# Patient Record
Sex: FEMALE | Race: WHITE | Employment: OTHER | URBAN - METROPOLITAN AREA
[De-identification: names, ages, dates, MRNs, and addresses within clinical notes are randomized per-mention and may not be internally consistent; named-entity substitution may affect disease eponyms.]

---

## 2020-05-03 ENCOUNTER — HOSPITAL ENCOUNTER (INPATIENT)
Age: 75
LOS: 14 days | Discharge: HOME | DRG: 829 | End: 2020-05-18
Attending: EMERGENCY MEDICINE | Admitting: INTERNAL MEDICINE
Payer: MEDICARE

## 2020-05-03 LAB
A/G RATIO: 1.5 (ref 1.1–2.2)
ALBUMIN SERPL-MCNC: 4 G/DL (ref 3.4–5)
ALP BLD-CCNC: 88 U/L (ref 40–129)
ALT SERPL-CCNC: 25 U/L (ref 10–40)
ANION GAP SERPL CALCULATED.3IONS-SCNC: 13 MMOL/L (ref 3–16)
AST SERPL-CCNC: 28 U/L (ref 15–37)
ATYPICAL LYMPHOCYTE RELATIVE PERCENT: 53 % (ref 0–6)
BASOPHILS ABSOLUTE: 0 K/UL (ref 0–0.2)
BASOPHILS RELATIVE PERCENT: 0 %
BILIRUB SERPL-MCNC: 0.8 MG/DL (ref 0–1)
BUN BLDV-MCNC: 10 MG/DL (ref 7–20)
CALCIUM SERPL-MCNC: 8.7 MG/DL (ref 8.3–10.6)
CHLORIDE BLD-SCNC: 75 MMOL/L (ref 99–110)
CO2: 24 MMOL/L (ref 21–32)
CREAT SERPL-MCNC: <0.5 MG/DL (ref 0.6–1.2)
EOSINOPHILS ABSOLUTE: 0 K/UL (ref 0–0.6)
EOSINOPHILS RELATIVE PERCENT: 0 %
GFR AFRICAN AMERICAN: >60
GFR NON-AFRICAN AMERICAN: >60
GLOBULIN: 2.6 G/DL
GLUCOSE BLD-MCNC: 127 MG/DL (ref 70–99)
HCT VFR BLD CALC: 38.8 % (ref 36–48)
HEMATOLOGY PATH CONSULT: YES
HEMOGLOBIN: 13.4 G/DL (ref 12–16)
LIPASE: 26 U/L (ref 13–60)
LYMPHOCYTES ABSOLUTE: 43.4 K/UL (ref 1–5.1)
LYMPHOCYTES RELATIVE PERCENT: 31 %
MCH RBC QN AUTO: 29.8 PG (ref 26–34)
MCHC RBC AUTO-ENTMCNC: 34.4 G/DL (ref 31–36)
MCV RBC AUTO: 86.8 FL (ref 80–100)
MONOCYTES ABSOLUTE: 1.6 K/UL (ref 0–1.3)
MONOCYTES RELATIVE PERCENT: 3 %
NEUTROPHILS ABSOLUTE: 6.7 K/UL (ref 1.7–7.7)
NEUTROPHILS RELATIVE PERCENT: 13 %
PDW BLD-RTO: 13.9 % (ref 12.4–15.4)
PLATELET # BLD: 419 K/UL (ref 135–450)
PLATELET SLIDE REVIEW: ADEQUATE
PMV BLD AUTO: 7.5 FL (ref 5–10.5)
POTASSIUM REFLEX MAGNESIUM: 4 MMOL/L (ref 3.5–5.1)
RBC # BLD: 4.48 M/UL (ref 4–5.2)
RBC # BLD: NORMAL 10*6/UL
SMUDGE CELLS: PRESENT
SODIUM BLD-SCNC: 112 MMOL/L (ref 136–145)
TOTAL PROTEIN: 6.6 G/DL (ref 6.4–8.2)
WBC # BLD: 51.7 K/UL (ref 4–11)

## 2020-05-03 PROCEDURE — 82533 TOTAL CORTISOL: CPT

## 2020-05-03 PROCEDURE — 6360000002 HC RX W HCPCS: Performed by: EMERGENCY MEDICINE

## 2020-05-03 PROCEDURE — 93005 ELECTROCARDIOGRAM TRACING: CPT | Performed by: EMERGENCY MEDICINE

## 2020-05-03 PROCEDURE — 80053 COMPREHEN METABOLIC PANEL: CPT

## 2020-05-03 PROCEDURE — 84443 ASSAY THYROID STIM HORMONE: CPT

## 2020-05-03 PROCEDURE — 2500000003 HC RX 250 WO HCPCS: Performed by: EMERGENCY MEDICINE

## 2020-05-03 PROCEDURE — 83690 ASSAY OF LIPASE: CPT

## 2020-05-03 PROCEDURE — 96374 THER/PROPH/DIAG INJ IV PUSH: CPT

## 2020-05-03 PROCEDURE — 2580000003 HC RX 258: Performed by: EMERGENCY MEDICINE

## 2020-05-03 PROCEDURE — 36415 COLL VENOUS BLD VENIPUNCTURE: CPT

## 2020-05-03 PROCEDURE — 85025 COMPLETE CBC W/AUTO DIFF WBC: CPT

## 2020-05-03 PROCEDURE — 80061 LIPID PANEL: CPT

## 2020-05-03 PROCEDURE — 96375 TX/PRO/DX INJ NEW DRUG ADDON: CPT

## 2020-05-03 PROCEDURE — 99291 CRITICAL CARE FIRST HOUR: CPT

## 2020-05-03 PROCEDURE — 83930 ASSAY OF BLOOD OSMOLALITY: CPT

## 2020-05-03 RX ORDER — 0.9 % SODIUM CHLORIDE 0.9 %
1000 INTRAVENOUS SOLUTION INTRAVENOUS ONCE
Status: COMPLETED | OUTPATIENT
Start: 2020-05-03 | End: 2020-05-04

## 2020-05-03 RX ORDER — ONDANSETRON 2 MG/ML
4 INJECTION INTRAMUSCULAR; INTRAVENOUS EVERY 30 MIN PRN
Status: DISCONTINUED | OUTPATIENT
Start: 2020-05-03 | End: 2020-05-04

## 2020-05-03 RX ADMIN — FAMOTIDINE 20 MG: 10 INJECTION INTRAVENOUS at 22:32

## 2020-05-03 RX ADMIN — SODIUM CHLORIDE 1000 ML: 9 INJECTION, SOLUTION INTRAVENOUS at 22:32

## 2020-05-03 RX ADMIN — ONDANSETRON 4 MG: 2 INJECTION INTRAMUSCULAR; INTRAVENOUS at 22:32

## 2020-05-04 ENCOUNTER — APPOINTMENT (OUTPATIENT)
Dept: GENERAL RADIOLOGY | Age: 75
DRG: 829 | End: 2020-05-04
Payer: MEDICARE

## 2020-05-04 PROBLEM — R11.2 N&V (NAUSEA AND VOMITING): Status: ACTIVE | Noted: 2020-05-04

## 2020-05-04 PROBLEM — D72.829 LEUKOCYTOSIS: Status: ACTIVE | Noted: 2020-05-04

## 2020-05-04 PROBLEM — E87.1 HYPONATREMIA: Status: ACTIVE | Noted: 2020-05-04

## 2020-05-04 LAB
ALBUMIN SERPL-MCNC: 3.4 G/DL (ref 3.4–5)
ALBUMIN SERPL-MCNC: 4.1 G/DL (ref 3.4–5)
ANION GAP SERPL CALCULATED.3IONS-SCNC: 10 MMOL/L (ref 3–16)
ANION GAP SERPL CALCULATED.3IONS-SCNC: 12 MMOL/L (ref 3–16)
BACTERIA: ABNORMAL /HPF
BILIRUBIN URINE: NEGATIVE
BLOOD, URINE: ABNORMAL
BUN BLDV-MCNC: 10 MG/DL (ref 7–20)
BUN BLDV-MCNC: 7 MG/DL (ref 7–20)
CALCIUM SERPL-MCNC: 8.2 MG/DL (ref 8.3–10.6)
CALCIUM SERPL-MCNC: 9.1 MG/DL (ref 8.3–10.6)
CHLORIDE BLD-SCNC: 77 MMOL/L (ref 99–110)
CHLORIDE BLD-SCNC: 84 MMOL/L (ref 99–110)
CHOLESTEROL, TOTAL: 176 MG/DL (ref 0–199)
CLARITY: CLEAR
CO2: 24 MMOL/L (ref 21–32)
CO2: 25 MMOL/L (ref 21–32)
COLOR: YELLOW
CORTISOL TOTAL: 46.6 UG/DL
CREAT SERPL-MCNC: <0.5 MG/DL (ref 0.6–1.2)
CREAT SERPL-MCNC: <0.5 MG/DL (ref 0.6–1.2)
CREATININE URINE: 65.4 MG/DL (ref 28–259)
EKG ATRIAL RATE: 68 BPM
EKG DIAGNOSIS: NORMAL
EKG P AXIS: 81 DEGREES
EKG P-R INTERVAL: 194 MS
EKG Q-T INTERVAL: 410 MS
EKG QRS DURATION: 102 MS
EKG QTC CALCULATION (BAZETT): 435 MS
EKG R AXIS: 65 DEGREES
EKG T AXIS: 76 DEGREES
EKG VENTRICULAR RATE: 68 BPM
EPITHELIAL CELLS, UA: ABNORMAL /HPF (ref 0–5)
GFR AFRICAN AMERICAN: >60
GFR AFRICAN AMERICAN: >60
GFR NON-AFRICAN AMERICAN: >60
GFR NON-AFRICAN AMERICAN: >60
GLUCOSE BLD-MCNC: 121 MG/DL (ref 70–99)
GLUCOSE BLD-MCNC: 124 MG/DL (ref 70–99)
GLUCOSE URINE: NEGATIVE MG/DL
HDLC SERPL-MCNC: 70 MG/DL (ref 40–60)
HEMATOLOGY PATH CONSULT: NORMAL
HYALINE CASTS: ABNORMAL /LPF (ref 0–2)
KETONES, URINE: 15 MG/DL
LDL CHOLESTEROL CALCULATED: 92 MG/DL
LEUKOCYTE ESTERASE, URINE: NEGATIVE
MICROSCOPIC EXAMINATION: YES
NITRITE, URINE: NEGATIVE
OSMOLALITY URINE: 332 MOSM/KG (ref 390–1070)
OSMOLALITY: 233 MOSM/KG (ref 280–301)
PH UA: 6 (ref 5–8)
PHOSPHORUS: 2.5 MG/DL (ref 2.5–4.9)
PHOSPHORUS: 3.5 MG/DL (ref 2.5–4.9)
POTASSIUM SERPL-SCNC: 3.4 MMOL/L (ref 3.5–5.1)
POTASSIUM SERPL-SCNC: 3.9 MMOL/L (ref 3.5–5.1)
PROTEIN UA: 30 MG/DL
RBC UA: ABNORMAL /HPF (ref 0–4)
SODIUM BLD-SCNC: 113 MMOL/L (ref 136–145)
SODIUM BLD-SCNC: 115 MMOL/L (ref 136–145)
SODIUM BLD-SCNC: 117 MMOL/L (ref 136–145)
SODIUM BLD-SCNC: 117 MMOL/L (ref 136–145)
SODIUM BLD-SCNC: 119 MMOL/L (ref 136–145)
SODIUM BLD-SCNC: 119 MMOL/L (ref 136–145)
SODIUM URINE: 28 MMOL/L
SODIUM URINE: 30 MMOL/L
SPECIFIC GRAVITY UA: 1.01 (ref 1–1.03)
TRIGL SERPL-MCNC: 68 MG/DL (ref 0–150)
TSH REFLEX: 1.27 UIU/ML (ref 0.27–4.2)
URINE CULTURE, ROUTINE: NORMAL
URINE REFLEX TO CULTURE: YES
URINE TYPE: ABNORMAL
UROBILINOGEN, URINE: 0.2 E.U./DL
VLDLC SERPL CALC-MCNC: 14 MG/DL
WBC UA: ABNORMAL /HPF (ref 0–5)

## 2020-05-04 PROCEDURE — 2580000003 HC RX 258: Performed by: INTERNAL MEDICINE

## 2020-05-04 PROCEDURE — 84300 ASSAY OF URINE SODIUM: CPT

## 2020-05-04 PROCEDURE — 83935 ASSAY OF URINE OSMOLALITY: CPT

## 2020-05-04 PROCEDURE — 87086 URINE CULTURE/COLONY COUNT: CPT

## 2020-05-04 PROCEDURE — 80069 RENAL FUNCTION PANEL: CPT

## 2020-05-04 PROCEDURE — 6370000000 HC RX 637 (ALT 250 FOR IP): Performed by: INTERNAL MEDICINE

## 2020-05-04 PROCEDURE — 71045 X-RAY EXAM CHEST 1 VIEW: CPT

## 2020-05-04 PROCEDURE — 82570 ASSAY OF URINE CREATININE: CPT

## 2020-05-04 PROCEDURE — 1200000000 HC SEMI PRIVATE

## 2020-05-04 PROCEDURE — 36415 COLL VENOUS BLD VENIPUNCTURE: CPT

## 2020-05-04 PROCEDURE — 84295 ASSAY OF SERUM SODIUM: CPT

## 2020-05-04 PROCEDURE — 93010 ELECTROCARDIOGRAM REPORT: CPT | Performed by: INTERNAL MEDICINE

## 2020-05-04 PROCEDURE — 6360000002 HC RX W HCPCS: Performed by: INTERNAL MEDICINE

## 2020-05-04 PROCEDURE — 81001 URINALYSIS AUTO W/SCOPE: CPT

## 2020-05-04 RX ORDER — SODIUM CHLORIDE 0.9 % (FLUSH) 0.9 %
10 SYRINGE (ML) INJECTION PRN
Status: DISCONTINUED | OUTPATIENT
Start: 2020-05-04 | End: 2020-05-18 | Stop reason: HOSPADM

## 2020-05-04 RX ORDER — ACETAMINOPHEN 325 MG/1
650 TABLET ORAL EVERY 6 HOURS PRN
Status: DISCONTINUED | OUTPATIENT
Start: 2020-05-04 | End: 2020-05-18 | Stop reason: HOSPADM

## 2020-05-04 RX ORDER — SODIUM CHLORIDE 0.9 % (FLUSH) 0.9 %
10 SYRINGE (ML) INJECTION EVERY 12 HOURS SCHEDULED
Status: DISCONTINUED | OUTPATIENT
Start: 2020-05-04 | End: 2020-05-18 | Stop reason: HOSPADM

## 2020-05-04 RX ORDER — PROMETHAZINE HYDROCHLORIDE 25 MG/1
12.5 TABLET ORAL EVERY 6 HOURS PRN
Status: DISCONTINUED | OUTPATIENT
Start: 2020-05-04 | End: 2020-05-18 | Stop reason: HOSPADM

## 2020-05-04 RX ORDER — POTASSIUM CHLORIDE 20 MEQ/1
20 TABLET, EXTENDED RELEASE ORAL ONCE
Status: COMPLETED | OUTPATIENT
Start: 2020-05-04 | End: 2020-05-04

## 2020-05-04 RX ORDER — SODIUM CHLORIDE 9 MG/ML
INJECTION, SOLUTION INTRAVENOUS CONTINUOUS
Status: DISCONTINUED | OUTPATIENT
Start: 2020-05-04 | End: 2020-05-04

## 2020-05-04 RX ORDER — ACETAMINOPHEN 650 MG/1
650 SUPPOSITORY RECTAL EVERY 6 HOURS PRN
Status: DISCONTINUED | OUTPATIENT
Start: 2020-05-04 | End: 2020-05-18 | Stop reason: HOSPADM

## 2020-05-04 RX ORDER — ONDANSETRON 2 MG/ML
4 INJECTION INTRAMUSCULAR; INTRAVENOUS EVERY 6 HOURS PRN
Status: DISCONTINUED | OUTPATIENT
Start: 2020-05-04 | End: 2020-05-18 | Stop reason: HOSPADM

## 2020-05-04 RX ORDER — POLYETHYLENE GLYCOL 3350 17 G/17G
17 POWDER, FOR SOLUTION ORAL DAILY PRN
Status: DISCONTINUED | OUTPATIENT
Start: 2020-05-04 | End: 2020-05-18 | Stop reason: HOSPADM

## 2020-05-04 RX ORDER — ATORVASTATIN CALCIUM 40 MG/1
40 TABLET, FILM COATED ORAL DAILY
Status: DISCONTINUED | OUTPATIENT
Start: 2020-05-04 | End: 2020-05-18 | Stop reason: HOSPADM

## 2020-05-04 RX ORDER — SODIUM CHLORIDE 9 MG/ML
1000 INJECTION, SOLUTION INTRAVENOUS CONTINUOUS
Status: DISCONTINUED | OUTPATIENT
Start: 2020-05-04 | End: 2020-05-04

## 2020-05-04 RX ADMIN — Medication 10 ML: at 11:29

## 2020-05-04 RX ADMIN — SODIUM CHLORIDE: 9 INJECTION, SOLUTION INTRAVENOUS at 17:50

## 2020-05-04 RX ADMIN — ATORVASTATIN CALCIUM 40 MG: 40 TABLET, FILM COATED ORAL at 11:27

## 2020-05-04 RX ADMIN — SODIUM CHLORIDE: 9 INJECTION, SOLUTION INTRAVENOUS at 02:02

## 2020-05-04 RX ADMIN — ENOXAPARIN SODIUM 40 MG: 40 INJECTION SUBCUTANEOUS at 11:27

## 2020-05-04 RX ADMIN — POTASSIUM CHLORIDE 20 MEQ: 20 TABLET, EXTENDED RELEASE ORAL at 14:26

## 2020-05-04 ASSESSMENT — PAIN SCALES - GENERAL
PAINLEVEL_OUTOF10: 0

## 2020-05-04 NOTE — PROGRESS NOTES
Pt admitted to 211 from ED. Pt a/o, VSS. Tele monitor in place. Admission assessment completed and charted. Pt oriented to environment. Pt denies any pain or nausea at this time. Pt instructed to call for assistance if needed. Bed in lowest position with wheels locked. Bedside table and call light within reach. Pt denies any other needs at this time. Will continue to monitor.

## 2020-05-04 NOTE — CONSULTS
Thanks for consulting [facility] for the care of [facility]. Severe hyponatremia  Labs ordered  RN to call me with results      Full consult will follow  Please call with questions at-  24 Hrs Answering service [phone]  Perfect serve, or cell phone 7 am - 764 Timo Myers MD   [facility] nephrology  [facility]

## 2020-05-04 NOTE — CONSULTS
[facility]ROLOG[facility]    [facility] [employer]              [phone]                 Plan :     Sodium every 4 hrs for now  Multiple labs pending  Urine lytes will be helpful- pending       Assessment :     Hyponatremia  Sodium 112 on admission- now 80  Was on fluids  Now on hold    Urine lytes pending  No history of liver failure, alcoholism, CHF, hypothyroidism  Possible leukemia- bm pending    Likely dehydration, but sodium not going up fast, so could have SIADH        Hypokalemia  Due to N and V, D  Will replace          Leucocytosis- plan for bone marrow biopsy    [facility]rology would like to thank Grace Pagan MD   for opportunity to serve this patient      Please call with questions at-   24 Hrs Answering service [phone] or  7 am- 5 pm via Perfect serve or cell phone  Dr.Sudhir Pablito Blas          CC/reason for consult :     hyponatremia     HPI :     Lona Valdez is a 69 y.o. female presented to   the hospital on 5/3/2020 with nausea, vomiting and diarrhea  For 4 days. No pain abdomen. No other complaints. Denies liver problem, drinking, thyroid problem,  Heart problem    She was found to have low sodium because of which   We are consulted     Interval History:     Sodium coming up    ROS:     Seen with- no family    positives in bold   Constitutional:  fever, chills, weakness, weight change, fatigue  Skin:  rash, pruritus, hair loss, bruising, dry skin, petechiae  Head, Face, Neck   headaches, swelling,  cervical adenopathy  Respiratory: shortness of breath, cough, or wheezing  Cardiovascular: chest pain, palpitations, dizzy, edema  Gastrointestinal: nausea, vomiting, diarrhea, constipation,belly pain    Yellow skin, blood in stool  Musculoskeletal:  back pain, muscle weakness, gait problems,       joint pain or swelling.   Genitourinary:  dysuria, poor urine flow, flank pain, blood in urine  Neurologic:  vertigo, TIA'S, syncope, seizures, focal weakness  Psychosocial:  insomnia,

## 2020-05-04 NOTE — ED PROVIDER NOTES
Emergency Physician Note    Chief Complaint  Abdominal Pain (nausea vomiting diarrhea started friday.)       History of Present Illness  Lona Valdez is a 69 y.o. female who presents to the ED for nausea, vomiting, diarrhea. Symptoms started on Friday. Symptoms have been constant. Denies any blood in the vomitus or the diarrhea. Patient denies any abdominal pain at this time. She is however feeling very dehydrated    Denies fever, chills, malaise, chest pain, shortness of breath, cough, abdominal pain,  headache, sore throat, dysuria, back pain, rash. No palliative/provocative factors. Unless otherwise stated in this report or unable to obtain because of the patient's clinical or mental status as evidenced by the medical record, this patient's positive and negative responses for review of systems, constitutional, psych, eyes, ENT, cardiovascular, respiratory, gastrointestinal, neurological, genitourinary, musculoskeletal, integument systems and systems related to the presenting problem are either stated in the preceding paragraph or were not pertinent or were negative for the symptoms and/or complaints related to the medical problem. I have reviewed the following from the nursing documentation:      Prior to Admission medications    Medication Sig Start Date End Date Taking? Authorizing Provider   Atorvastatin Calcium (LIPITOR PO) Take by mouth   Yes Historical Provider, MD       Allergies as of 05/03/2020    (No Known Allergies)       Past Medical History:   Diagnosis Date    Hyperlipidemia         Surgical History: No past surgical history on file. Family History:  No family history on file. Social History     Socioeconomic History    Marital status:       Spouse name: Not on file    Number of children: Not on file    Years of education: Not on file    Highest education level: Not on file   Occupational History    Not on file   Social Needs    Financial resource to med/surg floor. Please note, critical care time was at least 35 minutes, obtaining history, conducting a physical exam, performing and monitoring interventions, ordering, collecting and interpreting tests, and establishing medical decision-making and discussion with the patient and/or family, specifically for management of the presenting complaint and symptoms initially, direct bedside care, reevaluation, review of records, and consultation. There was a high probability of clinically significant life-threatening deterioration in the patient's condition, which required my urgent intervention. This time does not include separately billable procedures. The note was completed using Dragon voice recognition transcription. Every effort was made to ensure accuracy; however, inadvertent transcription errors may be present despite my best efforts to edit errors.     Chris Mauricio MD  [address] [facility]       Chris Mauricio MD  05/04/20 5974

## 2020-05-04 NOTE — ED NOTES
[facility] @ 0017   RE: Lymphocytosis, hyponatremia  DR. Singleton @ [address]  05/04/20 0022

## 2020-05-04 NOTE — CONSULTS
 Financial resource strain: Not on file    Food insecurity     Worry: Not on file     Inability: Not on file   Cap That needs     Medical: Not on file     Non-medical: Not on file   Tobacco Use    Smoking status: Not on file   Substance and Sexual Activity    Alcohol use: Not on file    Drug use: Not on file    Sexual activity: Not on file   Lifestyle    Physical activity     Days per week: Not on file     Minutes per session: Not on file    Stress: Not on file   Relationships    Social connections     Talks on phone: Not on file     Gets together: Not on file     Attends Scientologist service: Not on file     Active member of club or organization: Not on file     Attends meetings of clubs or organizations: Not on file     Relationship status: Not on file    Intimate partner violence     Fear of current or ex partner: Not on file     Emotionally abused: Not on file     Physically abused: Not on file     Forced sexual activity: Not on file   Other Topics Concern    Not on file   Social History Narrative    Not on file          Family History:     No family history on file.   REVIEW OF SYSTEMS:    Review of Systems    PHYSICAL EXAM:      Vitals:  BP (!) 133/96   Pulse 79   Temp 97.5 °F (36.4 °C) (Oral)   Resp 18   Ht 5' 4\" (1.626 m)   Wt 140 lb (63.5 kg)   SpO2 95%   BMI 24.03 kg/m²     CONSTITUTIONAL:  awake, alert, cooperative, no apparent distress, and appears stated age NAD  EYES:  pupils equal, round and reactive to light, extra ocular muscles intact, sclera clear, conjunctiva normal  NECK:Supple, symmetrical, trachea midline, no adenopathy, thyroid symmetric, not enlarged and no tenderness, skin normal  HEMATOLOGIC/LYMPHATICS:  no cervical lymphadenopathy, no supraclavicular lymphadenopathy, no axillarylymphadenopathy and no inguinal lymphadenopathy  LUNGS:  No increased work of breathing, good air exchange, clear to auscultation bilaterally, no crackles or wheezing  CARDIOVASCULAR:  , regular rate and rhythm, normalS1 and S2, no S3 or S4, and no murmur noted  ABDOMEN:  No scars, normal bowel sounds, soft, non-distended, non-tender, no masses palpated, no hepatosplenomegally      MUSCULOSKELETAL:  There is no redness, warmth,or swelling of the joints. Full range of motion noted. Motor strength is 5 out of 5 all extremities bilaterally. NEUROLOGIC:  Awake, alert, oriented to name, place and time. Cranial nerves II-XII are grossly intact. Motor is 5 out of 5 bilaterally. SKIN:  no bruising or bleeding      DATA:    PT/INR:    Recent Labs     05/03/20  2235   PROT 6.6     PTT:  No results for input(s): APTT in the last 72 hours.   CMP:    Lab Results   Component Value Date     05/03/2020     05/03/2020    K 4.0 05/03/2020    K 3.9 05/03/2020    CL 75 05/03/2020    CL 77 05/03/2020    CO2 24 05/03/2020    CO2 24 05/03/2020    BUN 10 05/03/2020    BUN 10 05/03/2020    PROT 6.6 05/03/2020     Magnesium:  No results found for: MG  Phosphorus:  No components found for: PO4  Calcium:  No components found for: CA  CBC:    Lab Results   Component Value Date    WBC 51.7 05/03/2020    RBC 4.48 05/03/2020    HGB 13.4 05/03/2020    HCT 38.8 05/03/2020    MCV 86.8 05/03/2020    RDW 13.9 05/03/2020     05/03/2020     DIFF:  Lab Results   Component Value Date    MCV 86.8 05/03/2020    RDW 13.9 05/03/2020      [email or web address]  Uric Acid:  @labcrnt(URIC)@    Radiology Review:          Problem List  Patient Active Problem List   Diagnosis    Hyponatremia    N&V (nausea and vomiting)    Leukocytosis       IMPRESSION/RECOMMENDATIONS:    Leukocytosis:  -He has a high lymphocyte count  -This could be just CLL  -This is rather abrupt  -I did look and she had a white count of 16,000 about a year ago, but did not have a lymphocytosis  -We will proceed with a bone marrow biopsy    Hyponatremia:  -Nephrology is working up  -If this is SIADH, I would also be concerned about a lung cancer with a leukemoid reaction  -I would then order a CT of her chest        Thank you for asking me to see the patient.        Zhanna Ruggiero MD  Please Contact Through Perfect Serve

## 2020-05-04 NOTE — PLAN OF CARE
Problem: Falls - Risk of:  Goal: Will remain free from falls  Description: Will remain free from falls  Outcome: Ongoing  Goal: Absence of physical injury  Description: Absence of physical injury  Outcome: Ongoing   Pt free from falls and injury. Call light within reach.  Pt bed check on and calls for assistance to bathroom

## 2020-05-05 ENCOUNTER — APPOINTMENT (OUTPATIENT)
Dept: CT IMAGING | Age: 75
DRG: 829 | End: 2020-05-05
Payer: MEDICARE

## 2020-05-05 LAB
ALBUMIN SERPL-MCNC: 3.3 G/DL (ref 3.4–5)
ANION GAP SERPL CALCULATED.3IONS-SCNC: 5 MMOL/L (ref 3–16)
BUN BLDV-MCNC: 10 MG/DL (ref 7–20)
CALCIUM SERPL-MCNC: 8.1 MG/DL (ref 8.3–10.6)
CHLORIDE BLD-SCNC: 83 MMOL/L (ref 99–110)
CO2: 25 MMOL/L (ref 21–32)
CREAT SERPL-MCNC: <0.5 MG/DL (ref 0.6–1.2)
GFR AFRICAN AMERICAN: >60
GFR NON-AFRICAN AMERICAN: >60
GLUCOSE BLD-MCNC: 100 MG/DL (ref 70–99)
GLUCOSE BLD-MCNC: 97 MG/DL (ref 70–99)
HCT VFR BLD CALC: 32.8 % (ref 36–48)
HCT VFR BLD CALC: 35 % (ref 36–48)
HEMOGLOBIN: 11.4 G/DL (ref 12–16)
HEMOGLOBIN: 12 G/DL (ref 12–16)
MAGNESIUM: 1.7 MG/DL (ref 1.8–2.4)
MCH RBC QN AUTO: 30 PG (ref 26–34)
MCH RBC QN AUTO: 30.3 PG (ref 26–34)
MCHC RBC AUTO-ENTMCNC: 34.3 G/DL (ref 31–36)
MCHC RBC AUTO-ENTMCNC: 34.9 G/DL (ref 31–36)
MCV RBC AUTO: 87 FL (ref 80–100)
MCV RBC AUTO: 87.4 FL (ref 80–100)
PDW BLD-RTO: 13.7 % (ref 12.4–15.4)
PDW BLD-RTO: 13.7 % (ref 12.4–15.4)
PERFORMED ON: ABNORMAL
PHOSPHORUS: 2.3 MG/DL (ref 2.5–4.9)
PLATELET # BLD: 370 K/UL (ref 135–450)
PLATELET # BLD: 396 K/UL (ref 135–450)
PMV BLD AUTO: 7.3 FL (ref 5–10.5)
PMV BLD AUTO: 7.9 FL (ref 5–10.5)
POTASSIUM SERPL-SCNC: 3.5 MMOL/L (ref 3.5–5.1)
RBC # BLD: 3.76 M/UL (ref 4–5.2)
RBC # BLD: 4.01 M/UL (ref 4–5.2)
SODIUM BLD-SCNC: 113 MMOL/L (ref 136–145)
SODIUM BLD-SCNC: 118 MMOL/L (ref 136–145)
SODIUM BLD-SCNC: 119 MMOL/L (ref 136–145)
SODIUM BLD-SCNC: 121 MMOL/L (ref 136–145)
SODIUM URINE: 129 MMOL/L
WBC # BLD: 44 K/UL (ref 4–11)
WBC # BLD: 44.3 K/UL (ref 4–11)

## 2020-05-05 PROCEDURE — 88313 SPECIAL STAINS GROUP 2: CPT

## 2020-05-05 PROCEDURE — 2700000000 HC OXYGEN THERAPY PER DAY

## 2020-05-05 PROCEDURE — 88184 FLOWCYTOMETRY/ TC 1 MARKER: CPT

## 2020-05-05 PROCEDURE — 2580000003 HC RX 258: Performed by: INTERNAL MEDICINE

## 2020-05-05 PROCEDURE — 88305 TISSUE EXAM BY PATHOLOGIST: CPT

## 2020-05-05 PROCEDURE — 94761 N-INVAS EAR/PLS OXIMETRY MLT: CPT

## 2020-05-05 PROCEDURE — 6370000000 HC RX 637 (ALT 250 FOR IP): Performed by: INTERNAL MEDICINE

## 2020-05-05 PROCEDURE — 84295 ASSAY OF SERUM SODIUM: CPT

## 2020-05-05 PROCEDURE — 83735 ASSAY OF MAGNESIUM: CPT

## 2020-05-05 PROCEDURE — 07DR3ZX EXTRACTION OF ILIAC BONE MARROW, PERCUTANEOUS APPROACH, DIAGNOSTIC: ICD-10-PCS | Performed by: RADIOLOGY

## 2020-05-05 PROCEDURE — 88342 IMHCHEM/IMCYTCHM 1ST ANTB: CPT

## 2020-05-05 PROCEDURE — 80069 RENAL FUNCTION PANEL: CPT

## 2020-05-05 PROCEDURE — 85027 COMPLETE CBC AUTOMATED: CPT

## 2020-05-05 PROCEDURE — 2709999900 CT BIOPSY BONE MARROW

## 2020-05-05 PROCEDURE — 88311 DECALCIFY TISSUE: CPT

## 2020-05-05 PROCEDURE — 6360000002 HC RX W HCPCS: Performed by: RADIOLOGY

## 2020-05-05 PROCEDURE — 36415 COLL VENOUS BLD VENIPUNCTURE: CPT

## 2020-05-05 PROCEDURE — 88185 FLOWCYTOMETRY/TC ADD-ON: CPT

## 2020-05-05 PROCEDURE — 88341 IMHCHEM/IMCYTCHM EA ADD ANTB: CPT

## 2020-05-05 PROCEDURE — 84300 ASSAY OF URINE SODIUM: CPT

## 2020-05-05 PROCEDURE — 1200000000 HC SEMI PRIVATE

## 2020-05-05 PROCEDURE — 77012 CT SCAN FOR NEEDLE BIOPSY: CPT

## 2020-05-05 RX ORDER — SODIUM CHLORIDE 9 MG/ML
INJECTION, SOLUTION INTRAVENOUS CONTINUOUS
Status: DISCONTINUED | OUTPATIENT
Start: 2020-05-05 | End: 2020-05-05

## 2020-05-05 RX ORDER — FENTANYL CITRATE 50 UG/ML
INJECTION, SOLUTION INTRAMUSCULAR; INTRAVENOUS
Status: COMPLETED | OUTPATIENT
Start: 2020-05-05 | End: 2020-05-05

## 2020-05-05 RX ORDER — MIDAZOLAM HYDROCHLORIDE 5 MG/ML
INJECTION INTRAMUSCULAR; INTRAVENOUS
Status: COMPLETED | OUTPATIENT
Start: 2020-05-05 | End: 2020-05-05

## 2020-05-05 RX ADMIN — Medication 10 ML: at 08:32

## 2020-05-05 RX ADMIN — ATORVASTATIN CALCIUM 40 MG: 40 TABLET, FILM COATED ORAL at 12:17

## 2020-05-05 RX ADMIN — Medication 15 G: at 15:57

## 2020-05-05 RX ADMIN — Medication 15 G: at 20:27

## 2020-05-05 RX ADMIN — SODIUM CHLORIDE: 9 INJECTION, SOLUTION INTRAVENOUS at 06:47

## 2020-05-05 RX ADMIN — FENTANYL CITRATE 50 MCG: 50 INJECTION INTRAMUSCULAR; INTRAVENOUS at 10:41

## 2020-05-05 RX ADMIN — MIDAZOLAM HYDROCHLORIDE 1 MG: 5 INJECTION, SOLUTION INTRAMUSCULAR; INTRAVENOUS at 10:41

## 2020-05-05 RX ADMIN — Medication 10 ML: at 20:29

## 2020-05-05 ASSESSMENT — PAIN SCALES - GENERAL: PAINLEVEL_OUTOF10: 0

## 2020-05-05 NOTE — PROGRESS NOTES
ONCOLOGY HEMATOLOGY CARE PROGRESS NOTE      SUBJECTIVE:     The patient says she feels pretty well. ROS:   The remaining 10 point review of symptoms is unremarkable. OBJECTIVE        Physical    VITALS:  /62   Pulse 70   Temp 97.5 °F (36.4 °C) (Oral)   Resp 16   Ht 5' 4\" (1.626 m)   Wt 144 lb 4.8 oz (65.5 kg)   SpO2 93%   BMI 24.77 kg/m²   TEMPERATURE:  Current - Temp: 97.5 °F (36.4 °C); Max - Temp  Av.6 °F (36.4 °C)  Min: 97.5 °F (36.4 °C)  Max: 97.8 °F (36.6 °C)  PULSE OXIMETRY RANGE: SpO2  Av.2 %  Min: 92 %  Max: 96 %  24HR INTAKE/OUTPUT:      Intake/Output Summary (Last 24 hours) at 2020 0824  Last data filed at 2020 3295  Gross per 24 hour   Intake 1459 ml   Output 1100 ml   Net 359 ml       CONSTITUTIONAL:  awake, alert, cooperative, no apparent distress, HEENT oral pharynx , no scleral icterus  HEMATOLOGIC/LYMPHATICS:  no cervical lymphadenopathy, no supraclavicular lymphadenopathy, no axillary lymphadenopathy and no inguinal lymphadenopathy  LUNGS:  No increased work of breathing, good air exchange, clear to auscultation bilaterally, no crackles or wheezing  CARDIOVASCULAR:  , regular rate and rhythm, normal S1 and S2, no S3 or S4, and no murmur noted  ABDOMEN:  No scars, normal bowel sounds, soft, non-distended, non-tender, no masses palpated, no hepatosplenomegally  MUSCULOSKELETAL:  There is no redness, warmth, or swelling of the joints. EXTREMETIES: No clubbing cynosis or edema  NEUROLOGIC:  Awake, alert, oriented to name, place and time. Cranial nerves II-XII are grossly intact. Motor is 5 out of 5 bilaterally.    SKIN:  no bruising or bleeding      Data      Recent Labs     20  0152   WBC 51.7* 44.0*   HGB 13.4 11.4*   HCT 38.8 32.8*    370   MCV 86.8 87.0        Recent Labs     20  0910  20  2142 20  0152 20  0645   *  112* 119*  119*   < > 115* 113*

## 2020-05-05 NOTE — PROGRESS NOTES
Pt a/o. VSS. Shift assessment complete and charted. Pt denies pain, n/v, sob at this time. Pt denies needs. Pt verbalized understanding of NPO status after midnight. Updated pt on POC regarding labs and sodium level and pt verbalized understanding. Bed locked and in lowest position. Will continue to monitor.

## 2020-05-05 NOTE — PLAN OF CARE
Pt a/o. Verbalized understanding of calling for assistance. Bed check in place for safety. Will continue to monitor.

## 2020-05-05 NOTE — PROGRESS NOTES
Hospitalist Progress Note      PCP: Joanne Garcia    Date of Admission: 5/3/2020    Chief Complaint: N/V    Subjective: no new c/o. Medications:  Reviewed    Infusion Medications   Scheduled Medications    urea  15 g Oral TID    atorvastatin  40 mg Oral Daily    sodium chloride flush  10 mL Intravenous 2 times per day    enoxaparin  40 mg Subcutaneous Daily     PRN Meds: sodium chloride flush, acetaminophen **OR** acetaminophen, polyethylene glycol, promethazine **OR** ondansetron      Intake/Output Summary (Last 24 hours) at 5/5/2020 0858  Last data filed at 5/5/2020 7584  Gross per 24 hour   Intake 1459 ml   Output 1100 ml   Net 359 ml       Physical Exam Performed:    /78   Pulse 66   Temp 98.2 °F (36.8 °C) (Oral)   Resp 16   Ht 5' 4\" (1.626 m)   Wt 144 lb 4.8 oz (65.5 kg)   SpO2 93%   BMI 24.77 kg/m²     General appearance: No apparent distress, appears stated age and cooperative. HEENT: Pupils equal, round, and reactive to light. Conjunctivae/corneas clear. Neck: Supple, with full range of motion. No jugular venous distention. Trachea midline. Respiratory:  Normal respiratory effort. Clear to auscultation, bilaterally without Rales/Wheezes/Rhonchi. Cardiovascular: Regular rate and rhythm with normal S1/S2 without murmurs, rubs or gallops. Abdomen: Soft, non-tender, non-distended with normal bowel sounds. Musculoskeletal: No clubbing, cyanosis or edema bilaterally. Full range of motion without deformity. Skin: Skin color, texture, turgor normal.  No rashes or lesions. Neurologic:  Neurovascularly intact without any focal sensory/motor deficits.  Cranial nerves: II-XII intact, grossly non-focal.  Psychiatric: Alert and oriented, thought content appropriate, normal insight  Capillary Refill: Brisk,< 3 seconds   Peripheral Pulses: +2 palpable, equal bilaterally       Labs:   Recent Labs     05/03/20  2235 05/05/20  0152   WBC 51.7* 44.0*   HGB 13.4 11.4*   HCT 38.8 32.8*   PLT [address] [facility]     05/03/20  2235 05/04/20  0910  05/04/20  2142 05/05/20  0152 05/05/20  0645   *  112* 119*  119*   < > 115* 113* 119*   K 3.9  4.0 3.4*  --   --  3.5  --    CL 77*  75* 84*  --   --  83*  --    CO2 24  24 25  --   --  25  --    BUN 10  10 7  --   --  10  --    CREATININE <0.5*  <0.5* <0.5*  --   --  <0.5*  --    CALCIUM 9.1  8.7 8.2*  --   --  8.1*  --    PHOS 3.5 2.5  --   --  2.3*  --     < > = values in this interval not displayed. Recent Labs     05/03/20 2235   AST 28   ALT 25   BILITOT 0.8   ALKPHOS 88     No results for input(s): INR in the last 72 hours. No results for input(s): Marcha Mary in the last 72 hours. Urinalysis:      Lab Results   Component Value Date    NITRU Negative 05/04/2020    WBCUA 6-10 05/04/2020    BACTERIA 2+ 05/04/2020    RBCUA 11-20 05/04/2020    BLOODU MODERATE 05/04/2020    SPECGRAV 1.015 05/04/2020    GLUCOSEU Negative 05/04/2020       Consults:    IP CONSULT TO HOSPITALIST  IP CONSULT TO ONCOLOGY  IP CONSULT TO NEPHROLOGY      Assessment/Plan:    Active Hospital Problems    Diagnosis    Hyponatremia [E87.1]    N&V (nausea and vomiting) [R11.2]    Leukocytosis [D72.829]       N/V - likely 2nd to viral gastroenteritis. Supportive care w/ IVF/Antiemetics. HypoNatremia - critically low, likely 2nd to above, likely hypovolemic. SIADH possible. Follow serial labs on IVF. Reviewed and documented as above. Nephrology consulted and appreciated. Leukocytosis - possible CLL. Heme/Onc consulted and appreciated w/ plans for BM Bx. DVT Prophylaxis: LMWH  Diet: Diet NPO, After Midnight  Code Status: Full Code      PT/OT Eval Status: not yet ordered. Dispo - Possibly Wed/Thurs 6/7 May pending Na trend and subspecialty recs.      Sakina Rodriguez MD

## 2020-05-05 NOTE — PROGRESS NOTES
weakness, gait problems,       joint pain or swelling. Genitourinary:  dysuria, poor urine flow, flank pain, blood in urine  Neurologic:  vertigo, TIA'S, syncope, seizures, focal weakness  Psychosocial:  insomnia, anxiety, or depression. Additional positive findings: nausea, and vomiting               All other remaining systems are negative or unable to obtain       Medication:     Current Facility-Administered Medications: urea (URE-NA) packet 15 g, 15 g, Oral, TID  atorvastatin (LIPITOR) tablet 40 mg, 40 mg, Oral, Daily  sodium chloride flush 0.9 % injection 10 mL, 10 mL, Intravenous, 2 times per day  sodium chloride flush 0.9 % injection 10 mL, 10 mL, Intravenous, PRN  acetaminophen (TYLENOL) tablet 650 mg, 650 mg, Oral, Q6H PRN **OR** acetaminophen (TYLENOL) suppository 650 mg, 650 mg, Rectal, Q6H PRN  polyethylene glycol (GLYCOLAX) packet 17 g, 17 g, Oral, Daily PRN  promethazine (PHENERGAN) tablet 12.5 mg, 12.5 mg, Oral, Q6H PRN **OR** ondansetron (ZOFRAN) injection 4 mg, 4 mg, Intravenous, Q6H PRN  enoxaparin (LOVENOX) injection 40 mg, 40 mg, Subcutaneous, Daily       Vitals :     Vitals:    05/05/20 1105   BP: 139/76   Pulse: 73   Resp: 16   Temp:    SpO2: 100%       I & O :       Intake/Output Summary (Last 24 hours) at 5/5/2020 1126  Last data filed at 5/5/2020 0858  Gross per 24 hour   Intake 1459 ml   Output 1800 ml   Net -341 ml        Physical Examination :     General appearance: Anxious- no, distressed- no, in good spirits- yes  HEENT: Lips- normal, teeth- ok , oral mucosa- moist  Neck : Mass- no, appears symmetrical, JVD- not visible  Respiratory: Respiratory effort-  normal, wheeze- no, crackles - no  Cardiovascular:  Ausculation- No M/R/G, Edema no  Abdomen: visible mass- no, distention- no, scar- no, tenderness- no                            hepatosplenomegaly-  no  Musculoskeletal:  clubbing no,cyanosis- no , digital ischemia- no                           muscle strength- grossly normal , tone

## 2020-05-06 ENCOUNTER — APPOINTMENT (OUTPATIENT)
Dept: CT IMAGING | Age: 75
DRG: 829 | End: 2020-05-06
Payer: MEDICARE

## 2020-05-06 LAB
GLUCOSE BLD-MCNC: 107 MG/DL (ref 70–99)
GLUCOSE BLD-MCNC: 91 MG/DL (ref 70–99)
GLUCOSE BLD-MCNC: 97 MG/DL (ref 70–99)
GLUCOSE BLD-MCNC: 99 MG/DL (ref 70–99)
PERFORMED ON: ABNORMAL
PERFORMED ON: NORMAL
SODIUM BLD-SCNC: 119 MMOL/L (ref 136–145)
SODIUM BLD-SCNC: 119 MMOL/L (ref 136–145)
SODIUM BLD-SCNC: 122 MMOL/L (ref 136–145)
SODIUM BLD-SCNC: 124 MMOL/L (ref 136–145)
SODIUM BLD-SCNC: 128 MMOL/L (ref 136–145)
SODIUM URINE: 66 MMOL/L

## 2020-05-06 PROCEDURE — 6370000000 HC RX 637 (ALT 250 FOR IP): Performed by: INTERNAL MEDICINE

## 2020-05-06 PROCEDURE — 36415 COLL VENOUS BLD VENIPUNCTURE: CPT

## 2020-05-06 PROCEDURE — 2580000003 HC RX 258: Performed by: INTERNAL MEDICINE

## 2020-05-06 PROCEDURE — 6360000002 HC RX W HCPCS: Performed by: INTERNAL MEDICINE

## 2020-05-06 PROCEDURE — 71260 CT THORAX DX C+: CPT

## 2020-05-06 PROCEDURE — 84300 ASSAY OF URINE SODIUM: CPT

## 2020-05-06 PROCEDURE — 84295 ASSAY OF SERUM SODIUM: CPT

## 2020-05-06 PROCEDURE — 6360000004 HC RX CONTRAST MEDICATION: Performed by: INTERNAL MEDICINE

## 2020-05-06 PROCEDURE — 1200000000 HC SEMI PRIVATE

## 2020-05-06 RX ORDER — TOLVAPTAN 15 MG/1
15 TABLET ORAL DAILY
Status: DISCONTINUED | OUTPATIENT
Start: 2020-05-06 | End: 2020-05-06

## 2020-05-06 RX ORDER — TOLVAPTAN 15 MG/1
7.5 TABLET ORAL ONCE
Status: COMPLETED | OUTPATIENT
Start: 2020-05-06 | End: 2020-05-06

## 2020-05-06 RX ADMIN — TOLVAPTAN 7.5 MG: 15 TABLET ORAL at 10:07

## 2020-05-06 RX ADMIN — Medication 10 ML: at 21:43

## 2020-05-06 RX ADMIN — IOPAMIDOL 75 ML: 755 INJECTION, SOLUTION INTRAVENOUS at 11:01

## 2020-05-06 RX ADMIN — ENOXAPARIN SODIUM 40 MG: 40 INJECTION SUBCUTANEOUS at 10:01

## 2020-05-06 RX ADMIN — ATORVASTATIN CALCIUM 40 MG: 40 TABLET, FILM COATED ORAL at 10:00

## 2020-05-06 RX ADMIN — Medication 10 ML: at 10:01

## 2020-05-06 NOTE — PLAN OF CARE
Pt a/o. Verbalized understanding of calling for assistance. Observed steady gait. Room well lit and free of clutter. Bed check in place for safety. Will continue to monitor.

## 2020-05-06 NOTE — PROGRESS NOTES
[facility]    [facility] [place]              [phone]                 Plan :     No help with fluids restriction and also urena for a day  Will start samsca with - liberalization of fluid intake  Sodium every 4 hrs  Rn to call me with results    Repeat urinelytes suggestive of SIADH- agree with plan for CT lung    Assessment :     Hyponatremia  Sodium 112 on admission- now 119- in 3days  Was on fluids  Now on hold  Also urena for a day- no improvement      Urine lytes < 40 twice, repeat was 129,  No history of liver failure, alcoholism, CHF, hypothyroidism  Possible leukemia- bm pending    TSH, lipids, cortisol, LFT normal  Xray chest -ve for mass  CT lung- pending      Hypokalemia  Due to N and V, D  better  Will give potassium again        Leucocytosis- bone marrow biopsy done 5/5/20  [facility] would like to thank Grace Pagan MD   for opportunity to serve this patient      Please call with questions at-   24 Hrs Answering service [phone] or  7 am- 5 pm via Perfect serve or cell phone  Dr.Sudhir Pablito Blas          CC/reason for consult :     hyponatremia     HPI :     From consult note-   Lona Valdez is a 69 y.o. female presented to   the hospital on 5/3/2020 with nausea, vomiting and diarrhea  For 4 days. No pain abdomen. No other complaints.   Denies liver problem, drinking, thyroid problem,  Heart problem    She was found to have low sodium because of which   We are consulted     Interval History:   Sodium came down last night  Now coming up    ROS:     Seen with- no family    positives in bold   Constitutional:  fever, chills, weakness, weight change, fatigue  Skin:  rash, pruritus, hair loss, bruising, dry skin, petechiae  Head, Face, Neck   headaches, swelling,  cervical adenopathy  Respiratory: shortness of breath, cough, or wheezing  Cardiovascular: chest pain, palpitations, dizzy, edema  Gastrointestinal: nausea, vomiting, diarrhea, constipation,belly pain muscle strength- grossly normal , tone - grossly normal  Skin: rashes- no , ulcers- no, induration- no, tightening - no  Psychiatric:  Judgement and insight- normal           AAO X 3  Additional finding: -      LABS:     Recent Labs     05/03/20 2235 05/05/20 0152 05/05/20  0645   WBC 51.7* 44.0* 44.3*   HGB 13.4 11.4* 12.0   HCT 38.8 32.8* 35.0*    370 396     Recent Labs     05/03/20 2235 05/04/20  0910  05/05/20 0152 05/05/20  1315 05/05/20  2108 05/06/20  0531   *  112* 119*  119*   < > 113*   < > 118* 121* 119*   K 3.9  4.0 3.4*  --  3.5  --   --   --   --    CL 77*  75* 84*  --  83*  --   --   --   --    CO2 24  24 25  --  25  --   --   --   --    BUN 10  10 7  --  10  --   --   --   --    CREATININE <0.5*  <0.5* <0.5*  --  <0.5*  --   --   --   --    GLUCOSE 121*  127* 124*  --  97  --   --   --   --    MG  --   --   --  1.70*  --   --   --   --    PHOS 3.5 2.5  --  2.3*  --   --   --   --     < > = values in this interval not displayed.

## 2020-05-06 NOTE — PROGRESS NOTES
51.7* 44.0* 44.3*   HGB 13.4 11.4* 12.0   HCT 38.8 32.8* 35.0*    370 396     Recent Labs     05/03/20  2235 05/04/20  0910  05/05/20  0152  05/05/20  1315 05/05/20  2108 05/06/20  0531   *  112* 119*  119*   < > 113*   < > 118* 121* 119*   K 3.9  4.0 3.4*  --  3.5  --   --   --   --    CL 77*  75* 84*  --  83*  --   --   --   --    CO2 24  24 25  --  25  --   --   --   --    BUN 10  10 7  --  10  --   --   --   --    CREATININE <0.5*  <0.5* <0.5*  --  <0.5*  --   --   --   --    CALCIUM 9.1  8.7 8.2*  --  8.1*  --   --   --   --    PHOS 3.5 2.5  --  2.3*  --   --   --   --     < > = values in this interval not displayed. Recent Labs     05/03/20 2235   AST 28   ALT 25   BILITOT 0.8   ALKPHOS 88     No results for input(s): INR in the last 72 hours. No results for input(s): Thena Gourd in the last 72 hours. Urinalysis:      Lab Results   Component Value Date    NITRU Negative 05/04/2020    WBCUA 6-10 05/04/2020    BACTERIA 2+ 05/04/2020    RBCUA 11-20 05/04/2020    BLOODU MODERATE 05/04/2020    SPECGRAV 1.015 05/04/2020    GLUCOSEU Negative 05/04/2020       Consults:    IP CONSULT TO HOSPITALIST  IP CONSULT TO ONCOLOGY  IP CONSULT TO NEPHROLOGY      Assessment/Plan:    Active Hospital Problems    Diagnosis    Hyponatremia [E87.1]    N&V (nausea and vomiting) [R11.2]    Leukocytosis [D72.829]         N/V - likely 2nd to viral gastroenteritis. Supportive care w/ IVF/Antiemetics. HypoNatremia - critically low, likely 2nd to above, likely hypovolemic. SIADH possible. Follow serial labs on IVF. Reviewed and documented as above. Nephrology consulted and appreciated. Leukocytosis - possible CLL. Heme/Onc consulted and appreciated s/p BM Bx 5 May. DVT Prophylaxis: LMWH  Diet: DIET GENERAL; Daily Fluid Restriction: 1000 ml  Code Status: Full Code      PT/OT Eval Status: not yet ordered.      Dispo - Possibly Thurs/Friday 7/8 May pending Na trend and subspecialty

## 2020-05-06 NOTE — CARE COORDINATION
Chart reviewed. Followed by nephrology, oncology and IM. Bone marrow biopsy completed yesterday. CT chest pending. D/c pending clinical improvement. Per  assessment, lives with daughter and Kathy Arnold. Open to [place] [id number] IF needed. Therapy not ordered. Following.  Sammie Jo RN

## 2020-05-07 LAB
ALBUMIN SERPL-MCNC: 3.8 G/DL (ref 3.4–5)
ANION GAP SERPL CALCULATED.3IONS-SCNC: 13 MMOL/L (ref 3–16)
BUN BLDV-MCNC: 14 MG/DL (ref 7–20)
CALCIUM SERPL-MCNC: 9 MG/DL (ref 8.3–10.6)
CHLORIDE BLD-SCNC: 90 MMOL/L (ref 99–110)
CO2: 26 MMOL/L (ref 21–32)
CREAT SERPL-MCNC: <0.5 MG/DL (ref 0.6–1.2)
GFR AFRICAN AMERICAN: >60
GFR NON-AFRICAN AMERICAN: >60
GLUCOSE BLD-MCNC: 95 MG/DL (ref 70–99)
HCT VFR BLD CALC: 38.4 % (ref 36–48)
HEMATOLOGY PATH CONSULT: NO
HEMOGLOBIN: 13.2 G/DL (ref 12–16)
MCH RBC QN AUTO: 30 PG (ref 26–34)
MCHC RBC AUTO-ENTMCNC: 34.3 G/DL (ref 31–36)
MCV RBC AUTO: 87.7 FL (ref 80–100)
PDW BLD-RTO: 14 % (ref 12.4–15.4)
PHOSPHORUS: 4.8 MG/DL (ref 2.5–4.9)
PLATELET # BLD: 444 K/UL (ref 135–450)
PMV BLD AUTO: 7.5 FL (ref 5–10.5)
POTASSIUM SERPL-SCNC: 3.4 MMOL/L (ref 3.5–5.1)
RBC # BLD: 4.38 M/UL (ref 4–5.2)
SODIUM BLD-SCNC: 124 MMOL/L (ref 136–145)
SODIUM BLD-SCNC: 127 MMOL/L (ref 136–145)
SODIUM BLD-SCNC: 127 MMOL/L (ref 136–145)
SODIUM BLD-SCNC: 129 MMOL/L (ref 136–145)
SODIUM BLD-SCNC: 130 MMOL/L (ref 136–145)
WBC # BLD: 41.8 K/UL (ref 4–11)

## 2020-05-07 PROCEDURE — 1200000000 HC SEMI PRIVATE

## 2020-05-07 PROCEDURE — 36415 COLL VENOUS BLD VENIPUNCTURE: CPT

## 2020-05-07 PROCEDURE — 6370000000 HC RX 637 (ALT 250 FOR IP): Performed by: INTERNAL MEDICINE

## 2020-05-07 PROCEDURE — 85027 COMPLETE CBC AUTOMATED: CPT

## 2020-05-07 PROCEDURE — 80069 RENAL FUNCTION PANEL: CPT

## 2020-05-07 PROCEDURE — 2580000003 HC RX 258: Performed by: INTERNAL MEDICINE

## 2020-05-07 PROCEDURE — 6360000002 HC RX W HCPCS: Performed by: INTERNAL MEDICINE

## 2020-05-07 PROCEDURE — 84295 ASSAY OF SERUM SODIUM: CPT

## 2020-05-07 RX ORDER — POTASSIUM CHLORIDE 20 MEQ/1
40 TABLET, EXTENDED RELEASE ORAL ONCE
Status: COMPLETED | OUTPATIENT
Start: 2020-05-07 | End: 2020-05-07

## 2020-05-07 RX ORDER — DEXTROSE MONOHYDRATE 50 MG/ML
INJECTION, SOLUTION INTRAVENOUS CONTINUOUS
Status: DISCONTINUED | OUTPATIENT
Start: 2020-05-07 | End: 2020-05-07

## 2020-05-07 RX ADMIN — POTASSIUM CHLORIDE 40 MEQ: 20 TABLET, EXTENDED RELEASE ORAL at 14:43

## 2020-05-07 RX ADMIN — ENOXAPARIN SODIUM 40 MG: 40 INJECTION SUBCUTANEOUS at 10:29

## 2020-05-07 RX ADMIN — ATORVASTATIN CALCIUM 40 MG: 40 TABLET, FILM COATED ORAL at 10:29

## 2020-05-07 RX ADMIN — DEXTROSE MONOHYDRATE: 50 INJECTION, SOLUTION INTRAVENOUS at 10:29

## 2020-05-07 RX ADMIN — Medication 10 ML: at 21:20

## 2020-05-07 NOTE — PROGRESS NOTES
The following message was sent to Ginger Hoyos MD regarding her Q4 Na checks:  \"Pt is 69 y.o. F in for hyponatremia- Her Q4 Na check came back at 128- it has increased 4 units in 4 hours. Any interventions? \"  - He responded w/ no new orders. Resolved.  no need for Na cl.  - KCL supplementation ,

## 2020-05-07 NOTE — PROGRESS NOTES
Pt is a/o x4. VSS. Assessment as charted. - Pt has unlabored respirations w/ inspiratory and expiratory wheezes- SpO2 >90% on RA.   - Pt is on tele and has been running SR.   - Pt denies any pain/N/V/D at this time. Pt is currently resting in her bed that is locked and in its lowest position with her call light within reach, non-skid socks on, and bed alarm off d/t no staff assistance w/ ambulation. Pt denies any other needs at this time. Will continue to monitor.

## 2020-05-07 NOTE — PROGRESS NOTES
Hospitalist Progress Note      PCP: Doreen Mota    Date of Admission: 5/3/2020    Chief Complaint: N/V    Subjective: no new c/o. Medications:  Reviewed    Infusion Medications    dextrose       Scheduled Medications    atorvastatin  40 mg Oral Daily    sodium chloride flush  10 mL Intravenous 2 times per day    enoxaparin  40 mg Subcutaneous Daily     PRN Meds: sodium chloride flush, acetaminophen **OR** acetaminophen, polyethylene glycol, promethazine **OR** ondansetron      Intake/Output Summary (Last 24 hours) at 5/7/2020 0857  Last data filed at 5/7/2020 0827  Gross per 24 hour   Intake 1210 ml   Output 400 ml   Net 810 ml       Physical Exam Performed:    BP (!) 106/55   Pulse 74   Temp 97.9 °F (36.6 °C) (Oral)   Resp 18   Ht 5' 4\" (1.626 m)   Wt 144 lb 4.8 oz (65.5 kg)   SpO2 91%   BMI 24.77 kg/m²     General appearance: No apparent distress, appears stated age and cooperative. HEENT: Pupils equal, round, and reactive to light. Conjunctivae/corneas clear. Neck: Supple, with full range of motion. No jugular venous distention. Trachea midline. Respiratory:  Normal respiratory effort. Clear to auscultation, bilaterally without Rales/Wheezes/Rhonchi. Cardiovascular: Regular rate and rhythm with normal S1/S2 without murmurs, rubs or gallops. Abdomen: Soft, non-tender, non-distended with normal bowel sounds. Musculoskeletal: No clubbing, cyanosis or edema bilaterally. Full range of motion without deformity. Skin: Skin color, texture, turgor normal.  No rashes or lesions. Neurologic:  Neurovascularly intact without any focal sensory/motor deficits.  Cranial nerves: II-XII intact, grossly non-focal.  Psychiatric: Alert and oriented, thought content appropriate, normal insight  Capillary Refill: Brisk,< 3 seconds   Peripheral Pulses: +2 palpable, equal bilaterally       Labs:   Recent Labs     05/05/20  0152 05/05/20  0645 05/07/20  0241   WBC 44.0* 44.3* 41.8*   HGB 11.4* 12.0 13.2 HCT 32.8* 35.0* 38.4    396 444     Recent Labs     05/04/20  0910  05/05/20  0152  05/06/20  1853 05/06/20  2252 05/07/20  0241   *  119*   < > 113*   < > 124* 128* 129*   K 3.4*  --  3.5  --   --   --  3.4*   CL 84*  --  83*  --   --   --  90*   CO2 25  --  25  --   --   --  26   BUN 7  --  10  --   --   --  14   CREATININE <0.5*  --  <0.5*  --   --   --  <0.5*   CALCIUM 8.2*  --  8.1*  --   --   --  9.0   PHOS 2.5  --  2.3*  --   --   --  4.8    < > = values in this interval not displayed. No results for input(s): AST, ALT, BILIDIR, BILITOT, ALKPHOS in the last 72 hours. No results for input(s): INR in the last 72 hours. No results for input(s): Jarvis Broom in the last 72 hours. Urinalysis:      Lab Results   Component Value Date    NITRU Negative 05/04/2020    WBCUA 6-10 05/04/2020    BACTERIA 2+ 05/04/2020    RBCUA 11-20 05/04/2020    BLOODU MODERATE 05/04/2020    SPECGRAV 1.015 05/04/2020    GLUCOSEU Negative 05/04/2020       Consults:    IP CONSULT TO HOSPITALIST  IP CONSULT TO ONCOLOGY  IP CONSULT TO NEPHROLOGY      Assessment/Plan:    Active Hospital Problems    Diagnosis    Hyponatremia [E87.1]    N&V (nausea and vomiting) [R11.2]    Leukocytosis [D72.829]         N/V - likely 2nd to viral gastroenteritis. Supportive care w/ IVF/Antiemetics. 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 Follow serial labs on IVF. Reviewed and documented as above. Nephrology consulted and appreciated - improved w/ Samsca 6 May. Leukocytosis - possible CLL. Heme/Onc consulted and appreciated s/p BM Bx 5 May. COPD - severe, seen on CT scan. Continue current tx. Lung mass - L paramediastinal mass seen on CT scan 6 May suspicious for malignancy. DVT Prophylaxis: LMWH  Diet: DIET GENERAL;  Code Status: Full Code      PT/OT Eval Status: not yet ordered.      Dispo - Possibly Friday 8 May pending Na trend and

## 2020-05-07 NOTE — PROGRESS NOTES
[facility]    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  Oncology consulted    Due to mass and SIADH- she will at least need salt tablets on DC     DC 1-2 days     Assessment :     Hyponatremia- SIADH  Sodium 112 on admission- now 130- rise of 11 in  24 hrs with samsca  On dextrose, liberal fluid intake      Urine lytes < 40 twice, repeat was 129, 66  No history of liver failure, alcoholism, CHF, hypothyroidism  Possible leukemia- bm pending    TSH, lipids, cortisol, LFT normal  Xray chest -ve for mass  CT lung- mass, and also has adrenal mass      Hypokalemia  Due to N and V, D  better  Will give potassium again        Leucocytosis- bone marrow biopsy done 5/5/20  [facility] would like to thank Reina Shankar MD   for opportunity to serve this patient      Please call with questions at-   24 Hrs Answering service [phone] or  7 am- 5 pm via Perfect serve or cell phone  Dr.Sudhir [facility]          CC/reason for consult :     hyponatremia     HPI :     From consult note-   Inez Da Silva is a 69 y.o. female presented to   the hospital on 5/3/2020 with nausea, vomiting and diarrhea  For 4 days. No pain abdomen. No other complaints.   Denies liver problem, drinking, thyroid problem,  Heart problem    She was found to have low sodium because of which   We are consulted     Interval History:   Sodium came down last night  Now coming up    ROS:     Seen with- no family    positives in bold   Constitutional:  fever, chills, weakness, weight change, fatigue  Skin:  rash, pruritus, hair loss, bruising, dry skin, petechiae  Head, Face, Neck   headaches, swelling,  cervical adenopathy  Respiratory: shortness of breath, cough, or wheezing  Cardiovascular: chest pain, palpitations, dizzy, edema  Gastrointestinal: nausea, vomiting, diarrhea, constipation,belly pain    Yellow skin, blood in stool  Musculoskeletal:  back pain, muscle weakness, gait problems,       joint pain or swelling. Genitourinary:  dysuria, poor urine flow, flank pain, blood in urine  Neurologic:  vertigo, TIA'S, syncope, seizures, focal weakness  Psychosocial:  insomnia, anxiety, or depression. Additional positive findings: nausea, and vomiting               All other remaining systems are negative or unable to obtain       Medication:     Current Facility-Administered Medications: dextrose 5 % solution, , Intravenous, Continuous  atorvastatin (LIPITOR) tablet 40 mg, 40 mg, Oral, Daily  sodium chloride flush 0.9 % injection 10 mL, 10 mL, Intravenous, 2 times per day  sodium chloride flush 0.9 % injection 10 mL, 10 mL, Intravenous, PRN  acetaminophen (TYLENOL) tablet 650 mg, 650 mg, Oral, Q6H PRN **OR** acetaminophen (TYLENOL) suppository 650 mg, 650 mg, Rectal, Q6H PRN  polyethylene glycol (GLYCOLAX) packet 17 g, 17 g, Oral, Daily PRN  promethazine (PHENERGAN) tablet 12.5 mg, 12.5 mg, Oral, Q6H PRN **OR** ondansetron (ZOFRAN) injection 4 mg, 4 mg, Intravenous, Q6H PRN  enoxaparin (LOVENOX) injection 40 mg, 40 mg, Subcutaneous, Daily       Vitals :     Vitals:    05/07/20 1030   BP: (!) 96/59   Pulse: 75   Resp: 18   Temp: 97.9 °F (36.6 °C)   SpO2: 93%       I & O :       Intake/Output Summary (Last 24 hours) at 5/7/2020 1127  Last data filed at 5/7/2020 0827  Gross per 24 hour   Intake 1200 ml   Output 400 ml   Net 800 ml        Physical Examination :     General appearance: Anxious- no, distressed- no, in good spirits- yes  HEENT: Lips- normal, teeth- ok , oral mucosa- moist  Neck : Mass- no, appears symmetrical, JVD- not visible  Respiratory: Respiratory effort-  normal, wheeze- no, crackles - no  Cardiovascular:  Ausculation- No M/R/G, Edema no  Abdomen: visible mass- no, distention- no, scar- no, tenderness- no                            hepatosplenomegaly-  no  Musculoskeletal:  clubbing

## 2020-05-08 LAB
INR BLD: 0.97 (ref 0.86–1.14)
PROTHROMBIN TIME: 11.3 SEC (ref 10–13.2)
SODIUM BLD-SCNC: 124 MMOL/L (ref 136–145)
SODIUM BLD-SCNC: 126 MMOL/L (ref 136–145)
SODIUM BLD-SCNC: 127 MMOL/L (ref 136–145)

## 2020-05-08 PROCEDURE — 84295 ASSAY OF SERUM SODIUM: CPT

## 2020-05-08 PROCEDURE — 36415 COLL VENOUS BLD VENIPUNCTURE: CPT

## 2020-05-08 PROCEDURE — 6370000000 HC RX 637 (ALT 250 FOR IP): Performed by: INTERNAL MEDICINE

## 2020-05-08 PROCEDURE — 1200000000 HC SEMI PRIVATE

## 2020-05-08 PROCEDURE — 2580000003 HC RX 258: Performed by: INTERNAL MEDICINE

## 2020-05-08 PROCEDURE — 85610 PROTHROMBIN TIME: CPT

## 2020-05-08 RX ORDER — SODIUM CHLORIDE 1000 MG
2 TABLET, SOLUBLE MISCELLANEOUS
Status: DISCONTINUED | OUTPATIENT
Start: 2020-05-08 | End: 2020-05-12

## 2020-05-08 RX ADMIN — Medication 2 G: at 18:21

## 2020-05-08 RX ADMIN — Medication 10 ML: at 21:09

## 2020-05-08 RX ADMIN — Medication 2 G: at 14:03

## 2020-05-08 RX ADMIN — Medication 10 ML: at 09:30

## 2020-05-08 NOTE — PROGRESS NOTES
[facility]    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    Due to mass and SIADH- she will at least need salt tablets on DC   5/7/2020 -CT directed lung biopsy done on      Assessment :     Hyponatremia- SIADH  Sodium 112 on admission- now 130- rise of 11 in  24 hrs with samsca  On dextrose, liberal fluid intake      Urine lytes < 40 twice, repeat was 129, 66  No history of liver failure, alcoholism, CHF, hypothyroidism  Possible leukemia- bm pending    TSH, lipids, cortisol, LFT normal  Xray chest -ve for mass  CT lung- mass, and also has adrenal mass      Hypokalemia  Due to N and V, D  better  Will give potassium again        Leucocytosis- bone marrow biopsy done 5/5/20  [facility] would like to thank Loreto Rivera MD   for opportunity to serve this patient      Please call with questions at-   24 Hrs Answering service [phone] or  7 am- 5 pm via Perfect serve or cell phone  Dr.Sudhir Sanchez Glaser          CC/reason for consult :     hyponatremia     HPI :     From consult note-   Stephanie Garcia is a 69 y.o. female presented to   the hospital on 5/3/2020 with nausea, vomiting and diarrhea  For 4 days. No pain abdomen. No other complaints.   Denies liver problem, drinking, thyroid problem,  Heart problem    She was found to have low sodium because of which   We are consulted     Interval History:   Sodium came down last night  Now coming up    ROS:     Seen with- no family    positives in bold   Constitutional:  fever, chills, weakness, weight change, fatigue  Skin:  rash, pruritus, hair loss, bruising, dry skin, petechiae  Head, Face, Neck   headaches, swelling,  cervical adenopathy  Respiratory: shortness of breath, cough, or wheezing  Cardiovascular: chest pain, palpitations, dizzy, edema  Gastrointestinal: nausea, vomiting, diarrhea, constipation,belly pain    Yellow skin, blood in stool  Musculoskeletal:  back pain, muscle weakness, gait problems,       joint pain or swelling. Genitourinary:  dysuria, poor urine flow, flank pain, blood in urine  Neurologic:  vertigo, TIA'S, syncope, seizures, focal weakness  Psychosocial:  insomnia, anxiety, or depression. Additional positive findings: nausea, and vomiting               All other remaining systems are negative or unable to obtain       Medication:     Current Facility-Administered Medications: sodium chloride tablet 2 g, 2 g, Oral, TID WC  atorvastatin (LIPITOR) tablet 40 mg, 40 mg, Oral, Daily  sodium chloride flush 0.9 % injection 10 mL, 10 mL, Intravenous, 2 times per day  sodium chloride flush 0.9 % injection 10 mL, 10 mL, Intravenous, PRN  acetaminophen (TYLENOL) tablet 650 mg, 650 mg, Oral, Q6H PRN **OR** acetaminophen (TYLENOL) suppository 650 mg, 650 mg, Rectal, Q6H PRN  polyethylene glycol (GLYCOLAX) packet 17 g, 17 g, Oral, Daily PRN  promethazine (PHENERGAN) tablet 12.5 mg, 12.5 mg, Oral, Q6H PRN **OR** ondansetron (ZOFRAN) injection 4 mg, 4 mg, Intravenous, Q6H PRN  enoxaparin (LOVENOX) injection 40 mg, 40 mg, Subcutaneous, Daily       Vitals :     Vitals:    05/08/20 0923   BP: 114/76   Pulse: 76   Resp: 12   Temp: 98 °F (36.7 °C)   SpO2: 94%       I & O :       Intake/Output Summary (Last 24 hours) at 5/8/2020 1408  Last data filed at 5/8/2020 1347  Gross per 24 hour   Intake 1517 ml   Output 100 ml   Net 1417 ml        Physical Examination :     General appearance: Anxious- no, distressed- no, in good spirits- yes  HEENT: Lips- normal, teeth- ok , oral mucosa- moist  Neck : Mass- no, appears symmetrical, JVD- not visible  Respiratory: Respiratory effort-  normal, wheeze- no, crackles - no  Cardiovascular:  Ausculation- No M/R/G, Edema no  Abdomen: visible mass- no, distention- no, scar- no, tenderness- no hepatosplenomegaly-  no  Musculoskeletal:  clubbing no,cyanosis- no , digital ischemia- no                           muscle strength- grossly normal , tone - grossly normal  Skin: rashes- no , ulcers- no, induration- no, tightening - no  Psychiatric:  Judgement and insight- normal           AAO X 3  Additional finding: -      LABS:     Recent Labs     05/07/20 0241   WBC 41.8*   HGB 13.2   HCT 38.4        Recent Labs     05/07/20  0241  05/08/20  0230 05/08/20  0642 05/08/20  1035   *   < > 124* 126* 127*   K 3.4*  --   --   --   --    CL 90*  --   --   --   --    CO2 26  --   --   --   --    BUN 14  --   --   --   --    CREATININE <0.5*  --   --   --   --    GLUCOSE 95  --   --   --   --    PHOS 4.8  --   --   --   --     < > = values in this interval not displayed.

## 2020-05-08 NOTE — CONSULTS
Interventional Radiology  History and Physical  Consult Note        Reason for Consult:  Lung lesions. Request for percutaneous lung biopsy        The patient is a 69 y.o. female with CLL who has lung mass/lesion concerning for primary lung malignancy    Past Medical History:        Diagnosis Date    Hyperlipidemia        RELEVANT IMAGING FINDINGS:     CT chest 5/6/20:     Impression:  1. Left paramediastinal mass highly suspicious for malignancy. 2. Left hilar adenopathy, most compatible with metastatic disease. 3. Scattered pulmonary nodules bilaterally as described. Both benign and metastatic disease remain differential consideration. No remote studies are available for comparison. 4. Consolidative opacities in the bilateral lung apices, right greater than left. Findings are favored to represent pleuoparenchymal scarring over malignancy. Further evaluation with PET/CT is recommended. 5. Left adrenal nodularity concerning for metastatic disease given the patient's chest findings. ASSESSMENT/PLAN: I reviewed the relevant imaging. . In my opinion, the paramediastinal mass is to central for percutaneous biopsy. The largest nodule in the left apex is also unable to be biopsied due to location/lack of window. The other nodules more inferior in the lungs are 7mm or less, likely of lower diagnostic yield. Consider endobronchial biopsy if feasible, or hold off on biopsy until PET is done to see if there are any additional areas to be targeted.

## 2020-05-08 NOTE — PROGRESS NOTES
05/08/20  0230 05/08/20  0642   *   < > 124* 124* 126*   K 3.4*  --   --   --   --    CL 90*  --   --   --   --    CO2 26  --   --   --   --    BUN 14  --   --   --   --    CREATININE <0.5*  --   --   --   --    CALCIUM 9.0  --   --   --   --    PHOS 4.8  --   --   --   --     < > = values in this interval not displayed. No results for input(s): AST, ALT, BILIDIR, BILITOT, ALKPHOS in the last 72 hours. Recent Labs     05/08/20  0816   INR 0.97     No results for input(s): Lai Mayers in the last 72 hours. Urinalysis:      Lab Results   Component Value Date    NITRU Negative 05/04/2020    WBCUA 6-10 05/04/2020    BACTERIA 2+ 05/04/2020    RBCUA 11-20 05/04/2020    BLOODU MODERATE 05/04/2020    SPECGRAV 1.015 05/04/2020    GLUCOSEU Negative 05/04/2020       Consults:    IP CONSULT TO HOSPITALIST  IP CONSULT TO ONCOLOGY  IP CONSULT TO NEPHROLOGY      Assessment/Plan:    Active Hospital Problems    Diagnosis    Hyponatremia [E87.1]    N&V (nausea and vomiting) [R11.2]    Leukocytosis [D72.829]         N/V - likely 2nd to viral gastroenteritis. Supportive care w/ IVF/Antiemetics. 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 Follow serial labs on IVF. Reviewed and documented as above. Nephrology consulted and appreciated - improved w/ Samsca 6 May. Leukocytosis - possible CLL. Heme/Onc consulted and appreciated s/p BM Bx 5 May. COPD - severe, seen on CT scan. Continue current tx. Lung mass - likely small cell lung cancer. L paramediastinal mass seen on CT scan 6 May suspicious for malignancy. Oncology input appreciated, arranging definitive dx and tx plan. CT Bx ordered and pending. DVT Prophylaxis: LMWH  Diet: Diet NPO Time Specified  Code Status: Full Code      PT/OT Eval Status: not yet ordered.      Dispo - Possibly Friday/Sat 8/9 May pending Na trend and subspecialty recs, especially in light of CT findings

## 2020-05-09 LAB
ALBUMIN SERPL-MCNC: 3.8 G/DL (ref 3.4–5)
ANION GAP SERPL CALCULATED.3IONS-SCNC: 10 MMOL/L (ref 3–16)
ANION GAP SERPL CALCULATED.3IONS-SCNC: 9 MMOL/L (ref 3–16)
BUN BLDV-MCNC: 12 MG/DL (ref 7–20)
BUN BLDV-MCNC: 9 MG/DL (ref 7–20)
CALCIUM SERPL-MCNC: 8.7 MG/DL (ref 8.3–10.6)
CALCIUM SERPL-MCNC: 8.8 MG/DL (ref 8.3–10.6)
CHLORIDE BLD-SCNC: 85 MMOL/L (ref 99–110)
CHLORIDE BLD-SCNC: 87 MMOL/L (ref 99–110)
CO2: 25 MMOL/L (ref 21–32)
CO2: 26 MMOL/L (ref 21–32)
CREAT SERPL-MCNC: 0.6 MG/DL (ref 0.6–1.2)
CREAT SERPL-MCNC: <0.5 MG/DL (ref 0.6–1.2)
GFR AFRICAN AMERICAN: >60
GFR AFRICAN AMERICAN: >60
GFR NON-AFRICAN AMERICAN: >60
GFR NON-AFRICAN AMERICAN: >60
GLUCOSE BLD-MCNC: 125 MG/DL (ref 70–99)
GLUCOSE BLD-MCNC: 86 MG/DL (ref 70–99)
HCT VFR BLD CALC: 37.3 % (ref 36–48)
HEMOGLOBIN: 12.7 G/DL (ref 12–16)
MCH RBC QN AUTO: 30.5 PG (ref 26–34)
MCHC RBC AUTO-ENTMCNC: 34.2 G/DL (ref 31–36)
MCV RBC AUTO: 89.2 FL (ref 80–100)
PDW BLD-RTO: 14.1 % (ref 12.4–15.4)
PHOSPHORUS: 3.2 MG/DL (ref 2.5–4.9)
PLATELET # BLD: 437 K/UL (ref 135–450)
PMV BLD AUTO: 7.3 FL (ref 5–10.5)
POTASSIUM SERPL-SCNC: 4 MMOL/L (ref 3.5–5.1)
POTASSIUM SERPL-SCNC: 4 MMOL/L (ref 3.5–5.1)
RBC # BLD: 4.18 M/UL (ref 4–5.2)
SODIUM BLD-SCNC: 119 MMOL/L (ref 136–145)
SODIUM BLD-SCNC: 123 MMOL/L (ref 136–145)
SODIUM BLD-SCNC: 126 MMOL/L (ref 136–145)
WBC # BLD: 36.7 K/UL (ref 4–11)

## 2020-05-09 PROCEDURE — 1200000000 HC SEMI PRIVATE

## 2020-05-09 PROCEDURE — 85027 COMPLETE CBC AUTOMATED: CPT

## 2020-05-09 PROCEDURE — 6370000000 HC RX 637 (ALT 250 FOR IP): Performed by: INTERNAL MEDICINE

## 2020-05-09 PROCEDURE — 36415 COLL VENOUS BLD VENIPUNCTURE: CPT

## 2020-05-09 PROCEDURE — 80069 RENAL FUNCTION PANEL: CPT

## 2020-05-09 PROCEDURE — 2580000003 HC RX 258: Performed by: INTERNAL MEDICINE

## 2020-05-09 PROCEDURE — 6360000002 HC RX W HCPCS: Performed by: INTERNAL MEDICINE

## 2020-05-09 PROCEDURE — 84295 ASSAY OF SERUM SODIUM: CPT

## 2020-05-09 RX ORDER — TOLVAPTAN 15 MG/1
7.5 TABLET ORAL ONCE
Status: DISCONTINUED | OUTPATIENT
Start: 2020-05-09 | End: 2020-05-09

## 2020-05-09 RX ADMIN — Medication 2 G: at 12:22

## 2020-05-09 RX ADMIN — Medication 10 ML: at 08:54

## 2020-05-09 RX ADMIN — ATORVASTATIN CALCIUM 40 MG: 40 TABLET, FILM COATED ORAL at 08:52

## 2020-05-09 RX ADMIN — Medication 10 ML: at 21:14

## 2020-05-09 RX ADMIN — Medication 2 G: at 17:01

## 2020-05-09 RX ADMIN — Medication 2 G: at 08:52

## 2020-05-09 RX ADMIN — ENOXAPARIN SODIUM 40 MG: 40 INJECTION SUBCUTANEOUS at 08:52

## 2020-05-09 ASSESSMENT — PAIN SCALES - GENERAL: PAINLEVEL_OUTOF10: 0

## 2020-05-09 NOTE — PROGRESS NOTES
[facility]    [facility]              [phone]                Assessment plan for today  The patient was seen and examined in her room  The patient is very awake, alert and conversant she is able to eat and drink  She denies of any discomfort  Her documented urine output for yesterday was 300, according to her nurse, she is on 1000 mL's of fluid restriction  Her most recent set of vital signs showed temperature 97.3, heart rate 75 and blood pressure 118/72  She has no peripheral edema at all  Her lab work from this morning showed a sodium 119, potassium 4, bicarb 25, BUN 9, creatinine 0.5, glucose 86, calcium 8.7.,  Phosphorus 3.2    The patient's potassium level is 127. It is unclear how close her sodium level dropped from 1 . So I will repeat the patient sodium level stat. If her sodium is too difficult, then I will give a dose of tolvaptan. The case been discussed with patient's nurse.   5/7/2020 -CT directed lung biopsy done on      Assessment :     Hyponatremia- SIADH  Sodium 112 on admission- now 130- rise of 11 in  24 hrs with samsca  On dextrose, liberal fluid intake      Urine lytes < 40 twice, repeat was 129, 66  No history of liver failure, alcoholism, CHF, hypothyroidism  Possible leukemia- bm pending    TSH, lipids, cortisol, LFT normal  Xray chest -ve for mass  CT lung- mass, and also has adrenal mass      Hypokalemia  Due to N and V, D  better  Will give potassium again        Leucocytosis- bone marrow biopsy done 5/5/20  [facility] would like to thank Olena Birmingham MD   for opportunity to serve this patient      Please call with questions at-   24 Hrs Answering service [phone] or  7 am- 5 pm via Perfect serve or cell phone  Suresh Jose          CC/reason for consult :     hyponatremia     HPI :     From consult note-   Junaid Alvarado is a 69 y.o. female presented to   the hospital on 5/3/2020 with nausea, vomiting and diarrhea  For 4

## 2020-05-09 NOTE — PROGRESS NOTES
--  85*   CO2 26  --   --   --  25   PHOS 4.8  --   --   --  3.2   BUN 14  --   --   --  9   CREATININE <0.5*  --   --   --  <0.5*    < > = values in this interval not displayed. No results for input(s): AST, ALT, ALB, BILIDIR, BILITOT, ALKPHOS in the last 72 hours. Magnesium:    Lab Results   Component Value Date    MG 1.70 05/05/2020         Problem List  Patient Active Problem List   Diagnosis    Hyponatremia    N&V (nausea and vomiting)    Leukocytosis       ASSESSMENT AND PLAN:    Small cell lung cancer (presumptive with path pending)  -needs PET as outpatient  -if can't control her Na will need to treat as an inpatient, since this is likely mediated by her lung cancer  -can have port placed  -discussed with the patient  -CT-guided biopsy has been ordered. Likely Monday. Hyponatremia  -Siadh  -see above    CLL  -no indication for treatment, but this may complicate her lung cancer tx       ONCOLOGIC DISPOSITION:  -need to decide whether to tx now or as an outpatient.      Tal Gil MD  Please contact through [facility]

## 2020-05-09 NOTE — PROGRESS NOTES
37.3    437     Recent Labs     05/07/20  0241  05/08/20  0642 05/08/20  1035 05/09/20  0642   *   < > 126* 127* 119*   K 3.4*  --   --   --  4.0   CL 90*  --   --   --  85*   CO2 26  --   --   --  25   BUN 14  --   --   --  9   CREATININE <0.5*  --   --   --  <0.5*   CALCIUM 9.0  --   --   --  8.7   PHOS 4.8  --   --   --  3.2    < > = values in this interval not displayed. No results for input(s): AST, ALT, BILIDIR, BILITOT, ALKPHOS in the last 72 hours. Recent Labs     05/08/20  0816   INR 0.97     No results for input(s): Saúl Seip in the last 72 hours. Urinalysis:      Lab Results   Component Value Date    NITRU Negative 05/04/2020    WBCUA 6-10 05/04/2020    BACTERIA 2+ 05/04/2020    RBCUA 11-20 05/04/2020    BLOODU MODERATE 05/04/2020    SPECGRAV 1.015 05/04/2020    GLUCOSEU Negative 05/04/2020       Consults:    IP CONSULT TO HOSPITALIST  IP CONSULT TO ONCOLOGY  IP CONSULT TO NEPHROLOGY      Assessment/Plan:    Active Hospital Problems    Diagnosis    Hyponatremia [E87.1]    N&V (nausea and vomiting) [R11.2]    Leukocytosis [D72.829]         N/V - likely 2nd to viral gastroenteritis. Supportive care w/ IVF/Antiemetics. HypoNatremia - critically low, likely multifactorial 2nd to above, likely hypovolemic w/ SIADH likely 2nd to presumed small cell lung cancer. Follow serial labs on IVF. Reviewed and documented as above. Nephrology consulted and appreciated - improved w/ Samsca 6 May but will not remain improved. Leukocytosis - possible CLL. Heme/Onc consulted and appreciated s/p BM Bx 5 May. COPD - severe, seen on CT scan. Continue current tx. Lung mass - likely small cell lung cancer. L paramediastinal mass seen on CT scan 6 May suspicious for malignancy. Oncology input appreciated, arranging definitive dx and tx plan. CT Bx ordered and pending. DVT Prophylaxis: LMWH  Diet: DIET GENERAL; No Added Salt (3-4 GM);  Daily Fluid Restriction: 1000 ml  Code

## 2020-05-09 NOTE — PROGRESS NOTES
Pt a/o. Pt denies any pain. No nausea, no emesis. Pt ate over 50% of breakfast. Complains of no SOB. Call light within reach; will continue to monitor.

## 2020-05-10 LAB
ALBUMIN SERPL-MCNC: 3.9 G/DL (ref 3.4–5)
ANION GAP SERPL CALCULATED.3IONS-SCNC: 13 MMOL/L (ref 3–16)
BUN BLDV-MCNC: 8 MG/DL (ref 7–20)
CALCIUM SERPL-MCNC: 8.8 MG/DL (ref 8.3–10.6)
CHLORIDE BLD-SCNC: 88 MMOL/L (ref 99–110)
CO2: 25 MMOL/L (ref 21–32)
CREAT SERPL-MCNC: <0.5 MG/DL (ref 0.6–1.2)
GFR AFRICAN AMERICAN: >60
GFR NON-AFRICAN AMERICAN: >60
GLUCOSE BLD-MCNC: 98 MG/DL (ref 70–99)
HCT VFR BLD CALC: 38.1 % (ref 36–48)
HEMOGLOBIN: 13.2 G/DL (ref 12–16)
MCH RBC QN AUTO: 30.5 PG (ref 26–34)
MCHC RBC AUTO-ENTMCNC: 34.5 G/DL (ref 31–36)
MCV RBC AUTO: 88.4 FL (ref 80–100)
PDW BLD-RTO: 14 % (ref 12.4–15.4)
PHOSPHORUS: 2.9 MG/DL (ref 2.5–4.9)
PLATELET # BLD: 429 K/UL (ref 135–450)
PMV BLD AUTO: 7.1 FL (ref 5–10.5)
POTASSIUM SERPL-SCNC: 4 MMOL/L (ref 3.5–5.1)
RBC # BLD: 4.31 M/UL (ref 4–5.2)
SODIUM BLD-SCNC: 126 MMOL/L (ref 136–145)
WBC # BLD: 37.2 K/UL (ref 4–11)

## 2020-05-10 PROCEDURE — 36415 COLL VENOUS BLD VENIPUNCTURE: CPT

## 2020-05-10 PROCEDURE — 85027 COMPLETE CBC AUTOMATED: CPT

## 2020-05-10 PROCEDURE — 6370000000 HC RX 637 (ALT 250 FOR IP): Performed by: INTERNAL MEDICINE

## 2020-05-10 PROCEDURE — 1200000000 HC SEMI PRIVATE

## 2020-05-10 PROCEDURE — 6360000002 HC RX W HCPCS: Performed by: INTERNAL MEDICINE

## 2020-05-10 PROCEDURE — 2580000003 HC RX 258: Performed by: INTERNAL MEDICINE

## 2020-05-10 PROCEDURE — 80069 RENAL FUNCTION PANEL: CPT

## 2020-05-10 RX ADMIN — ATORVASTATIN CALCIUM 40 MG: 40 TABLET, FILM COATED ORAL at 09:04

## 2020-05-10 RX ADMIN — Medication 10 ML: at 09:07

## 2020-05-10 RX ADMIN — Medication 2 G: at 12:31

## 2020-05-10 RX ADMIN — Medication 2 G: at 17:03

## 2020-05-10 RX ADMIN — ENOXAPARIN SODIUM 40 MG: 40 INJECTION SUBCUTANEOUS at 09:05

## 2020-05-10 RX ADMIN — Medication 10 ML: at 21:31

## 2020-05-10 RX ADMIN — Medication 2 G: at 09:04

## 2020-05-10 ASSESSMENT — PAIN SCALES - GENERAL: PAINLEVEL_OUTOF10: 0

## 2020-05-10 NOTE — PROGRESS NOTES
Shift assessment completed per documentation. Pt A&Ox4. VSS. Pt awake, sitting up in chair. Pt denies pain and discomfort at this time. Bilateral lung sounds clear. Nonskid slippers on. Call light and personal belongings within reach. Will continue to monitor.

## 2020-05-10 NOTE — PLAN OF CARE
Problem: Safety:  Goal: Free from accidental physical injury  Description: Free from accidental physical injury  Note: Pt remains safe. Ambulates independently. Non skid footwear on.  Bed locked in lowest position; side rails 2/4; call light and bedside within reach

## 2020-05-10 NOTE — PROGRESS NOTES
Labs     05/09/20  0642 05/09/20  1319 05/09/20 2058   * 126* 123*   K 4.0  --  4.0   CL 85*  --  87*   CO2 25  --  26   BUN 9  --  12   CREATININE <0.5*  --  0.6   CALCIUM 8.7  --  8.8   PHOS 3.2  --   --      No results for input(s): AST, ALT, BILIDIR, BILITOT, ALKPHOS in the last 72 hours. Recent Labs     05/08/20  0816   INR 0.97     No results for input(s): Lai Mayers in the last 72 hours. Urinalysis:      Lab Results   Component Value Date    NITRU Negative 05/04/2020    WBCUA 6-10 05/04/2020    BACTERIA 2+ 05/04/2020    RBCUA 11-20 05/04/2020    BLOODU MODERATE 05/04/2020    SPECGRAV 1.015 05/04/2020    GLUCOSEU Negative 05/04/2020       Consults:    IP CONSULT TO HOSPITALIST  IP CONSULT TO ONCOLOGY  IP CONSULT TO NEPHROLOGY      Assessment/Plan:    Active Hospital Problems    Diagnosis    Hyponatremia [E87.1]    N&V (nausea and vomiting) [R11.2]    Leukocytosis [D72.829]         N/V - likely 2nd to viral gastroenteritis. Supportive care w/ IVF/Antiemetics. HypoNatremia - critically low, likely multifactorial 2nd to above, likely hypovolemic w/ SIADH likely 2nd to presumed small cell lung cancer. Follow serial labs on IVF. Reviewed and documented as above. Nephrology consulted and appreciated - improved w/ Samsca 6 May but will not remain improved. Leukocytosis - possible CLL. Heme/Onc consulted and appreciated s/p BM Bx 5 May. COPD - severe, seen on CT scan. Continue current tx. Lung mass - likely small cell lung cancer. L paramediastinal mass seen on CT scan 6 May suspicious for malignancy. Oncology input appreciated, arranging definitive dx and tx plan. CT Bx ordered and pending - likely Monday. DVT Prophylaxis: LMWH  Diet: DIET GENERAL; No Added Salt (3-4 GM); Daily Fluid Restriction: 1000 ml  Code Status: Full Code      PT/OT Eval Status: not yet ordered.      Dispo - Possibly Tues 12 May pending Na trend and subspecialty recs, especially in light of CT findings above.      Kailey Ibarra MD

## 2020-05-11 ENCOUNTER — ANESTHESIA EVENT (OUTPATIENT)
Dept: ENDOSCOPY | Age: 75
DRG: 829 | End: 2020-05-11
Payer: MEDICARE

## 2020-05-11 ENCOUNTER — ANESTHESIA (OUTPATIENT)
Dept: ENDOSCOPY | Age: 75
DRG: 829 | End: 2020-05-11
Payer: MEDICARE

## 2020-05-11 VITALS — TEMPERATURE: 97.7 F | SYSTOLIC BLOOD PRESSURE: 140 MMHG | OXYGEN SATURATION: 100 % | DIASTOLIC BLOOD PRESSURE: 107 MMHG

## 2020-05-11 PROCEDURE — 3609011900 HC BRONCHOSCOPY NEEDLE BX TRACHEA MAIN STEM&/BRON: Performed by: INTERNAL MEDICINE

## 2020-05-11 PROCEDURE — 3609011200 HC BRONCHOSCOPY W/TRANSBRONCL NDL ASPIR BX EA ADDL LOBE: Performed by: INTERNAL MEDICINE

## 2020-05-11 PROCEDURE — 88342 IMHCHEM/IMCYTCHM 1ST ANTB: CPT

## 2020-05-11 PROCEDURE — 31645 BRNCHSC W/THER ASPIR 1ST: CPT | Performed by: INTERNAL MEDICINE

## 2020-05-11 PROCEDURE — 88341 IMHCHEM/IMCYTCHM EA ADD ANTB: CPT

## 2020-05-11 PROCEDURE — 94761 N-INVAS EAR/PLS OXIMETRY MLT: CPT

## 2020-05-11 PROCEDURE — 2580000003 HC RX 258: Performed by: NURSE ANESTHETIST, CERTIFIED REGISTERED

## 2020-05-11 PROCEDURE — 88177 CYTP FNA EVAL EA ADDL: CPT

## 2020-05-11 PROCEDURE — 2580000003 HC RX 258: Performed by: INTERNAL MEDICINE

## 2020-05-11 PROCEDURE — 2700000000 HC OXYGEN THERAPY PER DAY

## 2020-05-11 PROCEDURE — 31629 BRONCHOSCOPY/NEEDLE BX EACH: CPT | Performed by: INTERNAL MEDICINE

## 2020-05-11 PROCEDURE — 1200000000 HC SEMI PRIVATE

## 2020-05-11 PROCEDURE — 88305 TISSUE EXAM BY PATHOLOGIST: CPT

## 2020-05-11 PROCEDURE — 2720000010 HC SURG SUPPLY STERILE: Performed by: INTERNAL MEDICINE

## 2020-05-11 PROCEDURE — 3700000001 HC ADD 15 MINUTES (ANESTHESIA): Performed by: INTERNAL MEDICINE

## 2020-05-11 PROCEDURE — 3700000000 HC ANESTHESIA ATTENDED CARE: Performed by: INTERNAL MEDICINE

## 2020-05-11 PROCEDURE — 7100000010 HC PHASE II RECOVERY - FIRST 15 MIN: Performed by: INTERNAL MEDICINE

## 2020-05-11 PROCEDURE — 3603165200 HC BRNCHSC EBUS GUIDED SAMPL 1/2 NODE STATION/STRUX: Performed by: INTERNAL MEDICINE

## 2020-05-11 PROCEDURE — 3609027000 HC BRONCHOSCOPY: Performed by: INTERNAL MEDICINE

## 2020-05-11 PROCEDURE — 88360 TUMOR IMMUNOHISTOCHEM/MANUAL: CPT

## 2020-05-11 PROCEDURE — 0B9L8ZX DRAINAGE OF LEFT LUNG, VIA NATURAL OR ARTIFICIAL OPENING ENDOSCOPIC, DIAGNOSTIC: ICD-10-PCS | Performed by: INTERNAL MEDICINE

## 2020-05-11 PROCEDURE — 2709999900 HC NON-CHARGEABLE SUPPLY: Performed by: INTERNAL MEDICINE

## 2020-05-11 PROCEDURE — 99223 1ST HOSP IP/OBS HIGH 75: CPT | Performed by: INTERNAL MEDICINE

## 2020-05-11 PROCEDURE — 88172 CYTP DX EVAL FNA 1ST EA SITE: CPT

## 2020-05-11 PROCEDURE — 0BD88ZX EXTRACTION OF LEFT UPPER LOBE BRONCHUS, VIA NATURAL OR ARTIFICIAL OPENING ENDOSCOPIC, DIAGNOSTIC: ICD-10-PCS | Performed by: INTERNAL MEDICINE

## 2020-05-11 PROCEDURE — 7100000011 HC PHASE II RECOVERY - ADDTL 15 MIN: Performed by: INTERNAL MEDICINE

## 2020-05-11 PROCEDURE — 2500000003 HC RX 250 WO HCPCS: Performed by: NURSE ANESTHETIST, CERTIFIED REGISTERED

## 2020-05-11 PROCEDURE — 6370000000 HC RX 637 (ALT 250 FOR IP): Performed by: INTERNAL MEDICINE

## 2020-05-11 PROCEDURE — 88112 CYTOPATH CELL ENHANCE TECH: CPT

## 2020-05-11 PROCEDURE — 88173 CYTOPATH EVAL FNA REPORT: CPT

## 2020-05-11 PROCEDURE — 07D78ZX EXTRACTION OF THORAX LYMPHATIC, VIA NATURAL OR ARTIFICIAL OPENING ENDOSCOPIC, DIAGNOSTIC: ICD-10-PCS | Performed by: INTERNAL MEDICINE

## 2020-05-11 PROCEDURE — 6360000002 HC RX W HCPCS: Performed by: NURSE ANESTHETIST, CERTIFIED REGISTERED

## 2020-05-11 RX ORDER — SUCCINYLCHOLINE CHLORIDE 20 MG/ML
INJECTION INTRAMUSCULAR; INTRAVENOUS PRN
Status: DISCONTINUED | OUTPATIENT
Start: 2020-05-11 | End: 2020-05-11 | Stop reason: SDUPTHER

## 2020-05-11 RX ORDER — PROPOFOL 10 MG/ML
INJECTION, EMULSION INTRAVENOUS PRN
Status: DISCONTINUED | OUTPATIENT
Start: 2020-05-11 | End: 2020-05-11 | Stop reason: SDUPTHER

## 2020-05-11 RX ORDER — ONDANSETRON 2 MG/ML
INJECTION INTRAMUSCULAR; INTRAVENOUS PRN
Status: DISCONTINUED | OUTPATIENT
Start: 2020-05-11 | End: 2020-05-11 | Stop reason: SDUPTHER

## 2020-05-11 RX ORDER — LIDOCAINE HYDROCHLORIDE 20 MG/ML
INJECTION, SOLUTION INFILTRATION; PERINEURAL PRN
Status: DISCONTINUED | OUTPATIENT
Start: 2020-05-11 | End: 2020-05-11 | Stop reason: SDUPTHER

## 2020-05-11 RX ORDER — ROCURONIUM BROMIDE 10 MG/ML
INJECTION, SOLUTION INTRAVENOUS PRN
Status: DISCONTINUED | OUTPATIENT
Start: 2020-05-11 | End: 2020-05-11 | Stop reason: SDUPTHER

## 2020-05-11 RX ORDER — FENTANYL CITRATE 50 UG/ML
INJECTION, SOLUTION INTRAMUSCULAR; INTRAVENOUS PRN
Status: DISCONTINUED | OUTPATIENT
Start: 2020-05-11 | End: 2020-05-11 | Stop reason: SDUPTHER

## 2020-05-11 RX ORDER — DEXAMETHASONE SODIUM PHOSPHATE 4 MG/ML
INJECTION, SOLUTION INTRA-ARTICULAR; INTRALESIONAL; INTRAMUSCULAR; INTRAVENOUS; SOFT TISSUE PRN
Status: DISCONTINUED | OUTPATIENT
Start: 2020-05-11 | End: 2020-05-11 | Stop reason: SDUPTHER

## 2020-05-11 RX ORDER — PHENYLEPHRINE HCL IN 0.9% NACL 1 MG/10 ML
SYRINGE (ML) INTRAVENOUS PRN
Status: DISCONTINUED | OUTPATIENT
Start: 2020-05-11 | End: 2020-05-11 | Stop reason: SDUPTHER

## 2020-05-11 RX ORDER — SODIUM CHLORIDE, SODIUM LACTATE, POTASSIUM CHLORIDE, CALCIUM CHLORIDE 600; 310; 30; 20 MG/100ML; MG/100ML; MG/100ML; MG/100ML
INJECTION, SOLUTION INTRAVENOUS CONTINUOUS PRN
Status: DISCONTINUED | OUTPATIENT
Start: 2020-05-11 | End: 2020-05-11 | Stop reason: SDUPTHER

## 2020-05-11 RX ADMIN — ONDANSETRON 4 MG: 2 INJECTION INTRAMUSCULAR; INTRAVENOUS at 13:21

## 2020-05-11 RX ADMIN — PROPOFOL 140 MG: 10 INJECTION, EMULSION INTRAVENOUS at 13:15

## 2020-05-11 RX ADMIN — Medication 100 MCG: at 13:23

## 2020-05-11 RX ADMIN — PROPOFOL 20 MG: 10 INJECTION, EMULSION INTRAVENOUS at 14:21

## 2020-05-11 RX ADMIN — SUCCINYLCHOLINE CHLORIDE 120 MG: 20 INJECTION, SOLUTION INTRAMUSCULAR; INTRAVENOUS at 13:15

## 2020-05-11 RX ADMIN — Medication 2 G: at 18:31

## 2020-05-11 RX ADMIN — SODIUM CHLORIDE, POTASSIUM CHLORIDE, SODIUM LACTATE AND CALCIUM CHLORIDE: 600; 310; 30; 20 INJECTION, SOLUTION INTRAVENOUS at 13:09

## 2020-05-11 RX ADMIN — Medication 10 ML: at 22:46

## 2020-05-11 RX ADMIN — Medication 10 ML: at 11:31

## 2020-05-11 RX ADMIN — SUGAMMADEX 100 MG: 100 INJECTION, SOLUTION INTRAVENOUS at 14:20

## 2020-05-11 RX ADMIN — LIDOCAINE HYDROCHLORIDE 60 MG: 20 INJECTION, SOLUTION INFILTRATION; PERINEURAL at 13:14

## 2020-05-11 RX ADMIN — Medication 100 MCG: at 13:33

## 2020-05-11 RX ADMIN — ROCURONIUM BROMIDE 50 MG: 10 SOLUTION INTRAVENOUS at 13:20

## 2020-05-11 RX ADMIN — Medication 50 MCG: at 13:26

## 2020-05-11 RX ADMIN — Medication 50 MCG: at 13:14

## 2020-05-11 RX ADMIN — DEXAMETHASONE SODIUM PHOSPHATE 8 MG: 4 INJECTION, SOLUTION INTRAMUSCULAR; INTRAVENOUS at 13:21

## 2020-05-11 RX ADMIN — SUGAMMADEX 100 MG: 100 INJECTION, SOLUTION INTRAVENOUS at 14:22

## 2020-05-11 RX ADMIN — Medication 100 MCG: at 14:08

## 2020-05-11 ASSESSMENT — PULMONARY FUNCTION TESTS
PIF_VALUE: 24
PIF_VALUE: 28
PIF_VALUE: 20
PIF_VALUE: 12
PIF_VALUE: 33
PIF_VALUE: 36
PIF_VALUE: 28
PIF_VALUE: 37
PIF_VALUE: 22
PIF_VALUE: 26
PIF_VALUE: 31
PIF_VALUE: 35
PIF_VALUE: 4
PIF_VALUE: 36
PIF_VALUE: 30
PIF_VALUE: 0
PIF_VALUE: 23
PIF_VALUE: 7
PIF_VALUE: 37
PIF_VALUE: 35
PIF_VALUE: 34
PIF_VALUE: 29
PIF_VALUE: 29
PIF_VALUE: 36
PIF_VALUE: 32
PIF_VALUE: 4
PIF_VALUE: 23
PIF_VALUE: 25
PIF_VALUE: 37
PIF_VALUE: 1
PIF_VALUE: 29
PIF_VALUE: 2
PIF_VALUE: 33
PIF_VALUE: 16
PIF_VALUE: 37
PIF_VALUE: 34
PIF_VALUE: 32
PIF_VALUE: 22
PIF_VALUE: 36
PIF_VALUE: 22
PIF_VALUE: 31
PIF_VALUE: 30
PIF_VALUE: 25
PIF_VALUE: 25
PIF_VALUE: 2
PIF_VALUE: 38
PIF_VALUE: 33
PIF_VALUE: 0
PIF_VALUE: 34
PIF_VALUE: 23
PIF_VALUE: 31
PIF_VALUE: 1
PIF_VALUE: 36
PIF_VALUE: 32
PIF_VALUE: 24
PIF_VALUE: 5
PIF_VALUE: 29
PIF_VALUE: 23
PIF_VALUE: 39
PIF_VALUE: 1
PIF_VALUE: 38
PIF_VALUE: 1
PIF_VALUE: 33
PIF_VALUE: 29
PIF_VALUE: 32
PIF_VALUE: 36
PIF_VALUE: 33
PIF_VALUE: 1
PIF_VALUE: 28
PIF_VALUE: 32
PIF_VALUE: 2
PIF_VALUE: 32
PIF_VALUE: 37
PIF_VALUE: 0
PIF_VALUE: 40
PIF_VALUE: 38
PIF_VALUE: 30
PIF_VALUE: 36
PIF_VALUE: 21
PIF_VALUE: 22
PIF_VALUE: 38
PIF_VALUE: 28

## 2020-05-11 ASSESSMENT — PAIN - FUNCTIONAL ASSESSMENT: PAIN_FUNCTIONAL_ASSESSMENT: 0-10

## 2020-05-11 NOTE — PROGRESS NOTES
Pt A&O, quiet and pleasant. VSS. Denies N/V, BS active x4, passing flatus. Denies pain/discomfort. Denies dyspnea; SpO2 92% on RA; RLL insp/exp wheezes with rhonchi; moist, non productive cough. Encouraging ambulation. Call light within reach. Bed side table within reach. Wheels locked. Bed in lowest position. Refused bed alarm; independent with ADLs. Pt instructed to call out for assistance. Pt expressed understanding & calls out appropriately. Shift assessment complete. All care cont per orders. Will cont to monitor.    Electronically signed by Pan Izquierdo RN on 5/11/2020 at 8:20 AM

## 2020-05-11 NOTE — PROGRESS NOTES
No rashes or lesions. Neurologic:  Neurovascularly intact without any focal sensory/motor deficits. Cranial nerves: II-XII intact, grossly non-focal.  Psychiatric: Alert and oriented, thought content appropriate, normal insight  Capillary Refill: Brisk,< 3 seconds   Peripheral Pulses: +2 palpable, equal bilaterally       Labs:   Recent Labs     05/09/20 0642 05/10/20  0814   WBC 36.7* 37.2*   HGB 12.7 13.2   HCT 37.3 38.1    429     Recent Labs     05/09/20  0642 05/09/20  1319 05/09/20 2058 05/10/20  0814   * 126* 123* 126*   K 4.0  --  4.0 4.0   CL 85*  --  87* 88*   CO2 25  --  26 25   BUN 9  --  12 8   CREATININE <0.5*  --  0.6 <0.5*   CALCIUM 8.7  --  8.8 8.8   PHOS 3.2  --   --  2.9     No results for input(s): AST, ALT, BILIDIR, BILITOT, ALKPHOS in the last 72 hours. No results for input(s): INR in the last 72 hours. No results for input(s): [place] in the last 72 hours. Urinalysis:      Lab Results   Component Value Date    NITRU Negative 05/04/2020    WBCUA 6-10 05/04/2020    BACTERIA 2+ 05/04/2020    RBCUA 11-20 05/04/2020    BLOODU MODERATE 05/04/2020    SPECGRAV 1.015 05/04/2020    GLUCOSEU Negative 05/04/2020       Consults:    IP CONSULT TO HOSPITALIST  IP CONSULT TO ONCOLOGY  IP CONSULT TO NEPHROLOGY  IP CONSULT TO PULMONOLOGY      Assessment/Plan:    Active Hospital Problems    Diagnosis    Hyponatremia [E87.1]    N&V (nausea and vomiting) [R11.2]    Leukocytosis [D72.829]         N/V - likely 2nd to viral gastroenteritis: Resolved  Supportive care w/ IVF/Antiemetics. Continue to monitor and support patient. HypoNatremia - critically low, likely multifactorial 2nd to above, in addition to hypovolemia w/ SIADH likely 2nd to presumed small cell lung cancer. Follow serial labs on IVF. Nephrology consulted and appreciated - improved w/ Samsca 6 May. Trialing salt tablets 2 g p.o. 3 times daily with meals.   Continue to monitor close for retained fluid, and potential edema    Leukocytosis - CLL. Heme/Onc consulted s/p BM Bx 5 May. Consistent and confirmed with CLL on bone marrow biopsy    Chronic COPD without acute exacerbation-stable pulmonary function, seen on CT scan. -Medications: No current treatment required  -Pulmonary consultation completed. Lung mass - likely small cell lung cancer. L paramediastinal mass seen on CT scan 6 May suspicious for malignancy. Oncology input appreciated, arranging definitive dx and tx plan. Pulmonary consulted for EBUS: Biopsy completed on 5/11. Awaiting pathology      DVT Prophylaxis: LMWH  Diet: DIET GENERAL; No Added Salt (3-4 GM); Daily Fluid Restriction: 1000 ml  Code Status: Full Code      PT/OT Eval Status: No need identified    Dispo - Possibly Tues 12 May pending Na trend and subspecialty recs, especially in light of CT findings above.      Gray Sánchez MD

## 2020-05-11 NOTE — PROGRESS NOTES
· Evaluation: Goals set   · Goals: Pt will consume greater than 50% of meals and ONS this admission    · Monitoring: Meal Intake, Supplement Intake, Weight, Pertinent Labs      Electronically signed by Sandie Valdovinos, MS, RD, LD on 5/11/20 at 11:12 AM EDT    Contact Number: Office: [phone]

## 2020-05-11 NOTE — CONSULTS
Substance Use Topics    Alcohol use: Not on file        No Known Allergies      Physical Exam:  Blood pressure (!) 165/90, pulse 76, temperature 98 °F (36.7 °C), temperature source Oral, resp. rate 13, height 5' 4\" (1.626 m), weight 144 lb 4.8 oz (65.5 kg), SpO2 92 %.'   Constitutional: Pleasant, averagely built, in no distress. Awake, oriented and communicative. HENT: Upper dental plate, missing teeth in lower jaw, class II airway, no oral lesions. Eyes:  Conjunctivae are normal. Pupils equal, round, and reactive to light. No scleral icterus. Wearing glasses   Neck: No JVD. No tracheal deviation present. No obvious thyroid mass. Cardiovascular: Normal rate, regular rhythm, normal heart sounds. No right ventricular heave. No lower extremity edema. Pulmonary/Chest: No wheezes. No rales. No accessory muscle usage or stridor. Abdominal: Soft. Bowel sounds present. No distension or tenderness. Musculoskeletal: No cyanosis. No clubbing. No obvious joint deformity. Lymphadenopathy: No cervical or supraclavicular adenopathy. Skin: Skin is warm and dry. No rash or nodules on the exposed extremities. Psychiatric: Normal mood and affect. Behavior is normal.  No anxiety. Neurologic: Alert, awake and oriented. PERRL. Speech fluent. No obvious focal deficit, detailed exam not performed        Results:  CBC:   Recent Labs     05/09/20  0642 05/10/20  0814   WBC 36.7* 37.2*   HGB 12.7 13.2   HCT 37.3 38.1   MCV 89.2 88.4    429     BMP:   Recent Labs     05/09/20  0642 05/09/20  1319 05/09/20 2058 05/10/20  0814   * 126* 123* 126*   K 4.0  --  4.0 4.0   CL 85*  --  87* 88*   CO2 25  --  26 25   PHOS 3.2  --   --  2.9   BUN 9  --  12 8   CREATININE <0.5*  --  0.6 <0.5*       Imaging:  I have reviewed radiology images personally. CT CHEST W CONTRAST   Final Result   1. Left paramediastinal mass highly suspicious for malignancy.    2. Left hilar adenopathy, most compatible with metastatic

## 2020-05-11 NOTE — PRE SEDATION
Sedation Pre-Procedure Note    Patient Name: Collette Paschal   YOB: 1945  Room/Bed: Endo Pool/NONE  Medical Record Number: [id number]  Date: 5/11/2020   Time: 1:14 PM       Indication: Lung mass, hilar adenopathy, smoker    Consent: I have discussed with the patient and/or the patient representative the indication, alternatives, and the possible risks and/or complications of the planned procedure and the anesthesia methods. The patient and/or patient representative appear to understand and agree to proceed. Vital Signs:   Vitals:    05/11/20 1233   BP: (!) 158/64   Pulse: 77   Resp: 16   Temp: 97.3 °F (36.3 °C)   SpO2: 92%       Past Medical History:   has a past medical history of Hyperlipidemia. Past Surgical History:   has a past surgical history that includes Breast surgery and Skin cancer excision. Medications:   Scheduled Meds:    sodium chloride  2 g Oral TID WC    atorvastatin  40 mg Oral Daily    sodium chloride flush  10 mL Intravenous 2 times per day    enoxaparin  40 mg Subcutaneous Daily     Continuous Infusions:   PRN Meds: sodium chloride flush, acetaminophen **OR** acetaminophen, polyethylene glycol, promethazine **OR** ondansetron  Home Meds:   Prior to Admission medications    Medication Sig Start Date End Date Taking? Authorizing Provider   Atorvastatin Calcium (LIPITOR PO) Take by mouth   Yes Historical Provider, MD     Coumadin Use Last 7 Days:  no  Antiplatelet drug therapy use last 7 days: no  Other anticoagulant use last 7 days: yes - Lovenox  Additional Medication Information:  Medications reviewed      Pre-Sedation Documentation and Exam:   I have personally completed a history, physical exam & review of systems for this patient (see notes).     Mallampati Airway Assessment:  Mallampati Class II - (soft palate, fauces & uvula are visible)    Prior History of Anesthesia Complications:   none    ASA Classification:  Class 3 - A patient with severe systemic disease that limits activity but is not incapacitating    Sedation/ Anesthesia Plan:   general    Medications Planned:   Per anesthesiology    Patient is an appropriate candidate for plan of sedation: yes    Electronically signed by Юлия Pierce MD on 5/11/2020 at 1:14 PM

## 2020-05-11 NOTE — ANESTHESIA PRE PROCEDURE
Department of Anesthesiology  Preprocedure Note       Name:  Aneesh Fried   Age:  69 y.o.  :  1945                                          MRN:  [id number]         Date:  2020      Surgeon: Radha Watt):  Caitlyn Zamarripa MD    Procedure: BRONCHOSCOPY ENDOBRONCHIAL ULTRASOUND with ANESTHESIA (N/A )    Medications prior to admission:   Prior to Admission medications    Medication Sig Start Date End Date Taking?  Authorizing Provider   Atorvastatin Calcium (LIPITOR PO) Take by mouth   Yes Historical Provider, MD       Current medications:    Current Facility-Administered Medications   Medication Dose Route Frequency Provider Last Rate Last Dose    sodium chloride tablet 2 g  2 g Oral TID  Duane Young MD   2 g at 05/10/20 1703    atorvastatin (LIPITOR) tablet 40 mg  40 mg Oral Daily Savanah Bliss MD   40 mg at 05/10/20 4055    sodium chloride flush 0.9 % injection 10 mL  10 mL Intravenous 2 times per day Savanah Bliss MD   10 mL at 20 1131    sodium chloride flush 0.9 % injection 10 mL  10 mL Intravenous PRN Savanah Bliss MD        acetaminophen (TYLENOL) tablet 650 mg  650 mg Oral Q6H PRN Savanah Bliss MD        Or   [facility] acetaminophen (TYLENOL) suppository 650 mg  650 mg Rectal Q6H PRN Dania Gilmore MD        polyethylene glycol (GLYCOLAX) packet 17 g  17 g Oral Daily PRN Savanah Bliss MD        promethazine (PHENERGAN) tablet 12.5 mg  12.5 mg Oral Q6H PRN Savanah Bliss MD        Or    ondansetron (ZOFRAN) injection 4 mg  4 mg Intravenous Q6H PRN Dania Gilmore MD        enoxaparin (LOVENOX) injection 40 mg  40 mg Subcutaneous Daily Savanah Bliss MD   40 mg at 05/10/20 0905       Allergies:  No Known Allergies    Problem List:    Patient Active Problem List   Diagnosis Code    Acute hyponatremia E87.1    N&V (nausea and vomiting) R11.2    Leukocytosis D72.829    Lung mass R91.8    Multiple lung nodules R91.8    Hilar adenopathy R59.0    Centrilobular emphysema (HCC) J43.2    Smoker F17.200       Past Medical History:        Diagnosis Date    Hyperlipidemia        Past Surgical History:        Procedure Laterality Date    BREAST SURGERY      fibroid tumors removed bilateral breast    SKIN CANCER EXCISION      mid chest       Social History:    Social History     Tobacco Use    Smoking status: Former Smoker     Last attempt to quit: 2020     Years since quittin.0    Smokeless tobacco: Never Used   Substance Use Topics    Alcohol use: Not on file                                Counseling given: Not Answered      Vital Signs (Current):   Vitals:    05/10/20 1437 05/10/20 2259 20 0810 20 1233   BP: 113/72 (!) 145/83 (!) 165/90 (!) 158/64   Pulse: 78 78 76 77   Resp: 18 16 13 16   Temp: 98.5 °F (36.9 °C) 98 °F (36.7 °C) 98 °F (36.7 °C) 97.3 °F (36.3 °C)   TempSrc: Oral Oral Oral Temporal   SpO2: 93% 91% 92% 92%   Weight:       Height:                                                  BP Readings from Last 3 Encounters:   20 (!) 158/64       NPO Status: Time of last liquid consumption:                         Time of last solid consumption:                         Date of last liquid consumption: 05/10/20                        Date of last solid food consumption: 05/10/20    BMI:   Wt Readings from Last 3 Encounters:   20 144 lb 4.8 oz (65.5 kg)     Body mass index is 24.77 kg/m².     CBC:   Lab Results   Component Value Date    WBC 37.2 05/10/2020    RBC 4.31 05/10/2020    HGB 13.2 05/10/2020    HCT 38.1 05/10/2020    MCV 88.4 05/10/2020    RDW 14.0 05/10/2020     05/10/2020       CMP:   Lab Results   Component Value Date     05/10/2020    K 4.0 05/10/2020    K 4.0 2020    CL 88 05/10/2020    CO2 25 05/10/2020    BUN 8 05/10/2020    CREATININE <0.5 05/10/2020    GFRAA >60 05/10/2020    AGRATIO 1.5 2020    LABGLOM >60 05/10/2020    GLUCOSE 98 05/10/2020    PROT

## 2020-05-11 NOTE — PLAN OF CARE
Problem: Falls - Risk of:  Goal: Will remain free from falls  Description: Will remain free from falls  Outcome: Ongoing     Problem: Falls - Risk of:  Goal: Absence of physical injury  Description: Absence of physical injury  Outcome: Ongoing     Problem: Falls - Risk of: Intervention: Assess risk factors for falls  Note: Pt is medium fall risk  Intervention: Postfall assessment  Note: No falls sustained thus far this shift. Intervention: Manage a safe environment  Note: Call light within reach. Bed side table within reach. Wheels locked. Bed in lowest position. Independent with ADLS; refused bed alarm. Pt instructed to call out for assistance. Pt expressed understanding & calls out appropriately. Intervention: Toileting assistance  Note: Independent with toileting needs.

## 2020-05-11 NOTE — ANESTHESIA POSTPROCEDURE EVALUATION
Department of Anesthesiology  Postprocedure Note    Patient: Joe Gaston  MRN: [id number]  YOB: 1945  Date of evaluation: 5/11/2020  Time:  3:46 PM     Procedure Summary     Date:  05/11/20 Room / Location:  [facility]    Anesthesia Start:  1309 Anesthesia Stop:  9370    Procedures:       [facility] with ANESTHESIA (N/A )      BRONCHOSCOPY/TRANSBRONCHIAL NEEDLE BIOPSY      BRONCHOSCOPY/TRANSBRONCHIAL NEEDLE BIOPSY ADDL LOBE      BRONCHOSCOPY DIAGNOSTIC OR CELL [address] ONLY Diagnosis:  (paramediastinal mass)    Surgeon:  Gigi Barone MD Responsible Provider:  Brian Bernabe MD    Anesthesia Type:  general ASA Status:  2          Anesthesia Type: general    Arsalan Phase I: Arsalan Score: 10    Arsalan Phase II:      Last vitals: Reviewed and per EMR flowsheets.        Anesthesia Post Evaluation    Patient location during evaluation: PACU  Patient participation: complete - patient participated  Level of consciousness: awake and alert  Pain score: 0  Airway patency: patent  Nausea & Vomiting: no nausea and no vomiting  Complications: no  Cardiovascular status: blood pressure returned to baseline  Respiratory status: acceptable  Hydration status: stable

## 2020-05-12 ENCOUNTER — APPOINTMENT (OUTPATIENT)
Dept: CT IMAGING | Age: 75
DRG: 829 | End: 2020-05-12
Payer: MEDICARE

## 2020-05-12 ENCOUNTER — APPOINTMENT (OUTPATIENT)
Dept: MRI IMAGING | Age: 75
DRG: 829 | End: 2020-05-12
Payer: MEDICARE

## 2020-05-12 LAB
ANION GAP SERPL CALCULATED.3IONS-SCNC: 12 MMOL/L (ref 3–16)
BANDED NEUTROPHILS RELATIVE PERCENT: 1 % (ref 0–7)
BASOPHILS ABSOLUTE: 0 K/UL (ref 0–0.2)
BASOPHILS RELATIVE PERCENT: 0 %
BUN BLDV-MCNC: 11 MG/DL (ref 7–20)
CALCIUM SERPL-MCNC: 9 MG/DL (ref 8.3–10.6)
CHLORIDE BLD-SCNC: 85 MMOL/L (ref 99–110)
CO2: 26 MMOL/L (ref 21–32)
CREAT SERPL-MCNC: <0.5 MG/DL (ref 0.6–1.2)
EOSINOPHILS ABSOLUTE: 0 K/UL (ref 0–0.6)
EOSINOPHILS RELATIVE PERCENT: 0 %
GFR AFRICAN AMERICAN: >60
GFR NON-AFRICAN AMERICAN: >60
GLUCOSE BLD-MCNC: 95 MG/DL (ref 70–99)
HCT VFR BLD CALC: 36.2 % (ref 36–48)
HEMATOLOGY PATH CONSULT: NO
HEMOGLOBIN: 12.2 G/DL (ref 12–16)
LACTATE DEHYDROGENASE: 229 U/L (ref 100–190)
LYMPHOCYTES ABSOLUTE: 36.4 K/UL (ref 1–5.1)
LYMPHOCYTES RELATIVE PERCENT: 76 %
MCH RBC QN AUTO: 29.8 PG (ref 26–34)
MCHC RBC AUTO-ENTMCNC: 33.7 G/DL (ref 31–36)
MCV RBC AUTO: 88.4 FL (ref 80–100)
MONOCYTES ABSOLUTE: 1.9 K/UL (ref 0–1.3)
MONOCYTES RELATIVE PERCENT: 4 %
NEUTROPHILS ABSOLUTE: 9.6 K/UL (ref 1.7–7.7)
NEUTROPHILS RELATIVE PERCENT: 19 %
PDW BLD-RTO: 14.1 % (ref 12.4–15.4)
PLATELET # BLD: 422 K/UL (ref 135–450)
PMV BLD AUTO: 7.2 FL (ref 5–10.5)
POTASSIUM SERPL-SCNC: 3.4 MMOL/L (ref 3.5–5.1)
RBC # BLD: 4.1 M/UL (ref 4–5.2)
RBC # BLD: NORMAL 10*6/UL
SMUDGE CELLS: PRESENT
SODIUM BLD-SCNC: 123 MMOL/L (ref 136–145)
URIC ACID, SERUM: 2.6 MG/DL (ref 2.6–6)
WBC # BLD: 47.9 K/UL (ref 4–11)

## 2020-05-12 PROCEDURE — 74177 CT ABD & PELVIS W/CONTRAST: CPT

## 2020-05-12 PROCEDURE — 2580000003 HC RX 258: Performed by: NURSE PRACTITIONER

## 2020-05-12 PROCEDURE — 6370000000 HC RX 637 (ALT 250 FOR IP): Performed by: INTERNAL MEDICINE

## 2020-05-12 PROCEDURE — 6370000000 HC RX 637 (ALT 250 FOR IP): Performed by: NURSE PRACTITIONER

## 2020-05-12 PROCEDURE — 84550 ASSAY OF BLOOD/URIC ACID: CPT

## 2020-05-12 PROCEDURE — 6360000004 HC RX CONTRAST MEDICATION: Performed by: INTERNAL MEDICINE

## 2020-05-12 PROCEDURE — 6360000002 HC RX W HCPCS: Performed by: INTERNAL MEDICINE

## 2020-05-12 PROCEDURE — 99232 SBSQ HOSP IP/OBS MODERATE 35: CPT | Performed by: INTERNAL MEDICINE

## 2020-05-12 PROCEDURE — 83615 LACTATE (LD) (LDH) ENZYME: CPT

## 2020-05-12 PROCEDURE — 6360000004 HC RX CONTRAST MEDICATION: Performed by: NURSE PRACTITIONER

## 2020-05-12 PROCEDURE — 70553 MRI BRAIN STEM W/O & W/DYE: CPT

## 2020-05-12 PROCEDURE — 85025 COMPLETE CBC W/AUTO DIFF WBC: CPT

## 2020-05-12 PROCEDURE — 80048 BASIC METABOLIC PNL TOTAL CA: CPT

## 2020-05-12 PROCEDURE — 2580000003 HC RX 258: Performed by: INTERNAL MEDICINE

## 2020-05-12 PROCEDURE — 36415 COLL VENOUS BLD VENIPUNCTURE: CPT

## 2020-05-12 PROCEDURE — A9579 GAD-BASE MR CONTRAST NOS,1ML: HCPCS | Performed by: NURSE PRACTITIONER

## 2020-05-12 PROCEDURE — 1200000000 HC SEMI PRIVATE

## 2020-05-12 RX ORDER — POTASSIUM CHLORIDE 20 MEQ/1
40 TABLET, EXTENDED RELEASE ORAL ONCE
Status: COMPLETED | OUTPATIENT
Start: 2020-05-12 | End: 2020-05-12

## 2020-05-12 RX ORDER — SODIUM CHLORIDE 1000 MG
2 TABLET, SOLUBLE MISCELLANEOUS
Status: DISCONTINUED | OUTPATIENT
Start: 2020-05-12 | End: 2020-05-12

## 2020-05-12 RX ORDER — SODIUM CHLORIDE 9 MG/ML
INJECTION, SOLUTION INTRAVENOUS CONTINUOUS
Status: DISCONTINUED | OUTPATIENT
Start: 2020-05-12 | End: 2020-05-15

## 2020-05-12 RX ORDER — ALLOPURINOL 300 MG/1
300 TABLET ORAL DAILY
Status: DISCONTINUED | OUTPATIENT
Start: 2020-05-12 | End: 2020-05-18 | Stop reason: HOSPADM

## 2020-05-12 RX ADMIN — IOHEXOL 50 ML: 240 INJECTION, SOLUTION INTRATHECAL; INTRAVASCULAR; INTRAVENOUS; ORAL at 12:02

## 2020-05-12 RX ADMIN — SODIUM CHLORIDE: 9 INJECTION, SOLUTION INTRAVENOUS at 12:00

## 2020-05-12 RX ADMIN — SODIUM CHLORIDE: 9 INJECTION, SOLUTION INTRAVENOUS at 17:06

## 2020-05-12 RX ADMIN — ALLOPURINOL 300 MG: 300 TABLET ORAL at 11:59

## 2020-05-12 RX ADMIN — Medication 10 ML: at 21:47

## 2020-05-12 RX ADMIN — Medication 2 G: at 11:59

## 2020-05-12 RX ADMIN — POTASSIUM CHLORIDE 40 MEQ: 20 TABLET, EXTENDED RELEASE ORAL at 17:06

## 2020-05-12 RX ADMIN — Medication 10 ML: at 09:08

## 2020-05-12 RX ADMIN — GADOTERIDOL 12 ML: 279.3 INJECTION, SOLUTION INTRAVENOUS at 18:04

## 2020-05-12 RX ADMIN — SODIUM CHLORIDE: 9 INJECTION, SOLUTION INTRAVENOUS at 21:39

## 2020-05-12 RX ADMIN — ATORVASTATIN CALCIUM 40 MG: 40 TABLET, FILM COATED ORAL at 09:07

## 2020-05-12 RX ADMIN — IOPAMIDOL 75 ML: 755 INJECTION, SOLUTION INTRAVENOUS at 15:01

## 2020-05-12 RX ADMIN — POLYETHYLENE GLYCOL 3350 17 G: 17 POWDER, FOR SOLUTION ORAL at 09:13

## 2020-05-12 RX ADMIN — SODIUM CHLORIDE: 9 INJECTION, SOLUTION INTRAVENOUS at 21:30

## 2020-05-12 RX ADMIN — Medication 15 G: at 16:53

## 2020-05-12 RX ADMIN — Medication 2 G: at 09:07

## 2020-05-12 RX ADMIN — ENOXAPARIN SODIUM 40 MG: 40 INJECTION SUBCUTANEOUS at 09:07

## 2020-05-12 NOTE — PROGRESS NOTES
We are asked to place portacath during this admission to allow pt to begin chemotherapy. Have scheduled surgery for tomorrow. I will evaluate and discuss procedure in detail w/ patient and complete formal consult at that time.     DTW

## 2020-05-12 NOTE — PLAN OF CARE
Problem: Safety:  Goal: Free from accidental physical injury  Description: Free from accidental physical injury  Outcome: Ongoing  Note: Bed locked, in lowest position. Non skid socks in place while OOB. Room kept free of clutter with call light and personal belongings kept within reach.

## 2020-05-12 NOTE — PROGRESS NOTES
INPATIENT PULMONARY CRITICAL CARE PROGRESS NOTE      SUBJECTIVE: Afebrile and hemodynamically stable. Had mild hemoptysis yesterday and this morning. Denies other complaints except sore throat from the intubation yesterday    Physical Exam:  Blood pressure 131/76, pulse 83, temperature 97.8 °F (36.6 °C), temperature source Oral, resp. rate 14, height 5' 4\" (1.626 m), weight 144 lb 4.8 oz (65.5 kg), SpO2 94 %.'   Constitutional: Pleasant, averagely built, in no distress. Awake, oriented and communicative. HENT: Upper dental plate, missing teeth in lower jaw, class II airway, no oral lesions. Eyes:  Conjunctivae are normal. Pupils equal, round, and reactive to light. No scleral icterus. Wearing glasses   Neck: No JVD. Cardiovascular: Normal rate, regular rhythm, normal heart sounds. Pulmonary/Chest: Bilateral scattered wheezes. No rales. No accessory muscle usage or stridor. Abdominal: Deferred. Musculoskeletal: No cyanosis. No clubbing. No obvious joint deformity. Lymphadenopathy: Deferred. Skin: Skin is warm and dry. No rash or nodules on the exposed extremities. Psychiatric: Normal mood and affect. Behavior is normal.  No anxiety. Neurologic: Alert, awake and oriented. PERRL. Speech fluent. No obvious focal deficit, detailed exam not performed      Results:  CBC:   Recent Labs     05/10/20  0814 05/12/20  0939   WBC 37.2* 47.9*   HGB 13.2 12.2   HCT 38.1 36.2   MCV 88.4 88.4    422     BMP:   Recent Labs     05/09/20  2058 05/10/20  0814 05/12/20  0939   * 126* 123*   K 4.0 4.0 3.4*   CL 87* 88* 85*   CO2 26 25 26   PHOS  --  2.9  --    BUN 12 8 11   CREATININE 0.6 <0.5* <0.5*       Imaging:  I have reviewed radiology images personally. CT CHEST W CONTRAST   Final Result   1. Left paramediastinal mass highly suspicious for malignancy. 2. Left hilar adenopathy, most compatible with metastatic disease. 3. Scattered pulmonary nodules bilaterally as described.   Both benign and   metastatic disease remain differential considerations. No remote studies are   available for comparison. 4. Consolidative opacities in the bilateral lung apices, right greater than   left. Findings are favored to represent pleuroparenchymal scarring over   malignancy. Further evaluation with PET/CT is recommended. 5. Left adrenal nodularity concerning for metastatic disease given the   patient's chest findings. However, prior CT report dated November 2017   indicates the presence of a left adrenal nodule favored to represent an   adenoma. PET/CT can be obtained. 6. Left para-aortic lymph nodes versus additional left adrenal nodules,   reactive lymphadenopathy versus metastatic disease. 7. Severe emphysema. CT BIOPSY BONE MARROW   Final Result   Successful CT guided bone marrow aspiration and core biopsy of the left iliac   bone. CT GUIDED NEEDLE PLACEMENT   Final Result   Successful CT guided bone marrow aspiration and core biopsy of the left iliac   bone. XR CHEST PORTABLE   Final Result   Hyperinflated lungs suggests obstructive small airways disease such as asthma   or COPD. No focal airspace disease. CT NEEDLE BIOPSY LUNG PERCUTANEOUS    (Results Pending)     Ct Chest W Contrast    Result Date: 5/6/2020  EXAMINATION: CT OF THE CHEST WITH CONTRAST, 5/6/2020 10:54 am TECHNIQUE: CT of the chest was performed with the administration of intravenous contrast. Multiplanar reformatted images are provided for review. Dose modulation, iterative reconstruction, and/or weight based adjustment of the mA/kV was utilized to reduce the radiation dose to as low as reasonably achievable. COMPARISON: May 4, 2020. CT report from November 2017.  HISTORY: ORDERING SYSTEM PROVIDED HISTORY: Probable lung cancer TECHNOLOGIST PROVIDED HISTORY: Reason for exam:->Probable lung cancer Reason for Exam: Evaluate for Lung Cancer Acuity: Acute Type of Exam: Initial Additional signs and symptoms:  Smoker x several years Relevant Medical/Surgical History: No surg FINDINGS: Mediastinum: The heart is normal in size without a pericardial effusion. The aorta and arch branches are normal in course and caliber. Main pulmonary artery is normal in course and caliber. No saddle embolus. A left paramediastinal mass measuring 5.1 x 2.8 cm is identified. Another prevascular lymph node measures 1.6 x 1.4 cm. Prominent left perihilar lymph nodes are also seen, measuring 2.0 x 1.7 cm. No right hilar adenopathy. A left cardiophrenic lymph node measures 10 x 10 mm (series 3, image 102). Lungs/pleura: Severe emphysematous changes are present. In the bilateral apices, pleuroparenchymal scarring is identified, left greater than right. A discrete nodular opacities also seen in the left upper lobe measuring 12 x 10 mm. Two discrete left lower lobe nodules are seen measuring 8 mm (series 3, image 53) and 7 mm (series 3, image 79). Right lower lobe pulmonary nodule measures 6 mm (series 3, image 80). Small left pleural effusion is present. The central airways are patent. Small amount of debris is identified in the left mainstem bronchus and bilateral lower lobe airways. Upper Abdomen: Limited evaluation of the upper abdomen demonstrates a left adrenal nodule measuring 2.1 x 1.7 cm. The right adrenal gland is not well seen. Additional left adrenal nodule versus para-aortic lymph nodes measuring up to 11 x 10 mm (series 2, image 122). Soft Tissues/Bones: Right thyroid nodule measures 10 mm and requires no dedicated follow-up. No acute or aggressive osseous lesion. No pathologically enlarged axillary or lower neck lymph nodes. 1. Left paramediastinal mass highly suspicious for malignancy. 2. Left hilar adenopathy, most compatible with metastatic disease. 3. Scattered pulmonary nodules bilaterally as described. Both benign and metastatic disease remain differential considerations.   No remote studies are available for comparison. 4. Consolidative opacities in the bilateral lung apices, right greater than left. Findings are favored to represent pleuroparenchymal scarring over malignancy. Further evaluation with PET/CT is recommended. 5. Left adrenal nodularity concerning for metastatic disease given the patient's chest findings. However, prior CT report dated November 2017 indicates the presence of a left adrenal nodule favored to represent an adenoma. PET/CT can be obtained. 6. Left para-aortic lymph nodes versus additional left adrenal nodules, reactive lymphadenopathy versus metastatic disease. 7. Severe emphysema. Ct Guided Needle Placement    Result Date: 5/6/2020  PROCEDURE: CT GUIDED BONE MARROW ASPIRATION AND CORE NEEDLE BONE BIOPSY OF THE  ILIAC BONE. MODERATE CONSCIOUS SEDATION 5/5/2020 HISTORY: ORDERING SYSTEM PROVIDED HISTORY: ct directed bone marrow bx for leukocytosis TECHNOLOGIST PROVIDED HISTORY: Reason for exam:->ct directed bone marrow bx for leukocytosis Reason for Exam: left iliac bone marrow bx; ORDERING SYSTEM PROVIDED HISTORY: leukocytosis-bone marrow bx TECHNOLOGIST PROVIDED HISTORY: Reason for exam:->leukocytosis-bone marrow bx SEDATION: Versed 1 mg IV and Fentanyl 50 mcg IV were administered. Following administration of the medications, patient was continuously monitored throughout the procedure by the Radiology Specials nurse with radiologist in attendance. Total monitoring time was approximately 25 minutes. TECHNIQUE: Informed consent was obtained following a detailed explanation of the procedure including risks, benefits, and alternatives. Universal protocol was followed. Axial images were obtained through the iliac bones using CT guidance and a suitable skin site was prepped and draped in sterile fashion. Local anesthesia was achieved with lidocaine. An 11 gauge T-Justino bone marrow biopsy needle was advanced into the left iliac bone.   Approximately 3-4 mL of bone marrow aspirate attendance. Total monitoring time was approximately 25 minutes. TECHNIQUE: Informed consent was obtained following a detailed explanation of the procedure including risks, benefits, and alternatives. Universal protocol was followed. Axial images were obtained through the iliac bones using CT guidance and a suitable skin site was prepped and draped in sterile fashion. Local anesthesia was achieved with lidocaine. An 11 gauge T-Justino bone marrow biopsy needle was advanced into the left iliac bone. Approximately 3-4 mL of bone marrow aspirate was obtained. Following this, approximately 5-6 mL of bone marrow aspirate was obtained in a syringe containing 2 mL of heparin. A single core biopsy specimen was obtained and the patient tolerated the procedure well. Dose modulation, iterative reconstruction, and/or weight based adjustment of the mA/kV was utilized to reduce the radiation dose to as low as reasonably achievable. Successful CT guided bone marrow aspiration and core biopsy of the left iliac bone. Assessment and plan:  Lung mass, multiple lung nodules. Likely neoplastic process. The nodules were too small to biopsy, the lung mass is close to the aorta and pulmonary artery. The apical pleural abnormalities probably represent scarring. She denies prior TB exposure. Left hilar adenopathy. Completed bronchoscopy with EBUS and biopsy of left hilar, subcarinal and AP window nodes. Preliminary GURMEET suggested small cell lung cancer. Patient informed that we will probably get final results by tomorrow. .  COPD/emphysema. Not symptomatic at present, mild wheezing today, possibly from bronchoscopy yesterday. Severe hyponatremia. Likely SIADH. Leukocytosis. Elevated lymphocyte count, bone marrow compatible with CLL. Smoker. She says she has quit smoking. DVT prophylaxis. Lovenox    We will sign off, please call with questions. D/W Dr. Sue Vásquez.  Please call with questions, thank you for

## 2020-05-12 NOTE — PROGRESS NOTES
Patient Name: Onesimo George                                                    Primary Physician: Trell Palm MD  Admitting Dx: Hyponatremia [E87.1]  Hyponatremia [E87.1]    Dr. Khushboo Thomas      Nephrology Nancy  Note  [facility]  [email or web address]                                       Interval Plan:     · Hyponatremia is stable 123-126. Encouraged patient to add more protein to her diet because the urea produce on high protein diet can increase free water clearance if she could add 2 more ounces of protein per day to her diet, over time, her serum level can be improved drastically. · D/C NaCl tabs and added PO Urea 15 gm po qd. The patient can be discharged from renal aspect, but she will require nephrology for her hyponatremia. · The long-term consequences of  even mild hyponatremia can be very substantial   exacerbate osteoporosis, cause balance issues and falls. · 5/7/2020 -CT directed lung biopsy on 5/11/20 with Small Cell Lung Ca? Assessment:     Hyponatremia  · SIADH  · Sodium 112 on admission- now 130- rise of 11 in  24 hrs with samsca  · On dextrose, liberal fluid intake  · Urine lytes < 40 twice, repeat was 129, 66  · No history of liver failure, alcoholism, CHF, hypothyroidism  · Possible leukemia- bm pending   · TSH, lipids, cortisol, LFT normal  · Xray chest -ve for mass  · CT lung- mass, and also has adrenal mass  Hypokalemia  · Due to N and V, D  · Will give potassium again  Leucocytosis  · bone marrow biopsy done 5/5/20    Please call our office at [phone] or [employer] with any questions or contact me directly. Subjective:     CC / Reason for Consult: HYponatremia    HPI/PMH:  69 y.o. female presented to the hospital on 5/3/2020 with nausea, vomiting and diarrhea  For 4 days. No pain abdomen. No other complaints. Denies liver problem, drinking, thyroid problem,  Heart problem. She was found to have low sodium of 112 and came up to 119.

## 2020-05-13 ENCOUNTER — ANESTHESIA EVENT (OUTPATIENT)
Dept: OPERATING ROOM | Age: 75
DRG: 829 | End: 2020-05-13
Payer: MEDICARE

## 2020-05-13 ENCOUNTER — ANESTHESIA (OUTPATIENT)
Dept: OPERATING ROOM | Age: 75
DRG: 829 | End: 2020-05-13
Payer: MEDICARE

## 2020-05-13 ENCOUNTER — APPOINTMENT (OUTPATIENT)
Dept: NUCLEAR MEDICINE | Age: 75
DRG: 829 | End: 2020-05-13
Payer: MEDICARE

## 2020-05-13 ENCOUNTER — APPOINTMENT (OUTPATIENT)
Dept: GENERAL RADIOLOGY | Age: 75
DRG: 829 | End: 2020-05-13
Payer: MEDICARE

## 2020-05-13 VITALS
OXYGEN SATURATION: 98 % | SYSTOLIC BLOOD PRESSURE: 129 MMHG | DIASTOLIC BLOOD PRESSURE: 73 MMHG | RESPIRATION RATE: 15 BRPM | TEMPERATURE: 98.6 F

## 2020-05-13 LAB
ANION GAP SERPL CALCULATED.3IONS-SCNC: 12 MMOL/L (ref 3–16)
BUN BLDV-MCNC: 6 MG/DL (ref 7–20)
CALCIUM SERPL-MCNC: 8.3 MG/DL (ref 8.3–10.6)
CHLORIDE BLD-SCNC: 82 MMOL/L (ref 99–110)
CO2: 25 MMOL/L (ref 21–32)
CREAT SERPL-MCNC: <0.5 MG/DL (ref 0.6–1.2)
GFR AFRICAN AMERICAN: >60
GFR NON-AFRICAN AMERICAN: >60
GLUCOSE BLD-MCNC: 89 MG/DL (ref 70–99)
POTASSIUM SERPL-SCNC: 3.7 MMOL/L (ref 3.5–5.1)
SODIUM BLD-SCNC: 119 MMOL/L (ref 136–145)

## 2020-05-13 PROCEDURE — 7100000011 HC PHASE II RECOVERY - ADDTL 15 MIN: Performed by: SURGERY

## 2020-05-13 PROCEDURE — 2580000003 HC RX 258: Performed by: SURGERY

## 2020-05-13 PROCEDURE — 36561 INSERT TUNNELED CV CATH: CPT | Performed by: SURGERY

## 2020-05-13 PROCEDURE — 6360000002 HC RX W HCPCS: Performed by: SURGERY

## 2020-05-13 PROCEDURE — 36415 COLL VENOUS BLD VENIPUNCTURE: CPT

## 2020-05-13 PROCEDURE — 3700000000 HC ANESTHESIA ATTENDED CARE: Performed by: SURGERY

## 2020-05-13 PROCEDURE — 6360000002 HC RX W HCPCS

## 2020-05-13 PROCEDURE — 77001 FLUOROGUIDE FOR VEIN DEVICE: CPT | Performed by: SURGERY

## 2020-05-13 PROCEDURE — 77001 FLUOROGUIDE FOR VEIN DEVICE: CPT

## 2020-05-13 PROCEDURE — 6370000000 HC RX 637 (ALT 250 FOR IP): Performed by: INTERNAL MEDICINE

## 2020-05-13 PROCEDURE — 6370000000 HC RX 637 (ALT 250 FOR IP): Performed by: SURGERY

## 2020-05-13 PROCEDURE — 3430000000 HC RX DIAGNOSTIC RADIOPHARMACEUTICAL: Performed by: NURSE PRACTITIONER

## 2020-05-13 PROCEDURE — 76937 US GUIDE VASCULAR ACCESS: CPT | Performed by: SURGERY

## 2020-05-13 PROCEDURE — 3700000001 HC ADD 15 MINUTES (ANESTHESIA): Performed by: SURGERY

## 2020-05-13 PROCEDURE — C1788 PORT, INDWELLING, IMP: HCPCS | Performed by: SURGERY

## 2020-05-13 PROCEDURE — 7100000010 HC PHASE II RECOVERY - FIRST 15 MIN: Performed by: SURGERY

## 2020-05-13 PROCEDURE — 80048 BASIC METABOLIC PNL TOTAL CA: CPT

## 2020-05-13 PROCEDURE — 3600000002 HC SURGERY LEVEL 2 BASE: Performed by: SURGERY

## 2020-05-13 PROCEDURE — 2709999900 HC NON-CHARGEABLE SUPPLY: Performed by: SURGERY

## 2020-05-13 PROCEDURE — 3209999900 FLUORO FOR SURGICAL PROCEDURES

## 2020-05-13 PROCEDURE — 78306 BONE IMAGING WHOLE BODY: CPT

## 2020-05-13 PROCEDURE — A9503 TC99M MEDRONATE: HCPCS | Performed by: NURSE PRACTITIONER

## 2020-05-13 PROCEDURE — 1200000000 HC SEMI PRIVATE

## 2020-05-13 PROCEDURE — 99221 1ST HOSP IP/OBS SF/LOW 40: CPT | Performed by: SURGERY

## 2020-05-13 PROCEDURE — 0JH60WZ INSERTION OF TOTALLY IMPLANTABLE VASCULAR ACCESS DEVICE INTO CHEST SUBCUTANEOUS TISSUE AND FASCIA, OPEN APPROACH: ICD-10-PCS | Performed by: SURGERY

## 2020-05-13 PROCEDURE — 02HV33Z INSERTION OF INFUSION DEVICE INTO SUPERIOR VENA CAVA, PERCUTANEOUS APPROACH: ICD-10-PCS | Performed by: SURGERY

## 2020-05-13 PROCEDURE — 2500000003 HC RX 250 WO HCPCS

## 2020-05-13 PROCEDURE — 2500000003 HC RX 250 WO HCPCS: Performed by: SURGERY

## 2020-05-13 PROCEDURE — 2580000003 HC RX 258

## 2020-05-13 PROCEDURE — 3600000012 HC SURGERY LEVEL 2 ADDTL 15MIN: Performed by: SURGERY

## 2020-05-13 DEVICE — PORT INFUS OD2.7MM ID1.5MM INTRO 8FR TI POLYUR CATH DETACH CT80STPD] ANGIODYNAMICS INC]: Type: IMPLANTABLE DEVICE | Site: CHEST  WALL | Status: FUNCTIONAL

## 2020-05-13 RX ORDER — PROPOFOL 10 MG/ML
INJECTION, EMULSION INTRAVENOUS CONTINUOUS PRN
Status: DISCONTINUED | OUTPATIENT
Start: 2020-05-13 | End: 2020-05-13 | Stop reason: SDUPTHER

## 2020-05-13 RX ORDER — OXYCODONE HYDROCHLORIDE AND ACETAMINOPHEN 5; 325 MG/1; MG/1
1 TABLET ORAL PRN
Status: DISCONTINUED | OUTPATIENT
Start: 2020-05-13 | End: 2020-05-13 | Stop reason: HOSPADM

## 2020-05-13 RX ORDER — DIPHENHYDRAMINE HYDROCHLORIDE 50 MG/ML
6.25 INJECTION INTRAMUSCULAR; INTRAVENOUS
Status: DISCONTINUED | OUTPATIENT
Start: 2020-05-13 | End: 2020-05-13 | Stop reason: HOSPADM

## 2020-05-13 RX ORDER — OXYCODONE HYDROCHLORIDE AND ACETAMINOPHEN 5; 325 MG/1; MG/1
2 TABLET ORAL PRN
Status: DISCONTINUED | OUTPATIENT
Start: 2020-05-13 | End: 2020-05-13 | Stop reason: HOSPADM

## 2020-05-13 RX ORDER — OXYCODONE HYDROCHLORIDE AND ACETAMINOPHEN 5; 325 MG/1; MG/1
1 TABLET ORAL EVERY 4 HOURS PRN
Status: DISCONTINUED | OUTPATIENT
Start: 2020-05-13 | End: 2020-05-18 | Stop reason: HOSPADM

## 2020-05-13 RX ORDER — OXYCODONE HYDROCHLORIDE AND ACETAMINOPHEN 5; 325 MG/1; MG/1
2 TABLET ORAL EVERY 4 HOURS PRN
Status: DISCONTINUED | OUTPATIENT
Start: 2020-05-13 | End: 2020-05-18 | Stop reason: HOSPADM

## 2020-05-13 RX ORDER — LABETALOL HYDROCHLORIDE 5 MG/ML
5 INJECTION, SOLUTION INTRAVENOUS
Status: DISCONTINUED | OUTPATIENT
Start: 2020-05-13 | End: 2020-05-13 | Stop reason: HOSPADM

## 2020-05-13 RX ORDER — LIDOCAINE HYDROCHLORIDE 20 MG/ML
INJECTION, SOLUTION INFILTRATION; PERINEURAL PRN
Status: DISCONTINUED | OUTPATIENT
Start: 2020-05-13 | End: 2020-05-13 | Stop reason: SDUPTHER

## 2020-05-13 RX ORDER — ONDANSETRON 2 MG/ML
4 INJECTION INTRAMUSCULAR; INTRAVENOUS EVERY 30 MIN PRN
Status: DISCONTINUED | OUTPATIENT
Start: 2020-05-13 | End: 2020-05-13 | Stop reason: HOSPADM

## 2020-05-13 RX ORDER — SODIUM CHLORIDE, SODIUM LACTATE, POTASSIUM CHLORIDE, CALCIUM CHLORIDE 600; 310; 30; 20 MG/100ML; MG/100ML; MG/100ML; MG/100ML
INJECTION, SOLUTION INTRAVENOUS CONTINUOUS PRN
Status: DISCONTINUED | OUTPATIENT
Start: 2020-05-13 | End: 2020-05-13 | Stop reason: SDUPTHER

## 2020-05-13 RX ORDER — TC 99M MEDRONATE 20 MG/10ML
25 INJECTION, POWDER, LYOPHILIZED, FOR SOLUTION INTRAVENOUS
Status: COMPLETED | OUTPATIENT
Start: 2020-05-13 | End: 2020-05-13

## 2020-05-13 RX ORDER — HYDRALAZINE HYDROCHLORIDE 20 MG/ML
5 INJECTION INTRAMUSCULAR; INTRAVENOUS EVERY 30 MIN PRN
Status: DISCONTINUED | OUTPATIENT
Start: 2020-05-13 | End: 2020-05-13 | Stop reason: HOSPADM

## 2020-05-13 RX ADMIN — ONDANSETRON 4 MG: 2 INJECTION INTRAMUSCULAR; INTRAVENOUS at 15:40

## 2020-05-13 RX ADMIN — SODIUM CHLORIDE: 9 INJECTION, SOLUTION INTRAVENOUS at 21:46

## 2020-05-13 RX ADMIN — ACETAMINOPHEN 650 MG: 325 TABLET ORAL at 09:15

## 2020-05-13 RX ADMIN — OXYCODONE HYDROCHLORIDE AND ACETAMINOPHEN 1 TABLET: 5; 325 TABLET ORAL at 14:18

## 2020-05-13 RX ADMIN — TC 99M MEDRONATE 25 MILLICURIE: 20 INJECTION, POWDER, LYOPHILIZED, FOR SOLUTION INTRAVENOUS at 08:30

## 2020-05-13 RX ADMIN — Medication 15 G: at 14:18

## 2020-05-13 RX ADMIN — SODIUM CHLORIDE, POTASSIUM CHLORIDE, SODIUM LACTATE AND CALCIUM CHLORIDE: 600; 310; 30; 20 INJECTION, SOLUTION INTRAVENOUS at 10:37

## 2020-05-13 RX ADMIN — Medication 10 ML: at 21:46

## 2020-05-13 RX ADMIN — PROPOFOL 100 MCG/KG/MIN: 10 INJECTION, EMULSION INTRAVENOUS at 10:42

## 2020-05-13 RX ADMIN — LIDOCAINE HYDROCHLORIDE 60 MG: 20 INJECTION, SOLUTION INFILTRATION; PERINEURAL at 10:42

## 2020-05-13 RX ADMIN — CEFAZOLIN SODIUM 2 G: 10 INJECTION, POWDER, FOR SOLUTION INTRAVENOUS at 10:39

## 2020-05-13 RX ADMIN — SODIUM CHLORIDE: 9 INJECTION, SOLUTION INTRAVENOUS at 21:30

## 2020-05-13 ASSESSMENT — PULMONARY FUNCTION TESTS
PIF_VALUE: 0

## 2020-05-13 ASSESSMENT — PAIN SCALES - GENERAL
PAINLEVEL_OUTOF10: 5
PAINLEVEL_OUTOF10: 5

## 2020-05-13 NOTE — PROGRESS NOTES
Hospitalist Progress Note      PCP: Alison Castellanos    Date of Admission: 5/3/2020    Chief Complaint: N/V    Subjective:   Patient offers no new medical complaints. Pulmonary consultation and EBUS Bx completed 5/11 for evaluation of left lower lung mass. 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 Plan: Place port 5/13 and expect inpatient chemo treatment to begin in the next following days. No new medical complaints  Bedside rounding with nursing completed      Medications:  Reviewed    Infusion Medications    sodium chloride 125 mL/hr at 05/12/20 2139     Scheduled Medications    allopurinol  300 mg Oral Daily    ceFAZolin  2 g Intravenous Once    urea  15 g Oral Daily    atorvastatin  40 mg Oral Daily    sodium chloride flush  10 mL Intravenous 2 times per day    enoxaparin  40 mg Subcutaneous Daily     PRN Meds: sodium chloride flush, acetaminophen **OR** acetaminophen, polyethylene glycol, promethazine **OR** ondansetron      Intake/Output Summary (Last 24 hours) at 5/13/2020 1009  Last data filed at 5/13/2020 0450  Gross per 24 hour   Intake 3778.58 ml   Output 1750 ml   Net 2028.58 ml       Physical Exam Performed:    BP (!) 155/82   Pulse 77   Temp 97.5 °F (36.4 °C) (Oral)   Resp 16   Ht 5' 4\" (1.626 m)   Wt 144 lb 4.8 oz (65.5 kg)   SpO2 96%   BMI 24.77 kg/m²     General appearance: No apparent distress, appears stated age and cooperative. HEENT: Pupils equal, round, and reactive to light. Conjunctivae/corneas clear. Neck: Supple, with full range of motion. No jugular venous distention. Trachea midline. Respiratory:  Normal respiratory effort. Clear to auscultation, bilaterally without Rales/Wheezes/Rhonchi. Cardiovascular: Regular rate and rhythm with normal S1/S2 without murmurs, rubs or gallops. Port placed to upper chest wall. Dressing intact. Abdomen: Soft, non-tender, non-distended with normal bowel sounds.   Musculoskeletal: No clubbing, Nephrology recommendations, DC salt tablets 2 g p.o. 3 times daily with meals, and start 15 gm Urea. Continue to monitor close for retained fluid, and potential edema    Leukocytosis - CLL. Heme/Onc consulted s/p BM Bx 5 May. Consistent and confirmed with CLL on bone marrow biopsy    Chronic COPD without acute exacerbation-stable pulmonary function, seen on CT scan. -Medications: No current treatment required  -Pulmonary consultation completed. Lung mass - Final path: small cell lung cancer. L paramediastinal mass seen on CT scan 6 May    Oncology input appreciated, arranging definitive dx and tx plan. Pulmonary consulted for EBUS: Biopsy completed on 5/11. Plan for port placement and inpatient chemo. Hypokalemia: K 3.4  - Replete oral  - Labs: BMP monitoring  - Encourage good nutrition      DVT Prophylaxis: LMWH  Diet: Diet NPO Time Specified  Code Status: Full Code      PT/OT Eval Status: No need identified    Dispo -Expect 3 days of inpatient chemo to begin in the next day or so.      Haresh Finn MD

## 2020-05-13 NOTE — BRIEF OP NOTE
Brief Postoperative Note      Patient: Romy Riojas  YOB: 1945  MRN: [id number]    Date of Procedure: 5/13/2020    Pre-Op Diagnosis: Lung cancer    Post-Op Diagnosis: Same       Procedure(s):  RIGHT PORT A CATH INSERTION    Surgeon(s):  Shu Morocho MD    Assistant:  Surgical Assistant: Kelsy Vyas    Anesthesia: Monitor Anesthesia Care    Estimated Blood Loss (mL): Minimal    Complications: None    Specimens:   * No specimens in log *    Implants:  Implant Name Type Inv.  Item Serial No.  Lot No. LRB No. Used Action   PORT INFUSE 1LUM TI ATTACH POLYURETH 8.0FR Port PORT INFUSE 1LUM TI ATTACH POLYURETH 8.0FR  [id number] Right 1 Implanted         Drains: * No LDAs found *    Findings: R IJ portacath, tip in distal SVC        Electronically signed by Valentino Parker MD on 5/14/2020 at 11:06 AM

## 2020-05-13 NOTE — ANESTHESIA PRE PROCEDURE
Department of Anesthesiology  Preprocedure Note       Name:  Brittanie Pickett   Age:  69 y.o.  :  1945                                          MRN:  [id number]         Date:  2020      Surgeon: Dayanara Paiz):  Kassie Vásquez MD    Procedure: Procedure(s):  PORT INSERTION    Medications prior to admission:   Prior to Admission medications    Medication Sig Start Date End Date Taking?  Authorizing Provider   Atorvastatin Calcium (LIPITOR PO) Take by mouth   Yes Historical Provider, MD       Current medications:    Current Facility-Administered Medications   Medication Dose Route Frequency Provider Last Rate Last Dose    HYDROmorphone (DILAUDID) injection 0.5 mg  0.5 mg Intravenous Q10 Min PRN Terrilee Schilder, MD        HYDROmorphone (DILAUDID) injection 0.5 mg  0.5 mg Intravenous Q5 Min PRN Terrilee Schilder, MD        oxyCODONE-acetaminophen (PERCOCET) 5-325 MG per tablet 1 tablet  1 tablet Oral PRN Terrilee Schilder, MD        Or    oxyCODONE-acetaminophen (PERCOCET) 5-325 MG per tablet 2 tablet  2 tablet Oral PRN Terrilee Schilder, MD        diphenhydrAMINE (BENADRYL) injection 6.25 mg  6.25 mg Intravenous Once PRN Terrilee Schilder, MD        ondansetron [facility]) injection 4 mg  4 mg Intravenous Q30 Min PRN Terrilee Schilder, MD        labetalol (NORMODYNE;TRANDATE) injection 5 mg  5 mg Intravenous Q15 Min PRN Terrilee Schilder, MD        hydrALAZINE (APRESOLINE) injection 5 mg  5 mg Intravenous Q30 Min PRN Terrilee Schilder, MD        meperidine (DEMEROL) injection SOLN 12.5 mg  12.5 mg Intravenous Q5 Min PRN Terrilee Schilder, MD        allopurinol (ZYLOPRIM) tablet 300 mg  300 mg Oral Daily Lyndle Billet, APRN - CNP   300 mg at 20 1159    0.9 % sodium chloride infusion   Intravenous Continuous Lyndle Billet, APRN -  mL/hr at 20 0179      ceFAZolin (ANCEF) 2 g in dextrose 5 % 100 mL IVPB  2 g Intravenous Once MD Ti Funes

## 2020-05-13 NOTE — CONSULTS
Department of General Surgery Consult    PATIENT NAME: Aneesh Fried   YOB: 1945    ADMISSION DATE: 5/3/2020  9:41 PM      TODAY'S DATE: 5/13/2020    Reason for Consult:  Port insertion    Chief Complaint: none    Requesting Physician:  Jodi Cavanaugh NP    HISTORY OF PRESENT ILLNESS:              The patient is a 69 y.o. female who presents with recently diagnosed lung cancer, preparing for chemotherapy, a port has been requested. No prior upper chest trauma or surgery.     Past Medical History:        Diagnosis Date    Hyperlipidemia        Past Surgical History:        Procedure Laterality Date    BREAST SURGERY      fibroid tumors removed bilateral breast    BRONCHOSCOPY N/A 5/11/2020    BRONCHOSCOPY ENDOBRONCHIAL ULTRASOUND with ANESTHESIA performed by Caitlyn Zamarripa MD at [address] [place] [address]  5/11/2020    BRONCHOSCOPY/TRANSBRONCHIAL NEEDLE BIOPSY performed by Caitlyn Zamarripa MD at [address] [place] [address]  5/11/2020    BRONCHOSCOPY/TRANSBRONCHIAL NEEDLE BIOPSY ADDL LOBE performed by Caitlyn Zamarripa MD at [address] [place] [address]  5/11/2020    BRONCHOSCOPY DIAGNOSTIC OR CELL [address] ONLY performed by Caitlyn Zamarripa MD at [address] [place] [address]       Current Medications:   Current Facility-Administered Medications: allopurinol (ZYLOPRIM) tablet 300 mg, 300 mg, Oral, Daily  0.9 % sodium chloride infusion, , Intravenous, Continuous  ceFAZolin (ANCEF) 2 g in dextrose 5 % 100 mL IVPB, 2 g, Intravenous, Once  urea (URE-NA) packet 15 g, 15 g, Oral, Daily  atorvastatin (LIPITOR) tablet 40 mg, 40 mg, Oral, Daily  sodium chloride flush 0.9 % injection 10 mL, 10 mL, Intravenous, 2 times per day  sodium chloride flush 0.9 % injection 10 mL, 10 mL, Intravenous, PRN  acetaminophen (TYLENOL) tablet 650 mg, 650 mg, Oral, Q6H PRN **OR** acetaminophen (TYLENOL) suppository 650 mg, 650 mg, Rectal, Q6H PRN  polyethylene glycol (GLYCOLAX) packet 17 g, 17 g, Oral, Daily PRN  promethazine (PHENERGAN) tablet 12.5 mg, 12.5 mg, Oral, Q6H PRN **OR** ondansetron (ZOFRAN) injection 4 mg, 4 mg, Intravenous, Q6H PRN  enoxaparin (LOVENOX) injection 40 mg, 40 mg, Subcutaneous, Daily  Prior to Admission medications    Medication Sig Start Date End Date Taking? Authorizing Provider   Atorvastatin Calcium (LIPITOR PO) Take by mouth   Yes Historical Provider, MD        Allergies:  Patient has no known allergies. Social History:   TOBACCO:   reports that she quit smoking about 2 weeks ago. She has never used smokeless tobacco.  ETOH:   has no history on file for alcohol. DRUGS:   has no history on file for drug. OCCUPATION:  Retired  Patient currently lives with family      Family History:    History reviewed. No pertinent family history. REVIEW OF SYSTEMS:  CONSTITUTIONAL:  negative  HEENT:  negative  RESPIRATORY:  negative  CARDIOVASCULAR:  negative  GASTROINTESTINAL:  negative except for    GENITOURINARY:  negative  HEMATOLOGIC/LYMPHATIC:  negative  NEUROLOGICAL:  Negative  * All other ROS reviewed and negative. PHYSICAL EXAM:  VITALS:  BP (!) 155/82   Pulse 77   Temp 97.5 °F (36.4 °C) (Oral)   Resp 16   Ht 5' 4\" (1.626 m)   Wt 144 lb 4.8 oz (65.5 kg)   SpO2 96%   BMI 24.77 kg/m²   24HR INTAKE/OUTPUT:    I/O last 3 completed shifts: In: 4018.6 [P.O.:1720; I.V.:2298.6]  Out: 1750 [Urine:1750]  No intake/output data recorded.     CONSTITUTIONAL:  alert, no apparent distress and normal weight  EYES:  PERRL, sclera clear  ENT:  Normocephalic,atraumatic, without obvious abnormality  NECK:  supple, symmetrical, trachea midline  LUNGS: Resp effort easy and unlabored,    CARDIOVASCULAR:  NO JVD, regular rate and rhythm and    MUSCULOSKELETAL: No clubbing or cyanosis, 0+ pitting edema lower extremities  NEUROLOGIC:  Mental Status Exam:  Level of Alertness:   awake  PSYCHIATRIC:   person, place, time  SKIN:  normal skin color, texture,

## 2020-05-13 NOTE — PROGRESS NOTES
BILITOT, ALKPHOS in the last 72 hours. Magnesium:    Lab Results   Component Value Date    MG 1.70 05/05/2020         Problem List  Patient Active Problem List   Diagnosis    Acute hyponatremia    N&V (nausea and vomiting)    Leukocytosis    Lung mass    Multiple lung nodules    Hilar adenopathy    Centrilobular emphysema (HCC)    Smoker       ASSESSMENT AND PLAN:    Small cell lung cancer (presumptive with path pending)  -It appears that her sodium has been very difficult to control  -Pathology is still pending  -I discussed with the patient that she has extensive disease including disease in the liver  -MRI of brain appears unremarkable  -She will have a Port-A-Cath placed today  -I would like to go ahead and treat her in the hospital once her Port-A-Cath is placed in her pathology has returned  -Otherwise I do not think we will be able to control her sodium very well    Hyponatremia  -Siadh  -see above    CLL  -no indication for treatment, but this may complicate her lung cancer tx         ONCOLOGIC DISPOSITION:  -await final path, likely needs inpatient chemo with Carbo  16 (3 day regimen)- she is aware. However, if her resp status remains stable MAY be able to treat in the office next week as an outpatient. Will need a port.  Ask gen surgery to see for a port once path is final.     Sofie Weiner MD  May be reached through [address] [place] [address]

## 2020-05-13 NOTE — PLAN OF CARE
Problem: Safety:  Goal: Free from accidental physical injury  Description: Free from accidental physical injury  5/13/2020 0017 by Lacie Gilman RN  Outcome: Ongoing  Note: Pt remained free from any accidental physical injury or harm this shift. Will continue to monitor.

## 2020-05-14 LAB
ANION GAP SERPL CALCULATED.3IONS-SCNC: 10 MMOL/L (ref 3–16)
BASOPHILS ABSOLUTE: 0.2 K/UL (ref 0–0.2)
BASOPHILS RELATIVE PERCENT: 0.4 %
BUN BLDV-MCNC: 5 MG/DL (ref 7–20)
CALCIUM SERPL-MCNC: 8 MG/DL (ref 8.3–10.6)
CHLORIDE BLD-SCNC: 85 MMOL/L (ref 99–110)
CO2: 25 MMOL/L (ref 21–32)
CREAT SERPL-MCNC: <0.5 MG/DL (ref 0.6–1.2)
EOSINOPHILS ABSOLUTE: 0.1 K/UL (ref 0–0.6)
EOSINOPHILS RELATIVE PERCENT: 0.3 %
GFR AFRICAN AMERICAN: >60
GFR NON-AFRICAN AMERICAN: >60
GLUCOSE BLD-MCNC: 86 MG/DL (ref 70–99)
HCT VFR BLD CALC: 31.8 % (ref 36–48)
HEMATOLOGY PATH CONSULT: NO
HEMOGLOBIN: 11.1 G/DL (ref 12–16)
LYMPHOCYTES ABSOLUTE: 31.2 K/UL (ref 1–5.1)
LYMPHOCYTES RELATIVE PERCENT: 81.4 %
MCH RBC QN AUTO: 30.4 PG (ref 26–34)
MCHC RBC AUTO-ENTMCNC: 34.8 G/DL (ref 31–36)
MCV RBC AUTO: 87.5 FL (ref 80–100)
MONOCYTES ABSOLUTE: 0.9 K/UL (ref 0–1.3)
MONOCYTES RELATIVE PERCENT: 2.3 %
NEUTROPHILS ABSOLUTE: 6 K/UL (ref 1.7–7.7)
NEUTROPHILS RELATIVE PERCENT: 15.6 %
PDW BLD-RTO: 14 % (ref 12.4–15.4)
PLATELET # BLD: 324 K/UL (ref 135–450)
PMV BLD AUTO: 7.1 FL (ref 5–10.5)
POTASSIUM SERPL-SCNC: 3.8 MMOL/L (ref 3.5–5.1)
RBC # BLD: 3.64 M/UL (ref 4–5.2)
SODIUM BLD-SCNC: 120 MMOL/L (ref 136–145)
WBC # BLD: 38.4 K/UL (ref 4–11)

## 2020-05-14 PROCEDURE — 3E04305 INTRODUCTION OF OTHER ANTINEOPLASTIC INTO CENTRAL VEIN, PERCUTANEOUS APPROACH: ICD-10-PCS | Performed by: INTERNAL MEDICINE

## 2020-05-14 PROCEDURE — APPSS45 APP SPLIT SHARED TIME 31-45 MINUTES: Performed by: CLINICAL NURSE SPECIALIST

## 2020-05-14 PROCEDURE — 2580000003 HC RX 258: Performed by: SURGERY

## 2020-05-14 PROCEDURE — 36591 DRAW BLOOD OFF VENOUS DEVICE: CPT

## 2020-05-14 PROCEDURE — 80048 BASIC METABOLIC PNL TOTAL CA: CPT

## 2020-05-14 PROCEDURE — 2580000003 HC RX 258: Performed by: INTERNAL MEDICINE

## 2020-05-14 PROCEDURE — 1200000000 HC SEMI PRIVATE

## 2020-05-14 PROCEDURE — 6370000000 HC RX 637 (ALT 250 FOR IP): Performed by: SURGERY

## 2020-05-14 PROCEDURE — 6360000002 HC RX W HCPCS: Performed by: SURGERY

## 2020-05-14 PROCEDURE — 85025 COMPLETE CBC W/AUTO DIFF WBC: CPT

## 2020-05-14 PROCEDURE — 6360000002 HC RX W HCPCS: Performed by: INTERNAL MEDICINE

## 2020-05-14 RX ORDER — DEXAMETHASONE SODIUM PHOSPHATE 4 MG/ML
4 INJECTION, SOLUTION INTRA-ARTICULAR; INTRALESIONAL; INTRAMUSCULAR; INTRAVENOUS; SOFT TISSUE DAILY
Status: DISCONTINUED | OUTPATIENT
Start: 2020-05-14 | End: 2020-05-14

## 2020-05-14 RX ORDER — ONDANSETRON 2 MG/ML
16 INJECTION INTRAMUSCULAR; INTRAVENOUS DAILY
Status: DISCONTINUED | OUTPATIENT
Start: 2020-05-14 | End: 2020-05-14

## 2020-05-14 RX ADMIN — CARBOPLATIN 425 MG: 10 INJECTION, SOLUTION INTRAVENOUS at 14:26

## 2020-05-14 RX ADMIN — ONDANSETRON 4 MG: 2 INJECTION INTRAMUSCULAR; INTRAVENOUS at 07:46

## 2020-05-14 RX ADMIN — Medication 10 ML: at 07:54

## 2020-05-14 RX ADMIN — ETOPOSIDE 172 MG: 20 INJECTION, SOLUTION INTRAVENOUS at 15:38

## 2020-05-14 RX ADMIN — ONDANSETRON 4 MG: 2 INJECTION INTRAMUSCULAR; INTRAVENOUS at 01:39

## 2020-05-14 RX ADMIN — OXYCODONE HYDROCHLORIDE AND ACETAMINOPHEN 1 TABLET: 5; 325 TABLET ORAL at 01:39

## 2020-05-14 RX ADMIN — ATORVASTATIN CALCIUM 40 MG: 40 TABLET, FILM COATED ORAL at 07:53

## 2020-05-14 RX ADMIN — SODIUM CHLORIDE: 9 INJECTION, SOLUTION INTRAVENOUS at 21:50

## 2020-05-14 RX ADMIN — DEXAMETHASONE SODIUM PHOSPHATE: 4 INJECTION, SOLUTION INTRAMUSCULAR; INTRAVENOUS at 13:03

## 2020-05-14 RX ADMIN — ALLOPURINOL 300 MG: 300 TABLET ORAL at 07:53

## 2020-05-14 RX ADMIN — Medication 10 ML: at 22:07

## 2020-05-14 RX ADMIN — SODIUM CHLORIDE: 9 INJECTION, SOLUTION INTRAVENOUS at 15:40

## 2020-05-14 RX ADMIN — ENOXAPARIN SODIUM 40 MG: 40 INJECTION SUBCUTANEOUS at 07:53

## 2020-05-14 ASSESSMENT — PAIN SCALES - GENERAL
PAINLEVEL_OUTOF10: 2
PAINLEVEL_OUTOF10: 5

## 2020-05-14 NOTE — PROGRESS NOTES
324   MCV 88.4 87.5        Recent Labs     05/12/20  0939 05/13/20  1214 05/14/20  0545   * 119* 120*   K 3.4* 3.7 3.8   CL 85* 82* 85*   CO2 26 25 25   BUN 11 6* 5*   CREATININE <0.5* <0.5* <0.5*     No results for input(s): AST, ALT, ALB, BILIDIR, BILITOT, ALKPHOS in the last 72 hours. 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 Bullock Needs   - Cont IV hydration for TLS prevention   - Allopurinol ON dc as well   - Will add Tecentriq with Cycle 2 in the office     Hyponatremia  -Siadh  -see above    CLL  -no indication for treatment, but this may complicate her lung cancer tx     We will assume attending services as of 5-14. ONCOLOGIC DISPOSITION:  - DC Saturday post chemo   - Will need to come to the [facility] office on Monday for Neulasta shot.      Emelina Gao CNP   May be reached through [address] [place] [address]

## 2020-05-14 NOTE — PROGRESS NOTES
Patient returned to room from surgery. Patient alert and oriented vital signs stable. Patient denies needs at this time. Call light within reach. Will monitor.

## 2020-05-14 NOTE — PROGRESS NOTES
Patient resting in bed alert and oriented x4. Assessment completed as documented. Vital signs stable. Pt with c/o left shoulder pain. Administered prn tylenol and gave pt heat pack. Call light within reach. Will monitor.

## 2020-05-14 NOTE — PROGRESS NOTES
Hospitalist Progress Note      PCP: Perfecto Plunkett    Date of Admission: 5/3/2020    Chief Complaint: N/V    Subjective:   Pulmonary consultation and EBUS Bx completed 5/11 for evaluation of left lower lung mass. 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 Plan: Place port 5/13 and expect inpatient chemo treatment to begin 5/14    Patient with significant hyponatremia. Sodium level 120 this a.m. Has not had a positive response to oral sodium chloride tablets. Nephrology had recommended urea oral drink. Patient reports that this is very \"distasteful\". She is unable to take this drink and is having a hard time increasing the protein to her diet. She continues to have persistent nausea that is not well controlled. Using Phenergan/Zofran for symptomatic relief.     No new medical complaints  Bedside rounding with nursing completed      Medications:  Reviewed    Infusion Medications    sodium chloride 125 mL/hr at 05/13/20 7206     Scheduled Medications    etoposide (VEPESID) chemo IVPB  100 mg/m2 Intravenous Q24H    ondansetron  16 mg Intravenous Daily    dexamethasone  4 mg Intravenous Daily    CARBOplatin (PARAPLATIN) chemo IVPB (by AUC) BEACON  2 mg/m2 Intravenous Once    allopurinol  300 mg Oral Daily    urea  15 g Oral Daily    atorvastatin  40 mg Oral Daily    sodium chloride flush  10 mL Intravenous 2 times per day    enoxaparin  40 mg Subcutaneous Daily     PRN Meds: oxyCODONE-acetaminophen **OR** oxyCODONE-acetaminophen, sodium chloride flush, acetaminophen **OR** acetaminophen, polyethylene glycol, promethazine **OR** ondansetron      Intake/Output Summary (Last 24 hours) at 5/14/2020 0826  Last data filed at 5/14/2020 0813  Gross per 24 hour   Intake 3126 ml   Output 2500 ml   Net 626 ml       Physical Exam Performed:    BP (!) 158/85   Pulse 94   Temp 97.8 °F (36.6 °C) (Oral)   Resp 16   Ht 5' 4\" (1.626 m)   Wt 143 lb 4 oz (65 kg)   SpO2 92%   BMI

## 2020-05-14 NOTE — PLAN OF CARE
Problem: Pain:  Goal: Patient's pain/discomfort is manageable  Description: Patient's pain/discomfort is manageable  Outcome: Ongoing  Note: Pt denies any pain or prn meds at this time. Will continue to monitor and reassess as needed.

## 2020-05-14 NOTE — PROGRESS NOTES
[facility] - [address] [facility]    SURGERY   5/14/2020 10:02 AM     Pt s/p port placement. Pt denies much pain. Having some nausea today. Port site clean, accessed. Will sign off, please call with any questions or concerns. Mckenna Branham     Surgery Staff    I have examined this patient and read and agree with the note by Mckenna Branham CNP from today.     [employer] ANTHONY

## 2020-05-14 NOTE — PROGRESS NOTES
Patient Name: Onesimo George                                                    Primary Physician: Trell Palm MD  Admitting Dx: Hyponatremia [E87.1]  Hyponatremia [E87.1]    Dr. Khushboo Thomas      Nephrology Nancy  Note  [facility]  [email or web address] [place]                                       Interval Plan:     · Hyponatremia unchanged at 120. Nausea and Vomiting contributing to ADH release. ·  Encouraged patient to add more protein to her diet because the urea produce on high protein diet can increase free water clearance. Add 2 more ounces of protein per day to her diet. · D/C NaCl tabs and added PO Urea 15 gm po qd which she complains of taste and did NOT get yesterday due to vomiting. Remains on NS at 125 cc/hr and continue for now. No Hypertonic saline. · 5/7/2020 -CT directed lung biopsy on 5/11/20 with Small Cell Lung Ca which is likely contributing to SIADH and hyponatremia. Chemo to start today. Assessment:     Hyponatremia  · SIADH. The long-term consequences of  even mild hyponatremia can be very substantial   exacerbate osteoporosis, cause balance issues and falls. · Sodium 112 on admission- now 130- rise of 11 in  24 hrs with samsca  · On dextrose, liberal fluid intake  · Urine lytes < 40 twice, repeat was 129, 66  · No history of liver failure, alcoholism, CHF, hypothyroidism  · Possible leukemia- bm pending   · TSH, lipids, cortisol, LFT normal  · Xray chest -ve for mass  · CT lung- mass, and also has adrenal mass  Hypokalemia  · Due to N and V, D  · Will give potassium again  Leucocytosis  · bone marrow biopsy done 5/5/20    Please call our office at [phone] or Perfect Serve with any questions or contact me directly. Subjective:     CC / Reason for Consult: HYponatremia    HPI/PMH:  69 y.o. female presented to the hospital on 5/3/2020 with nausea, vomiting and diarrhea  For 4 days. No pain abdomen. No other complaints.  Denies liver problem, drinking, thyroid problem,  Heart problem. She was found to have low sodium of 112 and came up to 119. ROS / Interval Hx: Patient seen in room on med floor. Trying to eat more but gets nauseated. Vomiting all day per patient. NO other issues. Port was placed. Objective:        Gen: alert, well appearing, and in no distress and oriented to person, place, and time     Neck:  supple, no significant adenopathy     Cardio: normal rate, regular rhythm, normal S1, S2, no murmurs, rubs, clicks or gallops      Resp: clear to auscultation, no wheezes, rales or rhonchi, symmetric air entry. GI:  soft, nontender, nondistended, no masses or organomegaly. Ext:  peripheral pulses normal, no pedal edema, no clubbing or cyanosis      MS: no joint tenderness, deformity or swelling      DERM: normal coloration and turgor, no rashesor bruising    Vitals:     BP (!) 158/85   Pulse 94   Temp 97.8 °F (36.6 °C) (Oral)   Resp 16   Ht 5' 4\" (1.626 m)   Wt 143 lb 4 oz (65 kg)   SpO2 92%   BMI 24.59 kg/m²      I/Os: Reviewed    Meds: Reviewed. Please see plan for changes made.     Labs:    Lab Results   Component Value Date    WBC 38.4 05/14/2020    HGB 11.1 05/14/2020    HCT 31.8 05/14/2020    MCV 87.5 05/14/2020     05/14/2020      Lab Results   Component Value Date    CREATININE <0.5 05/14/2020    BUN 5 05/14/2020     05/14/2020    K 3.8 05/14/2020    K 4.0 05/03/2020    CL 85 05/14/2020    CO2 25 05/14/2020     No components found for: GLU   Lab Results   Component Value Date    CALCIUM 8.0 05/14/2020    PHOS 2.9 05/10/2020      No results found for: [id number]   No results found for: IRON, TIBC, FERRITIN   No results found for: Av Plata

## 2020-05-14 NOTE — PROGRESS NOTES
Original chemotherapy orders reviewed and acknowledged. Appropriateness of chemotherapy treatment regimen carboplatin and etoposide for diagnosis of metastatic small cell lung cancer was verified. Patient educated on chemotherapy regimen. Acknowledgement of informed consent for chemotherapy obtained. Pt height, weight, and BSA verified. Appropriate dosing calculations of chemotherapy based on height, weight, and BSA verified. Administration: Chemotherapy drug carboplatin and etoposide independently verified with JEFF Cross prior to administration. Original order, appropriateness of regimen, drug supplied, height, weight, BSA, dose calculations, expiration dates/times, drug appearance, and two patient identifiers were verified by both RNs. Drug checked for vesicant/irritant status and for risk of hypersensitivity. Most recent laboratory values and allergies, were reviewed. Appropriate IV access available: Positive, brisk blood return via CVC was confirmed prior to administration. Chest x-ray for correct line placement reviewed  Tj Osman and JEFF Cross verified correct rate of chemotherapy and maintenance IV fluids. Patient was educated on chemotherapy regimen prior to administration including indication for treatment related to disease & side effects of chemotherapy drug. Patient verbalizes understanding of all instructions. Completion of Chemotherapy: Monitoring during infusion done per policy, see Flowsheets. Blood return verified before, during, and after infusion per policy; no signs of extravasation. Pt tolerated chemotherapy well and without incident. Chemotherapy infusion end time on the [facility]. Will continue to monitor.

## 2020-05-14 NOTE — OP NOTE
catheter easily aspirated blood and was flushed with heparinized saline. The catheter was then cut at 24 cm and attached to the port. The port was placed into the pocket and secured to the chest wall w/ two 2-0 Prolene sutures. The port was accessed with a Rivers needle, and once again it aspirated easily and was flushed again w/ heparinized saline. Final fluoroscopic imaging confirmed good position of the port and catheter. Chest wall incision was closed with 3-0 Vicryl subcutaneous followed by 4-0 Monocryl subcuticular sutures. The patient tolerated the procedure well. Sponge, needle, and instrument counts were correct.      82 Kavya Zuniga MD  5/14/2020  11:07 AM

## 2020-05-14 NOTE — PROGRESS NOTES
Pt axo. Assessment completed as charted. Pt c/o nausea. Medicated per orders. Pt aware of and in agreement with plan of care including planned chemotherapy administration. Denies pain or additional needs. Will continue to monitor. Call light in reach.

## 2020-05-15 LAB
ANION GAP SERPL CALCULATED.3IONS-SCNC: 11 MMOL/L (ref 3–16)
BANDED NEUTROPHILS RELATIVE PERCENT: 1 % (ref 0–7)
BASOPHILS ABSOLUTE: 0 K/UL (ref 0–0.2)
BASOPHILS RELATIVE PERCENT: 0 %
BUN BLDV-MCNC: 5 MG/DL (ref 7–20)
CALCIUM SERPL-MCNC: 7.9 MG/DL (ref 8.3–10.6)
CHLORIDE BLD-SCNC: 83 MMOL/L (ref 99–110)
CO2: 24 MMOL/L (ref 21–32)
CREAT SERPL-MCNC: <0.5 MG/DL (ref 0.6–1.2)
EOSINOPHILS ABSOLUTE: 0 K/UL (ref 0–0.6)
EOSINOPHILS RELATIVE PERCENT: 0 %
GFR AFRICAN AMERICAN: >60
GFR NON-AFRICAN AMERICAN: >60
GLUCOSE BLD-MCNC: 121 MG/DL (ref 70–99)
HCT VFR BLD CALC: 33.2 % (ref 36–48)
HEMATOLOGY PATH CONSULT: NO
HEMOGLOBIN: 11.8 G/DL (ref 12–16)
LYMPHOCYTES ABSOLUTE: 42.7 K/UL (ref 1–5.1)
LYMPHOCYTES RELATIVE PERCENT: 83 %
MCH RBC QN AUTO: 30.6 PG (ref 26–34)
MCHC RBC AUTO-ENTMCNC: 35.4 G/DL (ref 31–36)
MCV RBC AUTO: 86.4 FL (ref 80–100)
MONOCYTES ABSOLUTE: 0 K/UL (ref 0–1.3)
MONOCYTES RELATIVE PERCENT: 0 %
NEUTROPHILS ABSOLUTE: 8.7 K/UL (ref 1.7–7.7)
NEUTROPHILS RELATIVE PERCENT: 16 %
PDW BLD-RTO: 13.8 % (ref 12.4–15.4)
PLATELET # BLD: 408 K/UL (ref 135–450)
PLATELET SLIDE REVIEW: ADEQUATE
PMV BLD AUTO: 7.3 FL (ref 5–10.5)
POTASSIUM SERPL-SCNC: 3.6 MMOL/L (ref 3.5–5.1)
RBC # BLD: 3.85 M/UL (ref 4–5.2)
RBC # BLD: NORMAL 10*6/UL
SLIDE REVIEW: ABNORMAL
SODIUM BLD-SCNC: 116 MMOL/L (ref 136–145)
SODIUM BLD-SCNC: 116 MMOL/L (ref 136–145)
SODIUM BLD-SCNC: 117 MMOL/L (ref 136–145)
SODIUM BLD-SCNC: 117 MMOL/L (ref 136–145)
SODIUM BLD-SCNC: 118 MMOL/L (ref 136–145)
SODIUM BLD-SCNC: 118 MMOL/L (ref 136–145)
WBC # BLD: 51.4 K/UL (ref 4–11)

## 2020-05-15 PROCEDURE — 6370000000 HC RX 637 (ALT 250 FOR IP): Performed by: SURGERY

## 2020-05-15 PROCEDURE — 6360000002 HC RX W HCPCS: Performed by: INTERNAL MEDICINE

## 2020-05-15 PROCEDURE — 6360000002 HC RX W HCPCS: Performed by: SURGERY

## 2020-05-15 PROCEDURE — 2580000003 HC RX 258: Performed by: SURGERY

## 2020-05-15 PROCEDURE — 2580000003 HC RX 258: Performed by: INTERNAL MEDICINE

## 2020-05-15 PROCEDURE — 80048 BASIC METABOLIC PNL TOTAL CA: CPT

## 2020-05-15 PROCEDURE — 2000000000 HC ICU R&B

## 2020-05-15 PROCEDURE — 36591 DRAW BLOOD OFF VENOUS DEVICE: CPT

## 2020-05-15 PROCEDURE — 85025 COMPLETE CBC W/AUTO DIFF WBC: CPT

## 2020-05-15 PROCEDURE — 84295 ASSAY OF SERUM SODIUM: CPT

## 2020-05-15 RX ORDER — PROMETHAZINE HYDROCHLORIDE 12.5 MG/1
12.5 TABLET ORAL EVERY 6 HOURS PRN
Qty: 25 TABLET | Refills: 5 | Status: SHIPPED | OUTPATIENT
Start: 2020-05-15 | End: 2020-05-18

## 2020-05-15 RX ORDER — 3% SODIUM CHLORIDE 3 G/100ML
50 INJECTION, SOLUTION INTRAVENOUS CONTINUOUS
Status: DISCONTINUED | OUTPATIENT
Start: 2020-05-16 | End: 2020-05-15

## 2020-05-15 RX ORDER — OXYCODONE HYDROCHLORIDE AND ACETAMINOPHEN 5; 325 MG/1; MG/1
1 TABLET ORAL EVERY 4 HOURS PRN
Qty: 24 TABLET | Refills: 0 | Status: SHIPPED | OUTPATIENT
Start: 2020-05-15 | End: 2020-05-18

## 2020-05-15 RX ORDER — ONDANSETRON 4 MG/1
8 TABLET, ORALLY DISINTEGRATING ORAL EVERY 8 HOURS PRN
Qty: 20 TABLET | Refills: 5 | Status: SHIPPED | OUTPATIENT
Start: 2020-05-15 | End: 2020-05-18 | Stop reason: SDUPTHER

## 2020-05-15 RX ORDER — ATORVASTATIN CALCIUM 40 MG/1
40 TABLET, FILM COATED ORAL DAILY
Qty: 30 TABLET | Refills: 3 | Status: SHIPPED | OUTPATIENT
Start: 2020-05-16 | End: 2020-05-18

## 2020-05-15 RX ORDER — ALLOPURINOL 300 MG/1
300 TABLET ORAL DAILY
Qty: 30 TABLET | Refills: 3 | Status: SHIPPED | OUTPATIENT
Start: 2020-05-16 | End: 2020-05-18

## 2020-05-15 RX ORDER — 3% SODIUM CHLORIDE 3 G/100ML
50 INJECTION, SOLUTION INTRAVENOUS CONTINUOUS
Status: DISCONTINUED | OUTPATIENT
Start: 2020-05-15 | End: 2020-05-16

## 2020-05-15 RX ADMIN — ALLOPURINOL 300 MG: 300 TABLET ORAL at 09:19

## 2020-05-15 RX ADMIN — DEXAMETHASONE SODIUM PHOSPHATE: 4 INJECTION, SOLUTION INTRAMUSCULAR; INTRAVENOUS at 11:13

## 2020-05-15 RX ADMIN — POLYETHYLENE GLYCOL 3350 17 G: 17 POWDER, FOR SOLUTION ORAL at 11:27

## 2020-05-15 RX ADMIN — SODIUM CHLORIDE 25 ML/HR: 3 INJECTION, SOLUTION INTRAVENOUS at 15:21

## 2020-05-15 RX ADMIN — SODIUM CHLORIDE: 9 INJECTION, SOLUTION INTRAVENOUS at 09:18

## 2020-05-15 RX ADMIN — Medication 10 ML: at 20:44

## 2020-05-15 RX ADMIN — Medication 15 G: at 09:25

## 2020-05-15 RX ADMIN — ATORVASTATIN CALCIUM 40 MG: 40 TABLET, FILM COATED ORAL at 09:18

## 2020-05-15 RX ADMIN — ETOPOSIDE 172 MG: 20 INJECTION, SOLUTION INTRAVENOUS at 12:11

## 2020-05-15 RX ADMIN — ENOXAPARIN SODIUM 40 MG: 40 INJECTION SUBCUTANEOUS at 09:18

## 2020-05-15 RX ADMIN — Medication 10 ML: at 11:15

## 2020-05-15 RX ADMIN — ONDANSETRON 4 MG: 2 INJECTION INTRAMUSCULAR; INTRAVENOUS at 00:43

## 2020-05-15 ASSESSMENT — PAIN SCALES - GENERAL
PAINLEVEL_OUTOF10: 0

## 2020-05-15 NOTE — PROGRESS NOTES
ONCOLOGY HEMATOLOGY CARE PROGRESS NOTE      SUBJECTIVE:     She did well with chemo, no side effects at all. ROS:   The remaining 10 point review of symptoms is unremarkable. OBJECTIVE        Physical    VITALS:  BP (!) 145/75   Pulse 77   Temp 98 °F (36.7 °C) (Oral)   Resp 20   Ht 5' 4\" (1.626 m)   Wt 143 lb 4 oz (65 kg)   SpO2 94%   BMI 24.59 kg/m²   TEMPERATURE:  Current - Temp: 98 °F (36.7 °C); Max - Temp  Av °F (36.7 °C)  Min: 97.7 °F (36.5 °C)  Max: 98.4 °F (36.9 °C)  PULSE OXIMETRY RANGE: SpO2  Av %  Min: 92 %  Max: 94 %  24HR INTAKE/OUTPUT:      Intake/Output Summary (Last 24 hours) at 5/15/2020 1207  Last data filed at 5/15/2020 1115  Gross per 24 hour   Intake 5069.42 ml   Output 3900 ml   Net 1169.42 ml       CONSTITUTIONAL:  awake, alert, cooperative, no apparent distress, HEENT oral pharynx , no scleral icterus  HEMATOLOGIC/LYMPHATICS:  no cervical lymphadenopathy, no supraclavicular lymphadenopathy, no axillary lymphadenopathy and no inguinal lymphadenopathy  LUNGS:  Scattered wheezing, rhonchi to bilateral anterior chest wall   CARDIOVASCULAR:  , regular rate and rhythm, normal S1 and S2, no S3 or S4, and no murmur noted  ABDOMEN:  No scars, normal bowel sounds, soft, non-distended, non-tender, no masses palpated, no hepatosplenomegally  MUSCULOSKELETAL:  There is no redness, warmth, or swelling of the joints. EXTREMETIES: No clubbing cynosis or edema  NEUROLOGIC:  Awake, alert, oriented to name, place and time. Cranial nerves II-XII are grossly intact. Motor is 5 out of 5 bilaterally.    SKIN:  no bruising or bleeding      Data      Recent Labs     20  0545 05/15/20  0635   WBC 38.4* 51.4*   HGB 11.1* 11.8*   HCT 31.8* 33.2*    408   MCV 87.5 86.4        Recent Labs     20  1214 20  0545 05/15/20  0635   * 120* 118*   K 3.7 3.8 3.6   CL 82* 85* 83*   CO2 25 25 24   BUN 6* 5* 5*   CREATININE <0.5* <0.5* <0.5*

## 2020-05-15 NOTE — PLAN OF CARE
Problem: Nutrition  Goal: Optimal nutrition therapy  Outcome: Ongoing  Note: Nutrition Problem: Increased nutrient needs  Intervention: Food and/or Nutrient Delivery: Continue current diet, Modify current ONS  Nutritional Goals: Pt will consume greater than 50% of meals and ONS this admission

## 2020-05-15 NOTE — PROGRESS NOTES
Nutrition Assessment    Type and Reason for Visit: Reassess    Nutrition Recommendations:   1. Continue general, no added salt diet   2. Modify ONS to Ensure TID   3. Encourage PO intakes   4. Monitor nutrition adequacy, pertinent labs, N/V, bowel habits, wt changes, and clinical progress     Nutrition Assessment: Follow up: Pt with metastatic small cell neuroendocrine carcinoma. Port placed 5/13 and chemo started 5/14. Hyponatremia continues. Pt nutritionally compromised AEB pt report of continued poor appetite. Fluid restriction removed, pt favorable to adding Ensure TID to increase nutrition. Pt has no c/o nausea at time of visit, taking anti-nausea meds. Pt had no further nutrition related questions at this time, will continue to monitor. Malnutrition Assessment:  · Malnutrition Status: At risk for malnutrition    Nutrition Risk Level:  Moderate    Nutrient Needs:  · Estimated Daily Total Kcal: 4436-0818 kcal  · Estimated Daily Protein (g): 66-79 g  · Estimated Daily Total Fluid (ml/day): per MD    Nutrition Diagnosis:   · Problem: Increased nutrient needs  · Etiology: related to Increased demand for energy/nutrients     Signs and symptoms:  as evidenced by Nausea, Vomiting, Diet history of poor intake    Objective Information:  · Nutrition-Focused Physical Findings: BM x1 on 5/12, emesis of 50 ml today   · Wound Type: Surgical Wound(surgical incision to chest)  · Current Nutrition Therapies:  · Oral Diet Orders: General, No Added Salt (3-4gm)   · Oral Diet intake: 0%, 1-25%, 26-50%, 51-75%, %  · Oral Nutrition Supplement (ONS) Orders: Low Volume Supplement  · ONS intake: %(per pt)  · Anthropometric Measures:  · Ht: 5' 4\" (162.6 cm)   · Current Body Wt: 143 lb (64.9 kg)  · % Weight Change:  ,  NATASHA d/t no follow up weights or weight hx  · Ideal Body Wt: 120 lb (54.4 kg),   · BMI Classification: BMI 18.5 - 24.9 Normal Weight    Nutrition Interventions:   Continue current diet, Modify current

## 2020-05-15 NOTE — PLAN OF CARE
Problem: Safety:  Goal: Free from accidental physical injury  Description: Free from accidental physical injury  Outcome: Ongoing  Goal: Free from intentional harm  Description: Free from intentional harm  Outcome: Ongoing

## 2020-05-15 NOTE — FLOWSHEET NOTE
05/15/20 1935   Assessment   Charting Type Shift assessment   Neurological   Neuro (WDL) WDL   Level of Consciousness 0   Orientation Level Oriented X4   Swallow Screening   Is the patient able to remain alert for testing? Yes   Faye Coma Scale   Eye Opening 4   Best Verbal Response 5   Best Motor Response 6   Faye Coma Scale Score 15   HEENT   Teeth Partial plate upper   Right Eye Impaired vision   Left Eye Impaired vision   Respiratory   Respiratory Pattern Regular   Respiratory Depth Normal   Respiratory Quality/Effort Unlabored   Chest Assessment Chest expansion symmetrical   Breath Sounds   Right Upper Lobe Clear   Right Middle Lobe Diminished   Right Lower Lobe Diminished   Left Upper Lobe Fine Crackles   Left Lower Lobe Diminished   Cardiac   Cardiac Rhythm NSR  (on tele monitor)   Cardiac Regularity Regular   Heart Sounds S1, S2   Cardiac Monitor   Telemetry Monitor On Yes   Telemetry Audible Yes   Telemetry Alarms Set Yes   Telemetry Box Number icu   Gastrointestinal   Abdomen Inspection Rounded; Soft   Last BM (including prior to admit) 05/12/20   Tenderness Soft   RUQ Bowel Sounds Active   LUQ Bowel Sounds Active   RLQ Bowel Sounds Active   LLQ Bowel Sounds Active   GI Symptoms Loss of appetite   Passing Flatus Y   Peripheral Vascular   Peripheral Vascular (WDL) WDL   Edema None   Skin Color/Condition   Skin Color/Condition (WDL) WDL   Skin Integrity   Skin Integrity   (Port incision)   Location Rt Chest   Musculoskeletal   RUE Full movement   LUE Full movement   RL Extremity Full movement   LL Extremity Full movement   Genitourinary   Genitourinary (WDL) WDL   Flank Tenderness No   Suprapubic Tenderness No   Dysuria No   Anus/Rectum   Anus/Rectum (WDL) WDL   Incision 05/13/20 Chest Right;Upper   Date First Assessed/Time First Assessed: 05/13/20 1053   Present on Hospital Admission: No  Primary Wound Type:  Incision  Location: Chest  Wound Location Orientation: Right;Upper   Wound Assessment

## 2020-05-15 NOTE — PROGRESS NOTES
apparent distress, appears stated age and cooperative. HEENT: Pupils equal, round, and reactive to light. Conjunctivae/corneas clear. Neck: Supple, with full range of motion. No jugular venous distention. Trachea midline. Respiratory:  Normal respiratory effort. Clear to auscultation, bilaterally without Rales/Wheezes/Rhonchi. Cardiovascular: Regular rate and rhythm with normal S1/S2 without murmurs, rubs or gallops. Port placed to upper chest wall. Dressing intact. Abdomen: Soft, non-tender, non-distended with normal bowel sounds. Musculoskeletal: No clubbing, cyanosis or edema bilaterally. Full range of motion without deformity. Skin: Skin color, texture, turgor normal.  No rashes or lesions. Neurologic:  Neurovascularly intact without any focal sensory/motor deficits. Cranial nerves: II-XII intact, grossly non-focal.  Psychiatric: Alert and oriented, thought content appropriate, normal insight  Capillary Refill: Brisk,< 3 seconds   Peripheral Pulses: +2 palpable, equal bilaterally       Labs:   Recent Labs     05/14/20  0545 05/15/20  0635   WBC 38.4* 51.4*   HGB 11.1* 11.8*   HCT 31.8* 33.2*    408     Recent Labs     05/13/20  1214 05/14/20  0545 05/15/20  0635 05/15/20  1452 05/15/20  1639   * 120* 118* 117* 116*   K 3.7 3.8 3.6  --   --    CL 82* 85* 83*  --   --    CO2 25 25 24  --   --    BUN 6* 5* 5*  --   --    CREATININE <0.5* <0.5* <0.5*  --   --    CALCIUM 8.3 8.0* 7.9*  --   --      No results for input(s): AST, ALT, BILIDIR, BILITOT, ALKPHOS in the last 72 hours. No results for input(s): INR in the last 72 hours. No results for input(s): [place] in the last 72 hours.     Urinalysis:      Lab Results   Component Value Date    NITRU Negative 05/04/2020    WBCUA 6-10 05/04/2020    BACTERIA 2+ 05/04/2020    RBCUA 11-20 05/04/2020    BLOODU MODERATE 05/04/2020    SPECGRAV 1.015 05/04/2020    GLUCOSEU Negative 05/04/2020       Consults:    IP CONSULT TO HOSPITALIST  IP CONSULT TO ONCOLOGY  IP CONSULT TO NEPHROLOGY  IP CONSULT TO PULMONOLOGY  IP CONSULT TO GENERAL SURGERY  PHARMACY TO DOSE MEDICATION      Assessment/Plan:    Active Hospital Problems    Diagnosis    Lung mass [R91.8]    Multiple lung nodules [R91.8]    Hilar adenopathy [R59.0]    Centrilobular emphysema (HCC) [J43.2]    Smoker [F17.200]    Acute hyponatremia [E87.1]    N&V (nausea and vomiting) [R11.2]    Leukocytosis [D72.829]         Persistent nausea: Phoebe Finders Hyponatremia, SIADH, electrolyte abnormality, cancer burden and current chemo treatment. Supportive care w/ IVF/Antiemetics. Continue to monitor and support patient. HypoNatremia - critically low, likely multifactorial: hypovolemia w/ SIADH likely 2nd to small cell lung cancer. Follow serial labs on IVF. Nephrology consulted and appreciated - improved w/ Samsca 6 May. Per Nephrology recommendations, DC salt tablets 2 g p.o. 3 times daily with meals, and start 15 gm Urea. Patient could not tolerate Urea drink and increase to protein in her meals. Na dropping to 117, tranfer to ICU for hypertonic saline 5/15. Continue to monitor close     Leukocytosis secondary to CLL. Heme/Onc consulted s/p BM Bx 5 May. Consistent and confirmed with CLL on bone marrow biopsy  -No planned treatment per Heme/Onc    Chronic COPD without acute exacerbation-stable pulmonary function, seen on CT scan. -Medications: No current treatment required  -Pulmonary consultation completed. Lung mass - Final path: small cell lung cancer. L paramediastinal mass seen on CT scan 6 May:  Metastatic disease to liver. Oncology input appreciated, arranging definitive dx and tx plan. Pulmonary consulted for EBUS: Biopsy completed on 5/11.    - port placement and inpatient chemo to begin on 5/14    Hypokalemia:   - Replete oral  - Labs: BMP monitoring  - Encourage good nutrition      DVT Prophylaxis: LMWH  Diet: DIET GENERAL; No Added Salt (3-4 GM)  Dietary Nutrition

## 2020-05-15 NOTE — PROGRESS NOTES
liver problem, drinking, thyroid problem,  Heart problem. She was found to have low sodium of 112 and came up to 119. ROS / Interval Hx: Patient seen in room on med floor. Trying to eat more with improved nausea and was able to drink Urea. NO other issues. Port was placed. Objective:        Gen: alert, well appearing, and in no distress and oriented to person, place, and time     Neck:  supple, no significant adenopathy     Cardio: normal rate, regular rhythm, normal S1, S2, no murmurs, rubs, clicks or gallops      Resp: clear to auscultation, no wheezes, rales or rhonchi, symmetric air entry. GI:  soft, nontender, nondistended, no masses or organomegaly. Ext:  peripheral pulses normal, no pedal edema, no clubbing or cyanosis      MS: no joint tenderness, deformity or swelling      DERM: normal coloration and turgor, no rashesor bruising    Vitals:     /65   Pulse 87   Temp 97.7 °F (36.5 °C) (Oral)   Resp 18   Ht 5' 4\" (1.626 m)   Wt 143 lb 4 oz (65 kg)   SpO2 93%   BMI 24.59 kg/m²      I/Os: Reviewed    Meds: Reviewed. Please see plan for changes made.     Labs:    Lab Results   Component Value Date    WBC 51.4 05/15/2020    HGB 11.8 05/15/2020    HCT 33.2 05/15/2020    MCV 86.4 05/15/2020     05/15/2020      Lab Results   Component Value Date    CREATININE <0.5 05/15/2020    BUN 5 05/15/2020     05/15/2020    K 3.6 05/15/2020    K 4.0 05/03/2020    CL 83 05/15/2020    CO2 24 05/15/2020     No components found for: GLU   Lab Results   Component Value Date    CALCIUM 7.9 05/15/2020    PHOS 2.9 05/10/2020      No results found for: [id number]   No results found for: IRON, TIBC, FERRITIN   No results found for: Shira Marissa

## 2020-05-16 LAB
ANION GAP SERPL CALCULATED.3IONS-SCNC: 10 MMOL/L (ref 3–16)
ATYPICAL LYMPHOCYTE RELATIVE PERCENT: 30 % (ref 0–6)
BANDED NEUTROPHILS RELATIVE PERCENT: 4 % (ref 0–7)
BASOPHILS ABSOLUTE: 0 K/UL (ref 0–0.2)
BASOPHILS RELATIVE PERCENT: 0 %
BUN BLDV-MCNC: 7 MG/DL (ref 7–20)
CALCIUM SERPL-MCNC: 8.4 MG/DL (ref 8.3–10.6)
CHLORIDE BLD-SCNC: 85 MMOL/L (ref 99–110)
CO2: 27 MMOL/L (ref 21–32)
CREAT SERPL-MCNC: <0.5 MG/DL (ref 0.6–1.2)
EOSINOPHILS ABSOLUTE: 0 K/UL (ref 0–0.6)
EOSINOPHILS RELATIVE PERCENT: 0 %
GFR AFRICAN AMERICAN: >60
GFR NON-AFRICAN AMERICAN: >60
GLUCOSE BLD-MCNC: 88 MG/DL (ref 70–99)
HCT VFR BLD CALC: 34.5 % (ref 36–48)
HEMATOLOGY PATH CONSULT: NO
HEMOGLOBIN: 12 G/DL (ref 12–16)
LYMPHOCYTES ABSOLUTE: 57.6 K/UL (ref 1–5.1)
LYMPHOCYTES RELATIVE PERCENT: 67 %
MCH RBC QN AUTO: 30.1 PG (ref 26–34)
MCHC RBC AUTO-ENTMCNC: 34.8 G/DL (ref 31–36)
MCV RBC AUTO: 86.5 FL (ref 80–100)
MONOCYTES ABSOLUTE: 1.2 K/UL (ref 0–1.3)
MONOCYTES RELATIVE PERCENT: 2 %
NEUTROPHILS ABSOLUTE: 13.1 K/UL (ref 1.7–7.7)
NEUTROPHILS RELATIVE PERCENT: 18 %
OSMOLALITY URINE: 412 MOSM/KG (ref 390–1070)
PDW BLD-RTO: 13.8 % (ref 12.4–15.4)
PLATELET # BLD: 425 K/UL (ref 135–450)
PMV BLD AUTO: 7.4 FL (ref 5–10.5)
POTASSIUM SERPL-SCNC: 3.3 MMOL/L (ref 3.5–5.1)
POTASSIUM, UR: 13.5 MMOL/L
POTASSIUM, UR: 15.7 MMOL/L
RBC # BLD: 3.98 M/UL (ref 4–5.2)
RBC # BLD: NORMAL 10*6/UL
SMUDGE CELLS: PRESENT
SODIUM BLD-SCNC: 117 MMOL/L (ref 136–145)
SODIUM BLD-SCNC: 119 MMOL/L (ref 136–145)
SODIUM BLD-SCNC: 120 MMOL/L (ref 136–145)
SODIUM BLD-SCNC: 122 MMOL/L (ref 136–145)
SODIUM BLD-SCNC: 122 MMOL/L (ref 136–145)
SODIUM BLD-SCNC: 123 MMOL/L (ref 136–145)
SODIUM BLD-SCNC: 126 MMOL/L (ref 136–145)
SODIUM BLD-SCNC: 126 MMOL/L (ref 136–145)
SODIUM URINE: 142 MMOL/L
SODIUM URINE: 154 MMOL/L
WBC # BLD: 59.4 K/UL (ref 4–11)

## 2020-05-16 PROCEDURE — 37799 UNLISTED PX VASCULAR SURGERY: CPT

## 2020-05-16 PROCEDURE — 6360000002 HC RX W HCPCS: Performed by: INTERNAL MEDICINE

## 2020-05-16 PROCEDURE — 6370000000 HC RX 637 (ALT 250 FOR IP): Performed by: SURGERY

## 2020-05-16 PROCEDURE — 2580000003 HC RX 258: Performed by: FAMILY MEDICINE

## 2020-05-16 PROCEDURE — 6370000000 HC RX 637 (ALT 250 FOR IP): Performed by: INTERNAL MEDICINE

## 2020-05-16 PROCEDURE — 83935 ASSAY OF URINE OSMOLALITY: CPT

## 2020-05-16 PROCEDURE — 80048 BASIC METABOLIC PNL TOTAL CA: CPT

## 2020-05-16 PROCEDURE — 84300 ASSAY OF URINE SODIUM: CPT

## 2020-05-16 PROCEDURE — 2580000003 HC RX 258: Performed by: INTERNAL MEDICINE

## 2020-05-16 PROCEDURE — 85025 COMPLETE CBC W/AUTO DIFF WBC: CPT

## 2020-05-16 PROCEDURE — 6360000002 HC RX W HCPCS: Performed by: SURGERY

## 2020-05-16 PROCEDURE — 84295 ASSAY OF SERUM SODIUM: CPT

## 2020-05-16 PROCEDURE — 84133 ASSAY OF URINE POTASSIUM: CPT

## 2020-05-16 PROCEDURE — 2000000000 HC ICU R&B

## 2020-05-16 PROCEDURE — 36591 DRAW BLOOD OFF VENOUS DEVICE: CPT

## 2020-05-16 PROCEDURE — 2580000003 HC RX 258: Performed by: SURGERY

## 2020-05-16 RX ORDER — SODIUM CHLORIDE 1000 MG
2 TABLET, SOLUBLE MISCELLANEOUS
Status: DISCONTINUED | OUTPATIENT
Start: 2020-05-16 | End: 2020-05-16

## 2020-05-16 RX ORDER — POTASSIUM CHLORIDE 20 MEQ/1
20 TABLET, EXTENDED RELEASE ORAL 3 TIMES DAILY
Status: DISCONTINUED | OUTPATIENT
Start: 2020-05-16 | End: 2020-05-18 | Stop reason: HOSPADM

## 2020-05-16 RX ORDER — FUROSEMIDE 20 MG/1
20 TABLET ORAL 2 TIMES DAILY
Status: DISCONTINUED | OUTPATIENT
Start: 2020-05-16 | End: 2020-05-16

## 2020-05-16 RX ADMIN — SODIUM CHLORIDE 50 ML/HR: 3 INJECTION, SOLUTION INTRAVENOUS at 05:42

## 2020-05-16 RX ADMIN — DEXAMETHASONE SODIUM PHOSPHATE: 4 INJECTION, SOLUTION INTRAMUSCULAR; INTRAVENOUS at 11:23

## 2020-05-16 RX ADMIN — POTASSIUM CHLORIDE 20 MEQ: 20 TABLET, EXTENDED RELEASE ORAL at 13:35

## 2020-05-16 RX ADMIN — ATORVASTATIN CALCIUM 40 MG: 40 TABLET, FILM COATED ORAL at 08:18

## 2020-05-16 RX ADMIN — FUROSEMIDE 20 MG: 20 TABLET ORAL at 13:35

## 2020-05-16 RX ADMIN — Medication 10 ML: at 04:39

## 2020-05-16 RX ADMIN — SODIUM CHLORIDE TAB 1 GM 2 G: 1 TAB at 13:35

## 2020-05-16 RX ADMIN — SODIUM CHLORIDE TAB 1 GM 2 G: 1 TAB at 16:58

## 2020-05-16 RX ADMIN — Medication 10 ML: at 02:29

## 2020-05-16 RX ADMIN — Medication 15 G: at 09:31

## 2020-05-16 RX ADMIN — Medication 10 ML: at 08:18

## 2020-05-16 RX ADMIN — Medication 10 ML: at 20:25

## 2020-05-16 RX ADMIN — ETOPOSIDE 172 MG: 20 INJECTION, SOLUTION INTRAVENOUS at 12:54

## 2020-05-16 RX ADMIN — POTASSIUM CHLORIDE 20 MEQ: 20 TABLET, EXTENDED RELEASE ORAL at 20:25

## 2020-05-16 RX ADMIN — ALLOPURINOL 300 MG: 300 TABLET ORAL at 08:18

## 2020-05-16 RX ADMIN — ENOXAPARIN SODIUM 40 MG: 40 INJECTION SUBCUTANEOUS at 08:17

## 2020-05-16 RX ADMIN — Medication 10 ML: at 00:44

## 2020-05-16 RX ADMIN — POLYETHYLENE GLYCOL 3350 17 G: 17 POWDER, FOR SOLUTION ORAL at 08:21

## 2020-05-16 ASSESSMENT — PAIN SCALES - GENERAL
PAINLEVEL_OUTOF10: 0

## 2020-05-16 NOTE — PROGRESS NOTES
PT Na+ 117, result called to Dr. Bishop Boxer, and  aware, Nephrologist would like to be called next Na + level result. IVF cont. As is per Nephro.  MKRN

## 2020-05-16 NOTE — PROGRESS NOTES
Call rec'd from Dr. Clary Hough MD order with read back- Increase rate of Sodium Chloride 3 % to 50 ml/hr. From 25 ml/hr. No need to call result next lab. if Na+ level is not above 122 mmol. Per Dr. Dylan Corey. Pt made aware of result and order, verbalized understanding. Will continue to monitor.  [place]

## 2020-05-16 NOTE — PROGRESS NOTES
Pt in bed, kept warm and comfortable. No c/o voiced. Personal belongings and call light within reach.  JESUSN

## 2020-05-16 NOTE — PROGRESS NOTES
ONCOLOGY HEMATOLOGY CARE PROGRESS NOTE      SUBJECTIVE:     Pt moved floors due to low Na. Day 3 chemo planned today. Denies N/V.      ROS:   The remaining 10 point review of symptoms is unremarkable. OBJECTIVE        Physical    VITALS:  /61   Pulse 85   Temp 98.4 °F (36.9 °C) (Oral)   Resp 18   Ht 5' 4\" (1.626 m)   Wt 140 lb 4.8 oz (63.6 kg)   SpO2 94%   BMI 24.08 kg/m²   TEMPERATURE:  Current - Temp: 98.4 °F (36.9 °C); Max - Temp  Av °F (36.7 °C)  Min: 97.5 °F (36.4 °C)  Max: 98.4 °F (36.9 °C)  PULSE OXIMETRY RANGE: SpO2  Av.9 %  Min: 93 %  Max: 97 %  24HR INTAKE/OUTPUT:      Intake/Output Summary (Last 24 hours) at 2020 1109  Last data filed at 2020 1015  Gross per 24 hour   Intake 1882.5 ml   Output 3650 ml   Net -1767.5 ml       CONSTITUTIONAL:  awake, alert, cooperative, no apparent distress, HEENT oral pharynx , no scleral icterus  HEMATOLOGIC/LYMPHATICS:  no cervical lymphadenopathy, no supraclavicular lymphadenopathy, no axillary lymphadenopathy and no inguinal lymphadenopathy  LUNGS:  Scattered wheezing, rhonchi to bilateral anterior chest wall   CARDIOVASCULAR:  , regular rate and rhythm, normal S1 and S2, no S3 or S4, and no murmur noted  ABDOMEN:  No scars, normal bowel sounds, soft, non-distended, non-tender, no masses palpated, no hepatosplenomegally  MUSCULOSKELETAL:  There is no redness, warmth, or swelling of the joints. EXTREMETIES: No clubbing cynosis or edema  NEUROLOGIC:  Awake, alert, oriented to name, place and time. Cranial nerves II-XII are grossly intact. Motor is 5 out of 5 bilaterally.    SKIN:  no bruising or bleeding      Data      Recent Labs     20  0545 05/15/20  0635 20  0615   WBC 38.4* 51.4* 59.4*   HGB 11.1* 11.8* 12.0   HCT 31.8* 33.2* 34.5*    408 425   MCV 87.5 86.4 86.5        Recent Labs     20  0545 05/15/20  0635  20  0436 20  0615 20  0825   * 118*   <

## 2020-05-16 NOTE — PROGRESS NOTES
Pt resting Na+ 118. Dr. Adrian Rangel paged-  Pt being admitted for hyponatremia, Na+ level @ 2245 hrs=118, from 117 ( @ 2049 hrs), pt is on 3% NS @ 25 ml/hr. pt has Na+ to be drawn Q 2 hrs. Please let me know for any orders.  Thank you- Matty Wheat [phone]

## 2020-05-17 LAB
ANION GAP SERPL CALCULATED.3IONS-SCNC: 9 MMOL/L (ref 3–16)
ANISOCYTOSIS: ABNORMAL
ATYPICAL LYMPHOCYTE RELATIVE PERCENT: 26 % (ref 0–6)
BANDED NEUTROPHILS RELATIVE PERCENT: 9 % (ref 0–7)
BASOPHILS ABSOLUTE: 0 K/UL (ref 0–0.2)
BASOPHILS RELATIVE PERCENT: 0 %
BUN BLDV-MCNC: 16 MG/DL (ref 7–20)
CALCIUM SERPL-MCNC: 8.6 MG/DL (ref 8.3–10.6)
CHLORIDE BLD-SCNC: 92 MMOL/L (ref 99–110)
CO2: 28 MMOL/L (ref 21–32)
CORTISOL TOTAL: 1.3 UG/DL
CREAT SERPL-MCNC: <0.5 MG/DL (ref 0.6–1.2)
EOSINOPHILS ABSOLUTE: 0 K/UL (ref 0–0.6)
EOSINOPHILS RELATIVE PERCENT: 0 %
GFR AFRICAN AMERICAN: >60
GFR NON-AFRICAN AMERICAN: >60
GLUCOSE BLD-MCNC: 86 MG/DL (ref 70–99)
HCT VFR BLD CALC: 31.1 % (ref 36–48)
HEMATOLOGY PATH CONSULT: NO
HEMOGLOBIN: 10.7 G/DL (ref 12–16)
LYMPHOCYTES ABSOLUTE: 25.3 K/UL (ref 1–5.1)
LYMPHOCYTES RELATIVE PERCENT: 34 %
MACROCYTES: ABNORMAL
MCH RBC QN AUTO: 30.3 PG (ref 26–34)
MCHC RBC AUTO-ENTMCNC: 34.5 G/DL (ref 31–36)
MCV RBC AUTO: 87.9 FL (ref 80–100)
MONOCYTES ABSOLUTE: 0.4 K/UL (ref 0–1.3)
MONOCYTES RELATIVE PERCENT: 1 %
NEUTROPHILS ABSOLUTE: 16.5 K/UL (ref 1.7–7.7)
NEUTROPHILS RELATIVE PERCENT: 30 %
OSMOLALITY URINE: 701 MOSM/KG (ref 390–1070)
OVALOCYTES: ABNORMAL
PDW BLD-RTO: 14.1 % (ref 12.4–15.4)
PLATELET # BLD: 395 K/UL (ref 135–450)
PMV BLD AUTO: 7 FL (ref 5–10.5)
POIKILOCYTES: ABNORMAL
POTASSIUM SERPL-SCNC: 3.8 MMOL/L (ref 3.5–5.1)
POTASSIUM, UR: 30.4 MMOL/L
RBC # BLD: 3.54 M/UL (ref 4–5.2)
SMUDGE CELLS: PRESENT
SODIUM BLD-SCNC: 127 MMOL/L (ref 136–145)
SODIUM BLD-SCNC: 128 MMOL/L (ref 136–145)
SODIUM BLD-SCNC: 129 MMOL/L (ref 136–145)
SODIUM URINE: 110 MMOL/L
WBC # BLD: 42.2 K/UL (ref 4–11)

## 2020-05-17 PROCEDURE — 2580000003 HC RX 258: Performed by: SURGERY

## 2020-05-17 PROCEDURE — 6370000000 HC RX 637 (ALT 250 FOR IP): Performed by: INTERNAL MEDICINE

## 2020-05-17 PROCEDURE — 1200000000 HC SEMI PRIVATE

## 2020-05-17 PROCEDURE — 80048 BASIC METABOLIC PNL TOTAL CA: CPT

## 2020-05-17 PROCEDURE — 84133 ASSAY OF URINE POTASSIUM: CPT

## 2020-05-17 PROCEDURE — 84300 ASSAY OF URINE SODIUM: CPT

## 2020-05-17 PROCEDURE — 36591 DRAW BLOOD OFF VENOUS DEVICE: CPT

## 2020-05-17 PROCEDURE — 6370000000 HC RX 637 (ALT 250 FOR IP): Performed by: SURGERY

## 2020-05-17 PROCEDURE — 82533 TOTAL CORTISOL: CPT

## 2020-05-17 PROCEDURE — 85025 COMPLETE CBC W/AUTO DIFF WBC: CPT

## 2020-05-17 PROCEDURE — 84295 ASSAY OF SERUM SODIUM: CPT

## 2020-05-17 PROCEDURE — 83935 ASSAY OF URINE OSMOLALITY: CPT

## 2020-05-17 PROCEDURE — 6360000002 HC RX W HCPCS: Performed by: SURGERY

## 2020-05-17 RX ORDER — SODIUM CHLORIDE 1000 MG
2 TABLET, SOLUBLE MISCELLANEOUS
Status: DISCONTINUED | OUTPATIENT
Start: 2020-05-17 | End: 2020-05-18 | Stop reason: HOSPADM

## 2020-05-17 RX ORDER — FUROSEMIDE 20 MG/1
20 TABLET ORAL 2 TIMES DAILY
Status: DISCONTINUED | OUTPATIENT
Start: 2020-05-17 | End: 2020-05-18 | Stop reason: HOSPADM

## 2020-05-17 RX ADMIN — ALLOPURINOL 300 MG: 300 TABLET ORAL at 09:03

## 2020-05-17 RX ADMIN — SODIUM CHLORIDE TAB 1 GM 2 G: 1 TAB at 16:38

## 2020-05-17 RX ADMIN — ENOXAPARIN SODIUM 40 MG: 40 INJECTION SUBCUTANEOUS at 09:03

## 2020-05-17 RX ADMIN — Medication 10 ML: at 19:59

## 2020-05-17 RX ADMIN — Medication 10 ML: at 09:03

## 2020-05-17 RX ADMIN — ATORVASTATIN CALCIUM 40 MG: 40 TABLET, FILM COATED ORAL at 09:03

## 2020-05-17 RX ADMIN — POTASSIUM CHLORIDE 20 MEQ: 20 TABLET, EXTENDED RELEASE ORAL at 16:38

## 2020-05-17 RX ADMIN — FUROSEMIDE 20 MG: 20 TABLET ORAL at 16:38

## 2020-05-17 RX ADMIN — POTASSIUM CHLORIDE 20 MEQ: 20 TABLET, EXTENDED RELEASE ORAL at 19:58

## 2020-05-17 RX ADMIN — POTASSIUM CHLORIDE 20 MEQ: 20 TABLET, EXTENDED RELEASE ORAL at 09:03

## 2020-05-17 ASSESSMENT — PAIN SCALES - GENERAL: PAINLEVEL_OUTOF10: 0

## 2020-05-17 NOTE — PROGRESS NOTES
Hospitalist Progress Note      PCP: Swapna Ghotra    Date of Admission: 5/3/2020    Chief Complaint: N/V    Brief Hospital Course : Pulmonary consultation and EBUS Bx completed 5/11 for evaluation of left lower lung mass. 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 Sodium level 117 on 5/15/20 ; Has not had a positive response to oral sodium chloride tablets. Nephrology had recommended UreNa (oral drink)  Patient reported  that this was very \"distasteful\". She is unable to take this drink and is having a hard time increasing the protein to her diet. She continued to have persistent nausea that is not well controlled. Using Phenergan/Zofran for symptomatic relief. 5/15 Transferred  to ICU for Hypertonic Saline gtt per Nephrology    Subjective : Patient reports feeling much better this morning. Sodium improved to 129 this morning. Off 3% MS since 5/16/2020. Nephrology resume salt tablets. D/w RN -no acute events reported overnight.   No new medical complaints    Medications:  Reviewed    Scheduled Medications    furosemide  20 mg Oral BID    sodium chloride  2 g Oral TID WC    potassium chloride  20 mEq Oral TID    allopurinol  300 mg Oral Daily    atorvastatin  40 mg Oral Daily    sodium chloride flush  10 mL Intravenous 2 times per day    enoxaparin  40 mg Subcutaneous Daily     PRN Meds: oxyCODONE-acetaminophen **OR** oxyCODONE-acetaminophen, sodium chloride flush, acetaminophen **OR** acetaminophen, polyethylene glycol, promethazine **OR** ondansetron      Intake/Output Summary (Last 24 hours) at 5/17/2020 1457  Last data filed at 5/17/2020 0800  Gross per 24 hour   Intake 1672 ml   Output 1550 ml   Net 122 ml       Physical Exam Performed:  /64   Pulse 85   Temp 97.9 °F (36.6 °C) (Oral)   Resp 18   Ht 5' 4\" (1.626 m)   Wt 143 lb 15.4 oz (65.3 kg) CONSULT TO ONCOLOGY  IP CONSULT TO NEPHROLOGY  IP CONSULT TO PULMONOLOGY  IP CONSULT TO GENERAL SURGERY  PHARMACY TO DOSE MEDICATION      Active Hospital Problems    Diagnosis    Lung mass [R91.8]    Multiple lung nodules [R91.8]    Hilar adenopathy [R59.0]    Centrilobular emphysema ([address] [J43.2]    Smoker [F17.200]    Acute hyponatremia [E87.1]    N&V (nausea and vomiting) [R11.2]    Leukocytosis [D72.829]     Assessment/Plan:  Persistent nausea: Romy Lint Hyponatremia, SIADH, electrolyte abnormality, cancer burden and current chemo treatment. Supportive care w/ IVF/Antiemetics. Continue to monitor and support patient. HypoNatremia - critically low, likely multifactorial: hypovolemia w/ SIADH likely 2nd to small cell lung cancer. Follow serial labs on IVF. Nephrology consulted and appreciated - improved w/ Samsca 6 May. Per Nephrology recommendations, DC salt tablets 2 g p.o. 3 times daily with meals, and start 15 gm UreNa. Patient could not tolerate Urea drink and increase to protein in her meals. Na dropped  to 117, transferred  to ICU for hypertonic saline 5/15. Nephrology assisting with management  Continue to monitor close     Leukocytosis secondary to CLL. Heme/Onc consulted s/p BM Bx 5 May. Consistent and confirmed with CLL on bone marrow biopsy  -No planned treatment per Heme/Onc    Chronic COPD without acute exacerbation-stable pulmonary function, seen on CT scan. -Medications: No current treatment required  -Pulmonary consultation completed. Lung mass - Final path: small cell lung cancer. L paramediastinal mass seen on CT scan 6 May:  Metastatic disease to liver. Oncology input appreciated, arranging definitive dx and tx plan. Pulmonary consulted for EBUS: Biopsy completed on 5/11.    - port placement and inpatient chemo to begin on 5/14    Hypokalemia:   - Replete oral  - Labs: BMP monitoring  - Encourage good nutrition    Called and updated daughter  Angel Sanchez) Lajuana Hashimoto [phone] on 5/16/20  and updated about patient's condition and answered all questions . DVT Prophylaxis: LMWH  Diet: Dietary Nutrition Supplements: Standard High Calorie Oral Supplement  DIET GENERAL; No Added Salt (3-4 GM)  Code Status: Full Code    PT/OT Eval Status: No need identified    Dispo -likely tomorrow.   Okay to move out of ICU to Aide Gonzalez MD

## 2020-05-17 NOTE — PROGRESS NOTES
05/16/20  1625 05/16/20  2225 05/17/20  0555   *   < > 122*   < > 126* 126* 129*   K 3.6  --  3.3*  --   --   --  3.8   CL 83*  --  85*  --   --   --  92*   CO2 24  --  27  --   --   --  28   BUN 5*  --  7  --   --   --  16   CREATININE <0.5*  --  <0.5*  --   --   --  <0.5*    < > = values in this interval not displayed. No results for input(s): AST, ALT, ALB, BILIDIR, BILITOT, ALKPHOS in the last 72 hours. Magnesium:    Lab Results   Component Value Date    MG 1.70 05/05/2020         Problem List  Patient Active Problem List   Diagnosis    Acute hyponatremia    N&V (nausea and vomiting)    Leukocytosis    Lung mass    Multiple lung nodules    Hilar adenopathy    Centrilobular emphysema (HCC)    Smoker       ASSESSMENT AND PLAN:    Small cell lung cancer Neuroendocrine type with mets, extensive stage   -It appears that her sodium has been very difficult to control  -I discussed with the patient that she has extensive disease including disease in the liver  -MRI of brain appears unremarkable  - s/p port   - Carbo Day 1 and  16 Day 1-3 (Carbo AUC 6 with creat dosed at 1)  - Day 3 of 3 completed 5/16/2020  - Cont allopurinol for TLS prevention   - Allopurinol on dc as well   - Will add Tecentriq with Cycle 2 in the office     Hyponatremia  -Siadh  -appreciate nephrology assistance  - now on restricted fluids   - Na 129 today  - hopefully will improve with chemotherapy    CLL  -no indication for treatment, but this may complicate her lung cancer tx     ONCOLOGIC DISPOSITION:  - scheduled on Monday for Neulasta shot in office. If remains inpatient, could give Granix.      Darwin López MD

## 2020-05-17 NOTE — PLAN OF CARE
Problem: Pain:  Goal: Control of acute pain  Description: Control of acute pain  5/16/2020 1602 by Hola Tena RN  Outcome: Met This Shift  Goal: Control of chronic pain  Description: Control of chronic pain  5/16/2020 1602 by Hola Tena RN  Outcome: Met This Shift     Problem: Skin Integrity:  Goal: Skin integrity will stabilize  Description: Skin integrity will stabilize  5/16/2020 1602 by Hola Tena RN  Outcome: Met This Shift     Problem: Discharge Planning:  Goal: Patients continuum of care needs are met  Description: Patients continuum of care needs are met  5/16/2020 1602 by Hola Tena RN  Outcome: Met This Shift     Problem: Falls - Risk of:  Goal: Will remain free from falls  Description: Will remain free from falls  5/16/2020 1602 by Hola Tena RN  Outcome: Met This Shift  Goal: Absence of physical injury  Description: Absence of physical injury  5/16/2020 1602 by Hola Tena RN  Outcome: Met This Shift     Problem: Nutrition  Goal: Optimal nutrition therapy  5/16/2020 1602 by Hola Tena RN  Outcome: Met This Shift     Problem:  Bowel/Gastric:  Goal: Occurrences of nausea will decrease  Description: Occurrences of nausea will decrease  5/16/2020 1602 by Hola Tena RN  Outcome: Met This Shift  Goal: Bowel function will improve  Description: Bowel function will improve  5/16/2020 1602 by Hola Tena RN  Outcome: Met This Shift

## 2020-05-17 NOTE — FLOWSHEET NOTE
05/16/20 2000   Assessment   Charting Type Shift assessment   Neurological   Neuro (WDL) WDL   Level of Consciousness 0   Orientation Level Oriented X4   Swallow Screening   Is the patient able to remain alert for testing? Yes   Faye Coma Scale   Eye Opening 4   Best Verbal Response 5   Best Motor Response 6   Faye Coma Scale Score 15   NIH/MNHISS Stroke Scale   NIH/MNIHSS Stroke Scale Assessed No   HEENT   HEENT (WDL) X   Teeth Partial plate upper   Right Eye Impaired vision   Left Eye Impaired vision   Respiratory   Respiratory (WDL) X   Respiratory Pattern Regular   Respiratory Depth Normal   Respiratory Quality/Effort Unlabored   Chest Assessment Chest expansion symmetrical   L Breath Sounds Diminished   R Breath Sounds Diminished   Breath Sounds   Right Upper Lobe Diminished   Right Middle Lobe Diminished   Right Lower Lobe Diminished   Left Upper Lobe Diminished   Left Lower Lobe Diminished   Cardiac   Cardiac (WDL) WDL   Cardiac Rhythm NSR   Cardiac Regularity Regular   Heart Sounds S1, S2   Rhythm Interpretation   Pulse 81   Cardiac Monitor   Telemetry Monitor On Yes   Telemetry Audible Yes   Telemetry Alarms Set Yes   Telemetry Box Number icu   Gastrointestinal   Abdominal (WDL) X   Abdomen Inspection Rounded; Soft   Tenderness Soft   RUQ Bowel Sounds Active   LUQ Bowel Sounds Active   RLQ Bowel Sounds Active   LLQ Bowel Sounds Active   GI Symptoms Loss of appetite   Passing Flatus Y   Peripheral Vascular   Peripheral Vascular (WDL) WDL   Edema None   Skin Color/Condition   Skin Color/Condition (WDL) WDL   Skin Integrity   Skin Integrity (WDL) WDL   Skin Integrity   (R chest port incision)   Preventative Dressing Yes   Skin Fold Management No   Multiple Skin Integrity Sites No   Dressing Site Sacrum   Assessed this shift?  Yes   Musculoskeletal   Musculoskeletal (WDL) WDL   RUE Full movement   LUE Full movement   RL Extremity Full movement   LL Extremity Full movement   Genitourinary   Genitourinary (WDL) WDL   Flank Tenderness No   Suprapubic Tenderness No   Dysuria No   Urine Assessment   Incontinence No   Urine Color Yellow/straw   Urine Appearance Clear   Urine Odor Malodorous   Anus/Rectum   Anus/Rectum (WDL) WDL   Incision 05/13/20 Chest Right;Upper   Date First Assessed/Time First Assessed: 05/13/20 1053   Present on Hospital Admission: No  Primary Wound Type: Incision  Location: Chest  Wound Location Orientation: Right;Upper   Wound Assessment Dry; Intact   Gladis-wound Assessment Clean;Dry   Closure Surgical glue   Dressing/Treatment Surgical glue   Psychosocial   Psychosocial (WDL) WDL

## 2020-05-18 VITALS
RESPIRATION RATE: 18 BRPM | HEIGHT: 64 IN | TEMPERATURE: 98.4 F | DIASTOLIC BLOOD PRESSURE: 79 MMHG | HEART RATE: 90 BPM | SYSTOLIC BLOOD PRESSURE: 165 MMHG | BODY MASS INDEX: 23.68 KG/M2 | OXYGEN SATURATION: 94 % | WEIGHT: 138.7 LBS

## 2020-05-18 LAB
ANION GAP SERPL CALCULATED.3IONS-SCNC: 8 MMOL/L (ref 3–16)
BASOPHILS ABSOLUTE: 0 K/UL (ref 0–0.2)
BASOPHILS RELATIVE PERCENT: 0 %
BUN BLDV-MCNC: 14 MG/DL (ref 7–20)
CALCIUM SERPL-MCNC: 9.2 MG/DL (ref 8.3–10.6)
CHLORIDE BLD-SCNC: 91 MMOL/L (ref 99–110)
CO2: 29 MMOL/L (ref 21–32)
CORTISOL TOTAL: 2.9 UG/DL
CORTISOL TOTAL: 4.1 UG/DL
CREAT SERPL-MCNC: <0.5 MG/DL (ref 0.6–1.2)
EOSINOPHILS ABSOLUTE: 0 K/UL (ref 0–0.6)
EOSINOPHILS RELATIVE PERCENT: 0 %
GFR AFRICAN AMERICAN: >60
GFR NON-AFRICAN AMERICAN: >60
GLUCOSE BLD-MCNC: 103 MG/DL (ref 70–99)
HCT VFR BLD CALC: 34.1 % (ref 36–48)
HEMATOLOGY PATH CONSULT: NO
HEMOGLOBIN: 11.6 G/DL (ref 12–16)
LYMPHOCYTES ABSOLUTE: 32.1 K/UL (ref 1–5.1)
LYMPHOCYTES RELATIVE PERCENT: 83 %
MCH RBC QN AUTO: 30.2 PG (ref 26–34)
MCHC RBC AUTO-ENTMCNC: 34.1 G/DL (ref 31–36)
MCV RBC AUTO: 88.5 FL (ref 80–100)
MONOCYTES ABSOLUTE: 0.8 K/UL (ref 0–1.3)
MONOCYTES RELATIVE PERCENT: 2 %
NEUTROPHILS ABSOLUTE: 5.8 K/UL (ref 1.7–7.7)
NEUTROPHILS RELATIVE PERCENT: 15 %
OSMOLALITY URINE: 444 MOSM/KG (ref 390–1070)
PDW BLD-RTO: 13.9 % (ref 12.4–15.4)
PLATELET # BLD: 301 K/UL (ref 135–450)
PLATELET SLIDE REVIEW: ADEQUATE
PMV BLD AUTO: 7.1 FL (ref 5–10.5)
POTASSIUM SERPL-SCNC: 4 MMOL/L (ref 3.5–5.1)
POTASSIUM, UR: 31.2 MMOL/L
RBC # BLD: 3.85 M/UL (ref 4–5.2)
RBC # BLD: NORMAL 10*6/UL
SODIUM BLD-SCNC: 127 MMOL/L (ref 136–145)
SODIUM BLD-SCNC: 128 MMOL/L (ref 136–145)
SODIUM URINE: 99 MMOL/L
WBC # BLD: 38.7 K/UL (ref 4–11)

## 2020-05-18 PROCEDURE — 84295 ASSAY OF SERUM SODIUM: CPT

## 2020-05-18 PROCEDURE — 6370000000 HC RX 637 (ALT 250 FOR IP): Performed by: SURGERY

## 2020-05-18 PROCEDURE — 36591 DRAW BLOOD OFF VENOUS DEVICE: CPT

## 2020-05-18 PROCEDURE — 6360000002 HC RX W HCPCS: Performed by: SURGERY

## 2020-05-18 PROCEDURE — 2580000003 HC RX 258: Performed by: SURGERY

## 2020-05-18 PROCEDURE — 6370000000 HC RX 637 (ALT 250 FOR IP): Performed by: INTERNAL MEDICINE

## 2020-05-18 PROCEDURE — 83935 ASSAY OF URINE OSMOLALITY: CPT

## 2020-05-18 PROCEDURE — 85025 COMPLETE CBC W/AUTO DIFF WBC: CPT

## 2020-05-18 PROCEDURE — 80048 BASIC METABOLIC PNL TOTAL CA: CPT

## 2020-05-18 PROCEDURE — 84133 ASSAY OF URINE POTASSIUM: CPT

## 2020-05-18 PROCEDURE — 84300 ASSAY OF URINE SODIUM: CPT

## 2020-05-18 PROCEDURE — 82533 TOTAL CORTISOL: CPT

## 2020-05-18 RX ORDER — ALBUTEROL SULFATE 90 UG/1
2 AEROSOL, METERED RESPIRATORY (INHALATION) EVERY 4 HOURS PRN
Qty: 1 INHALER | Refills: 3 | Status: SHIPPED | OUTPATIENT
Start: 2020-05-18

## 2020-05-18 RX ORDER — PROMETHAZINE HYDROCHLORIDE 12.5 MG/1
12.5 TABLET ORAL EVERY 6 HOURS PRN
Qty: 25 TABLET | Refills: 5 | Status: SHIPPED | OUTPATIENT
Start: 2020-05-18

## 2020-05-18 RX ORDER — POTASSIUM CHLORIDE 20 MEQ/1
20 TABLET, EXTENDED RELEASE ORAL 2 TIMES DAILY
Qty: 60 TABLET | Refills: 0 | Status: ON HOLD | OUTPATIENT
Start: 2020-05-18 | End: 2021-02-26 | Stop reason: HOSPADM

## 2020-05-18 RX ORDER — ATORVASTATIN CALCIUM 40 MG/1
40 TABLET, FILM COATED ORAL DAILY
Qty: 30 TABLET | Refills: 3 | Status: ON HOLD | OUTPATIENT
Start: 2020-05-18 | End: 2021-02-26 | Stop reason: HOSPADM

## 2020-05-18 RX ORDER — ONDANSETRON 4 MG/1
8 TABLET, ORALLY DISINTEGRATING ORAL EVERY 8 HOURS PRN
Qty: 20 TABLET | Refills: 5 | Status: SHIPPED | OUTPATIENT
Start: 2020-05-18

## 2020-05-18 RX ORDER — ALLOPURINOL 300 MG/1
300 TABLET ORAL DAILY
Qty: 30 TABLET | Refills: 3 | Status: SHIPPED | OUTPATIENT
Start: 2020-05-18 | End: 2020-10-10 | Stop reason: ALTCHOICE

## 2020-05-18 RX ORDER — FUROSEMIDE 20 MG/1
20 TABLET ORAL 2 TIMES DAILY
Qty: 60 TABLET | Refills: 0 | Status: SHIPPED | OUTPATIENT
Start: 2020-05-18 | End: 2020-05-18

## 2020-05-18 RX ORDER — SODIUM CHLORIDE 1000 MG
2 TABLET, SOLUBLE MISCELLANEOUS
Qty: 90 TABLET | Refills: 3 | Status: ON HOLD | OUTPATIENT
Start: 2020-05-18 | End: 2020-09-27 | Stop reason: ALTCHOICE

## 2020-05-18 RX ORDER — FUROSEMIDE 20 MG/1
20 TABLET ORAL 2 TIMES DAILY
Qty: 60 TABLET | Refills: 0 | Status: ON HOLD | OUTPATIENT
Start: 2020-05-18 | End: 2020-09-29 | Stop reason: SDUPTHER

## 2020-05-18 RX ADMIN — SODIUM CHLORIDE TAB 1 GM 2 G: 1 TAB at 08:55

## 2020-05-18 RX ADMIN — Medication 10 ML: at 08:56

## 2020-05-18 RX ADMIN — FUROSEMIDE 20 MG: 20 TABLET ORAL at 08:55

## 2020-05-18 RX ADMIN — ATORVASTATIN CALCIUM 40 MG: 40 TABLET, FILM COATED ORAL at 08:55

## 2020-05-18 RX ADMIN — POTASSIUM CHLORIDE 20 MEQ: 20 TABLET, EXTENDED RELEASE ORAL at 08:55

## 2020-05-18 RX ADMIN — ENOXAPARIN SODIUM 40 MG: 40 INJECTION SUBCUTANEOUS at 08:55

## 2020-05-18 RX ADMIN — ALLOPURINOL 300 MG: 300 TABLET ORAL at 08:55

## 2020-05-18 NOTE — CARE COORDINATION
CASE MANAGEMENT DISCHARGE SUMMARY      Discharge to: home      Transportation: private car     Confirmed discharge plan with:     Patient: yes     Family, name and contact number: daughter has already discussed plan of care with MD and patient.  Waiting for RN ok for d/c       RN, name:       Douglas Gonzalez RN

## 2020-05-18 NOTE — DISCHARGE SUMMARY
Hospital Discharge Summary     NAME: George Mathur    :  1945    MRN:  [id number]    Admission Details:     Admit date:  5/3/2020    Admitting Physician:  Shaye Blanco MD  Primary Care Physician:  Lucas Anguiano    Discharge Details:     Discharge date:  20   Discharge Diagnoses:    Patient Active Problem List   Diagnosis    Acute hyponatremia    N&V (nausea and vomiting)    Leukocytosis    Lung mass    Multiple lung nodules    Hilar adenopathy    Centrilobular emphysema ([address]    Smoker     Discharge Condition: stable, good     Hospital Course:   Admitted for hyponatremia and found to have a lung mass with liver mets. Bx confirmed small cell neuroendocrine malignancy. She was treated with cycle 1 Carbo and  16. She tolerated chemo well. Nephrology was consulted for hyponatremia as well. She is maintained on salt tablets. Na to improve further with chemo treatment as her low sodium levels are related to a paraneoplastic process from her CA. She had a port placed. MRI brain negative. She is stable for dc today with plans for outpatient Neulasta growth fx support. She is maintained on Allopurinol. No signs of tumor lysis syndrome. She feels well today and ready to go home. No headache. No seizure like activity this admit. CBC stable.      PE:   General: alert and oriented X3, NAD, appears stage age, well nourished  HEENT: sclera non icteric and neck supple   Lungs: scattered wheezing to bilateral anterior chest wall   Cardiac: RRR, normal S1 and S2   Abd: soft, non tender, non distended, no masses or organomegaly appreciated   Skin: no lesions or rashes   Neuro: grossly intact   Ext: no edema, cyanosis, or clubbing     Discharge Medications:       Shree Russell   Home Medication Instructions [id number]    Printed on:20 1037   Medication Information                      allopurinol (ZYLOPRIM) 300 MG tablet  Take 1 tablet by mouth daily             atorvastatin aorta up to 3.2 cm. No free fluid or free air. Bones/Soft Tissues: No acute or aggressive osseous lesion. Degenerative changes of the spine are mild. 1. Numerous (approximately 9) liver masses compatible with metastatic disease. 2. Abnormal lymph nodes in the upper abdomen and left cardiophrenic region most suspicious for metastatic disease. PET/CT can be considered. 3. Left adrenal nodule, suspicious for metastatic disease as no remote studies are available for comparison. PET/CT can be considered. 4. Partial visualization of a loculated left pleural effusion. Xr Chest Portable    Result Date: 5/4/2020  EXAMINATION: ONE XRAY VIEW OF THE CHEST 5/4/2020 12:30 am COMPARISON: None. HISTORY: ORDERING SYSTEM PROVIDED HISTORY: pre-admit TECHNOLOGIST PROVIDED HISTORY: Reason for exam:->pre-admit Reason for Exam: pre-admit Type of Exam: Unknown FINDINGS: Hyperinflated lungs. Coarsened interstitial markings in the lower lung zones. Biapical pleuroparenchymal scarring. No pneumothorax or pleural effusion. No focal airspace consolidation. Normal heart size and mediastinal contours allowing for patient rotation. No acute osseous abnormality. Hyperinflated lungs suggests obstructive small airways disease such as asthma or COPD. No focal airspace disease. Nm Bone Scan Whole Body    Result Date: 5/13/2020  EXAMINATION: WHOLE BODY BONE SCAN  5/13/2020 TECHNIQUE: The patient was injected intravenously with 25 mCi of 99 mTc MDP and scintigraphy of the entire skeleton was performed approximately three hours later. COMPARISON: CT abdomen pelvis dated 05/12/2020 HISTORY: ORDERING SYSTEM PROVIDED HISTORY: staging for new small cell lung cancer TECHNOLOGIST PROVIDED HISTORY: Reason for exam:->staging for new small cell lung cancer FINDINGS: Relatively diffuse activity is seen throughout the spine with mild focality in the cervical spine, typically degenerative. Injection site is seen at the left forearm.  Mild used.  4 fluoroscopic images were obtained. Fluoroscopy was provided to the clinical service for procedural guidance. Radiologist was not present for the procedure. FINDINGS: Port is seen in the right chest wall, extending to expected location of superior vena cava. Provision of fluoroscopy for procedural guidance. Please refer to procedure note for further details. Treatments:     Carbo  16 chemo Cycle 1 Day 1 5/14/20     Disposition:     [address] [place] [address] should be follow up with Dr. Lyly Beckham in one week. She will have her Neulasta shot in the [facility] office tomorrow 5-19 at 11:30 am. Shot cannot be given the same day as a hospitalization due to insurance issues.      Signed:     5/18/2020  10:37 AM    Jhon Irby CNP   Medical Oncology/Hematology    [facility] - [address],  [facility]   Phone: [phone]  Fax: [phone]

## 2020-05-18 NOTE — PROGRESS NOTES
nontender, nondistended, no masses or organomegaly. Ext:  peripheral pulses normal, no pedal edema, no clubbing or cyanosis      MS: no joint tenderness, deformity or swelling      DERM: normal coloration and turgor, no rashesor bruising      Subjective:   Patient has no complaint       Review of Systems  Seen in room     Objective:     Patient Vitals for the past 8 hrs:   BP Temp Temp src Pulse Resp SpO2 Weight   05/18/20 0853 (!) 165/79 98.4 °F (36.9 °C) Oral 90 18 94 % --   05/18/20 0433 -- -- -- -- -- -- 138 lb 11.2 oz (62.9 kg)       I/O last 3 completed shifts: In: 960 [P.O.:960]  Out: 1050 [Urine:1050]    . l  Lab Results   Component Value Date    CREATININE <0.5 (L) 05/18/2020    BUN 14 05/18/2020     (L) 05/18/2020    K 4.0 05/18/2020    CL 91 (L) 05/18/2020    CO2 29 05/18/2020     Lab Results   Component Value Date    WBC 38.7 (H) 05/18/2020    HGB 11.6 (L) 05/18/2020    HCT 34.1 (L) 05/18/2020    MCV 88.5 05/18/2020     05/18/2020            AGLUCOSE)Magnesium:    Lab Results   Component Value Date    MG 1.70 05/05/2020     Phosphorus:    Lab Results   Component Value Date    PHOS 2.9 05/10/2020       Uric Acid:  No components found for: URIC    Principal Problem:    Acute hyponatremia  Active Problems:    N&V (nausea and vomiting)    Leukocytosis    Lung mass    Multiple lung nodules    Hilar adenopathy    Centrilobular emphysema (HCC)    Smoker  Resolved Problems:    * No resolved hospital problems.  *

## 2020-05-18 NOTE — CARE COORDINATION
Chart reviewed day 14. Completed chemo on Saturday. Labs abnormal transferred to ICU for hypertonic saline. Continue to be abnormal. Patient IPTA no therapy noted. Open to [place] [id number] if needed.  Brittany Hdez RN

## 2020-05-19 LAB
Lab: NORMAL
REPORT: NORMAL
THIS TEST SENT TO: NORMAL

## 2020-06-23 ENCOUNTER — HOSPITAL ENCOUNTER (OUTPATIENT)
Dept: NURSING | Age: 75
Setting detail: INFUSION SERIES
Discharge: HOME | End: 2020-06-23
Payer: MEDICARE

## 2020-06-23 VITALS
BODY MASS INDEX: 23.56 KG/M2 | HEART RATE: 77 BPM | TEMPERATURE: 97.2 F | SYSTOLIC BLOOD PRESSURE: 116 MMHG | RESPIRATION RATE: 16 BRPM | DIASTOLIC BLOOD PRESSURE: 68 MMHG | HEIGHT: 64 IN | WEIGHT: 138 LBS

## 2020-06-23 LAB
ABO/RH: NORMAL
ABO/RH: NORMAL
ANTIBODY SCREEN: NORMAL
BLOOD BANK DISPENSE STATUS: NORMAL
BLOOD BANK PRODUCT CODE: NORMAL
BPU ID: NORMAL
DESCRIPTION BLOOD BANK: NORMAL

## 2020-06-23 PROCEDURE — 99211 OFF/OP EST MAY X REQ PHY/QHP: CPT

## 2020-06-23 PROCEDURE — 6370000000 HC RX 637 (ALT 250 FOR IP): Performed by: INTERNAL MEDICINE

## 2020-06-23 PROCEDURE — 86923 COMPATIBILITY TEST ELECTRIC: CPT

## 2020-06-23 PROCEDURE — P9016 RBC LEUKOCYTES REDUCED: HCPCS

## 2020-06-23 PROCEDURE — 36415 COLL VENOUS BLD VENIPUNCTURE: CPT

## 2020-06-23 PROCEDURE — 36430 TRANSFUSION BLD/BLD COMPNT: CPT

## 2020-06-23 PROCEDURE — 86850 RBC ANTIBODY SCREEN: CPT

## 2020-06-23 PROCEDURE — 86901 BLOOD TYPING SEROLOGIC RH(D): CPT

## 2020-06-23 PROCEDURE — 86900 BLOOD TYPING SEROLOGIC ABO: CPT

## 2020-06-23 RX ORDER — DIPHENHYDRAMINE HCL 25 MG
25 TABLET ORAL ONCE
Status: COMPLETED | OUTPATIENT
Start: 2020-06-23 | End: 2020-06-23

## 2020-06-23 RX ORDER — ACETAMINOPHEN 325 MG/1
650 TABLET ORAL ONCE
Status: COMPLETED | OUTPATIENT
Start: 2020-06-23 | End: 2020-06-23

## 2020-06-23 RX ADMIN — ACETAMINOPHEN 650 MG: 325 TABLET ORAL at 11:41

## 2020-06-23 RX ADMIN — DIPHENHYDRAMINE HCL 25 MG: 25 TABLET ORAL at 11:42

## 2020-06-23 ASSESSMENT — PAIN - FUNCTIONAL ASSESSMENT: PAIN_FUNCTIONAL_ASSESSMENT: 0-10

## 2020-06-23 ASSESSMENT — PAIN SCALES - GENERAL: PAINLEVEL_OUTOF10: 0

## 2020-06-23 NOTE — PROGRESS NOTES
Pt tolerates transfusion well discharge instructions given and verbalizes understanding discharged via wheelchair in stable condition

## 2020-08-07 ENCOUNTER — APPOINTMENT (OUTPATIENT)
Dept: GENERAL RADIOLOGY | Age: 75
DRG: 871 | End: 2020-08-07
Payer: MEDICARE

## 2020-08-07 ENCOUNTER — HOSPITAL ENCOUNTER (INPATIENT)
Age: 75
LOS: 7 days | Discharge: HOME | DRG: 871 | End: 2020-08-14
Attending: EMERGENCY MEDICINE | Admitting: INTERNAL MEDICINE
Payer: MEDICARE

## 2020-08-07 PROBLEM — U07.1 COVID-19: Status: ACTIVE | Noted: 2020-08-07

## 2020-08-07 PROBLEM — A41.9 SEPSIS (HCC): Status: ACTIVE | Noted: 2020-08-07

## 2020-08-07 PROBLEM — D64.9 ANEMIA: Status: ACTIVE | Noted: 2020-08-07

## 2020-08-07 PROBLEM — E87.6 HYPOKALEMIA: Status: ACTIVE | Noted: 2020-08-07

## 2020-08-07 PROBLEM — E78.5 HYPERLIPIDEMIA: Status: ACTIVE | Noted: 2020-08-07

## 2020-08-07 PROBLEM — C34.90 LUNG CANCER (HCC): Status: ACTIVE | Noted: 2020-08-07

## 2020-08-07 LAB
A/G RATIO: 1.1 (ref 1.1–2.2)
ABO/RH: NORMAL
ALBUMIN SERPL-MCNC: 3.1 G/DL (ref 3.4–5)
ALP BLD-CCNC: 95 U/L (ref 40–129)
ALT SERPL-CCNC: 12 U/L (ref 10–40)
ANION GAP SERPL CALCULATED.3IONS-SCNC: 11 MMOL/L (ref 3–16)
ANISOCYTOSIS: ABNORMAL
ANTIBODY SCREEN: NORMAL
AST SERPL-CCNC: 16 U/L (ref 15–37)
BANDED NEUTROPHILS RELATIVE PERCENT: 33 % (ref 0–7)
BASOPHILS ABSOLUTE: 0 K/UL (ref 0–0.2)
BASOPHILS RELATIVE PERCENT: 0 %
BILIRUB SERPL-MCNC: 0.4 MG/DL (ref 0–1)
BUN BLDV-MCNC: 9 MG/DL (ref 7–20)
CALCIUM SERPL-MCNC: 7.5 MG/DL (ref 8.3–10.6)
CHLORIDE BLD-SCNC: 92 MMOL/L (ref 99–110)
CO2: 25 MMOL/L (ref 21–32)
CREAT SERPL-MCNC: 0.9 MG/DL (ref 0.6–1.2)
EOSINOPHILS ABSOLUTE: 0.3 K/UL (ref 0–0.6)
EOSINOPHILS RELATIVE PERCENT: 1 %
GFR AFRICAN AMERICAN: >60
GFR NON-AFRICAN AMERICAN: >60
GLOBULIN: 2.7 G/DL
GLUCOSE BLD-MCNC: 109 MG/DL (ref 70–99)
HCT VFR BLD CALC: 14.8 % (ref 36–48)
HEMATOLOGY PATH CONSULT: NO
HEMOGLOBIN: 4.8 G/DL (ref 12–16)
LACTIC ACID, SEPSIS: 0.8 MMOL/L (ref 0.4–1.9)
LYMPHOCYTES ABSOLUTE: 7.5 K/UL (ref 1–5.1)
LYMPHOCYTES RELATIVE PERCENT: 30 %
MAGNESIUM: 1.1 MG/DL (ref 1.8–2.4)
MCH RBC QN AUTO: 32.1 PG (ref 26–34)
MCHC RBC AUTO-ENTMCNC: 32.8 G/DL (ref 31–36)
MCV RBC AUTO: 98 FL (ref 80–100)
MONOCYTES ABSOLUTE: 0.3 K/UL (ref 0–1.3)
MONOCYTES RELATIVE PERCENT: 1 %
NEUTROPHILS ABSOLUTE: 17 K/UL (ref 1.7–7.7)
NEUTROPHILS RELATIVE PERCENT: 35 %
PDW BLD-RTO: 20.8 % (ref 12.4–15.4)
PLATELET # BLD: 241 K/UL (ref 135–450)
PLATELET SLIDE REVIEW: ADEQUATE
PMV BLD AUTO: 7.9 FL (ref 5–10.5)
POIKILOCYTES: ABNORMAL
POLYCHROMASIA: ABNORMAL
POTASSIUM REFLEX MAGNESIUM: 2.9 MMOL/L (ref 3.5–5.1)
RBC # BLD: 1.51 M/UL (ref 4–5.2)
ROULEAUX: ABNORMAL
SLIDE REVIEW: ABNORMAL
SODIUM BLD-SCNC: 128 MMOL/L (ref 136–145)
TOTAL PROTEIN: 5.8 G/DL (ref 6.4–8.2)
TROPONIN: <0.01 NG/ML
WBC # BLD: 25 K/UL (ref 4–11)

## 2020-08-07 PROCEDURE — 94761 N-INVAS EAR/PLS OXIMETRY MLT: CPT

## 2020-08-07 PROCEDURE — 83605 ASSAY OF LACTIC ACID: CPT

## 2020-08-07 PROCEDURE — 85025 COMPLETE CBC W/AUTO DIFF WBC: CPT

## 2020-08-07 PROCEDURE — 93005 ELECTROCARDIOGRAM TRACING: CPT | Performed by: EMERGENCY MEDICINE

## 2020-08-07 PROCEDURE — 86901 BLOOD TYPING SEROLOGIC RH(D): CPT

## 2020-08-07 PROCEDURE — 6360000002 HC RX W HCPCS: Performed by: EMERGENCY MEDICINE

## 2020-08-07 PROCEDURE — 86850 RBC ANTIBODY SCREEN: CPT

## 2020-08-07 PROCEDURE — P9016 RBC LEUKOCYTES REDUCED: HCPCS

## 2020-08-07 PROCEDURE — 83735 ASSAY OF MAGNESIUM: CPT

## 2020-08-07 PROCEDURE — 2700000000 HC OXYGEN THERAPY PER DAY

## 2020-08-07 PROCEDURE — 96375 TX/PRO/DX INJ NEW DRUG ADDON: CPT

## 2020-08-07 PROCEDURE — 99291 CRITICAL CARE FIRST HOUR: CPT

## 2020-08-07 PROCEDURE — 71045 X-RAY EXAM CHEST 1 VIEW: CPT

## 2020-08-07 PROCEDURE — 1200000000 HC SEMI PRIVATE

## 2020-08-07 PROCEDURE — 6370000000 HC RX 637 (ALT 250 FOR IP): Performed by: INTERNAL MEDICINE

## 2020-08-07 PROCEDURE — 2580000003 HC RX 258: Performed by: EMERGENCY MEDICINE

## 2020-08-07 PROCEDURE — 84484 ASSAY OF TROPONIN QUANT: CPT

## 2020-08-07 PROCEDURE — 86923 COMPATIBILITY TEST ELECTRIC: CPT

## 2020-08-07 PROCEDURE — 80053 COMPREHEN METABOLIC PANEL: CPT

## 2020-08-07 PROCEDURE — 86900 BLOOD TYPING SEROLOGIC ABO: CPT

## 2020-08-07 PROCEDURE — 2580000003 HC RX 258: Performed by: INTERNAL MEDICINE

## 2020-08-07 PROCEDURE — 96365 THER/PROPH/DIAG IV INF INIT: CPT

## 2020-08-07 PROCEDURE — 6360000002 HC RX W HCPCS: Performed by: INTERNAL MEDICINE

## 2020-08-07 PROCEDURE — 6370000000 HC RX 637 (ALT 250 FOR IP): Performed by: EMERGENCY MEDICINE

## 2020-08-07 RX ORDER — MAGNESIUM SULFATE 1 G/100ML
1 INJECTION INTRAVENOUS ONCE
Status: COMPLETED | OUTPATIENT
Start: 2020-08-07 | End: 2020-08-07

## 2020-08-07 RX ORDER — POLYETHYLENE GLYCOL 3350 17 G/17G
17 POWDER, FOR SOLUTION ORAL DAILY PRN
Status: DISCONTINUED | OUTPATIENT
Start: 2020-08-07 | End: 2020-08-14 | Stop reason: HOSPADM

## 2020-08-07 RX ORDER — SODIUM CHLORIDE 1000 MG
2 TABLET, SOLUBLE MISCELLANEOUS
Status: DISCONTINUED | OUTPATIENT
Start: 2020-08-08 | End: 2020-08-14 | Stop reason: HOSPADM

## 2020-08-07 RX ORDER — ONDANSETRON 4 MG/1
8 TABLET, ORALLY DISINTEGRATING ORAL EVERY 8 HOURS PRN
Status: DISCONTINUED | OUTPATIENT
Start: 2020-08-07 | End: 2020-08-07 | Stop reason: SDUPTHER

## 2020-08-07 RX ORDER — PROMETHAZINE HYDROCHLORIDE 12.5 MG/1
12.5 TABLET ORAL EVERY 6 HOURS PRN
Status: DISCONTINUED | OUTPATIENT
Start: 2020-08-07 | End: 2020-08-07 | Stop reason: SDUPTHER

## 2020-08-07 RX ORDER — ONDANSETRON 2 MG/ML
4 INJECTION INTRAMUSCULAR; INTRAVENOUS ONCE
Status: COMPLETED | OUTPATIENT
Start: 2020-08-07 | End: 2020-08-07

## 2020-08-07 RX ORDER — ALBUTEROL SULFATE 2.5 MG/3ML
2.5 SOLUTION RESPIRATORY (INHALATION) EVERY 6 HOURS PRN
Status: DISCONTINUED | OUTPATIENT
Start: 2020-08-07 | End: 2020-08-14 | Stop reason: HOSPADM

## 2020-08-07 RX ORDER — POTASSIUM CHLORIDE 20 MEQ/1
40 TABLET, EXTENDED RELEASE ORAL ONCE
Status: COMPLETED | OUTPATIENT
Start: 2020-08-07 | End: 2020-08-07

## 2020-08-07 RX ORDER — SODIUM CHLORIDE 0.9 % (FLUSH) 0.9 %
10 SYRINGE (ML) INJECTION EVERY 12 HOURS SCHEDULED
Status: DISCONTINUED | OUTPATIENT
Start: 2020-08-07 | End: 2020-08-10

## 2020-08-07 RX ORDER — SODIUM CHLORIDE 0.9 % (FLUSH) 0.9 %
10 SYRINGE (ML) INJECTION PRN
Status: DISCONTINUED | OUTPATIENT
Start: 2020-08-07 | End: 2020-08-10

## 2020-08-07 RX ORDER — ACETAMINOPHEN 650 MG/1
650 SUPPOSITORY RECTAL EVERY 6 HOURS PRN
Status: DISCONTINUED | OUTPATIENT
Start: 2020-08-07 | End: 2020-08-08

## 2020-08-07 RX ORDER — 0.9 % SODIUM CHLORIDE 0.9 %
30 INTRAVENOUS SOLUTION INTRAVENOUS ONCE
Status: COMPLETED | OUTPATIENT
Start: 2020-08-07 | End: 2020-08-07

## 2020-08-07 RX ORDER — POTASSIUM CHLORIDE 7.45 MG/ML
10 INJECTION INTRAVENOUS ONCE
Status: COMPLETED | OUTPATIENT
Start: 2020-08-07 | End: 2020-08-07

## 2020-08-07 RX ORDER — POTASSIUM CHLORIDE 20 MEQ/1
20 TABLET, EXTENDED RELEASE ORAL 2 TIMES DAILY
Status: DISCONTINUED | OUTPATIENT
Start: 2020-08-07 | End: 2020-08-14 | Stop reason: HOSPADM

## 2020-08-07 RX ORDER — POTASSIUM CHLORIDE AND SODIUM CHLORIDE 900; 300 MG/100ML; MG/100ML
INJECTION, SOLUTION INTRAVENOUS CONTINUOUS
Status: DISPENSED | OUTPATIENT
Start: 2020-08-07 | End: 2020-08-08

## 2020-08-07 RX ORDER — ALLOPURINOL 300 MG/1
300 TABLET ORAL DAILY
Status: DISCONTINUED | OUTPATIENT
Start: 2020-08-08 | End: 2020-08-14 | Stop reason: HOSPADM

## 2020-08-07 RX ORDER — ATORVASTATIN CALCIUM 40 MG/1
40 TABLET, FILM COATED ORAL DAILY
Status: DISCONTINUED | OUTPATIENT
Start: 2020-08-08 | End: 2020-08-14 | Stop reason: HOSPADM

## 2020-08-07 RX ORDER — PROMETHAZINE HYDROCHLORIDE 25 MG/1
12.5 TABLET ORAL EVERY 6 HOURS PRN
Status: DISCONTINUED | OUTPATIENT
Start: 2020-08-07 | End: 2020-08-14 | Stop reason: HOSPADM

## 2020-08-07 RX ORDER — 0.9 % SODIUM CHLORIDE 0.9 %
20 INTRAVENOUS SOLUTION INTRAVENOUS ONCE
Status: DISCONTINUED | OUTPATIENT
Start: 2020-08-07 | End: 2020-08-14 | Stop reason: HOSPADM

## 2020-08-07 RX ORDER — ONDANSETRON 2 MG/ML
4 INJECTION INTRAMUSCULAR; INTRAVENOUS EVERY 6 HOURS PRN
Status: DISCONTINUED | OUTPATIENT
Start: 2020-08-07 | End: 2020-08-14 | Stop reason: HOSPADM

## 2020-08-07 RX ORDER — ACETAMINOPHEN 325 MG/1
650 TABLET ORAL EVERY 6 HOURS PRN
Status: DISCONTINUED | OUTPATIENT
Start: 2020-08-07 | End: 2020-08-08

## 2020-08-07 RX ORDER — ACETAMINOPHEN 325 MG/1
650 TABLET ORAL ONCE
Status: COMPLETED | OUTPATIENT
Start: 2020-08-07 | End: 2020-08-07

## 2020-08-07 RX ADMIN — POTASSIUM CHLORIDE AND SODIUM CHLORIDE: 900; 300 INJECTION, SOLUTION INTRAVENOUS at 21:00

## 2020-08-07 RX ADMIN — PROMETHAZINE HYDROCHLORIDE 12.5 MG: 25 TABLET ORAL at 23:13

## 2020-08-07 RX ADMIN — POTASSIUM CHLORIDE 20 MEQ: 20 TABLET, EXTENDED RELEASE ORAL at 21:00

## 2020-08-07 RX ADMIN — MAGNESIUM SULFATE HEPTAHYDRATE 1 G: 1 INJECTION, SOLUTION INTRAVENOUS at 17:44

## 2020-08-07 RX ADMIN — SODIUM CHLORIDE 1836 ML: 9 INJECTION, SOLUTION INTRAVENOUS at 16:20

## 2020-08-07 RX ADMIN — PIPERACILLIN AND TAZOBACTAM 3.38 G: 3; .375 INJECTION, POWDER, LYOPHILIZED, FOR SOLUTION INTRAVENOUS at 18:54

## 2020-08-07 RX ADMIN — POTASSIUM CHLORIDE 40 MEQ: 20 TABLET, EXTENDED RELEASE ORAL at 17:40

## 2020-08-07 RX ADMIN — ONDANSETRON 4 MG: 2 INJECTION INTRAMUSCULAR; INTRAVENOUS at 16:20

## 2020-08-07 RX ADMIN — VANCOMYCIN HYDROCHLORIDE 1250 MG: 10 INJECTION, POWDER, LYOPHILIZED, FOR SOLUTION INTRAVENOUS at 19:28

## 2020-08-07 RX ADMIN — POTASSIUM CHLORIDE 10 MEQ: 7.46 INJECTION, SOLUTION INTRAVENOUS at 17:44

## 2020-08-07 RX ADMIN — CEFEPIME 2 G: 2 INJECTION, POWDER, FOR SOLUTION INTRAMUSCULAR; INTRAVENOUS at 21:00

## 2020-08-07 RX ADMIN — ACETAMINOPHEN 650 MG: 325 TABLET ORAL at 16:20

## 2020-08-07 RX ADMIN — Medication 10 ML: at 21:00

## 2020-08-07 ASSESSMENT — PAIN SCALES - GENERAL
PAINLEVEL_OUTOF10: 0

## 2020-08-07 NOTE — ED TRIAGE NOTES
Patient identified as a positive fall risk on the ED triage fall screening. Patient placed in fall precautions which includes:  yellow fall risk bracelet on wrist, non skid shoes on feett, \"Be Safe\" sign placed on patient's door, and bed alarm placed under patient/alarm turned on. Patient instructed on importance of not getting out of bed or ambulating without assistance for safety.

## 2020-08-07 NOTE — ED NOTES
Bed: 21  Expected date:   Expected time:   Means of arrival:   Comments:  [facility] [address], [facility]  08/07/20 3103

## 2020-08-07 NOTE — ED PROVIDER NOTES
[address] [facility]  ED  EMERGENCY DEPARTMENT ENCOUNTER      Pt Name: Kirt Villalpando  MRN: [id number]  Armstrongfstanislaw 1945  Date of evaluation: 8/7/2020  Provider: Kim Scott MD    CHIEF COMPLAINT       Chief Complaint   Patient presents with    Nausea     patient being txt for cancer. last txt Monday, Tuesday, Wednesday last week. she reports she recently tested positive for COVID last Thursday. nausea and vomiting started yesterday     Emesis         HISTORY OF PRESENT ILLNESS   (Location/Symptom, Timing/Onset, Context/Setting, Quality, Duration, Modifying Factors, Severity)  Note limiting factors. Kirt Villalpando is a 77 y.o. female with past medical history of lung cancer currently on chemotherapy with last treatment proceeding Wednesday here today with COVID positive lab test, vomiting and emesis    The patient states that last Wednesday she received chemotherapy. She noted to her oncologist that she started having some cough, fevers at home and feeling weak and fatigued. Patient was sent to an outside lab for COVID-19 testing and was called yesterday and told that she did in fact have COVID-19 infection. She states she is continued to have cough, shortness of breath, emesis, fevers myalgias. She states earlier today she was vomiting and has had increased worsening symptoms and decided to come to the hospital.  Denies chest pain. No abdominal pain. No diarrhea. Denies dysuria or hematuria. [facility]    Nursing Notes were reviewed. REVIEW OF SYSTEMS    (2-9 systems for level 4, 10 or more for level 5)     Review of Systems    Please see HPI for pertinent positive and negative review of system findings. A full 10 system ROS was performed and otherwise negative.         PAST MEDICAL HISTORY     Past Medical History:   Diagnosis Date    Cancer ([address]     Hyperlipidemia     Lung cancer [facility])          SURGICAL HISTORY       Past Surgical History:   Procedure Laterality Date    BREAST SURGERY fibroid tumors removed bilateral breast    BRONCHOSCOPY N/A 5/11/2020    BRONCHOSCOPY ENDOBRONCHIAL ULTRASOUND with ANESTHESIA performed by Malina Healy MD at [address] [place] [address]  5/11/2020    BRONCHOSCOPY/TRANSBRONCHIAL NEEDLE BIOPSY performed by Malina Healy MD at [address] [place] [address]  5/11/2020    BRONCHOSCOPY/TRANSBRONCHIAL NEEDLE BIOPSY ADDL LOBE performed by Malina Healy MD at [address] [place] [address]  5/11/2020    BRONCHOSCOPY DIAGNOSTIC OR CELL [address] ONLY performed by Malina Healy MD at [address] N/A 5/13/2020    RIGHT SUBCLAVIAN VEIN PORT A CATH INSERTION performed by Monserrat Villatoro MD at [address]      mid chest         CURRENT MEDICATIONS       Previous Medications    ALBUTEROL SULFATE HFA (PROAIR HFA) 108 (90 BASE) MCG/ACT INHALER    Inhale 2 puffs into the lungs every 4 hours as needed for Wheezing or Shortness of Breath    ALLOPURINOL (ZYLOPRIM) 300 MG TABLET    Take 1 tablet by mouth daily    ATORVASTATIN (LIPITOR) 40 MG TABLET    Take 1 tablet by mouth daily    FUROSEMIDE (LASIX) 20 MG TABLET    Take 1 tablet by mouth 2 times daily    ONDANSETRON (ZOFRAN ODT) 4 MG DISINTEGRATING TABLET    Take 2 tablets by mouth every 8 hours as needed for Nausea or Vomiting    POTASSIUM CHLORIDE (KLOR-CON M) 20 MEQ EXTENDED RELEASE TABLET    Take 1 tablet by mouth 2 times daily    PROMETHAZINE (PHENERGAN) 12.5 MG TABLET    Take 1 tablet by mouth every 6 hours as needed for Nausea    SODIUM CHLORIDE 1 G TABLET    Take 2 tablets by mouth 3 times daily (with meals)       ALLERGIES     Patient has no known allergies. FAMILY HISTORY     No family history on file. SOCIAL HISTORY       Social History     Socioeconomic History    Marital status:       Spouse name: Not on file    Number of children: Not on file    Years of education: Not on file    Highest education level: Not on file extremities spontaneously  Musculoskeletal:   Normal ROM, no deformities          Psychiatric:  Normal mood      DIAGNOSTIC RESULTS       Labs Reviewed   CBC WITH AUTO DIFFERENTIAL - Abnormal; Notable for the following components:       Result Value    WBC 25.0 (*)     RBC 1.51 (*)     Hemoglobin 4.8 (*)     Hematocrit 14.8 (*)     RDW 20.8 (*)     Neutrophils Absolute 17.0 (*)     Lymphocytes Absolute 7.5 (*)     Bands Relative 33 (*)     Anisocytosis Occasional (*)     Polychromasia Occasional (*)     Poikilocytes Occasional (*)     Rouleaux 3+ (*)     All other components within normal limits    Narrative:     Aylin Cortez [phone],  Hematology results called to and read back by Whit LOPEZ, 08/07/2020  16:27, by Joy Aguilar  Performed at:  [place] [address] [place]   Phone [phone]   COMPREHENSIVE METABOLIC PANEL W/ REFLEX TO MG FOR LOW K - Abnormal; Notable for the following components:    Sodium 128 (*)     Potassium reflex Magnesium 2.9 (*)     Chloride 92 (*)     Glucose 109 (*)     Calcium 7.5 (*)     Total Protein 5.8 (*)     Alb 3.1 (*)     All other components within normal limits    Narrative:     Aylin Sofia tel. [phone],  Chemistry results called to and read back by JEFF Beck, 08/07/2020  16:56, by Jeermie Kimball  Performed at:  [place] [address] [place]   Phone [phone]   MAGNESIUM - Abnormal; Notable for the following components:    Magnesium 1.10 (*)     All other components within normal limits    Narrative:     Aylin Sofia tel. [phone],  Chemistry results called to and read back by JEFF Beck, 08/07/2020  16:56, by Jeremie Kimball  Performed at:  [place] [address] [place]   Phone [phone]   TROPONIN    Narrative:     Aylin Sofia tel. [phone],  Chemistry results called to and volume IV fluids and broad-spectrum antibiotics for sepsis. Found to have hypokalemia and was treated with oral and IV potassium. In addition to the above findings, the patient was found to be significantly anemic with a hemoglobin of 4.8. She has had no blood in her stool. She is not vomiting blood. No vaginal bleeding. States she has had problems with anemia in the past and has worked previously required transfusions. She was transfused 2 units of PRBCs here but is otherwise remained stable. No signs of active hemorrhage. Soft abdomen    MDM    CONSULTS     IP CONSULT TO HOSPITALIST  IP CONSULT TO ONCOLOGY  IP CONSULT TO PHARMACY  IP CONSULT TO INFECTIOUS DISEASES    Critical Care:     CRITICAL CARE TIME   Total Critical Care time was 30 minutes, excluding separately reportable procedures. There was a high probability of clinically significant/life threatening deterioration in the patient's condition which required my urgent intervention. This time was spent reviewing the patient chart, interpreting diagnostic/laboratory data, transfusion of PRBCs for acute anemia, transfusion large-volume IV fluids and broad-spectrum antibiotics for sepsis, and administration of supplemental potassium for hypokalemia      REASSESSMENT          PROCEDURE     Unless otherwise noted below, none     Procedures      FINAL IMPRESSION      1. COVID-19 virus infection    2. Septicemia ([facility]    3. Anemia, unspecified type    4. Hypokalemia            DISPOSITION/PLAN   DISPOSITION Admitted 08/07/2020 06:29:37 PM        PATIENT REFERRED TO:  Saloni Perry  [address] [place] [id number] [phone]            DISCHARGE MEDICATIONS:  New Prescriptions    No medications on file     Controlled Substances Monitoring:     No flowsheet data found.     (Please note that portions of this note were completed with a voice recognition program.  Efforts were made to edit the dictations but occasionally words are mis-transcribed.)    Radha Panchal MD (electronically signed)  Attending Emergency Physician            Boogie Boyce MD  08/07/20 [id number]

## 2020-08-07 NOTE — ED NOTES
[facility] @ [facility]  Re; COVID+, Anemia, Sepsis, HypoK  Dr. Fer Perry @ [address] [facility]  08/07/20 [phone]

## 2020-08-08 PROBLEM — C78.7 HEPATIC METASTASIS (HCC): Status: ACTIVE | Noted: 2020-08-08

## 2020-08-08 PROBLEM — Z87.891 FORMER SMOKER: Status: ACTIVE | Noted: 2020-08-08

## 2020-08-08 PROBLEM — D62 ACUTE BLOOD LOSS ANEMIA: Status: ACTIVE | Noted: 2020-08-08

## 2020-08-08 PROBLEM — R79.89 ELEVATED PROCALCITONIN: Status: ACTIVE | Noted: 2020-08-08

## 2020-08-08 PROBLEM — E87.1 HYPONATREMIA: Status: ACTIVE | Noted: 2020-08-08

## 2020-08-08 PROBLEM — R91.8 PULMONARY INFILTRATES: Status: ACTIVE | Noted: 2020-08-08

## 2020-08-08 PROBLEM — C34.90 SMALL CELL LUNG CANCER (HCC): Status: ACTIVE | Noted: 2020-08-08

## 2020-08-08 LAB
A/G RATIO: 1.2 (ref 1.1–2.2)
ALBUMIN SERPL-MCNC: 3 G/DL (ref 3.4–5)
ALBUMIN SERPL-MCNC: 3.2 G/DL (ref 3.4–5)
ALP BLD-CCNC: 89 U/L (ref 40–129)
ALT SERPL-CCNC: 11 U/L (ref 10–40)
ANION GAP SERPL CALCULATED.3IONS-SCNC: 9 MMOL/L (ref 3–16)
ANION GAP SERPL CALCULATED.3IONS-SCNC: 9 MMOL/L (ref 3–16)
APTT: 37.3 SEC (ref 24.2–36.2)
AST SERPL-CCNC: 23 U/L (ref 15–37)
BACTERIA: ABNORMAL /HPF
BANDED NEUTROPHILS RELATIVE PERCENT: 8 % (ref 0–7)
BASOPHILS ABSOLUTE: 0 K/UL (ref 0–0.2)
BASOPHILS RELATIVE PERCENT: 0 %
BILIRUB SERPL-MCNC: 0.5 MG/DL (ref 0–1)
BILIRUBIN URINE: NEGATIVE
BLOOD, URINE: ABNORMAL
BUN BLDV-MCNC: 7 MG/DL (ref 7–20)
BUN BLDV-MCNC: 8 MG/DL (ref 7–20)
CALCIUM SERPL-MCNC: 7.4 MG/DL (ref 8.3–10.6)
CALCIUM SERPL-MCNC: 7.4 MG/DL (ref 8.3–10.6)
CHLORIDE BLD-SCNC: 103 MMOL/L (ref 99–110)
CHLORIDE BLD-SCNC: 97 MMOL/L (ref 99–110)
CLARITY: CLEAR
CO2: 21 MMOL/L (ref 21–32)
CO2: 23 MMOL/L (ref 21–32)
COLOR: YELLOW
CREAT SERPL-MCNC: 0.8 MG/DL (ref 0.6–1.2)
CREAT SERPL-MCNC: 0.8 MG/DL (ref 0.6–1.2)
D DIMER: 1208 NG/ML DDU (ref 0–229)
EKG ATRIAL RATE: 94 BPM
EKG DIAGNOSIS: NORMAL
EKG P AXIS: 70 DEGREES
EKG P-R INTERVAL: 142 MS
EKG Q-T INTERVAL: 364 MS
EKG QRS DURATION: 92 MS
EKG QTC CALCULATION (BAZETT): 455 MS
EKG R AXIS: 51 DEGREES
EKG T AXIS: 71 DEGREES
EKG VENTRICULAR RATE: 94 BPM
EOSINOPHILS ABSOLUTE: 0 K/UL (ref 0–0.6)
EOSINOPHILS RELATIVE PERCENT: 0 %
EPITHELIAL CELLS, UA: ABNORMAL /HPF (ref 0–5)
FERRITIN: 979.6 NG/ML (ref 15–150)
FIBRINOGEN: 401 MG/DL (ref 200–397)
GFR AFRICAN AMERICAN: >60
GFR AFRICAN AMERICAN: >60
GFR NON-AFRICAN AMERICAN: >60
GFR NON-AFRICAN AMERICAN: >60
GLOBULIN: 2.6 G/DL
GLUCOSE BLD-MCNC: 107 MG/DL (ref 70–99)
GLUCOSE BLD-MCNC: 112 MG/DL (ref 70–99)
GLUCOSE URINE: NEGATIVE MG/DL
HCT VFR BLD CALC: 20.7 % (ref 36–48)
HCT VFR BLD CALC: 20.8 % (ref 36–48)
HCT VFR BLD CALC: 24.2 % (ref 36–48)
HCT VFR BLD CALC: 29.4 % (ref 36–48)
HEMATOLOGY PATH CONSULT: NO
HEMOGLOBIN: 6.8 G/DL (ref 12–16)
HEMOGLOBIN: 6.9 G/DL (ref 12–16)
HEMOGLOBIN: 7.9 G/DL (ref 12–16)
HEMOGLOBIN: 9.7 G/DL (ref 12–16)
HYPOCHROMIA: ABNORMAL
INR BLD: 1.25 (ref 0.86–1.14)
KETONES, URINE: NEGATIVE MG/DL
LACTATE DEHYDROGENASE: 285 U/L (ref 100–190)
LACTIC ACID, SEPSIS: 0.7 MMOL/L (ref 0.4–1.9)
LEUKOCYTE ESTERASE, URINE: NEGATIVE
LYMPHOCYTES ABSOLUTE: 7.8 K/UL (ref 1–5.1)
LYMPHOCYTES RELATIVE PERCENT: 39 %
MACROCYTES: ABNORMAL
MAGNESIUM: 1.5 MG/DL (ref 1.8–2.4)
MCH RBC QN AUTO: 29.4 PG (ref 26–34)
MCH RBC QN AUTO: 29.9 PG (ref 26–34)
MCHC RBC AUTO-ENTMCNC: 32.7 G/DL (ref 31–36)
MCHC RBC AUTO-ENTMCNC: 33.1 G/DL (ref 31–36)
MCV RBC AUTO: 89.9 FL (ref 80–100)
MCV RBC AUTO: 90.1 FL (ref 80–100)
MICROSCOPIC EXAMINATION: YES
MONOCYTES ABSOLUTE: 0 K/UL (ref 0–1.3)
MONOCYTES RELATIVE PERCENT: 0 %
NEUTROPHILS ABSOLUTE: 12.1 K/UL (ref 1.7–7.7)
NEUTROPHILS RELATIVE PERCENT: 53 %
NITRITE, URINE: NEGATIVE
PDW BLD-RTO: 22.9 % (ref 12.4–15.4)
PDW BLD-RTO: 23.3 % (ref 12.4–15.4)
PH UA: 6 (ref 5–8)
PHOSPHORUS: 1.2 MG/DL (ref 2.5–4.9)
PLATELET # BLD: 236 K/UL (ref 135–450)
PLATELET # BLD: 248 K/UL (ref 135–450)
PLATELET SLIDE REVIEW: ADEQUATE
PMV BLD AUTO: 7.5 FL (ref 5–10.5)
PMV BLD AUTO: 7.9 FL (ref 5–10.5)
POTASSIUM REFLEX MAGNESIUM: 4 MMOL/L (ref 3.5–5.1)
POTASSIUM SERPL-SCNC: 4.3 MMOL/L (ref 3.5–5.1)
PROCALCITONIN: 1.82 NG/ML (ref 0–0.15)
PROTEIN UA: 30 MG/DL
PROTHROMBIN TIME: 14.5 SEC (ref 10–13.2)
RBC # BLD: 2.3 M/UL (ref 4–5.2)
RBC # BLD: 2.31 M/UL (ref 4–5.2)
RBC UA: ABNORMAL /HPF (ref 0–4)
SLIDE REVIEW: ABNORMAL
SODIUM BLD-SCNC: 127 MMOL/L (ref 136–145)
SODIUM BLD-SCNC: 135 MMOL/L (ref 136–145)
SPECIFIC GRAVITY UA: 1.02 (ref 1–1.03)
TOTAL PROTEIN: 5.6 G/DL (ref 6.4–8.2)
TOXIC GRANULATION: PRESENT
URINE REFLEX TO CULTURE: ABNORMAL
URINE TYPE: ABNORMAL
UROBILINOGEN, URINE: 0.2 E.U./DL
WBC # BLD: 19.9 K/UL (ref 4–11)
WBC # BLD: 20.9 K/UL (ref 4–11)
WBC UA: ABNORMAL /HPF (ref 0–5)

## 2020-08-08 PROCEDURE — 36591 DRAW BLOOD OFF VENOUS DEVICE: CPT

## 2020-08-08 PROCEDURE — C9113 INJ PANTOPRAZOLE SODIUM, VIA: HCPCS | Performed by: INTERNAL MEDICINE

## 2020-08-08 PROCEDURE — 85610 PROTHROMBIN TIME: CPT

## 2020-08-08 PROCEDURE — 2580000003 HC RX 258: Performed by: NURSE PRACTITIONER

## 2020-08-08 PROCEDURE — 6360000002 HC RX W HCPCS: Performed by: NURSE PRACTITIONER

## 2020-08-08 PROCEDURE — 87040 BLOOD CULTURE FOR BACTERIA: CPT

## 2020-08-08 PROCEDURE — 2700000000 HC OXYGEN THERAPY PER DAY

## 2020-08-08 PROCEDURE — 6360000002 HC RX W HCPCS: Performed by: INTERNAL MEDICINE

## 2020-08-08 PROCEDURE — 6370000000 HC RX 637 (ALT 250 FOR IP): Performed by: INTERNAL MEDICINE

## 2020-08-08 PROCEDURE — 2580000003 HC RX 258

## 2020-08-08 PROCEDURE — 99223 1ST HOSP IP/OBS HIGH 75: CPT | Performed by: INTERNAL MEDICINE

## 2020-08-08 PROCEDURE — 85027 COMPLETE CBC AUTOMATED: CPT

## 2020-08-08 PROCEDURE — 87449 NOS EACH ORGANISM AG IA: CPT

## 2020-08-08 PROCEDURE — 85384 FIBRINOGEN ACTIVITY: CPT

## 2020-08-08 PROCEDURE — 83605 ASSAY OF LACTIC ACID: CPT

## 2020-08-08 PROCEDURE — 87641 MR-STAPH DNA AMP PROBE: CPT

## 2020-08-08 PROCEDURE — 85379 FIBRIN DEGRADATION QUANT: CPT

## 2020-08-08 PROCEDURE — 36415 COLL VENOUS BLD VENIPUNCTURE: CPT

## 2020-08-08 PROCEDURE — U0003 INFECTIOUS AGENT DETECTION BY NUCLEIC ACID (DNA OR RNA); SEVERE ACUTE RESPIRATORY SYNDROME CORONAVIRUS 2 (SARS-COV-2) (CORONAVIRUS DISEASE [COVID-19]), AMPLIFIED PROBE TECHNIQUE, MAKING USE OF HIGH THROUGHPUT TECHNOLOGIES AS DESCRIBED BY CMS-2020-01-R: HCPCS

## 2020-08-08 PROCEDURE — 36430 TRANSFUSION BLD/BLD COMPNT: CPT

## 2020-08-08 PROCEDURE — 93010 ELECTROCARDIOGRAM REPORT: CPT | Performed by: INTERNAL MEDICINE

## 2020-08-08 PROCEDURE — 84145 PROCALCITONIN (PCT): CPT

## 2020-08-08 PROCEDURE — 94761 N-INVAS EAR/PLS OXIMETRY MLT: CPT

## 2020-08-08 PROCEDURE — 82728 ASSAY OF FERRITIN: CPT

## 2020-08-08 PROCEDURE — 85018 HEMOGLOBIN: CPT

## 2020-08-08 PROCEDURE — 83735 ASSAY OF MAGNESIUM: CPT

## 2020-08-08 PROCEDURE — 83615 LACTATE (LD) (LDH) ENZYME: CPT

## 2020-08-08 PROCEDURE — 81001 URINALYSIS AUTO W/SCOPE: CPT

## 2020-08-08 PROCEDURE — 1200000000 HC SEMI PRIVATE

## 2020-08-08 PROCEDURE — 2580000003 HC RX 258: Performed by: INTERNAL MEDICINE

## 2020-08-08 PROCEDURE — 80053 COMPREHEN METABOLIC PANEL: CPT

## 2020-08-08 PROCEDURE — 85730 THROMBOPLASTIN TIME PARTIAL: CPT

## 2020-08-08 PROCEDURE — 85025 COMPLETE CBC W/AUTO DIFF WBC: CPT

## 2020-08-08 PROCEDURE — 6370000000 HC RX 637 (ALT 250 FOR IP): Performed by: NURSE PRACTITIONER

## 2020-08-08 PROCEDURE — 85014 HEMATOCRIT: CPT

## 2020-08-08 RX ORDER — MAGNESIUM SULFATE IN WATER 40 MG/ML
2 INJECTION, SOLUTION INTRAVENOUS ONCE
Status: COMPLETED | OUTPATIENT
Start: 2020-08-08 | End: 2020-08-08

## 2020-08-08 RX ORDER — ACETAMINOPHEN 650 MG/1
650 SUPPOSITORY RECTAL EVERY 6 HOURS PRN
Status: DISCONTINUED | OUTPATIENT
Start: 2020-08-08 | End: 2020-08-14 | Stop reason: HOSPADM

## 2020-08-08 RX ORDER — SODIUM CHLORIDE 9 MG/ML
INJECTION, SOLUTION INTRAVENOUS
Status: DISPENSED
Start: 2020-08-08 | End: 2020-08-09

## 2020-08-08 RX ORDER — SODIUM CHLORIDE 9 MG/ML
INJECTION, SOLUTION INTRAVENOUS
Status: COMPLETED
Start: 2020-08-08 | End: 2020-08-08

## 2020-08-08 RX ORDER — ACETAMINOPHEN 325 MG/1
650 TABLET ORAL EVERY 6 HOURS PRN
Status: DISCONTINUED | OUTPATIENT
Start: 2020-08-08 | End: 2020-08-14 | Stop reason: HOSPADM

## 2020-08-08 RX ORDER — SODIUM CHLORIDE 0.9 % (FLUSH) 0.9 %
10 SYRINGE (ML) INJECTION EVERY 12 HOURS SCHEDULED
Status: DISCONTINUED | OUTPATIENT
Start: 2020-08-08 | End: 2020-08-14 | Stop reason: HOSPADM

## 2020-08-08 RX ORDER — PANTOPRAZOLE SODIUM 40 MG/10ML
40 INJECTION, POWDER, LYOPHILIZED, FOR SOLUTION INTRAVENOUS DAILY
Status: DISCONTINUED | OUTPATIENT
Start: 2020-08-08 | End: 2020-08-14 | Stop reason: HOSPADM

## 2020-08-08 RX ORDER — 0.9 % SODIUM CHLORIDE 0.9 %
250 INTRAVENOUS SOLUTION INTRAVENOUS ONCE
Status: COMPLETED | OUTPATIENT
Start: 2020-08-08 | End: 2020-08-08

## 2020-08-08 RX ORDER — SODIUM CHLORIDE 0.9 % (FLUSH) 0.9 %
10 SYRINGE (ML) INJECTION PRN
Status: DISCONTINUED | OUTPATIENT
Start: 2020-08-08 | End: 2020-08-14 | Stop reason: HOSPADM

## 2020-08-08 RX ORDER — DEXAMETHASONE SODIUM PHOSPHATE 10 MG/ML
6 INJECTION INTRAMUSCULAR; INTRAVENOUS EVERY 24 HOURS
Status: DISCONTINUED | OUTPATIENT
Start: 2020-08-08 | End: 2020-08-13

## 2020-08-08 RX ADMIN — MAGNESIUM SULFATE HEPTAHYDRATE 2 G: 40 INJECTION, SOLUTION INTRAVENOUS at 11:23

## 2020-08-08 RX ADMIN — ATORVASTATIN CALCIUM 40 MG: 40 TABLET, FILM COATED ORAL at 08:17

## 2020-08-08 RX ADMIN — DEXAMETHASONE SODIUM PHOSPHATE 6 MG: 10 INJECTION INTRAMUSCULAR; INTRAVENOUS at 15:10

## 2020-08-08 RX ADMIN — SODIUM CHLORIDE 250 ML: 900 INJECTION, SOLUTION INTRAVENOUS at 15:37

## 2020-08-08 RX ADMIN — SODIUM CHLORIDE 250 ML: 9 INJECTION, SOLUTION INTRAVENOUS at 21:09

## 2020-08-08 RX ADMIN — Medication 2 G: at 08:17

## 2020-08-08 RX ADMIN — ALLOPURINOL 300 MG: 300 TABLET ORAL at 08:17

## 2020-08-08 RX ADMIN — CEFEPIME 2 G: 2 INJECTION, POWDER, FOR SOLUTION INTRAMUSCULAR; INTRAVENOUS at 08:17

## 2020-08-08 RX ADMIN — POTASSIUM CHLORIDE 20 MEQ: 20 TABLET, EXTENDED RELEASE ORAL at 21:16

## 2020-08-08 RX ADMIN — VANCOMYCIN HYDROCHLORIDE 1000 MG: 1 INJECTION, POWDER, LYOPHILIZED, FOR SOLUTION INTRAVENOUS at 22:57

## 2020-08-08 RX ADMIN — ACETAMINOPHEN 650 MG: 325 TABLET ORAL at 15:11

## 2020-08-08 RX ADMIN — PANTOPRAZOLE SODIUM 40 MG: 40 INJECTION, POWDER, FOR SOLUTION INTRAVENOUS at 18:48

## 2020-08-08 RX ADMIN — Medication 10 ML: at 21:42

## 2020-08-08 RX ADMIN — Medication 2 G: at 11:23

## 2020-08-08 RX ADMIN — CEFEPIME 2 G: 2 INJECTION, POWDER, FOR SOLUTION INTRAMUSCULAR; INTRAVENOUS at 21:06

## 2020-08-08 RX ADMIN — POTASSIUM CHLORIDE 20 MEQ: 20 TABLET, EXTENDED RELEASE ORAL at 08:17

## 2020-08-08 RX ADMIN — Medication 2 G: at 18:10

## 2020-08-08 RX ADMIN — Medication 10 ML: at 08:18

## 2020-08-08 ASSESSMENT — PAIN SCALES - GENERAL
PAINLEVEL_OUTOF10: 0
PAINLEVEL_OUTOF10: 3
PAINLEVEL_OUTOF10: 0

## 2020-08-08 NOTE — CONSULTS
Laterality Date    BREAST SURGERY      fibroid tumors removed bilateral breast    BRONCHOSCOPY N/A 2020    BRONCHOSCOPY ENDOBRONCHIAL ULTRASOUND with ANESTHESIA performed by Malina Healy MD at [address] [place] [address]  2020    BRONCHOSCOPY/TRANSBRONCHIAL NEEDLE BIOPSY performed by Malina Healy MD at [address] [place] [address]  2020    BRONCHOSCOPY/TRANSBRONCHIAL NEEDLE BIOPSY ADDL LOBE performed by Malina Healy MD at [address] [place] [address]  2020    BRONCHOSCOPY DIAGNOSTIC OR CELL [address] ONLY performed by Malina Healy MD at [address] N/A 2020    RIGHT SUBCLAVIAN VEIN PORT A CATH INSERTION performed by Monserrat Villatoro MD at [address]      mid chest        No family history on file. Social History     Tobacco Use    Smoking status: Former Smoker     Last attempt to quit: 2020     Years since quittin.2    Smokeless tobacco: Never Used   Substance Use Topics    Alcohol use: Not on file        No Known Allergies            Physical Exam:  Blood pressure 103/60, pulse 85, temperature 99.1 °F (37.3 °C), temperature source Oral, resp. rate 18, height 5' 3.5\" (1.613 m), weight 145 lb 11.6 oz (66.1 kg), SpO2 (!) 89 %.'     In-person bedside physical examination deferred.   Pursuant to the emergency declaration under the [facility] waiver authority and the [employer] Act, this clinical encounter was conducted to provide necessary medical care.   (Also consistent with new provisions and guidance offered by [facility] on 2020 in setting of COVID 19 outbreak and in order to preserve personal protective equipment in accordance with the flexibilities announced by CMS on 2020)   References: https://[facility]. [facility]/Portals/0/Resources/COVID-19/3_18%20Telemed%20Guidance%20Updated%20March%2018. pdf?bwl=[email or web address]                      https://[facility]/Portals/0/Resources/COVID-19/3_18%20Telemed%20Guidance%20Updated%20March%2018. pdf?kab=[email or web address]                      http://[employer]/. pdf       Results:  CBC:   Recent Labs     08/07/20  1605 08/08/20  0025 08/08/20  0617 08/08/20  1147   WBC 25.0*  --  20.9* 19.9*   HGB 4.8* 7.9* 6.9* 6.8*   HCT 14.8* 24.2* 20.7* 20.8*   MCV 98.0  --  90.1 89.9     --  248 236     BMP:   Recent Labs     08/07/20  1605 08/08/20  0617 08/08/20  1106   * 135* 127*   K 2.9* 4.3 4.0   CL 92* 103 97*   CO2 25 23 21   PHOS  --  1.2*  --    BUN 9 7 8   CREATININE 0.9 0.8 0.8     LIVER PROFILE:   Recent Labs     08/07/20  1605 08/08/20  1106   AST 16 23   ALT 12 11   BILITOT 0.4 0.5   ALKPHOS 95 89     PT/INR:   Recent Labs     08/08/20  1147   PROTIME 14.5*   INR 1.25*     APTT:   Recent Labs     08/08/20  1147   APTT 37.3*     Imaging:  I have reviewed radiology images personally. XR CHEST PORTABLE   Final Result   Bibasilar airspace disease greater right lower lobe compatible with   pneumonia. There is given history of COVID-19 positivity. Xr Chest Portable    Result Date: 8/7/2020  EXAMINATION: ONE XRAY VIEW OF THE CHEST 8/7/2020 4:17 pm COMPARISON: Prior study(s) most recent 05/06/2020. HISTORY: ORDERING SYSTEM PROVIDED HISTORY: cough, COVID+ TECHNOLOGIST PROVIDED HISTORY: Reason for exam:->cough, COVID+ Reason for Exam: cough, COVID+ Acuity: Acute Type of Exam: Initial FINDINGS: The heart is within normal limits size. There is airspace disease in the lower lungs bilaterally, greater on the right. Patchy density is seen in the periphery of the mid upper lungs more likely interstitial disease. Indwelling right port catheter has been placed, tunneled right IJ approach.  The tip extends to the lower 3rd of the SVC. Bibasilar airspace disease greater right lower lobe compatible with pneumonia. There is given history of COVID-19 positivity. Results for Shawna Rowland (MRN [id number]) as of 8/8/2020 15:51   Ref. Range 8/8/2020 06:17 8/8/2020 11:06 8/8/2020 11:47 8/8/2020 13:58   Sodium Latest Ref Range: 136 - 145 mmol/L 135 (L) 127 (L)     Potassium Latest Ref Range: 3.5 - 5.1 mmol/L 4.3 4.0     Chloride Latest Ref Range: 99 - 110 mmol/L 103 97 (L)     CO2 Latest Ref Range: 21 - 32 mmol/L 23 21     BUN Latest Ref Range: 7 - 20 mg/dL 7 8     Creatinine Latest Ref Range: 0.6 - 1.2 mg/dL 0.8 0.8     Anion Gap Latest Ref Range: 3 - 16  9 9     GFR Non- Latest Ref Range: >60  >60 >60     GFR  Latest Ref Range: >60  >60 >60     Magnesium Latest Ref Range: 1.80 - 2.40 mg/dL 1.50 (L)      Lactic Acid, Sepsis Latest Ref Range: 0.4 - 1.9 mmol/L  0.7     Glucose Latest Ref Range: 70 - 99 mg/dL 107 (H) 112 (H)     Calcium Latest Ref Range: 8.3 - 10.6 mg/dL 7.4 (L) 7.4 (L)     Phosphorus Latest Ref Range: 2.5 - 4.9 mg/dL 1.2 (L)      Total Protein Latest Ref Range: 6.4 - 8.2 g/dL  5.6 (L)     Procalcitonin Latest Ref Range: 0.00 - 0.15 ng/mL    1.82 (H)   LD Latest Ref Range: 100 - 190 U/L  285 (H)       Results for Shawna Rowland (MRN [id number]) as of 8/8/2020 15:51   Ref.  Range 8/7/2020 16:05 8/8/2020 00:25 8/8/2020 06:17 8/8/2020 11:47   WBC Latest Ref Range: 4.0 - 11.0 K/uL 25.0 (H)  20.9 (H) 19.9 (H)   RBC Latest Ref Range: 4.00 - 5.20 M/uL 1.51 (L)  2.30 (L) 2.31 (L)   Hemoglobin Quant Latest Ref Range: 12.0 - 16.0 g/dL 4.8 (LL) 7.9 (L) 6.9 (LL) 6.8 (LL)   Hematocrit Latest Ref Range: 36.0 - 48.0 % 14.8 (LL) 24.2 (L) 20.7 (LL) 20.8 (LL)   MCV Latest Ref Range: 80.0 - 100.0 fL 98.0  90.1 89.9   MCH Latest Ref Range: 26.0 - 34.0 pg 32.1  29.9 29.4   MCHC Latest Ref Range: 31.0 - 36.0 g/dL 32.8  33.1 32.7   MPV Latest Ref Range: 5.0 - 10.5 fL 7.9  7.5 7. 9   RDW Latest Ref Range: 12.4 - 15.4 % 20.8 (H)  23.3 (H) 22.9 (H)   Platelet Count Latest Ref Range: 135 - 450 K/uL 241  248 236   Neutrophils % Latest Units: % 35.0   53.0   Lymphocyte % Latest Units: % 30.0   39.0   Monocytes % Latest Units: % 1.0   0.0     Results for Izabela Fletcher (MRN [id number]) as of 8/8/2020 15:51   Ref. Range 5/8/2020 08:16 8/8/2020 11:47   Prothrombin Time Latest Ref Range: 10.0 - 13.2 sec 11.3 14.5 (H)   INR Latest Ref Range: 0.86 - 1.14  0.97 1.25 (H)   Fibrinogen Latest Ref Range: 200 - 397 mg/dL  401 (H)   aPTT Latest Ref Range: 24.2 - 36.2 sec  37.3 (H)   D-Dimer, Quant Latest Ref Range: 0 - 229 ng/mL DDU  1208 (H)     Results for Izabela Fletcher (MRN [id number]) as of 8/8/2020 15:51   Ref. Range 8/8/2020 15:29   Color, UA Latest Ref Range: Straw/Yellow  Yellow   Clarity, UA Latest Ref Range: Clear  Clear   Glucose, UA Latest Ref Range: Negative mg/dL Negative   Bilirubin, Urine Latest Ref Range: Negative  Negative   Ketones, Urine Latest Ref Range: Negative mg/dL Negative   Specific Gravity, UA Latest Ref Range: 1.005 - 1.030  1.020   Blood, Urine Latest Ref Range: Negative  TRACE-INTACT (A)   pH, UA Latest Ref Range: 5.0 - 8.0  6.0   Protein, UA Latest Ref Range: Negative mg/dL 30 (A)   Urobilinogen, Urine Latest Ref Range: <2.0 E.U./dL 0.2   Nitrite, Urine Latest Ref Range: Negative  Negative   Leukocyte Esterase, Urine Latest Ref Range: Negative  Negative   Urine Type Unknown NotGiven   Urine Reflex to Culture Unknown Not Indicated   WBC, UA Latest Ref Range: 0 - 5 /HPF 0-2   RBC, UA Latest Ref Range: 0 - 4 /HPF 3-4   Epithelial Cells, UA Latest Ref Range: 0 - 5 /HPF 6-10 (A)   Bacteria, UA Latest Ref Range: None Seen /HPF 2+ (A)   Microscopic Examination Unknown YES   URINE RT REFLEX TO CULTURE Unknown Rpt (A)     COVID 19 (+)      CT abdomen/pelvis in May 2020-  1. Numerous (approximately 9) liver masses compatible with metastatic disease.     2. Abnormal lymph nodes in the upper abdomen and left cardiophrenic region    most suspicious for metastatic disease.  PET/CT can be considered. 3. Left adrenal nodule, suspicious for metastatic disease as no remote    studies are available for comparison.  PET/CT can be considered. 4. Partial visualization of a loculated left pleural effusion. ONE XRAY VIEW OF THE CHEST         8/7/2020 4:17 pm         COMPARISON:    Prior study(s) most recent 05/06/2020.         HISTORY:    ORDERING SYSTEM PROVIDED HISTORY: cough, COVID+    TECHNOLOGIST PROVIDED HISTORY:    Reason for exam:->cough, COVID+    Reason for Exam: cough, COVID+    Acuity: Acute    Type of Exam: Initial         FINDINGS:    The heart is within normal limits size.  There is airspace disease in the    lower lungs bilaterally, greater on the right.  Patchy density is seen in the    periphery of the mid upper lungs more likely interstitial disease. Indwelling right port catheter has been placed, tunneled right IJ approach. The tip extends to the lower 3rd of the SVC.              Impression    Bibasilar airspace disease greater right lower lobe compatible with    pneumonia. Liss Jack is given history of COVID-19 positivity. A.  Lymph Node, Left Hilar Transbronchial Needle Aspiration:         -   Positive for malignancy; metastatic small cell neuroendocrine   carcinoma - see comment. B.  Lymph Node, Subcarinal Transbronchial Needle Aspiration:          -  Positive for malignancy; metastatic small cell neuroendocrine   carcinoma - see comment. C.  Lymph Node, AP Window Transbronchial Needle Aspiration:          -  Positive for malignancy; metastatic small cell neuroendocrine   carcinoma - see comment. D.  Lung, Bronchial Washing:         -  Negative for malignancy.       -  Acute inflammation present.      COMMENT:   Each of the submitted lymph nodes specimens (parts A-C) shows   lymphoid tissue involved by a poorly-differentiated neoplasm with distinctive morphologic features.  The neoplasm is composed of sheets of   cells with high nucleus-to-cytoplasm ratio (scant to absent cytoplasm),   stippled nuclear chromatin, mottled nuclei and numerous mitoses and   apoptotic bodies.  Immunohistochemical staining was performed on block C1   to further evaluate this neoplasm.  The neoplastic cells show positive   expression for Synaptophysin and pancytokeratin AE1/AE3.  The cells show   a markedly elevated proliferative index by Ki-67 staining.  The cells   show no expression for Chromogranin.  The immunohistochemical profile   supports the histologic impression of a small cell neuroendocrine   carcinoma    Echocardiogram: None in Epic   PFT: None in Epic         Assessment:  Principal Problem:    Anemia  Active Problems:    N&V (nausea and vomiting)    Leukocytosis    Hilar adenopathy    Centrilobular emphysema (HCC)    Sepsis (HCC)    Lung cancer (HCC)    Hyperlipidemia    2019 novel coronavirus disease (COVID-19)    Hypokalemia    Pulmonary infiltrates    Small cell lung cancer (HCC)    Hepatic metastasis (HCC)    Acute blood loss anemia    Elevated procalcitonin    Hyponatremia    Former smoker  Resolved Problems:    * No resolved hospital problems.  *          Plan:   Oxygen supplementation to keep saturation more than 92%  Pulmonary toilet  Patient has been started on cefepime along with vancomycin after getting a dose of Zosyn in the ER which is been continued by the ID specialist  Monitor for any worsening respiratory compromise or hypoxemia  Patient is on low-dose of dexamethasone which can be continued  Patient's hemodynamics needs to monitor closely  Trend the inflammatory markers  No need for any Remdesivir /convalescent plasma as per ID  If respiratory status worsened-to be reconsidered in terms of these therapies  Patient has hyponatremia which can be secondary to the 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

## 2020-08-08 NOTE — PROGRESS NOTES
4 Eyes Skin Assessment     The patient is being assess for  Admission    I agree that 2 RN's have performed a thorough Head to Toe Skin Assessment on the patient. ALL assessment sites listed below have been assessed. Areas assessed by both nurses:   [x]   Head, Face, and Ears   [x]   Shoulders, Back, and Chest  [x]   Arms, Elbows, and Hands   [x]   Coccyx, Sacrum, and IschIum  [x]   Legs, Feet, and Heels        Does the Patient have Skin Breakdown?   No         Ramone Prevention initiated:  Yes   Wound Care Orders initiated:  NA      [facility] nurse consulted for Pressure Injury (Stage 3,4, Unstageable, DTI, NWPT, and Complex wounds), New and Established Ostomies:  NA      Nurse 1 eSignature: Electronically signed by Robin Goldberg RN on 8/8/20 at 2:34 AM EDT    **SHARE this note so that the co-signing nurse is able to place an eSignature**    Nurse 2 eSignature: Electronically signed by Karina Carbajal RN on 8/8/20 at 6:33 AM EDT

## 2020-08-08 NOTE — PROGRESS NOTES
Fei Reilly NP notified of result:     Results for Jaylyn Delgado (MRN [id number]) as of 8/8/2020 12:51   Ref. Range 8/8/2020 11:47   Hemoglobin Quant Latest Ref Range: 12.0 - 16.0 g/dL 6.8 (LL)   Hematocrit Latest Ref Range: 36.0 - 48.0 % 20.8 (LL)   '    Awaiting response.

## 2020-08-08 NOTE — ACP (ADVANCE CARE PLANNING)
Advance Care Planning   The patient has the following advanced directives on file:  Advanced Directives     Power of [employer] Will    Not on File Not on File          The patient has appointed the following active healthcare agents: The Patient has the following current code status:    Code Status: Full Code    Visit Documentation:  I discussed Advance Care Planning with [address] today which included the importance of making their choices for care and treatment in the case of a health event that adversely affects their decision-making abilities. She has not completed the Advance Care Directives. She does not have an active health care agent at this time. Cha Myers was encouraged to complete the declaration forms and provide a signed copy of her medical records. She was referred to spiritual to assist with completion. I advised patient we would continue this discussion at future visits. 1. Goals of medical treatment were reviewed . 2. Patient's stated goal/treatment preference is to remain a full code at this time. She will discuss this further with her daughter. 3. Others present during the discussion- Dr Alex Guzman. 4. The discussion lasted 20 minutes. 5. No change in plan of care.      DEVORAH Pal - SLOANE  8/8/2020

## 2020-08-08 NOTE — PROGRESS NOTES
MD at [address] [facility]  5/11/2020    BRONCHOSCOPY DIAGNOSTIC OR CELL [address] ONLY performed by Narendra Kay MD at [facility] N/A 5/13/2020    RIGHT SUBCLAVIAN VEIN PORT A CATH INSERTION performed by Tina Matias MD at [address]      mid chest       Level of Consciousness: Alert, Oriented, and Cooperative = 0    Level of Activity: Walking unassisted = 0    Respiratory Pattern: Regular Pattern; RR 8-20 = 0    Breath Sounds: Clear = 0    Sputum   ,  ,    Cough: Strong, spontaneous, non-productive = 0    Vital Signs   /61   Pulse 87   Temp 98.8 °F (37.1 °C) (Oral)   Resp 17   Ht 5' 3.5\" (1.613 m)   Wt 135 lb (61.2 kg)   SpO2 97%   BMI 23.54 kg/m²   SPO2 (COPD values may differ): Greater than or equal to 92% on room air = 0    Peak Flow (asthma only): not applicable = 0    RSI: 0-4 = See once and convert to home regimen or discontinue        Plan       Goals: medication delivery, mobilize retained secretions, volume expansion and improve oxygenation    Patient/caregiver was educated on the proper method of use for Respiratory Care Devices:  Yes      Level of patient/caregiver understanding able to:   ? Verbalize understanding   ? Demonstrate understanding       ? Teach back        ? Needs reinforcement       ? No available caregiver               ? Other:     Response to education:  [address]     Is patient being placed on Home Treatment Regimen? Yes     Does the patient have everything they need prior to discharge? NA     Comments: charts reviewed on admission for resp eval at this time    Plan of Care: Albuterol inh Q6 PRN     Electronically signed by Rosalina Healy RCP on 8/7/2020 at 8:49 PM    Respiratory Protocol Guidelines     1. Assessment and treatment by Respiratory Therapy will be initiated for medication and therapeutic interventions upon initiation of aerosolized medication.   2. Physician will be contacted for respiratory or secretions have lessened to the point that the patient is able to clear them with a cough. Anti-inflammatory and Combination Medications:    1. If the patient lacks prior history of lung disease, is not using inhaled anti-inflammatory medication at home, and lacks wheezing by examination or by history for at least 24 hours, contact physician for possible discontinuation.

## 2020-08-08 NOTE — CONSULTS
Pharmacy to Dose Vancomycin    Dx: Sepsis  Goal trough = 15-20 mcg/mL  Pt wt = 61.2 kg  Estimated Creatinine Clearance: 46 mL/min (based on SCr of 0.9 mg/dL). Vancomycin 1250 mg IVPB x 1 in ED at 1928 today. Start Vancomycin 1000 mg IVPB q24h tomorrow. Vancomycin trough prior to 3rd dose (8/9 at 1900). Arianna Denny, PharmD 08/07/20 9:24 PM      Vanc trough = 5.7 at 1908. Increase dose to Vancomycin 1gram IVPB q18h. Trough ordered fro 0800 8/11 dose  [facility]  8/9/2020 at 7:44 PM    Day 4  Estimated Creatinine Clearance: 66 mL/min (based on SCr of 0.7 mg/dL). WBC 27.3  Vancomycin 750mg IVPB q12h  Vancomycin trough 8/11 at 2330. Trini Mckeon.  Eugene DengD, [facility]   8/10/2020 11:10 AM

## 2020-08-08 NOTE — H&P
CANCER EXCISION      mid chest       Medications Prior to Admission:      Prior to Admission medications    Medication Sig Start Date End Date Taking? Authorizing Provider   potassium chloride (KLOR-CON M) 20 MEQ extended release tablet Take 1 tablet by mouth 2 times daily 5/18/20  Yes Eugenio Prince MD   sodium chloride 1 g tablet Take 2 tablets by mouth 3 times daily (with meals) 5/18/20  Yes Eugenio Prince MD   albuterol sulfate HFA (PROAIR HFA) 108 (90 Base) MCG/ACT inhaler Inhale 2 puffs into the lungs every 4 hours as needed for Wheezing or Shortness of Breath 5/18/20  Yes DEVORAH Alexandre CNP   ondansetron (ZOFRAN ODT) 4 MG disintegrating tablet Take 2 tablets by mouth every 8 hours as needed for Nausea or Vomiting 5/18/20  Yes DEVORAH Alexandre CNP   promethazine (PHENERGAN) 12.5 MG tablet Take 1 tablet by mouth every 6 hours as needed for Nausea 5/18/20  Yes DEVORAH Alexandre CNP   atorvastatin (LIPITOR) 40 MG tablet Take 1 tablet by mouth daily 5/18/20  Yes DEVORAH Alexandre CNP   furosemide (LASIX) 20 MG tablet Take 1 tablet by mouth 2 times daily 5/18/20  Yes DEVORAH Alexandre CNP   allopurinol (ZYLOPRIM) 300 MG tablet Take 1 tablet by mouth daily 5/18/20  Yes DEVORAH Alexandre CNP       Allergies:  Patient has no known allergies. Social History:      The patient currently lives at home with her daughter    TOBACCO:   reports that she quit smoking about 3 months ago. She has never used smokeless tobacco.  ETOH:   has no history on file for alcohol. Family History:       Reviewed in detail and negative for DM, CAD, Cancer, CVA. No family history on file. REVIEW OF SYSTEMS:   Pertinent positives as noted in the HPI. All other systems reviewed and negative.     PHYSICAL EXAM PERFORMED:    BP (!) 118/56   Pulse 99   Temp 98 °F (36.7 °C) (Oral)   Resp 20   Ht 5' 3.5\" (1.613 m)   Wt 145 lb 11.6 oz (66.1 kg)   SpO2 94%   BMI 25.41 kg/m²     General appearance:  Ill appearing female lying in bed, No apparent distress, appears stated age and cooperative. HEENT:  Normal cephalic, atraumatic without obvious deformity. Pupils equal, round, and reactive to light. Extra ocular muscles intact. Conjunctivae/corneas clear. Neck: Supple, with full range of motion. No jugular venous distention. Trachea midline. Respiratory:  Normal respiratory effort. Diffuse rales, DM to auscultation. Cardiovascular:  Regular rate and rhythm with normal S1/S2 without murmurs, rubs or gallops. Abdomen: Soft, non-tender, non-distended with normal bowel sounds. Musculoskeletal:  No clubbing, cyanosis or edema bilaterally. Full range of motion without deformity. Skin: Pallor,  texture, turgor normal.  No rashes or lesions. Neurologic:  Neurovascularly intact without any focal sensory/motor deficits. Cranial nerves: II-XII intact, grossly non-focal.  Psychiatric:  Alert and oriented, thought content appropriate, normal insight  Capillary Refill: Brisk,< 3 seconds   Peripheral Pulses: +2 palpable, equal bilaterally       Labs:     Recent Labs     08/07/20  1605 08/08/20  0025 08/08/20  0617   WBC 25.0*  --  20.9*   HGB 4.8* 7.9* 6.9*   HCT 14.8* 24.2* 20.7*     --  248     Recent Labs     08/07/20  1605 08/08/20  0617   * 135*   K 2.9* 4.3   CL 92* 103   CO2 25 23   BUN 9 7   CREATININE 0.9 0.8   CALCIUM 7.5* 7.4*   PHOS  --  1.2*     Recent Labs     08/07/20  1605   AST 16   ALT 12   BILITOT 0.4   ALKPHOS 95     No results for input(s): INR in the last 72 hours.   Recent Labs     08/07/20  1605   TROPONINI <0.01       Urinalysis:      Lab Results   Component Value Date    NITRU Negative 05/04/2020    WBCUA 6-10 05/04/2020    BACTERIA 2+ 05/04/2020    RBCUA 11-20 05/04/2020    BLOODU MODERATE 05/04/2020    SPECGRAV 1.015 05/04/2020    GLUCOSEU Negative 05/04/2020       Radiology:     XR CHEST PORTABLE   Final Result   Bibasilar airspace disease greater

## 2020-08-08 NOTE — CONSULTS
Infectious Diseases Inpatient Consult Note      Reason for Consult:  COVID-19 INFECTION     Requesting Physician:  Dr. Jakub Melchor    Primary Care Physician:  Jh Pinto    History Obtained From:  Medical records     CHIEF COMPLAINT:     Chief Complaint   Patient presents with    Nausea     patient being txt for cancer. last txt Monday, Tuesday, Wednesday last week. she reports she recently tested positive for COVID last Thursday. nausea and vomiting started yesterday     Emesis        HISTORY OF PRESENT ILLNESS:  77 y. o. woman with Lung cancer on going chemo not feeling well had cough, fevers,at home was tested for COVID-19 as out pt was told it was positive and presented to hospital for evaluation She had episodes of vomiting and cough. Fevers 100.9 on admit and she is on 2 lts nasal cannula. Blood cx sent. CXR with bi basal PNA Rt lower lobe greater than Left lower lobe. She has chest port in place. WBC elevated 25 on admit with Bandemia. Hb low at 6.8 . CT chest from May 2020 indicate metastatic cancer.   She did have elevated WBC chronic from May 2020 likely driven by her cancer B cell Lymphoma+  per Bone marrow exam May 2020        Past Medical History:    Past Medical History:   Diagnosis Date    Cancer ([address]     Hyperlipidemia     Lung cancer ([address]        Past Surgical History:    Past Surgical History:   Procedure Laterality Date    BREAST SURGERY      fibroid tumors removed bilateral breast    BRONCHOSCOPY N/A 5/11/2020    BRONCHOSCOPY ENDOBRONCHIAL ULTRASOUND with ANESTHESIA performed by Kavitha Rome MD at [address] [place] [address]  5/11/2020    BRONCHOSCOPY/TRANSBRONCHIAL NEEDLE BIOPSY performed by Kavitha Rome MD at [address] [place] [address]  5/11/2020    BRONCHOSCOPY/TRANSBRONCHIAL NEEDLE BIOPSY ADDL LOBE performed by Kavitha Rome MD at [address] [place] [address]  5/11/2020    BRONCHOSCOPY DIAGNOSTIC OR CELL [address] performed by Kavitha Rome MD at sore throat, dysphagia. Denies cough / sputum/Sob   Denies chest pain, palpitations/ dizziness  Denies nausea/ vomiting/abdominal pain/diarrhea. Denies dysuria or change in urinary function. Denies joint swelling or pain. No myalgia, arthralgia. No rashes, skin lesions   Denies focal weakness, sensory change or other neurologic symptoms  No lymph node swelling or tenderness. Fevers, cough    PHYSICAL EXAM:      Vitals:    /66   Pulse 91   Temp 98 °F (36.7 °C) (Oral)   Resp 18   Ht 5' 3.5\" (1.613 m)   Wt 145 lb 11.6 oz (66.1 kg)   SpO2 92%   BMI 25.41 kg/m²     PHYSICAL EXAM:     In-person bedside physical examination deferred. Pursuant to the emergency declaration under the [facility], [id number] waiver authority and the [employer] Act, this clinical encounter was conducted to provide necessary medical care. (Also consistent with new provisions and guidance offered by [facility] on March 18, 2020 in setting of COVID 19 outbreak and in order to preserve personal protective equipment in accordance with the flexibilities announced by CMS on March 30, 2020)   References: [email or web address]://[email or web address]/Portals/0/Resources/COVID-19/3_18%20Telemed%20Guidance%20Updated%20March%2018. pdf?[email or web address]                      [email or web address]://[email or web address]/Portals/0/Resources/COVID-19/3_18%20Telemed%20Guidance%20Updated%20March%2018. pdf?[email or web address]                      [email or web address] pdf                              Cardiovascular: Telemetry data reviewed, rest deferred   Pulmonary: deferred  Abdomen/GI: deferred  Neuro: deferred  Skin: deferred  Musculoskeletal:  deferred  Genitourinary: Deferred  Psych: deferred  Lymphatic/Immunologic: deferred             DATA:    Lab Results   Component Value Date    WBC 19.9 (H) 08/08/2020    HGB 6.8 (LL) 08/08/2020    HCT 20.8 (LL) 08/08/2020    MCV 89.9 08/08/2020     08/08/2020     Lab Results   Component Value Date    CREATININE 0.8 08/08/2020    BUN 8 08/08/2020     (L) 08/08/2020    K 4.0 08/08/2020    CL 97 (L) 08/08/2020    CO2 21 08/08/2020       Hepatic Function Panel:   Lab Results   Component Value Date    ALKPHOS 89 08/08/2020    ALT 11 08/08/2020    AST 23 08/08/2020    PROT 5.6 08/08/2020    BILITOT 0.5 08/08/2020    LABALBU 3.0 08/08/2020     UA:  Lab Results   Component Value Date    COLORU Yellow 05/04/2020    CLARITYU Clear 05/04/2020    GLUCOSEU Negative 05/04/2020    BILIRUBINUR Negative 05/04/2020    KETUA 15 05/04/2020    SPECGRAV 1.015 05/04/2020    BLOODU MODERATE 05/04/2020    PHUR 6.0 05/04/2020    PROTEINU 30 05/04/2020    UROBILINOGEN 0.2 05/04/2020    NITRU Negative 05/04/2020    LEUKOCYTESUR Negative 05/04/2020    LABMICR YES 05/04/2020    URINETYPE NotGiven 05/04/2020      Urine Microscopic:   Lab Results   Component Value Date    BACTERIA 2+ 05/04/2020    HYALCAST 6-10 05/04/2020    WBCUA 6-10 05/04/2020    RBCUA 11-20 05/04/2020    EPIU 11-20 05/04/2020         Scheduled Meds:   sodium chloride flush  10 mL Intravenous 2 times per day    magnesium sulfate  2 g Intravenous Once    enoxaparin  30 mg Subcutaneous BID    sodium chloride  20 mL Intravenous Once    cefepime  2 g Intravenous Q12H    allopurinol  300 mg Oral Daily    atorvastatin  40 mg Oral Daily    potassium chloride  20 mEq Oral BID    sodium chloride  2 g Oral TID WC    sodium chloride flush  10 mL Intravenous 2 times per day    vancomycin  1,000 mg Intravenous Q24H       Continuous Infusions:      PRN Meds:  sodium chloride flush, acetaminophen **OR** acetaminophen, albuterol, sodium chloride flush, polyethylene glycol, promethazine **OR** ondansetron    MICRO: cultures reviewed and updated by me       Urine Culture  No results for input(s): LABURIN in the last 72 hours.   Imaging:   XR CHEST

## 2020-08-09 LAB
A/G RATIO: 1.1 (ref 1.1–2.2)
ALBUMIN SERPL-MCNC: 3.1 G/DL (ref 3.4–5)
ALP BLD-CCNC: 91 U/L (ref 40–129)
ALT SERPL-CCNC: 15 U/L (ref 10–40)
ANION GAP SERPL CALCULATED.3IONS-SCNC: 8 MMOL/L (ref 3–16)
ANISOCYTOSIS: ABNORMAL
AST SERPL-CCNC: 24 U/L (ref 15–37)
BANDED NEUTROPHILS RELATIVE PERCENT: 15 % (ref 0–7)
BASOPHILS ABSOLUTE: 0 K/UL (ref 0–0.2)
BASOPHILS RELATIVE PERCENT: 0 %
BILIRUB SERPL-MCNC: 0.5 MG/DL (ref 0–1)
BUN BLDV-MCNC: 9 MG/DL (ref 7–20)
BURR CELLS: ABNORMAL
C-REACTIVE PROTEIN: 213.6 MG/L (ref 0–5.1)
CALCIUM SERPL-MCNC: 8.3 MG/DL (ref 8.3–10.6)
CHLORIDE BLD-SCNC: 98 MMOL/L (ref 99–110)
CO2: 21 MMOL/L (ref 21–32)
CREAT SERPL-MCNC: 0.7 MG/DL (ref 0.6–1.2)
D DIMER: 2255 NG/ML DDU (ref 0–229)
DAT POLYSPECIFIC: NORMAL
EOSINOPHILS ABSOLUTE: 0 K/UL (ref 0–0.6)
EOSINOPHILS RELATIVE PERCENT: 0 %
FERRITIN: 1232 NG/ML (ref 15–150)
GFR AFRICAN AMERICAN: >60
GFR NON-AFRICAN AMERICAN: >60
GLOBULIN: 2.7 G/DL
GLUCOSE BLD-MCNC: 124 MG/DL (ref 70–99)
HCT VFR BLD CALC: 29.6 % (ref 36–48)
HEMATOLOGY PATH CONSULT: NO
HEMOGLOBIN: 10 G/DL (ref 12–16)
L. PNEUMOPHILA SEROGP 1 UR AG: NORMAL
LACTATE DEHYDROGENASE: 375 U/L (ref 100–190)
LYMPHOCYTES ABSOLUTE: 3.9 K/UL (ref 1–5.1)
LYMPHOCYTES RELATIVE PERCENT: 19 %
MCH RBC QN AUTO: 29.7 PG (ref 26–34)
MCHC RBC AUTO-ENTMCNC: 33.7 G/DL (ref 31–36)
MCV RBC AUTO: 88.2 FL (ref 80–100)
MONOCYTES ABSOLUTE: 0 K/UL (ref 0–1.3)
MONOCYTES RELATIVE PERCENT: 0 %
MRSA SCREEN RT-PCR: NORMAL
NEUTROPHILS ABSOLUTE: 16.8 K/UL (ref 1.7–7.7)
NEUTROPHILS RELATIVE PERCENT: 66 %
OCCULT BLOOD SCREENING: NORMAL
OVALOCYTES: ABNORMAL
PDW BLD-RTO: 18.6 % (ref 12.4–15.4)
PLATELET # BLD: 252 K/UL (ref 135–450)
PMV BLD AUTO: 8 FL (ref 5–10.5)
POTASSIUM REFLEX MAGNESIUM: 4 MMOL/L (ref 3.5–5.1)
RBC # BLD: 3.35 M/UL (ref 4–5.2)
SODIUM BLD-SCNC: 127 MMOL/L (ref 136–145)
STREP PNEUMONIAE ANTIGEN, URINE: NORMAL
TOTAL PROTEIN: 5.8 G/DL (ref 6.4–8.2)
VANCOMYCIN TROUGH: 5.7 UG/ML (ref 10–20)
WBC # BLD: 20.7 K/UL (ref 4–11)

## 2020-08-09 PROCEDURE — 6370000000 HC RX 637 (ALT 250 FOR IP): Performed by: INTERNAL MEDICINE

## 2020-08-09 PROCEDURE — 85379 FIBRIN DEGRADATION QUANT: CPT

## 2020-08-09 PROCEDURE — 6370000000 HC RX 637 (ALT 250 FOR IP): Performed by: NURSE PRACTITIONER

## 2020-08-09 PROCEDURE — 85025 COMPLETE CBC W/AUTO DIFF WBC: CPT

## 2020-08-09 PROCEDURE — 80053 COMPREHEN METABOLIC PANEL: CPT

## 2020-08-09 PROCEDURE — C9113 INJ PANTOPRAZOLE SODIUM, VIA: HCPCS | Performed by: INTERNAL MEDICINE

## 2020-08-09 PROCEDURE — 84238 ASSAY NONENDOCRINE RECEPTOR: CPT

## 2020-08-09 PROCEDURE — 83615 LACTATE (LD) (LDH) ENZYME: CPT

## 2020-08-09 PROCEDURE — 86880 COOMBS TEST DIRECT: CPT

## 2020-08-09 PROCEDURE — G0328 FECAL BLOOD SCRN IMMUNOASSAY: HCPCS

## 2020-08-09 PROCEDURE — 82728 ASSAY OF FERRITIN: CPT

## 2020-08-09 PROCEDURE — 36415 COLL VENOUS BLD VENIPUNCTURE: CPT

## 2020-08-09 PROCEDURE — 99233 SBSQ HOSP IP/OBS HIGH 50: CPT | Performed by: INTERNAL MEDICINE

## 2020-08-09 PROCEDURE — 6360000002 HC RX W HCPCS: Performed by: INTERNAL MEDICINE

## 2020-08-09 PROCEDURE — 6360000002 HC RX W HCPCS: Performed by: NURSE PRACTITIONER

## 2020-08-09 PROCEDURE — 94761 N-INVAS EAR/PLS OXIMETRY MLT: CPT

## 2020-08-09 PROCEDURE — 2580000003 HC RX 258: Performed by: NURSE PRACTITIONER

## 2020-08-09 PROCEDURE — 2700000000 HC OXYGEN THERAPY PER DAY

## 2020-08-09 PROCEDURE — 83010 ASSAY OF HAPTOGLOBIN QUANT: CPT

## 2020-08-09 PROCEDURE — 86140 C-REACTIVE PROTEIN: CPT

## 2020-08-09 PROCEDURE — 2580000003 HC RX 258: Performed by: INTERNAL MEDICINE

## 2020-08-09 PROCEDURE — 1200000000 HC SEMI PRIVATE

## 2020-08-09 PROCEDURE — 83550 IRON BINDING TEST: CPT

## 2020-08-09 PROCEDURE — 80202 ASSAY OF VANCOMYCIN: CPT

## 2020-08-09 PROCEDURE — 83540 ASSAY OF IRON: CPT

## 2020-08-09 RX ADMIN — DEXAMETHASONE SODIUM PHOSPHATE 6 MG: 10 INJECTION INTRAMUSCULAR; INTRAVENOUS at 13:04

## 2020-08-09 RX ADMIN — ACETAMINOPHEN 650 MG: 325 TABLET ORAL at 13:04

## 2020-08-09 RX ADMIN — ALLOPURINOL 300 MG: 300 TABLET ORAL at 09:07

## 2020-08-09 RX ADMIN — Medication 10 ML: at 09:08

## 2020-08-09 RX ADMIN — VANCOMYCIN HYDROCHLORIDE 1000 MG: 1 INJECTION, POWDER, LYOPHILIZED, FOR SOLUTION INTRAVENOUS at 20:24

## 2020-08-09 RX ADMIN — ACETAMINOPHEN 650 MG: 325 TABLET ORAL at 22:02

## 2020-08-09 RX ADMIN — ONDANSETRON 4 MG: 2 INJECTION INTRAMUSCULAR; INTRAVENOUS at 13:18

## 2020-08-09 RX ADMIN — ENOXAPARIN SODIUM 30 MG: 30 INJECTION SUBCUTANEOUS at 20:23

## 2020-08-09 RX ADMIN — POTASSIUM CHLORIDE 20 MEQ: 20 TABLET, EXTENDED RELEASE ORAL at 09:07

## 2020-08-09 RX ADMIN — POTASSIUM CHLORIDE 20 MEQ: 20 TABLET, EXTENDED RELEASE ORAL at 20:23

## 2020-08-09 RX ADMIN — Medication 2 G: at 13:00

## 2020-08-09 RX ADMIN — ATORVASTATIN CALCIUM 40 MG: 40 TABLET, FILM COATED ORAL at 09:07

## 2020-08-09 RX ADMIN — PANTOPRAZOLE SODIUM 40 MG: 40 INJECTION, POWDER, FOR SOLUTION INTRAVENOUS at 09:07

## 2020-08-09 RX ADMIN — CEFEPIME 2 G: 2 INJECTION, POWDER, FOR SOLUTION INTRAMUSCULAR; INTRAVENOUS at 22:02

## 2020-08-09 RX ADMIN — Medication 2 G: at 16:26

## 2020-08-09 RX ADMIN — Medication 10 ML: at 09:07

## 2020-08-09 RX ADMIN — Medication 2 G: at 09:07

## 2020-08-09 RX ADMIN — CEFEPIME 2 G: 2 INJECTION, POWDER, FOR SOLUTION INTRAMUSCULAR; INTRAVENOUS at 09:07

## 2020-08-09 ASSESSMENT — PAIN SCALES - GENERAL
PAINLEVEL_OUTOF10: 7
PAINLEVEL_OUTOF10: 0
PAINLEVEL_OUTOF10: 3
PAINLEVEL_OUTOF10: 0

## 2020-08-09 NOTE — PROGRESS NOTES
Pulmonary & Critical Care Medicine ICU Progress Note      61-year-old male with history of past IV smoking, COPD/emphysema, CLL, metastatic neuroendocrine lung cancer, COVID-19 infection, acute hypoxemic respiratory failure. Presented to the ER, was found to be severely anemic, received PRBC. The patient received chemotherapy last week, but she denies this, says her chemotherapy was 3 weeks ago. Reportedly the patient's daughter is also COVID positive, lives with her. Testing was done at an urgent care center, there is some question about it being a false positive test, further testing has been sent. Events of Last 24 hours: The patient says she feels terrible, but SaO2 is 95% on oxygen at 2 LPM, she had been off oxygen but this afternoon is back on 4 LPM O2. She has mild cough, nonproductive. She has mild shortness of breath. She denies chest pain, abdominal pain. She does have nausea. She has a poor appetite. Invasive Lines:   Port R subclavian        IV:    Vitals:  /76   Pulse 89   Temp 98.6 °F (37 °C) (Oral)   Resp 22   Ht 5' 3.5\" (1.613 m)   Wt 147 lb 14.9 oz (67.1 kg)   SpO2 92%   BMI 25.79 kg/m²         Intake/Output Summary (Last 24 hours) at 8/10/2020 2142  Last data filed at 8/10/2020 0930  Gross per 24 hour   Intake 540 ml   Output 350 ml   Net 190 ml       EXAM:  General: Averagely built, pale and ill-appearing. In no distress. Eyes: PERRL. No sclera icterus. No conjunctival injection. Wearing glasses  ENT: No discharge. Pharynx clear. Class II airway, no oral lesions  Neck: Trachea midline. Normal thyroid. No JVD or adenopathy  Resp: No accessory muscle use. No crackles. No wheezing. No rhonchi. Right chest wall port  CV: Regular rate. Regular rhythm. No mumur or rub. No edema. GI: Non-tender. Non-distended. No masses. No organmegaly. Skin: Warm and dry. No nodule on exposed extremities. No rash on exposed extremities. Lymph: No cervical LAD.  No supraclavicular LAD. M/S: No cyanosis. No joint deformity. No clubbing. Neuro: Awake. Follows commands. PERRL, no obvious focal deficit, detailed exam not performed  Psych: Oriented to person, place, time. No anxiety or agitation.      Medications:  Scheduled Meds:   vancomycin  750 mg Intravenous Q12H    iron sucrose (VENOFER) iv piggyback 100 mL (Admin over 60 minutes)  200 mg Intravenous Q24H    sodium chloride flush  10 mL Intravenous 2 times per day    enoxaparin  30 mg Subcutaneous BID    dexamethasone  6 mg Intravenous Q24H    pantoprazole  40 mg Intravenous Daily    sodium chloride  20 mL Intravenous Once    cefepime  2 g Intravenous Q12H    allopurinol  300 mg Oral Daily    atorvastatin  40 mg Oral Daily    potassium chloride  20 mEq Oral BID    sodium chloride  2 g Oral TID WC       PRN Meds:  sodium chloride flush, acetaminophen **OR** acetaminophen, albuterol, polyethylene glycol, promethazine **OR** ondansetron    Results:  CBC:   Recent Labs     08/08/20  1147 08/08/20  2250 08/09/20  0440 08/10/20  0533   WBC 19.9*  --  20.7* 27.3*   HGB 6.8* 9.7* 10.0* 12.0   HCT 20.8* 29.4* 29.6* 36.7   MCV 89.9  --  88.2 89.4     --  252 280     BMP:   Recent Labs     08/08/20  0617 08/08/20  1106 08/09/20  0440 08/10/20  0533   * 127* 127* 131*   K 4.3 4.0 4.0 4.3    97* 98* 97*   CO2 23 21 21 23   PHOS 1.2*  --   --   --    BUN 7 8 9 9   CREATININE 0.8 0.8 0.7 0.7     LIVER PROFILE:   Recent Labs     08/08/20  1106 08/09/20  0440 08/10/20  0533   AST 23 24 37   ALT 11 15 26   BILITOT 0.5 0.5 0.5   ALKPHOS 89 91 89     PT/INR:   Recent Labs     08/08/20  1147   PROTIME 14.5*   INR 1.25*     APTT:   Recent Labs     08/08/20  1147   APTT 37.3*     UA:  Recent Labs     08/08/20  1529   COLORU Yellow   PHUR 6.0   WBCUA 0-2   RBCUA 3-4   BACTERIA 2+*   CLARITYU Clear   SPECGRAV 1.020   LEUKOCYTESUR Negative   UROBILINOGEN 0.2   BILIRUBINUR Negative   BLOODU TRACE-INTACT*   GLUCOSEU

## 2020-08-09 NOTE — PROGRESS NOTES
08/08/20  0617 08/08/20  1106 08/09/20  0440   * 127* 127*   K 4.3 4.0 4.0    97* 98*   CO2 23 21 21   PHOS 1.2*  --   --    BUN 7 8 9   CREATININE 0.8 0.8 0.7     LIVER PROFILE:   Recent Labs     08/07/20  1605 08/08/20  1106 08/09/20  0440   AST 16 23 24   ALT 12 11 15   BILITOT 0.4 0.5 0.5   ALKPHOS 95 89 91     PT/INR:   Recent Labs     08/08/20  1147   PROTIME 14.5*   INR 1.25*     APTT:   Recent Labs     08/08/20  1147   APTT 37.3*     UA:  Recent Labs     08/08/20  1529   COLORU Yellow   PHUR 6.0   WBCUA 0-2   RBCUA 3-4   BACTERIA 2+*   CLARITYU Clear   SPECGRAV 1.020   LEUKOCYTESUR Negative   UROBILINOGEN 0.2   BILIRUBINUR Negative   BLOODU TRACE-INTACT*   GLUCOSEU Negative       Imaging:  I have reviewed radiology images personally. XR CHEST PORTABLE   Final Result   Bibasilar airspace disease greater right lower lobe compatible with   pneumonia. There is given history of COVID-19 positivity. Xr Chest Portable    Result Date: 8/7/2020  EXAMINATION: ONE XRAY VIEW OF THE CHEST 8/7/2020 4:17 pm COMPARISON: Prior study(s) most recent 05/06/2020. HISTORY: ORDERING SYSTEM PROVIDED HISTORY: cough, COVID+ TECHNOLOGIST PROVIDED HISTORY: Reason for exam:->cough, COVID+ Reason for Exam: cough, COVID+ Acuity: Acute Type of Exam: Initial FINDINGS: The heart is within normal limits size. There is airspace disease in the lower lungs bilaterally, greater on the right. Patchy density is seen in the periphery of the mid upper lungs more likely interstitial disease. Indwelling right port catheter has been placed, tunneled right IJ approach. The tip extends to the lower 3rd of the SVC. Bibasilar airspace disease greater right lower lobe compatible with pneumonia. There is given history of COVID-19 positivity. Results for Nohelia Yousif (MRN [id number]) as of 8/9/2020 11:38   Ref.  Range 8/7/2020 16:05 8/8/2020 06:17 8/8/2020 11:06 8/8/2020 13:58 8/9/2020 04:40   Sodium Latest Ref Range: Monocytes % Latest Units: %   0.0  0.0     Results for Colt Nuñez (MRN [id number]) as of 8/9/2020 11:38   Ref. Range 8/8/2020 11:47 8/9/2020 04:40   aPTT Latest Ref Range: 24.2 - 36.2 sec 37.3 (H)    D-Dimer, Quant Latest Ref Range: 0 - 229 ng/mL DDU 1208 (H) 2255 (H)       Assessment:  Principal Problem:    Anemia  Active Problems:    N&V (nausea and vomiting)    Leukocytosis    Hilar adenopathy    Centrilobular emphysema (HCC)    Sepsis (HCC)    Lung cancer (HCC)    Hyperlipidemia    2019 novel coronavirus disease (COVID-19)    Hypokalemia    Pulmonary infiltrates    Small cell lung cancer (HCC)    Hepatic metastasis (HCC)    Acute blood loss anemia    Elevated procalcitonin    Hyponatremia    Former smoker  Resolved Problems:    * No resolved hospital problems.  *          Plan:   · Oxygen supplementation to keep saturation more than 92%  · Pulmonary toilet  · Patient has been started on cefepime along with vancomycin after getting a dose of Zosyn in the ER which is been continued by the ID specialist-patient's WBC count is minimally worse as compared to yesterday   · Monitor for any worsening respiratory compromise or hypoxemia  · Patient is on low-dose of dexamethasone which can be continued  · Patient's hemodynamics needs to monitor closely  · Trend the inflammatory markers-patient's d-dimers are worsening   · No need for any Remdesivir /convalescent plasma as per ID  · If respiratory status worsened-to be reconsidered in terms of these therapies  · Patient has hyponatremia which can be secondary to the 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

## 2020-08-09 NOTE — PROGRESS NOTES
End Date Taking? Authorizing Provider   potassium chloride (KLOR-CON M) 20 MEQ extended release tablet Take 1 tablet by mouth 2 times daily 5/18/20  Yes Seth Gómez MD   sodium chloride 1 g tablet Take 2 tablets by mouth 3 times daily (with meals) 5/18/20  Yes Seth Gómez MD   albuterol sulfate HFA (PROAIR HFA) 108 (90 Base) MCG/ACT inhaler Inhale 2 puffs into the lungs every 4 hours as needed for Wheezing or Shortness of Breath 5/18/20  Yes DEVORAH Vickers CNP   ondansetron (ZOFRAN ODT) 4 MG disintegrating tablet Take 2 tablets by mouth every 8 hours as needed for Nausea or Vomiting 5/18/20  Yes DEVORAH Vickers CNP   promethazine (PHENERGAN) 12.5 MG tablet Take 1 tablet by mouth every 6 hours as needed for Nausea 5/18/20  Yes DEVORAH Vickers CNP   atorvastatin (LIPITOR) 40 MG tablet Take 1 tablet by mouth daily 5/18/20  Yes DEVORAH Vickers CNP   furosemide (LASIX) 20 MG tablet Take 1 tablet by mouth 2 times daily 5/18/20  Yes DEVORAH Vickers CNP   allopurinol (ZYLOPRIM) 300 MG tablet Take 1 tablet by mouth daily 5/18/20  Yes DEVORAH Vickers CNP       Allergies:  Patient has no known allergies. Social History:      The patient currently lives at home with her daughter    TOBACCO:   reports that she quit smoking about 3 months ago. She has never used smokeless tobacco.  ETOH:   has no history on file for alcohol. Family History:       Reviewed in detail and negative for DM, CAD, Cancer, CVA. No family history on file. REVIEW OF SYSTEMS:   Pertinent positives as noted in the HPI. All other systems reviewed and negative. PHYSICAL EXAM PERFORMED:    BP (!) 155/80   Pulse 95   Temp 97.9 °F (36.6 °C) (Oral)   Resp 18   Ht 5' 3.5\" (1.613 m)   Wt 140 lb 12.8 oz (63.9 kg)   SpO2 98%   BMI 24.55 kg/m²     General appearance:  Ill appearing female lying in bed, No apparent distress, appears stated age and cooperative.   HEENT: Normal cephalic, atraumatic without obvious deformity. Pupils equal, round, and reactive to light. Extra ocular muscles intact. Conjunctivae/corneas clear. Neck: Supple, with full range of motion. No jugular venous distention. Trachea midline. Respiratory:  Normal respiratory effort. Diffuse rales, DM to auscultation. Cardiovascular:  Regular rate and rhythm with normal S1/S2 without murmurs, rubs or gallops. Abdomen: Soft, non-tender, non-distended with normal bowel sounds. Musculoskeletal:  No clubbing, cyanosis or edema bilaterally. Full range of motion without deformity. Skin: Pallor,  texture, turgor normal.  No rashes or lesions. Neurologic:  Neurovascularly intact without any focal sensory/motor deficits.  Cranial nerves: II-XII intact, grossly non-focal.  Psychiatric:  Alert and oriented, thought content appropriate, normal insight  Capillary Refill: Brisk,< 3 seconds   Peripheral Pulses: +2 palpable, equal bilaterally       Labs:     Recent Labs     08/08/20  0617 08/08/20  1147 08/08/20  2250 08/09/20  0440   WBC 20.9* 19.9*  --  20.7*   HGB 6.9* 6.8* 9.7* 10.0*   HCT 20.7* 20.8* 29.4* 29.6*    236  --  252     Recent Labs     08/08/20  0617 08/08/20  1106 08/09/20  0440   * 127* 127*   K 4.3 4.0 4.0    97* 98*   CO2 23 21 21   BUN 7 8 9   CREATININE 0.8 0.8 0.7   CALCIUM 7.4* 7.4* 8.3   PHOS 1.2*  --   --      Recent Labs     08/07/20  1605 08/08/20  1106 08/09/20  0440   AST 16 23 24   ALT 12 11 15   BILITOT 0.4 0.5 0.5   ALKPHOS 95 89 91     Recent Labs     08/08/20  1147   INR 1.25*     Recent Labs     08/07/20  1605   TROPONINI <0.01       Urinalysis:      Lab Results   Component Value Date    NITRU Negative 08/08/2020    WBCUA 0-2 08/08/2020    BACTERIA 2+ 08/08/2020    RBCUA 3-4 08/08/2020    BLOODU TRACE-INTACT 08/08/2020    SPECGRAV 1.020 08/08/2020    GLUCOSEU Negative 08/08/2020       Radiology:     XR CHEST PORTABLE   Final Result   Bibasilar airspace disease greater right lower lobe compatible with   pneumonia. There is given history of COVID-19 positivity. ASSESSMENT:    Active Hospital Problems    Diagnosis Date Noted    Pulmonary infiltrates [R91.8] 08/08/2020    Small cell lung cancer ([facility] [C34.90] 08/08/2020    Hepatic metastasis (HCC) [C78.7] 08/08/2020    Acute blood loss anemia [D62] 08/08/2020    Elevated procalcitonin [R79.89] 08/08/2020    Hyponatremia [E87.1] 08/08/2020    Former smoker [Z87.891] 08/08/2020    Anemia [D64.9] 08/07/2020    Sepsis ([facility] [A41.9] 08/07/2020    Lung cancer ([facility] [address] [C34.90] 08/07/2020    Hyperlipidemia [E78.5] 08/07/2020    2019 novel coronavirus disease (COVID-19) [U07.1] 08/07/2020    Hypokalemia [E87.6] 08/07/2020    Hilar adenopathy [R59.0]     Centrilobular emphysema (HCC) [J43.2]     N&V (nausea and vomiting) [R11.2] 05/04/2020    Leukocytosis [D72.829] 05/04/2020         PLAN:    1. ABLA, likely chemo induced, improving. S/P 6 units PRBC at admission. H/H is now 10/29 . Continue serial h/h    2. COVID 19 infection  +from [facility]  Repeat pending. D dimer, Ferritin, LDH daily. Decadron, Lovenox PPX (given sig elevation of d dimer and increased r/f prothrombotic state)  ID following  Continue Cefepime/Vanc. 3. Neuroendocrine Ca with lung and liver mets, CLL  Heme/onc consulted   completed C4 carbo/VP16/tecentriq 7/20-7/22    4. Hypokalemia, resolving. Monitor and replete as needed     DVT Prophylaxis: Lovenox BID (pending FOBT)    Diet: DIET GENERAL;  Code Status: Full Code    PT/OT Eval Status: active and ongoing      Dispo - 2-3 days       DEVORAH Luna - CNP    Thank you Krystal Davis for the opportunity to be involved in this patient's care. If you have any questions or concerns please feel free to contact me at [phone].

## 2020-08-09 NOTE — CONSULTS
on file    Number of children: Not on file    Years of education: Not on file    Highest education level: Not on file   Occupational History    Not on file   Social Needs    Financial resource strain: Not on file    Food insecurity     Worry: Not on file     Inability: Not on file    Transportation needs     Medical: Not on file     Non-medical: Not on file   Tobacco Use    Smoking status: Former Smoker     Last attempt to quit: 2020     Years since quittin.2    Smokeless tobacco: Never Used   Substance and Sexual Activity    Alcohol use: Not on file    Drug use: Not on file    Sexual activity: Not on file   Lifestyle    Physical activity     Days per week: Not on file     Minutes per session: Not on file    Stress: Not on file   Relationships    Social connections     Talks on phone: Not on file     Gets together: Not on file     Attends Spiritism service: Not on file     Active member of club or organization: Not on file     Attends meetings of clubs or organizations: Not on file     Relationship status: Not on file    Intimate partner violence     Fear of current or ex partner: Not on file     Emotionally abused: Not on file     Physically abused: Not on file     Forced sexual activity: Not on file   Other Topics Concern    Not on file   Social History Narrative    Not on file       FAMILY HISTORY:     No family history on file. ALLERGIES:     Allergies as of 2020    (No Known Allergies)       MEDICATIONS:     No current facility-administered medications on file prior to encounter.       Current Outpatient Medications on File Prior to Encounter   Medication Sig Dispense Refill    potassium chloride (KLOR-CON M) 20 MEQ extended release tablet Take 1 tablet by mouth 2 times daily 60 tablet 0    sodium chloride 1 g tablet Take 2 tablets by mouth 3 times daily (with meals) 90 tablet 3    albuterol sulfate HFA (PROAIR HFA) 108 (90 Base) MCG/ACT inhaler Inhale 2 puffs into the lungs every 4 hours as needed for Wheezing or Shortness of Breath 1 Inhaler 3    ondansetron (ZOFRAN ODT) 4 MG disintegrating tablet Take 2 tablets by mouth every 8 hours as needed for Nausea or Vomiting 20 tablet 5    promethazine (PHENERGAN) 12.5 MG tablet Take 1 tablet by mouth every 6 hours as needed for Nausea 25 tablet 5    atorvastatin (LIPITOR) 40 MG tablet Take 1 tablet by mouth daily 30 tablet 3    furosemide (LASIX) 20 MG tablet Take 1 tablet by mouth 2 times daily 60 tablet 0    allopurinol (ZYLOPRIM) 300 MG tablet Take 1 tablet by mouth daily 30 tablet 3     REVIEW OF SYSTEMS:       10 point ROS completed. Pertinent positives in HPI, otherwise negative. PHYSICAL EXAM:       Vitals:    08/09/20 0901   BP: (!) 145/72   Pulse: 95   Resp: 18   Temp: 98 °F (36.7 °C)   SpO2:      Not directly examined due to COVID-19 isolation. LABS:     Lab Results   Component Value Date    WBC 20.7 (H) 08/09/2020    HGB 10.0 (L) 08/09/2020    HCT 29.6 (L) 08/09/2020    MCV 88.2 08/09/2020     08/09/2020       Lab Results   Component Value Date    GLUCOSE 124 (H) 08/09/2020    BUN 9 08/09/2020    CREATININE 0.7 08/09/2020    K 4.0 08/09/2020    PHOS 1.2 (L) 08/08/2020       Lab Results   Component Value Date    ALKPHOS 91 08/09/2020    ALT 15 08/09/2020    AST 24 08/09/2020    BILITOT 0.5 08/09/2020    PROT 5.8 (L) 08/09/2020       IMAGING:     Xr Chest Portable  Result Date: 8/7/2020  Bibasilar airspace disease greater right lower lobe compatible with pneumonia. There is given history of COVID-19 positivity. ASSESSMENT:     Problem List Items Addressed This Visit     * (Principal) Anemia    Hypokalemia      Other Visit Diagnoses     COVID-19 virus infection    -  Primary    Septicemia ([facility] [address]                    PLAN:     1.  Severe anemia    - Hgb 4.8 on admission, now up to 10 post-transfusion  - likely multifactorial with recent chemotherapy and infection  - check iron studies and hemolysis panel  - transfuse for Hgb < 7    2. COVID-19 (+)  3. Pneumonia    - ID and pulmonary  following   - on cefepime and vancomycin  - ID considering Remdesevir    4. Extensive stage small cell lung cancer    - completed C4 carbo/VP16/tecentriq 7/20-7/22  - will be due for restaging scans and maintenance immunotherapy if showing response  - sees Dr. Valerie Nolan    5.  CLL    - has not required treatment     Yuly Curry MD

## 2020-08-10 LAB
A/G RATIO: 1.1 (ref 1.1–2.2)
ALBUMIN SERPL-MCNC: 3.2 G/DL (ref 3.4–5)
ALP BLD-CCNC: 89 U/L (ref 40–129)
ALT SERPL-CCNC: 26 U/L (ref 10–40)
ANION GAP SERPL CALCULATED.3IONS-SCNC: 11 MMOL/L (ref 3–16)
ANISOCYTOSIS: ABNORMAL
AST SERPL-CCNC: 37 U/L (ref 15–37)
ATYPICAL LYMPHOCYTE RELATIVE PERCENT: 1 % (ref 0–6)
BANDED NEUTROPHILS RELATIVE PERCENT: 29 % (ref 0–7)
BASOPHILS ABSOLUTE: 0 K/UL (ref 0–0.2)
BASOPHILS RELATIVE PERCENT: 0 %
BILIRUB SERPL-MCNC: 0.5 MG/DL (ref 0–1)
BUN BLDV-MCNC: 9 MG/DL (ref 7–20)
BURR CELLS: ABNORMAL
C-REACTIVE PROTEIN: 128 MG/L (ref 0–5.1)
CALCIUM SERPL-MCNC: 8.5 MG/DL (ref 8.3–10.6)
CHLORIDE BLD-SCNC: 97 MMOL/L (ref 99–110)
CO2: 23 MMOL/L (ref 21–32)
CREAT SERPL-MCNC: 0.7 MG/DL (ref 0.6–1.2)
D DIMER: 1384 NG/ML DDU (ref 0–229)
EOSINOPHILS ABSOLUTE: 0 K/UL (ref 0–0.6)
EOSINOPHILS RELATIVE PERCENT: 0 %
FERRITIN: 1402 NG/ML (ref 15–150)
FERRITIN: 1624 NG/ML (ref 15–150)
GFR AFRICAN AMERICAN: >60
GFR NON-AFRICAN AMERICAN: >60
GLOBULIN: 2.9 G/DL
GLUCOSE BLD-MCNC: 91 MG/DL (ref 70–99)
HAPTOGLOBIN: 393 MG/DL (ref 30–200)
HCT VFR BLD CALC: 36.7 % (ref 36–48)
HEMATOLOGY PATH CONSULT: NO
HEMOGLOBIN: 12 G/DL (ref 12–16)
HYPOCHROMIA: ABNORMAL
IRON SATURATION: 10 % (ref 15–50)
IRON: 18 UG/DL (ref 37–145)
LYMPHOCYTES ABSOLUTE: 11.5 K/UL (ref 1–5.1)
LYMPHOCYTES RELATIVE PERCENT: 41 %
MACROCYTES: ABNORMAL
MCH RBC QN AUTO: 29.1 PG (ref 26–34)
MCHC RBC AUTO-ENTMCNC: 32.5 G/DL (ref 31–36)
MCV RBC AUTO: 89.4 FL (ref 80–100)
MONOCYTES ABSOLUTE: 0 K/UL (ref 0–1.3)
MONOCYTES RELATIVE PERCENT: 0 %
NEUTROPHILS ABSOLUTE: 15.8 K/UL (ref 1.7–7.7)
NEUTROPHILS RELATIVE PERCENT: 29 %
OVALOCYTES: ABNORMAL
PDW BLD-RTO: 19.6 % (ref 12.4–15.4)
PLATELET # BLD: 280 K/UL (ref 135–450)
PLATELET SLIDE REVIEW: ADEQUATE
PMV BLD AUTO: 7.8 FL (ref 5–10.5)
POIKILOCYTES: ABNORMAL
POLYCHROMASIA: ABNORMAL
POTASSIUM REFLEX MAGNESIUM: 4.3 MMOL/L (ref 3.5–5.1)
RBC # BLD: 4.11 M/UL (ref 4–5.2)
SLIDE REVIEW: ABNORMAL
SODIUM BLD-SCNC: 131 MMOL/L (ref 136–145)
SOLUBLE TRANSFERRIN RECEPT: 5.6 MG/L (ref 1.9–4.4)
TOTAL IRON BINDING CAPACITY: 172 UG/DL (ref 260–445)
TOTAL PROTEIN: 6.1 G/DL (ref 6.4–8.2)
WBC # BLD: 27.3 K/UL (ref 4–11)

## 2020-08-10 PROCEDURE — 85025 COMPLETE CBC W/AUTO DIFF WBC: CPT

## 2020-08-10 PROCEDURE — 6360000002 HC RX W HCPCS: Performed by: INTERNAL MEDICINE

## 2020-08-10 PROCEDURE — 2580000003 HC RX 258: Performed by: INTERNAL MEDICINE

## 2020-08-10 PROCEDURE — 99233 SBSQ HOSP IP/OBS HIGH 50: CPT | Performed by: INTERNAL MEDICINE

## 2020-08-10 PROCEDURE — 82728 ASSAY OF FERRITIN: CPT

## 2020-08-10 PROCEDURE — 6360000002 HC RX W HCPCS: Performed by: NURSE PRACTITIONER

## 2020-08-10 PROCEDURE — 94761 N-INVAS EAR/PLS OXIMETRY MLT: CPT

## 2020-08-10 PROCEDURE — 2580000003 HC RX 258: Performed by: NURSE PRACTITIONER

## 2020-08-10 PROCEDURE — 6370000000 HC RX 637 (ALT 250 FOR IP): Performed by: NURSE PRACTITIONER

## 2020-08-10 PROCEDURE — C9113 INJ PANTOPRAZOLE SODIUM, VIA: HCPCS | Performed by: INTERNAL MEDICINE

## 2020-08-10 PROCEDURE — 1200000000 HC SEMI PRIVATE

## 2020-08-10 PROCEDURE — 99232 SBSQ HOSP IP/OBS MODERATE 35: CPT | Performed by: INTERNAL MEDICINE

## 2020-08-10 PROCEDURE — 85379 FIBRIN DEGRADATION QUANT: CPT

## 2020-08-10 PROCEDURE — 6370000000 HC RX 637 (ALT 250 FOR IP): Performed by: INTERNAL MEDICINE

## 2020-08-10 PROCEDURE — 80053 COMPREHEN METABOLIC PANEL: CPT

## 2020-08-10 PROCEDURE — 86140 C-REACTIVE PROTEIN: CPT

## 2020-08-10 PROCEDURE — 2700000000 HC OXYGEN THERAPY PER DAY

## 2020-08-10 PROCEDURE — 2580000003 HC RX 258

## 2020-08-10 RX ORDER — SODIUM CHLORIDE 9 MG/ML
INJECTION, SOLUTION INTRAVENOUS
Status: COMPLETED
Start: 2020-08-10 | End: 2020-08-10

## 2020-08-10 RX ADMIN — CEFEPIME 2 G: 2 INJECTION, POWDER, FOR SOLUTION INTRAMUSCULAR; INTRAVENOUS at 20:33

## 2020-08-10 RX ADMIN — Medication 2 G: at 08:14

## 2020-08-10 RX ADMIN — ACETAMINOPHEN 650 MG: 325 TABLET ORAL at 17:23

## 2020-08-10 RX ADMIN — ENOXAPARIN SODIUM 30 MG: 30 INJECTION SUBCUTANEOUS at 09:38

## 2020-08-10 RX ADMIN — VANCOMYCIN HYDROCHLORIDE 750 MG: 750 INJECTION, POWDER, LYOPHILIZED, FOR SOLUTION INTRAVENOUS at 23:48

## 2020-08-10 RX ADMIN — ENOXAPARIN SODIUM 30 MG: 30 INJECTION SUBCUTANEOUS at 20:32

## 2020-08-10 RX ADMIN — Medication 10 ML: at 09:42

## 2020-08-10 RX ADMIN — Medication 10 ML: at 20:48

## 2020-08-10 RX ADMIN — VANCOMYCIN HYDROCHLORIDE 750 MG: 750 INJECTION, POWDER, LYOPHILIZED, FOR SOLUTION INTRAVENOUS at 12:36

## 2020-08-10 RX ADMIN — ACETAMINOPHEN 650 MG: 325 TABLET ORAL at 05:53

## 2020-08-10 RX ADMIN — CEFEPIME 2 G: 2 INJECTION, POWDER, FOR SOLUTION INTRAMUSCULAR; INTRAVENOUS at 09:42

## 2020-08-10 RX ADMIN — IRON SUCROSE 200 MG: 20 INJECTION, SOLUTION INTRAVENOUS at 14:30

## 2020-08-10 RX ADMIN — ALLOPURINOL 300 MG: 300 TABLET ORAL at 09:38

## 2020-08-10 RX ADMIN — DEXAMETHASONE SODIUM PHOSPHATE 6 MG: 10 INJECTION INTRAMUSCULAR; INTRAVENOUS at 14:30

## 2020-08-10 RX ADMIN — PANTOPRAZOLE SODIUM 40 MG: 40 INJECTION, POWDER, FOR SOLUTION INTRAVENOUS at 09:39

## 2020-08-10 RX ADMIN — Medication 10 ML: at 09:40

## 2020-08-10 RX ADMIN — POTASSIUM CHLORIDE 20 MEQ: 20 TABLET, EXTENDED RELEASE ORAL at 09:37

## 2020-08-10 RX ADMIN — ATORVASTATIN CALCIUM 40 MG: 40 TABLET, FILM COATED ORAL at 09:37

## 2020-08-10 RX ADMIN — SODIUM CHLORIDE 250 ML: 9 INJECTION, SOLUTION INTRAVENOUS at 12:34

## 2020-08-10 RX ADMIN — Medication 2 G: at 17:23

## 2020-08-10 RX ADMIN — Medication 2 G: at 12:43

## 2020-08-10 RX ADMIN — POTASSIUM CHLORIDE 20 MEQ: 20 TABLET, EXTENDED RELEASE ORAL at 20:32

## 2020-08-10 ASSESSMENT — PAIN DESCRIPTION - PROGRESSION: CLINICAL_PROGRESSION: NOT CHANGED

## 2020-08-10 ASSESSMENT — PAIN SCALES - GENERAL
PAINLEVEL_OUTOF10: 0
PAINLEVEL_OUTOF10: 4
PAINLEVEL_OUTOF10: 4

## 2020-08-10 ASSESSMENT — PAIN DESCRIPTION - PAIN TYPE: TYPE: ACUTE PAIN

## 2020-08-10 ASSESSMENT — PAIN DESCRIPTION - LOCATION: LOCATION: HEAD

## 2020-08-10 ASSESSMENT — PAIN DESCRIPTION - DESCRIPTORS: DESCRIPTORS: ACHING

## 2020-08-10 NOTE — PROGRESS NOTES
Infectious Disease Follow up Notes    CC :  COVID-19      Antibiotics:   Cefepime 2g q12  vanc 750 q12  IV decadron     Admit Date:   8/7/2020  Hospital Day: 4    Subjective:   Tm 99.7 on 2.5L NC   Feels weak, +coughing. No chest pain. Port functioning normally    Objective:     Patient Vitals for the past 8 hrs:   BP Temp Temp src Pulse Resp SpO2   08/10/20 1539 -- 99.7 °F (37.6 °C) Oral -- -- --   08/10/20 1537 -- -- -- 92 -- --   08/10/20 1433 125/73 -- -- 93 -- --   08/10/20 1247 -- -- -- -- -- 92 %   08/10/20 1243 -- -- -- -- -- (!) 84 %   08/10/20 1241 (!) 151/86 99.7 °F (37.6 °C) Oral 107 22 --   08/10/20 1000 -- -- -- 87 -- --   08/10/20 0930 -- -- -- 100 -- --       Due to the current efforts to prevent transmission of COVID-19 and also the need to preserve PPE for other caregivers, a face-to-face encounter with the patient was not performed. That being said, all relevant records and diagnostic tests were reviewed, including laboratory results and imaging. Please reference any relevant documentation elsewhere. Care will be coordinated with the primary service.          Scheduled Meds:   vancomycin  750 mg Intravenous Q12H    iron sucrose (VENOFER) iv piggyback 100 mL (Admin over 60 minutes)  200 mg Intravenous Q24H    sodium chloride flush  10 mL Intravenous 2 times per day    enoxaparin  30 mg Subcutaneous BID    dexamethasone  6 mg Intravenous Q24H    pantoprazole  40 mg Intravenous Daily    sodium chloride  20 mL Intravenous Once    cefepime  2 g Intravenous Q12H    allopurinol  300 mg Oral Daily    atorvastatin  40 mg Oral Daily    potassium chloride  20 mEq Oral BID    sodium chloride  2 g Oral TID WC          Data Review:    Lab Results   Component Value Date    WBC 27.3 (H) 08/10/2020    HGB 12.0 08/10/2020    HCT 36.7 08/10/2020    MCV 89.4 08/10/2020     08/10/2020     Lab Results   Component Value Date    CREATININE 0.7 08/10/2020    BUN 9 08/10/2020     (L) 08/10/2020    K 4.3 08/10/2020    CL 97 (L) 08/10/2020    CO2 23 08/10/2020       Hepatic Function Panel:   Lab Results   Component Value Date    ALKPHOS 89 08/10/2020    ALT 26 08/10/2020    AST 37 08/10/2020    PROT 6.1 08/10/2020    BILITOT 0.5 08/10/2020    LABALBU 3.2 08/10/2020       8/8 8/9 8/10  procal   1.8  Ferritin  979 1232 1402  ddimer  7206 7000 4097         MICRO:  8/8 BC x2 NGTD    MRSA screen neg    Legionella ag neg    COVID PCR  Pending       IMAGING:  CXR 8/7/20   Impression    Bibasilar airspace disease greater right lower lobe compatible with    pneumonia. Berny Julian is given history of COVID-19 positivity. Assessment:     Patient Active Problem List    Diagnosis Date Noted    Pulmonary infiltrates 08/08/2020    [facility] 08/08/2020    Hepatic metastasis ([facility] 08/08/2020    Acute blood loss anemia 08/08/2020    Elevated procalcitonin 08/08/2020    Hyponatremia 08/08/2020    Former smoker 08/08/2020    Anemia 08/07/2020    Sepsis ([facility] [address] [address] 08/07/2020    Lung cancer [facility] 08/07/2020    Hyperlipidemia 08/07/2020    2019 novel coronavirus disease (COVID-19) 08/07/2020    Hypokalemia 08/07/2020    Lung mass     Multiple lung nodules     Hilar adenopathy     Centrilobular emphysema (HCC)     Smoker     Acute hyponatremia 05/04/2020    N&V (nausea and vomiting) 05/04/2020    Leukocytosis 05/04/2020       CLL, extensive stage SCLC  Recent chemotherapy     +COVID test as outpt prior to admission, repeat PCR collected 8/8 and pending     Hypoxemic respiratory failure     Possible concomitant bacterial pneumonia  Elevated procal on admission with leukocytosis/bandemia, low grade fever. Persistently elevated WBC     Severe anemia     Plan:   The WBC is persistently elevated though clinically she seems to be stable   Possible at least partial steroid effect.   Unclear if she rec'd any G-CSF prior to

## 2020-08-10 NOTE — PROGRESS NOTES
ONCOLOGY HEMATOLOGY CARE PROGRESS NOTE      SUBJECTIVE:     She says she is cold. Wants to go home. Hgb 12. ROS:   The remaining 10 point review of symptoms is unremarkable. OBJECTIVE        Physical    VITALS:  BP (!) 151/86   Pulse 107   Temp 99.7 °F (37.6 °C) (Oral)   Resp 22   Ht 5' 3.5\" (1.613 m)   Wt 147 lb 14.9 oz (67.1 kg)   SpO2 (!) 84%   BMI 25.79 kg/m²   TEMPERATURE:  Current - Temp: 99.7 °F (37.6 °C);  Max - Temp  Av.2 °F (36.8 °C)  Min: 97.2 °F (36.2 °C)  Max: 99.7 °F (37.6 °C)  PULSE OXIMETRY RANGE: SpO2  Av.7 %  Min: 84 %  Max: 93 %  24HR INTAKE/OUTPUT:      Intake/Output Summary (Last 24 hours) at 8/10/2020 1245  Last data filed at 8/10/2020 0930  Gross per 24 hour   Intake 660 ml   Output 350 ml   Net 310 ml       PE: deferred per covid 19 rule out policy       Data      Recent Labs     20  1147 20  2250 20  0440 08/10/20  0533   WBC 19.9*  --  20.7* 27.3*   HGB 6.8* 9.7* 10.0* 12.0   HCT 20.8* 29.4* 29.6* 36.7     --  252 280   MCV 89.9  --  88.2 89.4        Recent Labs     20  0617 20  1106 20  0440 08/10/20  0533   * 127* 127* 131*   K 4.3 4.0 4.0 4.3    97* 98* 97*   CO2 23 21 21 23   PHOS 1.2*  --   --   --    BUN 7 8 9 9   CREATININE 0.8 0.8 0.7 0.7     Recent Labs     20  1106 20  0440 08/10/20  0533   AST 23 24 37   ALT 11 15 26   BILITOT 0.5 0.5 0.5   ALKPHOS 89 91 89       Magnesium:    Lab Results   Component Value Date    MG 1.50 2020    MG 1.10 2020    MG 1.70 2020         Problem List  Patient Active Problem List   Diagnosis    Acute hyponatremia    N&V (nausea and vomiting)    Leukocytosis    Lung mass    Multiple lung nodules    Hilar adenopathy    Centrilobular emphysema (HCC)    Smoker    Anemia    Sepsis ([facility]    Lung cancer ([facility]    Hyperlipidemia    2019 novel coronavirus disease (COVID-19)    Hypokalemia    Pulmonary infiltrates    Small cell lung cancer (HCC)    Hepatic metastasis (HCC)    Acute blood loss anemia    Elevated procalcitonin    Hyponatremia    Former smoker       ASSESSMENT AND PLAN:    1. Severe anemia     - Hgb 4.8 on admission, now up to 10 post-transfusion- today she is 12   - likely multifactorial with recent chemotherapy and infection  - check iron studies and hemolysis panel  - transfuse for Hgb < 7  - stool occult blood is negative  - ferritin 1400 - acute phase reactant   - DA negative   - STR pending - iron stores are 18 and sat is 10 percent. Will order venofer. - Haptoglobin is 393- no hemolysis noted   -    -      2. COVID-19 (+)  3. Pneumonia     - ID and pulmonary  following   - on cefepime and vancomycin  - ID considering Remdesevir- now decided against      4. Extensive stage small cell lung cancer     - completed C4 carbo/VP16/tecentriq 7/20-7/22  - will be due for restaging scans and maintenance immunotherapy if showing response- will get scans once COVID negative   - sees Dr. Gladis He- he will follow up tomorrow.      5.  CLL     - has not required treatment   - WBC baseline at 27 (runs in the 20s)         ONCOLOGIC DISPOSITION: per IM and ID       Aman Marin CNP  [facility]   Please contact through [address]

## 2020-08-10 NOTE — FLOWSHEET NOTE
08/09/20 2106   Assessment   Charting Type Shift assessment   Neurological   Neuro (WDL) WDL   Level of Consciousness 0   [place] Coma Scale   Eye Opening 4   Best Verbal Response 5   Best Motor Response 6   Faye Coma Scale Score 15   HEENT   HEENT (WDL) X   Right Eye Impaired vision   Left Eye Impaired vision   Respiratory   Respiratory (WDL) X   Respiratory Depth Normal   Respiratory Quality/Effort Dyspnea with exertion   Chest Assessment Chest expansion symmetrical   L Breath Sounds Diminished   R Breath Sounds Diminished   Cardiac   Cardiac (WDL) WDL   Cardiac Rhythm NSR   Cardiac Monitor   Telemetry Monitor On Yes   Telemetry Audible Yes   Telemetry Alarms Set Yes   Telemetry Box Number ICU   Gastrointestinal   Abdominal (WDL) X   GI Symptoms Loss of appetite   Abdomen Inspection Soft;Distended   Last BM (including prior to admit) 08/09/20   RUQ Bowel Sounds Active   LUQ Bowel Sounds Active   RLQ Bowel Sounds Active   LLQ Bowel Sounds Active   Peripheral Vascular   Peripheral Vascular (WDL) WDL   Edema None   Skin Color/Condition   Skin Color/Condition (WDL) WDL   Skin Integrity   Skin Integrity (WDL) WDL   Musculoskeletal   Musculoskeletal (WDL) X   RUE Weakness   LUE Weakness   RL Extremity Weakness   LL Extremity Weakness   Genitourinary   Genitourinary (WDL) WDL   Flank Tenderness No   Suprapubic Tenderness No   Dysuria No   Urine Assessment   Incontinence No   Urine Color Yellow/straw   Urine Appearance Clear   Anus/Rectum   Anus/Rectum (WDL) WDL   Psychosocial   Psychosocial (WDL) WDL

## 2020-08-10 NOTE — PROGRESS NOTES
4 Eyes Skin Assessment     The patient is being assess for  {Blank single:30597::\"Admission\",\"Transfer to New Unit\",\"Post-Op Surgical\",\"Cath Lab Post-Op\",\"Shift Handoff\"}    I agree that 2 RN's have performed a thorough Head to Toe Skin Assessment on the patient. ALL assessment sites listed below have been assessed. Areas assessed by both nurses: Saloni/Alex  [x]   Head, Face, and Ears   [x]   Shoulders, Back, and Chest  [x]   Arms, Elbows, and Hands   [x]   Coccyx, Sacrum, and Ischum  [x]   Legs, Feet, and Heels        Does the Patient have Skin Breakdown?   No         Ramone Prevention initiated:  NA   Wound Care Orders initiated:  NA      [facility] nurse consulted for Pressure Injury (Stage 3,4, Unstageable, DTI, NWPT, and Complex wounds):  NA      Nurse 1 eSignature: Electronically signed by Janice Alberto RN on 8/10/20 at 10:51 AM EDT    **SHARE this note so that the co-signing nurse is able to place an eSignature**    Nurse 2 eSignature: {Esignature:[id number]}

## 2020-08-10 NOTE — CARE COORDINATION
CASE MANAGEMENT INITIAL ASSESSMENT      Reviewed chart and completed assessment via telephone with: Jaelyn Ramos  Explained Case Management role/services. Primary contact information: Zee Craven, daughter    Admit date/status: 08/07/2020  Diagnosis: Anemia  Is this a Readmission?:  No  Insurance: [employer]  Precert required for SNF - Y      3 night stay required - N    Living arrangements, Adls, care needs, prior to admission: Pt lives with daughter in 2 story bilevel with 8 stairs to enter home and 7 stairs to living space. Transportation: private    [address] [place] [address] at home: Walker_x_Cane__RTS__ BSC__Shower Chair_x_  02__ HHN__ CPAP__  BiPap__  Hospital Bed__ W/C___ Other__________    Services in the home and/or outpatient, prior to admission: none      PT/OT recs: none at this time    [address] Notification (HEN): not started at this time    Barriers to discharge: daughter, who pt lives with, has COVID    Plan/comments: Opal Allen, lives with daughter, but daughter is currently testing positive for COVID, stating that pt has recently grown weaker and started to use a walker; Pt last chemo was 3 weeks ago and is due on 08/10 for next round. PT/OT when appropriate. DCP will continue to follow for possible needs when medically stable for discharge.   Marycruz Hare RN      [facility] on chart for MD adorno

## 2020-08-10 NOTE — PROGRESS NOTES
Pt a/o. Pt has loss of appetite. Encouraging to eat. No emesis. Pt given pain for tylenol per [facility] as requested by pt. Tried to titrated pt off oxygen and SpO2 dropped down to 77-85% on room air. SpO2 now 90s on 2.5 L NC. Call light within reach; will continue to monitor.

## 2020-08-10 NOTE — CONSULTS
Inpatient consult to Palliative Care  Consult performed by: Fernie James RN  Consult ordered by: Mike De Jesus MD  Reason for consult: [address], Code status            Palliative Care Initial Note  Palliative Care Admit date:   8/10/2020    Advance Directives:  Pt reports not having executed AD's. Bandar Barnard is pts only child and pt is aware that dtr is viewed as her NOK for decision making if pt cannot make decisions for herself. Pt currently has a full code status. Laureano Coto states she understands what resuscitation entails but hasn't given much if any thought as to her wishes. Encouraged Judith to give consideration to any questions she might have and writer would be happy to review when she is ready. Plan of care/goals:  Pt shared that chemo cycle is finished and she is anticipating f/u scans, referenced in Onc NP's note, and she seems eager to get those results. Pt did share that her appetite PTA was diminished but she hadn't lost wt and that she was getting around w/ her walker. When asked pts overall goals for care, she wasn't able to offer an answer, only to say \"I just don't know. \"   She didn't seem sure of the possible plan for immunotherapy. Social/Spiritual:  Judith shared that Bandar Barnard lives w/ pt \"but she has covid, too. \"  Bandar Barnard works for a dermatologist and pt states that, PTA, she was needing her dtr to help more in relation to homemaking and pts ADL's. Plan:  Maintain full code for now per pts expressed wish. Will continue to follow for ACP discussions as this admission evolves.         Reason for consult:    _X_ Advance Care Planning  ___ Transition of Care Planning  ___ Psychosocial/Spiritual Support  ___ Symptom Management                                                                                                                  Fernie James RN

## 2020-08-10 NOTE — PROGRESS NOTES
Hospitalist Progress Note    Date of Admission: 8/7/2020    Chief Complaint: Nausea/Vomiting    Hospital Course:       Hospital Medicine Progress Notes    PCP: Krystal Davis    Date of Admission: 8/7/2020    Chief Complaint:    Chief Complaint   Patient presents with    Nausea     patient being txt for cancer. last txt Monday, Tuesday, Wednesday last week. she reports she recently tested positive for COVID last Thursday. nausea and vomiting started yesterday     Emesis     History Of Present Illness:     77 y.o. female who presented to [facility] with c/o weakness fatigue. 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 She follows with [facility] s/p carboplatin and -16. She reports increase WOB, fatigue and general malaise. She tested +for COVID and came to the ED for further eval. Upon arrival, she was noted to have an h/h of 4.8/14, WBC of 25 dioni k of 2.9. Her temp was 100.9    Subjective: She reports mild shortness of breath. Minimal cough. Minimal oxygen requirement. Her appetite is poor although this not much changed from her baseline prior to this acute illness. Hemoglobin is improved dramatically. No evidence of blood loss.     Labs:   Recent Labs     08/08/20  1147 08/08/20  2250 08/09/20  0440 08/10/20  0533   WBC 19.9*  --  20.7* 27.3*   HGB 6.8* 9.7* 10.0* 12.0   HCT 20.8* 29.4* 29.6* 36.7     --  252 280     Recent Labs     08/08/20  0617 08/08/20  1106 08/09/20  0440 08/10/20  0533   * 127* 127* 131*   K 4.3 4.0 4.0 4.3    97* 98* 97*   CO2 23 21 21 23   BUN 7 8 9 9   CREATININE 0.8 0.8 0.7 0.7   CALCIUM 7.4* 7.4* 8.3 8.5   PHOS 1.2*  --   --   --      Recent Labs     08/08/20  1106 08/09/20  0440 08/10/20  0533   AST 23 24 37   ALT 11 15 26   BILITOT 0.5 0.5 0.5   ALKPHOS 89 91 89     Recent Labs     08/08/20  1147   INR 1.25*       Physical Exam Performed:    /76   Pulse 89   Temp 98.6 °F (37 °C) (Oral)   Resp 22   Ht 5' 3.5\" (1.613 m)   Wt 147 lb 14.9 oz (67.1 kg)   SpO2 92%   BMI 25.79 kg/m²   General appearance:  Ill appearing female lying in bed, No apparent distress, appears stated age and cooperative. HEENT:  Normal cephalic, atraumatic without obvious deformity. Pupils equal, round, and reactive to light. Extra ocular muscles intact. Conjunctivae/corneas clear. Neck: Supple, with full range of motion. No jugular venous distention. Trachea midline. Respiratory:  Normal respiratory effort. Diminished with crackles. Cardiovascular:  Regular rate and rhythm with normal S1/S2 without murmurs, rubs or gallops. Abdomen: Soft, non-tender, non-distended with normal bowel sounds. Musculoskeletal:  No clubbing, cyanosis or edema bilaterally. Full range of motion without deformity. Skin: Pallor,  texture, turgor normal.  No rashes or lesions. Neurologic:  Neurovascularly intact without any focal sensory/motor deficits. Cranial nerves: II-XII intact, grossly non-focal.  Psychiatric:  Alert and oriented, thought content appropriate, normal insight  Capillary Refill: Brisk,< 3 seconds   Peripheral Pulses: +2 palpable, equal bilaterally     Assessment/Plan:    Active Hospital Problems    Diagnosis    Pulmonary infiltrates [R91.8]    Small cell lung cancer (HCC) [C34.90]    Hepatic metastasis (HCC) [C78.7]    Acute blood loss anemia [D62]    Elevated procalcitonin [R79.89]    Hyponatremia [E87.1]    Former smoker [Z87.891]    Anemia [D64.9]    Sepsis ([facility] [A41.9]    Lung cancer ([facility] [C34.90]    Hyperlipidemia [E78.5]    2019 novel coronavirus disease (COVID-19) [U07.1]    Hypokalemia [E87.6]    Hilar adenopathy [R59.0]    Centrilobular emphysema (HCC) [J43.2]    N&V (nausea and vomiting) [R11.2]    Leukocytosis [D72.829]     Acute blood loss anemia, likely chemo induced, improving. S/P 6 units PRBC at admission. Hemoglobin dramatically improved and stable. Monitor.       COVID 19 infection  Minimal symptoms. Noted to have positive testing as an outpatient from the [address] [place] [address]. Formal PCR testing is pending here at the hospital.    Continue empiric Decadron with Lovenox PPX (given sig elevation of d dimer and increased r/f prothrombotic state)  ID following  Continue Cefepime/Vanc.      Neuroendocrine Ca with lung and liver mets, CLL  Heme/onc consulted   completed C4 carbo/VP16/tecentriq 7/20-7/22     Hypokalemia, resolving.    Monitor and replete as needed      DVT Prophylaxis: Lovenox BID   Diet: DIET GENERAL;  Code Status: Full Code  PT/OT Eval Status: Pending clinical course    Dispo -continue droplet plus isolation    Nivia Gr MD

## 2020-08-10 NOTE — PROGRESS NOTES
4 Eyes Skin Assessment     The patient is being assess for  {Blank single:46016::\"Admission\",\"Transfer to New Unit\",\"Post-Op Surgical\",\"Cath Lab Post-Op\",\"Shift Handoff\"}    I agree that 2 RN's have performed a thorough Head to Toe Skin Assessment on the patient. ALL assessment sites listed below have been assessed. Areas assessed by both nurses: Margaretann Moon  [x]   Head, Face, and Ears   [x]   Shoulders, Back, and Chest  [x]   Arms, Elbows, and Hands   [x]   Coccyx, Sacrum, and Ischum  [x]   Legs, Feet, and Heels        Does the Patient have Skin Breakdown?   No         Ramone Prevention initiated:  NA   Wound Care Orders initiated:  NA      [facility] nurse consulted for Pressure Injury (Stage 3,4, Unstageable, DTI, NWPT, and Complex wounds):  NA      Nurse 1 eSignature: Electronically signed by Titi Chavira RN on 8/10/20 at 7:39 AM EDT    **SHARE this note so that the co-signing nurse is able to place an eSignature**    Nurse 2 eSignature: Electronically signed by Britney Rodriguez RN on 8/10/20 at 7:40 AM EDT

## 2020-08-10 NOTE — PLAN OF CARE
Problem: Falls - Risk of:  Goal: Will remain free from falls  Description: Will remain free from falls  Outcome: Ongoing  Note: Pt remains safe. Uses call light appropriately. Non skid footwear on. Bed locked in lowest position; side rails 2/4; call light and bedside table within reach. solids

## 2020-08-11 ENCOUNTER — APPOINTMENT (OUTPATIENT)
Dept: GENERAL RADIOLOGY | Age: 75
DRG: 871 | End: 2020-08-11
Payer: MEDICARE

## 2020-08-11 LAB
A/G RATIO: 1.1 (ref 1.1–2.2)
ALBUMIN SERPL-MCNC: 3.1 G/DL (ref 3.4–5)
ALP BLD-CCNC: 88 U/L (ref 40–129)
ALT SERPL-CCNC: 40 U/L (ref 10–40)
ANION GAP SERPL CALCULATED.3IONS-SCNC: 12 MMOL/L (ref 3–16)
ANISOCYTOSIS: ABNORMAL
AST SERPL-CCNC: 53 U/L (ref 15–37)
ATYPICAL LYMPHOCYTE RELATIVE PERCENT: 1 % (ref 0–6)
BANDED NEUTROPHILS RELATIVE PERCENT: 30 % (ref 0–7)
BASOPHILS ABSOLUTE: 0 K/UL (ref 0–0.2)
BASOPHILS RELATIVE PERCENT: 0 %
BILIRUB SERPL-MCNC: 0.4 MG/DL (ref 0–1)
BLOOD BANK DISPENSE STATUS: NORMAL
BLOOD BANK PRODUCT CODE: NORMAL
BPU ID: NORMAL
BUN BLDV-MCNC: 8 MG/DL (ref 7–20)
BURR CELLS: ABNORMAL
C-REACTIVE PROTEIN: 149.2 MG/L (ref 0–5.1)
CALCIUM SERPL-MCNC: 8.4 MG/DL (ref 8.3–10.6)
CHLORIDE BLD-SCNC: 97 MMOL/L (ref 99–110)
CO2: 23 MMOL/L (ref 21–32)
CREAT SERPL-MCNC: 0.7 MG/DL (ref 0.6–1.2)
D DIMER: 2561 NG/ML DDU (ref 0–229)
DESCRIPTION BLOOD BANK: NORMAL
EOSINOPHILS ABSOLUTE: 0 K/UL (ref 0–0.6)
EOSINOPHILS RELATIVE PERCENT: 0 %
FERRITIN: 2772 NG/ML (ref 15–150)
GFR AFRICAN AMERICAN: >60
GFR NON-AFRICAN AMERICAN: >60
GLOBULIN: 2.9 G/DL
GLUCOSE BLD-MCNC: 89 MG/DL (ref 70–99)
GLUCOSE BLD-MCNC: 97 MG/DL (ref 70–99)
HCT VFR BLD CALC: 29.7 % (ref 36–48)
HEMATOLOGY PATH CONSULT: NO
HEMOGLOBIN: 9.7 G/DL (ref 12–16)
HYPOCHROMIA: ABNORMAL
LYMPHOCYTES ABSOLUTE: 12.2 K/UL (ref 1–5.1)
LYMPHOCYTES RELATIVE PERCENT: 38 %
MACROCYTES: ABNORMAL
MCH RBC QN AUTO: 29.6 PG (ref 26–34)
MCHC RBC AUTO-ENTMCNC: 32.8 G/DL (ref 31–36)
MCV RBC AUTO: 90.3 FL (ref 80–100)
MONOCYTES ABSOLUTE: 0 K/UL (ref 0–1.3)
MONOCYTES RELATIVE PERCENT: 0 %
NEUTROPHILS ABSOLUTE: 19 K/UL (ref 1.7–7.7)
NEUTROPHILS RELATIVE PERCENT: 31 %
OVALOCYTES: ABNORMAL
PDW BLD-RTO: 18.3 % (ref 12.4–15.4)
PERFORMED ON: NORMAL
PLATELET # BLD: 257 K/UL (ref 135–450)
PLATELET SLIDE REVIEW: ADEQUATE
PMV BLD AUTO: 7.7 FL (ref 5–10.5)
POIKILOCYTES: ABNORMAL
POLYCHROMASIA: ABNORMAL
POTASSIUM REFLEX MAGNESIUM: 4.2 MMOL/L (ref 3.5–5.1)
PROCALCITONIN: 0.53 NG/ML (ref 0–0.15)
RBC # BLD: 3.29 M/UL (ref 4–5.2)
SCHISTOCYTES: ABNORMAL
SLIDE REVIEW: ABNORMAL
SODIUM BLD-SCNC: 132 MMOL/L (ref 136–145)
TOTAL PROTEIN: 6 G/DL (ref 6.4–8.2)
TOXIC GRANULATION: PRESENT
WBC # BLD: 31.2 K/UL (ref 4–11)

## 2020-08-11 PROCEDURE — 99233 SBSQ HOSP IP/OBS HIGH 50: CPT | Performed by: INTERNAL MEDICINE

## 2020-08-11 PROCEDURE — 6370000000 HC RX 637 (ALT 250 FOR IP): Performed by: NURSE PRACTITIONER

## 2020-08-11 PROCEDURE — 2580000003 HC RX 258: Performed by: NURSE PRACTITIONER

## 2020-08-11 PROCEDURE — 6360000002 HC RX W HCPCS: Performed by: INTERNAL MEDICINE

## 2020-08-11 PROCEDURE — 84145 PROCALCITONIN (PCT): CPT

## 2020-08-11 PROCEDURE — 2580000003 HC RX 258: Performed by: INTERNAL MEDICINE

## 2020-08-11 PROCEDURE — 99232 SBSQ HOSP IP/OBS MODERATE 35: CPT | Performed by: INTERNAL MEDICINE

## 2020-08-11 PROCEDURE — 6360000002 HC RX W HCPCS: Performed by: NURSE PRACTITIONER

## 2020-08-11 PROCEDURE — 71045 X-RAY EXAM CHEST 1 VIEW: CPT

## 2020-08-11 PROCEDURE — 6370000000 HC RX 637 (ALT 250 FOR IP): Performed by: INTERNAL MEDICINE

## 2020-08-11 PROCEDURE — C9113 INJ PANTOPRAZOLE SODIUM, VIA: HCPCS | Performed by: INTERNAL MEDICINE

## 2020-08-11 PROCEDURE — 80053 COMPREHEN METABOLIC PANEL: CPT

## 2020-08-11 PROCEDURE — 1200000000 HC SEMI PRIVATE

## 2020-08-11 PROCEDURE — 94761 N-INVAS EAR/PLS OXIMETRY MLT: CPT

## 2020-08-11 PROCEDURE — 86140 C-REACTIVE PROTEIN: CPT

## 2020-08-11 PROCEDURE — 85025 COMPLETE CBC W/AUTO DIFF WBC: CPT

## 2020-08-11 PROCEDURE — 2700000000 HC OXYGEN THERAPY PER DAY

## 2020-08-11 PROCEDURE — 85379 FIBRIN DEGRADATION QUANT: CPT

## 2020-08-11 PROCEDURE — 82728 ASSAY OF FERRITIN: CPT

## 2020-08-11 RX ADMIN — ACETAMINOPHEN 650 MG: 325 TABLET ORAL at 21:46

## 2020-08-11 RX ADMIN — IRON SUCROSE 200 MG: 20 INJECTION, SOLUTION INTRAVENOUS at 17:13

## 2020-08-11 RX ADMIN — CEFEPIME 2 G: 2 INJECTION, POWDER, FOR SOLUTION INTRAMUSCULAR; INTRAVENOUS at 21:47

## 2020-08-11 RX ADMIN — ENOXAPARIN SODIUM 30 MG: 30 INJECTION SUBCUTANEOUS at 09:43

## 2020-08-11 RX ADMIN — ENOXAPARIN SODIUM 30 MG: 30 INJECTION SUBCUTANEOUS at 21:46

## 2020-08-11 RX ADMIN — ALLOPURINOL 300 MG: 300 TABLET ORAL at 09:42

## 2020-08-11 RX ADMIN — PANTOPRAZOLE SODIUM 40 MG: 40 INJECTION, POWDER, FOR SOLUTION INTRAVENOUS at 09:43

## 2020-08-11 RX ADMIN — ACETAMINOPHEN 650 MG: 325 TABLET ORAL at 17:07

## 2020-08-11 RX ADMIN — Medication 10 ML: at 09:48

## 2020-08-11 RX ADMIN — CEFEPIME 2 G: 2 INJECTION, POWDER, FOR SOLUTION INTRAMUSCULAR; INTRAVENOUS at 09:30

## 2020-08-11 RX ADMIN — POTASSIUM CHLORIDE 20 MEQ: 20 TABLET, EXTENDED RELEASE ORAL at 21:46

## 2020-08-11 RX ADMIN — DEXAMETHASONE SODIUM PHOSPHATE 6 MG: 10 INJECTION INTRAMUSCULAR; INTRAVENOUS at 17:13

## 2020-08-11 RX ADMIN — ATORVASTATIN CALCIUM 40 MG: 40 TABLET, FILM COATED ORAL at 21:46

## 2020-08-11 RX ADMIN — Medication 2 G: at 09:42

## 2020-08-11 RX ADMIN — Medication 10 ML: at 21:47

## 2020-08-11 RX ADMIN — Medication 2 G: at 17:13

## 2020-08-11 RX ADMIN — ACETAMINOPHEN 650 MG: 325 TABLET ORAL at 09:42

## 2020-08-11 ASSESSMENT — PAIN DESCRIPTION - LOCATION
LOCATION: HEAD
LOCATION: HEAD

## 2020-08-11 ASSESSMENT — PAIN SCALES - GENERAL
PAINLEVEL_OUTOF10: 4
PAINLEVEL_OUTOF10: 9
PAINLEVEL_OUTOF10: 0
PAINLEVEL_OUTOF10: 4
PAINLEVEL_OUTOF10: 3

## 2020-08-11 ASSESSMENT — PAIN DESCRIPTION - PAIN TYPE
TYPE: ACUTE PAIN
TYPE: ACUTE PAIN

## 2020-08-11 NOTE — PROGRESS NOTES
Pulmonary & Critical Care Medicine ICU Progress Note      71-year-old male with history of past history of smoking, COPD/emphysema, CLL, metastatic neuroendocrine lung cancer, COVID-19 infection, acute hypoxemic respiratory failure. Presented to the ER, was found to be severely anemic, received PRBC. The patient received chemotherapy last week, but she denies this, says her chemotherapy was 3 weeks ago. Reportedly the patient's daughter is also COVID positive, lives with her. Testing was done at the [facility]. There is some question about it being a false positive test, further testing has been sent. Events of Last 24 hours: The patient says she feels about the same,SaO2 is 91% on oxygen at 3 LPM.  She has mild cough, nonproductive. She has mild shortness of breath. She denies chest pain, abdominal pain. She does have nausea. She has a poor appetite. She complains of headache last night. Invasive Lines:   Port R subclavian        IV:    Vitals:  /84   Pulse 74   Temp 98.4 °F (36.9 °C) (Oral)   Resp 16   Ht 5' 3.5\" (1.613 m)   Wt 147 lb (66.7 kg)   SpO2 91%   BMI 25.63 kg/m²         Intake/Output Summary (Last 24 hours) at 8/11/2020 1640  Last data filed at 8/11/2020 0948  Gross per 24 hour   Intake 10 ml   Output --   Net 10 ml       EXAM:  Due to the current efforts to prevent transmission of COVID-19 and also the need to preserve PPE for other caregivers, a face-to-face encounter with the patient was not performed. That being said, all relevant records and diagnostic tests were reviewed, including laboratory results and imaging. Please reference any relevant documentation elsewhere. Care will be coordinated with the primary service.       Medications:  Scheduled Meds:   iron sucrose (VENOFER) iv piggyback 100 mL (Admin over 60 minutes)  200 mg Intravenous Q24H    sodium chloride flush  10 mL Intravenous 2 times per day    enoxaparin  30 mg Subcutaneous BID    dexamethasone  6 mg Intravenous Q24H    pantoprazole  40 mg Intravenous Daily    sodium chloride  20 mL Intravenous Once    cefepime  2 g Intravenous Q12H    allopurinol  300 mg Oral Daily    atorvastatin  40 mg Oral Daily    potassium chloride  20 mEq Oral BID    sodium chloride  2 g Oral TID WC       PRN Meds:  sodium chloride flush, acetaminophen **OR** acetaminophen, albuterol, polyethylene glycol, promethazine **OR** ondansetron    Results:  CBC:   Recent Labs     08/09/20  0440 08/10/20  0533 08/11/20  0530   WBC 20.7* 27.3* 31.2*   HGB 10.0* 12.0 9.7*   HCT 29.6* 36.7 29.7*   MCV 88.2 89.4 90.3    280 257     BMP:   Recent Labs     08/09/20  0440 08/10/20  0533 08/11/20  0530   * 131* 132*   K 4.0 4.3 4.2   CL 98* 97* 97*   CO2 21 23 23   BUN 9 9 8   CREATININE 0.7 0.7 0.7     LIVER PROFILE:   Recent Labs     08/09/20  0440 08/10/20  0533 08/11/20  0530   AST 24 37 53*   ALT 15 26 40   BILITOT 0.5 0.5 0.4   ALKPHOS 91 89 88       Cultures:  Negative so far    Films:  CXR 8/7/2020 reviewed by me       Assessment and plan:  Acute respiratory failure. On oxygen 3 LPM, relatively stable  COVID-19 infection. Followed by ID, on Decadron. Elevated CRP, d-dimer and LD. High risk for deterioration of status, monitor closely. She feels worse, but oxygen requirement has not significantly increased. D-dimer had decreased, has increased again. ? Pneumonia. On vancomycin and cefepime. Procalcitonin elevated, could be confounded by lung cancer. ID following  Metastatic small cell lung cancer. Being followed by oncology  COPD/centrilobular emphysema. On PRN albuterol  Leukocytosis. Probably due to infection and steroid. White count higher again today, CXR repeated and shows worsening. Concern for progressive COVID-19 infection. Will discuss with ID about escalation of care including Remdesivir and convalescent plasma  Anemia. Transfused, H&H slightly lower  Hyponatremia.   Likely due to

## 2020-08-11 NOTE — PROGRESS NOTES
Infectious Disease Follow up Notes    CC :  COVID-19      Antibiotics:   Cefepime 2g q12  IV decadron     Admit Date:   8/7/2020  Hospital Day: 5    Subjective:   AF, on 3L NC. Per RN, pt has been somewhat more active today. PO intake better. Continued dry cough. Objective:     Patient Vitals for the past 8 hrs:   BP Temp Temp src Pulse Resp SpO2   08/11/20 1705 (!) 165/88 98.3 °F (36.8 °C) Oral 90 18 93 %   08/11/20 1235 130/84 -- -- 74 16 91 %       Due to the current efforts to prevent transmission of COVID-19 and also the need to preserve PPE for other caregivers, a face-to-face encounter with the patient was not performed. That being said, all relevant records and diagnostic tests were reviewed, including laboratory results and imaging. Please reference any relevant documentation elsewhere. Care will be coordinated with the primary service.          Scheduled Meds:   iron sucrose (VENOFER) iv piggyback 100 mL (Admin over 60 minutes)  200 mg Intravenous Q24H    sodium chloride flush  10 mL Intravenous 2 times per day    enoxaparin  30 mg Subcutaneous BID    dexamethasone  6 mg Intravenous Q24H    pantoprazole  40 mg Intravenous Daily    sodium chloride  20 mL Intravenous Once    cefepime  2 g Intravenous Q12H    allopurinol  300 mg Oral Daily    atorvastatin  40 mg Oral Daily    potassium chloride  20 mEq Oral BID    sodium chloride  2 g Oral TID WC          Data Review:    Lab Results   Component Value Date    WBC 31.2 (H) 08/11/2020    HGB 9.7 (L) 08/11/2020    HCT 29.7 (L) 08/11/2020    MCV 90.3 08/11/2020     08/11/2020     Lab Results   Component Value Date    CREATININE 0.7 08/11/2020    BUN 8 08/11/2020     (L) 08/11/2020    K 4.2 08/11/2020    CL 97 (L) 08/11/2020    CO2 23 08/11/2020       Hepatic Function Panel:   Lab Results   Component Value Date    ALKPHOS 88 08/11/2020    ALT 40 08/11/2020 AST 53 08/11/2020    PROT 6.0 08/11/2020    BILITOT 0.4 08/11/2020    LABALBU 3.1 08/11/2020       8/8 8/9 8/10 8/11   procal   1.8 - - 0.53  Ferritin  979 1232 1624 -  ddimer  4621 6134 2657 5831         MICRO:  8/8 BC x2 NGTD    MRSA screen neg    Legionella ag neg    COVID PCR  Pending       IMAGING:  CXR 8/7/20   Impression    Bibasilar airspace disease greater right lower lobe compatible with    pneumonia. Berny Julian is given history of COVID-19 positivity. CXR 8/11/20   Impression    Multifocal bilateral airspace disease has increased, compatible with    patient's history of pneumonia. Assessment:     Patient Active Problem List    Diagnosis Date Noted    Pulmonary infiltrates 08/08/2020    Small cell lung cancer ([facility] 08/08/2020    Hepatic metastasis ([facility] 08/08/2020    Acute blood loss anemia 08/08/2020    Elevated procalcitonin 08/08/2020    Hyponatremia 08/08/2020    Former smoker 08/08/2020    Anemia 08/07/2020    Sepsis ([facility] 08/07/2020    Lung cancer ([facility] 08/07/2020    Hyperlipidemia 08/07/2020    2019 novel coronavirus disease (COVID-19) 08/07/2020    Hypokalemia 08/07/2020    Lung mass     Multiple lung nodules     Hilar adenopathy     Centrilobular emphysema (HCC)     Smoker     Acute hyponatremia 05/04/2020    N&V (nausea and vomiting) 05/04/2020    Leukocytosis 05/04/2020       CLL, extensive stage SCLC  Recent chemotherapy     +COVID test as outpt prior to admission, repeat PCR collected 8/8/20 and pending     Hypoxemic respiratory failure - stable     Possible concomitant bacterial pneumonia  Elevated procal on admission with leukocytosis/bandemia, low grade fever. Persistently elevated WBC while procal is trending down. No fever.      Severe anemia     NKDA     Plan:   Elevated WBC could be leukemoid reaction from malignancy +/- steroid effect, favored over infection/sepsis     Continue to trend procal, WBC   Droplet/contact isolation  Supportive

## 2020-08-11 NOTE — PROGRESS NOTES
Hospitalist Progress Note    Date of Admission: 8/7/2020    Chief Complaint: Nausea/Vomiting    Hospital Course:  77 y.o. female who presented to [facility] with c/o weakness fatigue. 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 She follows with [facility] s/p carboplatin and -16. She reports increase WOB, fatigue and general malaise. She tested +for COVID and came to the ED for further eval. Upon arrival, she was noted to have an h/h of 4.8/14, WBC of 25 dioni k of 2.9. Her temp was 100.9    Subjective: She reports improvement in SOB. Minimal oxygen requirement. Her appetite is slightly better. Labs:   Recent Labs     08/09/20  0440 08/10/20  0533 08/11/20  0530   WBC 20.7* 27.3* 31.2*   HGB 10.0* 12.0 9.7*   HCT 29.6* 36.7 29.7*    280 257     Recent Labs     08/09/20  0440 08/10/20  0533 08/11/20  0530   * 131* 132*   K 4.0 4.3 4.2   CL 98* 97* 97*   CO2 21 23 23   BUN 9 9 8   CREATININE 0.7 0.7 0.7   CALCIUM 8.3 8.5 8.4     Recent Labs     08/09/20  0440 08/10/20  0533 08/11/20  0530   AST 24 37 53*   ALT 15 26 40   BILITOT 0.5 0.5 0.4   ALKPHOS 91 89 88     No results for input(s): INR in the last 72 hours. Physical Exam Performed:    /84   Pulse 74   Temp 98.4 °F (36.9 °C) (Oral)   Resp 16   Ht 5' 3.5\" (1.613 m)   Wt 147 lb (66.7 kg)   SpO2 91%   BMI 25.63 kg/m²   General appearance:  Ill appearing female lying in bed, No apparent distress, appears stated age and cooperative. HEENT:  Normal cephalic, atraumatic without obvious deformity. Pupils equal, round, and reactive to light. Extra ocular muscles intact. Conjunctivae/corneas clear. Neck: Supple, with full range of motion. No jugular venous distention. Trachea midline. Respiratory:  Normal respiratory effort. Diminished with crackles. Cardiovascular:  Regular rate and rhythm with normal S1/S2 without murmurs, rubs or gallops.   Abdomen: Soft, non-tender, non-distended with normal bowel sounds. Musculoskeletal:  No clubbing, cyanosis or edema bilaterally. Full range of motion without deformity. Skin: Pallor,  texture, turgor normal.  No rashes or lesions. Neurologic:  Neurovascularly intact without any focal sensory/motor deficits. Cranial nerves: II-XII intact, grossly non-focal.  Psychiatric:  Alert and oriented, thought content appropriate, normal insight  Capillary Refill: Brisk,< 3 seconds   Peripheral Pulses: +2 palpable, equal bilaterally     Assessment/Plan:    Active Hospital Problems    Diagnosis    Pulmonary infiltrates [R91.8]    Small cell lung cancer (HCC) [C34.90]    Hepatic metastasis (HCC) [C78.7]    Acute blood loss anemia [D62]    Elevated procalcitonin [R79.89]    Hyponatremia [E87.1]    Former smoker [Z87.891]    Anemia [D64.9]    Sepsis ([facility] [A41.9]    Lung cancer ([facility] [C34.90]    Hyperlipidemia [E78.5]    2019 novel coronavirus disease (COVID-19) [U07.1]    Hypokalemia [E87.6]    Hilar adenopathy [R59.0]    Centrilobular emphysema (HCC) [J43.2]    N&V (nausea and vomiting) [R11.2]    Leukocytosis [D72.829]     Acute blood loss anemia, likely chemo induced, improving. S/P 6 units PRBC at admission. Hemoglobin dramatically improved and remains at stable level. Monitor. COVID 19 infection  Minimal symptoms. Noted to have positive testing as an outpatient from the [address] [place] [address]. Formal PCR testing is pending here at the hospital.    Continue empiric Decadron with Lovenox PPX (given sig elevation of d dimer and increased r/f prothrombotic state)  ID following  Continue Cefepime/Vanc.      Neuroendocrine Ca with lung and liver mets, CLL  Heme/onc consulted   completed C4 carbo/VP16/tecentriq 7/20-7/22     Hypokalemia, resolving.    Monitor and replete as needed      DVT Prophylaxis: Lovenox BID   Diet: DIET GENERAL;  Code Status: Full Code  PT/OT Eval Status: Pending clinical course    Dispo -continue droplet plus Colby Mejia MD

## 2020-08-12 LAB
A/G RATIO: 0.9 (ref 1.1–2.2)
ALBUMIN SERPL-MCNC: 2.8 G/DL (ref 3.4–5)
ALP BLD-CCNC: 86 U/L (ref 40–129)
ALT SERPL-CCNC: 53 U/L (ref 10–40)
ANION GAP SERPL CALCULATED.3IONS-SCNC: 11 MMOL/L (ref 3–16)
ANISOCYTOSIS: ABNORMAL
AST SERPL-CCNC: 59 U/L (ref 15–37)
ATYPICAL LYMPHOCYTE RELATIVE PERCENT: 1 % (ref 0–6)
BANDED NEUTROPHILS RELATIVE PERCENT: 12 % (ref 0–7)
BASOPHILS ABSOLUTE: 0 K/UL (ref 0–0.2)
BASOPHILS RELATIVE PERCENT: 0 %
BILIRUB SERPL-MCNC: 0.4 MG/DL (ref 0–1)
BLOOD CULTURE, ROUTINE: NORMAL
BUN BLDV-MCNC: 8 MG/DL (ref 7–20)
BURR CELLS: ABNORMAL
CALCIUM SERPL-MCNC: 8.3 MG/DL (ref 8.3–10.6)
CHLORIDE BLD-SCNC: 98 MMOL/L (ref 99–110)
CO2: 25 MMOL/L (ref 21–32)
CREAT SERPL-MCNC: 0.7 MG/DL (ref 0.6–1.2)
CULTURE, BLOOD 2: NORMAL
D DIMER: 2346 NG/ML DDU (ref 0–229)
EOSINOPHILS ABSOLUTE: 0 K/UL (ref 0–0.6)
EOSINOPHILS RELATIVE PERCENT: 0 %
GFR AFRICAN AMERICAN: >60
GFR NON-AFRICAN AMERICAN: >60
GLOBULIN: 3.1 G/DL
GLUCOSE BLD-MCNC: 127 MG/DL (ref 70–99)
HCT VFR BLD CALC: 29.7 % (ref 36–48)
HEMATOLOGY PATH CONSULT: NO
HEMOGLOBIN: 9.7 G/DL (ref 12–16)
HYPOCHROMIA: ABNORMAL
LYMPHOCYTES ABSOLUTE: 12.4 K/UL (ref 1–5.1)
LYMPHOCYTES RELATIVE PERCENT: 38 %
MACROCYTES: ABNORMAL
MCH RBC QN AUTO: 29.3 PG (ref 26–34)
MCHC RBC AUTO-ENTMCNC: 32.6 G/DL (ref 31–36)
MCV RBC AUTO: 89.9 FL (ref 80–100)
METAMYELOCYTES RELATIVE PERCENT: 1 %
MONOCYTES ABSOLUTE: 0 K/UL (ref 0–1.3)
MONOCYTES RELATIVE PERCENT: 0 %
NEUTROPHILS ABSOLUTE: 19.4 K/UL (ref 1.7–7.7)
NEUTROPHILS RELATIVE PERCENT: 48 %
OVALOCYTES: ABNORMAL
PDW BLD-RTO: 18.9 % (ref 12.4–15.4)
PLATELET # BLD: 314 K/UL (ref 135–450)
PMV BLD AUTO: 7.1 FL (ref 5–10.5)
POIKILOCYTES: ABNORMAL
POLYCHROMASIA: ABNORMAL
POTASSIUM REFLEX MAGNESIUM: 3.8 MMOL/L (ref 3.5–5.1)
RBC # BLD: 3.3 M/UL (ref 4–5.2)
SCHISTOCYTES: ABNORMAL
SODIUM BLD-SCNC: 134 MMOL/L (ref 136–145)
TOTAL PROTEIN: 5.9 G/DL (ref 6.4–8.2)
WBC # BLD: 31.8 K/UL (ref 4–11)

## 2020-08-12 PROCEDURE — 97530 THERAPEUTIC ACTIVITIES: CPT

## 2020-08-12 PROCEDURE — 97116 GAIT TRAINING THERAPY: CPT

## 2020-08-12 PROCEDURE — 85379 FIBRIN DEGRADATION QUANT: CPT

## 2020-08-12 PROCEDURE — 2580000003 HC RX 258: Performed by: NURSE PRACTITIONER

## 2020-08-12 PROCEDURE — 94761 N-INVAS EAR/PLS OXIMETRY MLT: CPT

## 2020-08-12 PROCEDURE — 6360000002 HC RX W HCPCS: Performed by: INTERNAL MEDICINE

## 2020-08-12 PROCEDURE — C9113 INJ PANTOPRAZOLE SODIUM, VIA: HCPCS | Performed by: INTERNAL MEDICINE

## 2020-08-12 PROCEDURE — 85025 COMPLETE CBC W/AUTO DIFF WBC: CPT

## 2020-08-12 PROCEDURE — 97535 SELF CARE MNGMENT TRAINING: CPT

## 2020-08-12 PROCEDURE — 6360000002 HC RX W HCPCS: Performed by: NURSE PRACTITIONER

## 2020-08-12 PROCEDURE — 97166 OT EVAL MOD COMPLEX 45 MIN: CPT

## 2020-08-12 PROCEDURE — 6370000000 HC RX 637 (ALT 250 FOR IP): Performed by: NURSE PRACTITIONER

## 2020-08-12 PROCEDURE — 6370000000 HC RX 637 (ALT 250 FOR IP): Performed by: INTERNAL MEDICINE

## 2020-08-12 PROCEDURE — 2580000003 HC RX 258: Performed by: INTERNAL MEDICINE

## 2020-08-12 PROCEDURE — 97161 PT EVAL LOW COMPLEX 20 MIN: CPT

## 2020-08-12 PROCEDURE — 94760 N-INVAS EAR/PLS OXIMETRY 1: CPT

## 2020-08-12 PROCEDURE — 99232 SBSQ HOSP IP/OBS MODERATE 35: CPT | Performed by: INTERNAL MEDICINE

## 2020-08-12 PROCEDURE — 1200000000 HC SEMI PRIVATE

## 2020-08-12 PROCEDURE — 80053 COMPREHEN METABOLIC PANEL: CPT

## 2020-08-12 PROCEDURE — 2700000000 HC OXYGEN THERAPY PER DAY

## 2020-08-12 RX ADMIN — Medication 2 G: at 13:32

## 2020-08-12 RX ADMIN — Medication 2 G: at 18:54

## 2020-08-12 RX ADMIN — IRON SUCROSE 200 MG: 20 INJECTION, SOLUTION INTRAVENOUS at 16:27

## 2020-08-12 RX ADMIN — PANTOPRAZOLE SODIUM 40 MG: 40 INJECTION, POWDER, FOR SOLUTION INTRAVENOUS at 09:48

## 2020-08-12 RX ADMIN — Medication 10 ML: at 09:48

## 2020-08-12 RX ADMIN — CEFEPIME 2 G: 2 INJECTION, POWDER, FOR SOLUTION INTRAMUSCULAR; INTRAVENOUS at 09:48

## 2020-08-12 RX ADMIN — Medication 2 G: at 09:48

## 2020-08-12 RX ADMIN — DEXAMETHASONE SODIUM PHOSPHATE 6 MG: 10 INJECTION INTRAMUSCULAR; INTRAVENOUS at 13:32

## 2020-08-12 RX ADMIN — ACETAMINOPHEN 650 MG: 325 TABLET ORAL at 09:49

## 2020-08-12 RX ADMIN — ALLOPURINOL 300 MG: 300 TABLET ORAL at 09:49

## 2020-08-12 RX ADMIN — POTASSIUM CHLORIDE 20 MEQ: 20 TABLET, EXTENDED RELEASE ORAL at 09:49

## 2020-08-12 RX ADMIN — CEFEPIME 2 G: 2 INJECTION, POWDER, FOR SOLUTION INTRAMUSCULAR; INTRAVENOUS at 20:20

## 2020-08-12 RX ADMIN — ATORVASTATIN CALCIUM 40 MG: 40 TABLET, FILM COATED ORAL at 09:49

## 2020-08-12 RX ADMIN — POTASSIUM CHLORIDE 20 MEQ: 20 TABLET, EXTENDED RELEASE ORAL at 20:38

## 2020-08-12 RX ADMIN — ENOXAPARIN SODIUM 30 MG: 30 INJECTION SUBCUTANEOUS at 20:20

## 2020-08-12 RX ADMIN — ENOXAPARIN SODIUM 30 MG: 30 INJECTION SUBCUTANEOUS at 09:48

## 2020-08-12 ASSESSMENT — PAIN SCALES - GENERAL
PAINLEVEL_OUTOF10: 1
PAINLEVEL_OUTOF10: 4
PAINLEVEL_OUTOF10: 0
PAINLEVEL_OUTOF10: 3

## 2020-08-12 ASSESSMENT — PAIN DESCRIPTION - PAIN TYPE: TYPE: ACUTE PAIN

## 2020-08-12 ASSESSMENT — PAIN DESCRIPTION - LOCATION: LOCATION: CHEST;HEAD

## 2020-08-12 NOTE — PROGRESS NOTES
Spoke with [facility] lab about pending COVID PCR. Per [facility] , swab was sent to [facility] but has not been run. [facility]  will send an email to [facility] to inquire why the sample hasn't been run yet.

## 2020-08-12 NOTE — PROGRESS NOTES
ONCOLOGY HEMATOLOGY CARE PROGRESS NOTE      SUBJECTIVE:     The patient states that she feels better and would like to go home. ROS:   The remaining 10 point review of symptoms is unremarkable. OBJECTIVE        Physical    VITALS:  /80   Pulse 83   Temp 98.1 °F (36.7 °C) (Oral)   Resp 16   Ht 5' 3.5\" (1.613 m)   Wt 147 lb 6.4 oz (66.9 kg)   SpO2 92%   BMI 25.70 kg/m²   TEMPERATURE:  Current - Temp: 98.1 °F (36.7 °C);  Max - Temp  Av.2 °F (36.8 °C)  Min: 98.1 °F (36.7 °C)  Max: 98.4 °F (36.9 °C)  PULSE OXIMETRY RANGE: SpO2  Av %  Min: 90 %  Max: 93 %  24HR INTAKE/OUTPUT:      Intake/Output Summary (Last 24 hours) at 2020 0857  Last data filed at 2020 2312  Gross per 24 hour   Intake 165 ml   Output 1925 ml   Net -1760 ml       PE: deferred per covid 19 rule out policy       Data      Recent Labs     08/10/20  0533 20  0530 20  0445   WBC 27.3* 31.2* 31.8*   HGB 12.0 9.7* 9.7*   HCT 36.7 29.7* 29.7*    257 314   MCV 89.4 90.3 89.9        Recent Labs     08/10/20  0533 20  0530 20  0445   * 132* 134*   K 4.3 4.2 3.8   CL 97* 97* 98*   CO2 23 23 25   BUN 9 8 8   CREATININE 0.7 0.7 0.7     Recent Labs     08/10/20  0533 20  0530 20  0445   AST 37 53* 59*   ALT 26 40 53*   BILITOT 0.5 0.4 0.4   ALKPHOS 89 88 86       Magnesium:    Lab Results   Component Value Date    MG 1.50 2020    MG 1.10 2020    MG 1.70 2020         Problem List  Patient Active Problem List   Diagnosis    Acute hyponatremia    N&V (nausea and vomiting)    Leukocytosis    Lung mass    Multiple lung nodules    Hilar adenopathy    Centrilobular emphysema (HCC)    Smoker    Anemia    Sepsis ([address]    Lung cancer ([address]    Hyperlipidemia    2019 novel coronavirus disease (COVID-19)    Hypokalemia    Pulmonary infiltrates    Small cell lung cancer ([address]    Hepatic metastasis ([address]    Acute blood loss anemia    Elevated procalcitonin    Hyponatremia    Former smoker       ASSESSMENT AND PLAN:    COVID-19 positive:  Possible pneumonia also  -Patient is being followed by pulmonary and ID  -She is on cefepime   -She states she feels better today    Extensive stage small cell lung carcinoma:  -Status post 4 cycles of carboplatinum/-16/Tecentriq  -She is due for restaging CTs and if they are improved she will be maintained on Tecentriq  -This is not urgent  -The patient is not neutropenic    CLL:  -No indication for treatment    Anemia:  -She was severely anemic on admission  -She is receiving Venofer  -Hemoglobin prior to admission was over 8  Hemoglobin peaked at 12 several days ago, but for the last 2 days it is been approximately 9.7        ONCOLOGIC DISPOSITION: per IM and ID       Wendy Maldonado MD  May be reached through [address] [place] [address]

## 2020-08-12 NOTE — PROGRESS NOTES
Pulmonary & Critical Care Medicine ICU Progress Note      62-year-old male with history of past history of smoking, COPD/emphysema, CLL, metastatic neuroendocrine lung cancer, COVID-19 infection, acute hypoxemic respiratory failure. Presented to the ER, was found to be severely anemic, received PRBC. The patient received chemotherapy last week, but she denies this, says her chemotherapy was 3 weeks ago. Reportedly the patient's daughter is also COVID positive, lives with her. Testing was done at the [facility]. There is some question about it being a false positive test, further testing has been sent. Events of Last 24 hours: The patient says she feels better, SaO2 is in the low 90s on oxygen at 3 LPM.  She has mild cough, nonproductive. She has mild shortness of breath. She denies chest pain, abdominal pain. She has a poor appetite, but possibly improving. Invasive Lines:   Port R subclavian        IV:    Vitals:  BP (!) 146/82   Pulse 79   Temp 97.9 °F (36.6 °C) (Oral)   Resp 20   Ht 5' 3.5\" (1.613 m)   Wt 147 lb 6.4 oz (66.9 kg)   SpO2 94%   BMI 25.70 kg/m²         Intake/Output Summary (Last 24 hours) at 8/12/2020 1832  Last data filed at 8/12/2020 1332  Gross per 24 hour   Intake 390 ml   Output 1675 ml   Net -1285 ml       EXAM:  Due to the current efforts to prevent transmission of COVID-19 and also the need to preserve PPE for other caregivers, a face-to-face encounter with the patient was not performed. That being said, all relevant records and diagnostic tests were reviewed, including laboratory results and imaging. Please reference any relevant documentation elsewhere. Care will be coordinated with the primary service.       Medications:  Scheduled Meds:   sodium chloride flush  10 mL Intravenous 2 times per day    enoxaparin  30 mg Subcutaneous BID    dexamethasone  6 mg Intravenous Q24H    pantoprazole  40 mg Intravenous Daily    sodium chloride  20 mL Intravenous Once    cefepime  2 g Intravenous Q12H    allopurinol  300 mg Oral Daily    atorvastatin  40 mg Oral Daily    potassium chloride  20 mEq Oral BID    sodium chloride  2 g Oral TID WC       PRN Meds:  sodium chloride flush, acetaminophen **OR** acetaminophen, albuterol, polyethylene glycol, promethazine **OR** ondansetron    Results:  CBC:   Recent Labs     08/10/20  0533 08/11/20  0530 08/12/20  0445   WBC 27.3* 31.2* 31.8*   HGB 12.0 9.7* 9.7*   HCT 36.7 29.7* 29.7*   MCV 89.4 90.3 89.9    257 314     BMP:   Recent Labs     08/10/20  0533 08/11/20  0530 08/12/20  0445   * 132* 134*   K 4.3 4.2 3.8   CL 97* 97* 98*   CO2 23 23 25   BUN 9 8 8   CREATININE 0.7 0.7 0.7     LIVER PROFILE:   Recent Labs     08/10/20  0533 08/11/20  0530 08/12/20  0445   AST 37 53* 59*   ALT 26 40 53*   BILITOT 0.5 0.4 0.4   ALKPHOS 89 88 86       Cultures:  Negative so far    Films:  CXR 8/7/2020 reviewed by me       Assessment and plan:  Acute respiratory failure. On oxygen 3 LPM, relatively stable  COVID-19 infection. Followed by ID, on Decadron. Elevated CRP, d-dimer and LD. High risk for deterioration of status, monitor closely. She feels better today, oxygen requirement has not significantly increased. D-dimer had decreased, increased, and is down again. ? Pneumonia. On vancomycin and cefepime. Procalcitonin elevated, could be confounded by lung cancer. ID following  Metastatic small cell lung cancer. Being followed by oncology  COPD/centrilobular emphysema. On PRN albuterol  Leukocytosis. Probably due to infection and steroid. Appears stable for now, discussed with Dr. Emily Rodriguez and Dr. Nora Mike. Will hold off escalating treatment for COVID 19 for now. Repeat still pending  Anemia. Transfused, H&H slightly lower  Hyponatremia. Likely due to small cell lung cancer. Slightly better  Hyperlipidemia. Per IM  PUD prophylaxis. Protonix  DVT prophylaxis.   On Lovenox 30 mg twice daily      Multidisciplinary rounds were completed at the bedside with participation from the intensivist, pharmacy, nursing, nutrition. All labs and imaging studies review, discussed with patient.       Electronically signed by:  Jing Butt MD    8/12/2020    6:32 PM.

## 2020-08-12 NOTE — PROGRESS NOTES
Infectious Disease Follow up Notes    CC :  COVID-19      Antibiotics:   Cefepime 2g q12 - d6, started 8/7/20  IV decadron     Admit Date:   8/7/2020  Hospital Day: 6    Subjective:   AF, on 3L NC, SpO2 92-96%. Dropped to 1.5L with SpO2 92-94%  She says she feels better, wants to go home. Still coughing, not expectorating much. PO intake improved. Headache denied. Objective:     Patient Vitals for the past 8 hrs:   BP Temp Temp src Pulse Resp SpO2 Weight   08/12/20 0515 -- -- -- -- -- -- 147 lb 6.4 oz (66.9 kg)   08/12/20 0400 134/80 98.1 °F (36.7 °C) Oral 83 16 92 % --       Alert, oriented, no acute distress  Pale. MMM  Few basilar rales. No wheezing.   Infrequent cough  RRR  Abd soft, flat, NT   Port R chest accessed, site looks ok   No focal rash        Scheduled Meds:   iron sucrose (VENOFER) iv piggyback 100 mL (Admin over 60 minutes)  200 mg Intravenous Q24H    sodium chloride flush  10 mL Intravenous 2 times per day    enoxaparin  30 mg Subcutaneous BID    dexamethasone  6 mg Intravenous Q24H    pantoprazole  40 mg Intravenous Daily    sodium chloride  20 mL Intravenous Once    cefepime  2 g Intravenous Q12H    allopurinol  300 mg Oral Daily    atorvastatin  40 mg Oral Daily    potassium chloride  20 mEq Oral BID    sodium chloride  2 g Oral TID WC          Data Review:    Lab Results   Component Value Date    WBC 31.8 (H) 08/12/2020    HGB 9.7 (L) 08/12/2020    HCT 29.7 (L) 08/12/2020    MCV 89.9 08/12/2020     08/12/2020     Lab Results   Component Value Date    CREATININE 0.7 08/12/2020    BUN 8 08/12/2020     (L) 08/12/2020    K 3.8 08/12/2020    CL 98 (L) 08/12/2020    CO2 25 08/12/2020       Hepatic Function Panel:   Lab Results   Component Value Date    ALKPHOS 86 08/12/2020    ALT 53 08/12/2020    AST 59 08/12/2020    PROT 5.9 08/12/2020    BILITOT 0.4 08/12/2020    LABALBU 2.8 08/12/2020 8/8 8/9 8/10 8/11 8/12   procal   1.8 - - 0.53  Ferritin  979 1232 1624 2772  ddimer  1208 2255 1384 2561 2346  CRP   213 128 149        MICRO:  8/8 BC x2 neg   MRSA screen neg    Legionella and pneumococcal ag neg    COVID PCR pending       IMAGING:  CXR 8/7/20   Impression    Bibasilar airspace disease greater right lower lobe compatible with    pneumonia. Keshia Spring is given history of COVID-19 positivity. CXR 8/11/20   Impression    Multifocal bilateral airspace disease has increased, compatible with    patient's history of pneumonia. Assessment:     Patient Active Problem List    Diagnosis Date Noted    Pulmonary infiltrates 08/08/2020    Small cell lung cancer ([facility] 08/08/2020    Hepatic metastasis ([facility] 08/08/2020    Acute blood loss anemia 08/08/2020    Elevated procalcitonin 08/08/2020    Hyponatremia 08/08/2020    Former smoker 08/08/2020    Anemia 08/07/2020    Sepsis ([facility] 08/07/2020    Lung cancer ([facility] [facility] 08/07/2020    Hyperlipidemia 08/07/2020    2019 novel coronavirus disease (COVID-19) 08/07/2020    Hypokalemia 08/07/2020    Lung mass     Multiple lung nodules     Hilar adenopathy     Centrilobular emphysema (HCC)     Smoker     Acute hyponatremia 05/04/2020    N&V (nausea and vomiting) 05/04/2020    Leukocytosis 05/04/2020       CLL  Extensive stage SCLC. Last received carboplatin and -16 7/20-7/22/0    Admitted 8/7/20 with fever, cough, SOB  +COVID test as outpt prior to admission on 8/6/20, repeat PCR collected 8/8/20 and pending     Hypoxemic respiratory failure - stable     Possible concomitant bacterial pneumonia  Elevated procal on admission      Persistently elevated WBC, chronic, in the context of CLL     Severe anemia on admission hgb was 4.8  Stable post-transfusion     NKDA     Plan:   Clinically seems to be improved though CXR yesterday was worse   See if we can wean oxygen further, stable at 1.5L currently  Continue decadron.   Hold off on any further COVID-specific therapy at this time  RN to follow-up with reference lab re pending COVID PCR   Continuing cefepime as ordered for now.   Follow trend inflammatory markers, procal       D/w RN Reyes Morale, MD  Phone: [phone]   Fax : [phone]

## 2020-08-12 NOTE — PROGRESS NOTES
Physical Therapy    Facility/Department: [facility] C2 CARD TELEMETRY  Initial Assessment, Treatment, and Discharge Summary    NAME: Lakshmi Interiano  : 1945  MRN: [id number]    Date of Service: 2020    Discharge Recommendations:  Home with assist PRN   PT Equipment Recommendations  Equipment Needed: No    Assessment   Assessment: Pt is 76 yo female who presents with diagnosis of anemia. COVID rule out. Pt indep with mobility this date and declines concerns about mobility at home. States she is at her baseline. Will d/c from acute PT. Prognosis: Good  Decision Making: Low Complexity  PT Education: Goals; General Safety;Gait Training;PT Role;Disease Specific Education;Plan of Care;Home Exercise Program;Transfer Training;Energy Conservation; Functional Mobility Training  Patient Education: Edcuated on importance of mobility and seated ther ex -- pt verbalized understanding and states she knows all her seated exercises. Barriers to Learning: none  No Skilled PT: At baseline function  REQUIRES PT FOLLOW UP: No  Activity Tolerance  Activity Tolerance: Patient Tolerated treatment well  Activity Tolerance: At rest HR 80, SpO2 92% on 2.5L, /73; Post gait training HR 87; SPO2 88% on 2.5L; recovered to 92% seated in chair with cues for pursed lip breathing       Patient Diagnosis(es): The primary encounter diagnosis was COVID-19 virus infection. Diagnoses of Septicemia ([facility] Anemia, unspecified type, and Hypokalemia were also pertinent to this visit. has a past medical history of Cancer ([facility] [address] Hyperlipidemia, and Lung cancer ([facility] [address] has a past surgical history that includes Breast surgery; Skin cancer excision; bronchoscopy (N/A, 2020); bronchoscopy (2020); bronchoscopy (2020); bronchoscopy (2020); and Port Surgery (N/A, 2020).     Restrictions  Restrictions/Precautions  Restrictions/Precautions: General Precautions, Fall Risk  Position Activity Restriction  Other position/activity restrictions: up with assist; 2.5L O2  Vision/Hearing  Vision: Impaired  Vision Exceptions: Wears glasses at all times  Hearing: Within functional limits     Subjective  General  Chart Reviewed: Yes  Patient assessed for rehabilitation services?: Yes  Additional Pertinent Hx: History of lung CA and undergoing chemo  Response To Previous Treatment: Not applicable  Family / Caregiver Present: No  Referring Practitioner: Dr. Cam Lyn MD  Referral Date : 08/12/20  Diagnosis: Anemia  Follows Commands: Within Functional Limits  General Comment  Comments: Pt resting in bed on approach; RN cleared pt for therapy  Subjective  Subjective: pt agreeable to therapy  Pain Screening  Patient Currently in Pain: Yes          Orientation  Orientation  Overall Orientation Status: Within Functional Limits  Social/Functional History  Social/Functional History  Lives With: Daughter  Type of Home: (Condo)  Home Layout: Two level, Bed/Bath upstairs(~7 steps to main level from ground level. Once on main level then no stairs)  Home Access: Stairs to enter with rails  Entrance Stairs - Number of Steps: 7  Bathroom Shower/Tub: Walk-in shower  Bathroom Equipment: Shower chair  Home Equipment: 4 wheeled walker  ADL Assistance: Needs assistance(daughter assists with bathing and dressing)  Homemaking Responsibilities: No  Ambulation Assistance: Independent(with [id number])  Transfer Assistance: Independent    Objective          AROM RLE (degrees)  RLE AROM: WFL  AROM LLE (degrees)  LLE AROM : WFL  Strength RLE  Strength RLE: WFL  Strength LLE  Strength LLE: WFL     Sensation  Overall Sensation Status: WFL  Bed mobility  Supine to Sit: Modified independent  Sit to Supine: Unable to assess(pt up in chair at end of session)     Transfers  Sit to Stand: Independent  Stand to sit:  Independent     Ambulation  Ambulation?: Yes  Ambulation 1  Surface: level tile  Device: No Device  Assistance: Independent  Quality of Gait: Pt will appropriate gait pattern, no LOB  Distance: 75 ft  Comments: Limited d/t droplet plus precautions     Balance  Sitting - Static: Good  Sitting - Dynamic: Good  Standing - Static: Good  Standing - Dynamic: Good        Plan   Plan  Times per week: d/c acute PT  Safety Devices  Type of devices:  All fall risk precautions in place, Call light within reach, Chair alarm in place, Gait belt, Nurse notified, Left in chair                                   AM-PAC Score  AM-PAC Inpatient Mobility Raw Score : 24 (08/12/20 1605)  AM-PAC Inpatient T-Scale Score : 61.14 (08/12/20 1605)  Mobility Inpatient CMS 0-100% Score: 0 (08/12/20 1605)  Mobility Inpatient CMS G-Code Modifier : [address] (08/12/20 1605)          Goals  Short term goals  Time Frame for Short term goals: 1 PT session  Short term goal 1: Pt will be indep with ambulation 50 ft -- GOAL MET 8/12  Patient Goals   Patient goals : \"to go home\"       Therapy Time   Individual Concurrent Group Co-treatment   Time In 8044         Time Out 1518         Minutes 23         Timed Code Treatment Minutes: [id number] [address] [facility]

## 2020-08-12 NOTE — PROGRESS NOTES
Hospitalist Progress Note    Date of Admission: 8/7/2020    Chief Complaint: Nausea/Vomiting    Hospital Course:  77 y.o. female who presented to [facility] with c/o weakness fatigue. Ms Renetta Medel was recently diagnosed with neuroendocrine ca with liver and lung mets. She follows with [facility] s/p carboplatin and -16. She reports increase WOB, fatigue and general malaise. She tested +for COVID and came to the ED for further eval. Upon arrival, she was noted to have an h/h of 4.8/14, WBC of 25 dioni k of 2.9. Her temp was 100.9    Subjective: She reports no significant shortness of breath. She has mild cough, wet at times. Minimal oxygen requirement although seems to be worse with activity. Did well with therapy evaluation. Labs:   Recent Labs     08/10/20  0533 08/11/20  0530 08/12/20  0445   WBC 27.3* 31.2* 31.8*   HGB 12.0 9.7* 9.7*   HCT 36.7 29.7* 29.7*    257 314     Recent Labs     08/10/20  0533 08/11/20  0530 08/12/20  0445   * 132* 134*   K 4.3 4.2 3.8   CL 97* 97* 98*   CO2 23 23 25   BUN 9 8 8   CREATININE 0.7 0.7 0.7   CALCIUM 8.5 8.4 8.3     Recent Labs     08/10/20  0533 08/11/20  0530 08/12/20  0445   AST 37 53* 59*   ALT 26 40 53*   BILITOT 0.5 0.4 0.4   ALKPHOS 89 88 86     No results for input(s): INR in the last 72 hours. Physical Exam Performed:    BP (!) 147/82   Pulse 82   Temp 98.2 °F (36.8 °C) (Oral)   Resp 18   Ht 5' 3.5\" (1.613 m)   Wt 147 lb 6.4 oz (66.9 kg)   SpO2 92%   BMI 25.70 kg/m²   General appearance:  Ill appearing female lying in bed, No apparent distress, appears stated age and cooperative. HEENT:  Normal cephalic, atraumatic without obvious deformity. Pupils equal, round, and reactive to light. Extra ocular muscles intact. Conjunctivae/corneas clear. Neck: Supple, with full range of motion. No jugular venous distention. Trachea midline. Respiratory:  Normal respiratory effort. Diminished with crackles.     Cardiovascular:  Regular rate GENERAL;  Code Status: Full Code  PT/OT Eval Status: Pending clinical course    Dispo -continue droplet plus isolation. Possible discharge home in next 2 days if able to wean off oxygen.     Laurie Littlejohn MD

## 2020-08-12 NOTE — PLAN OF CARE
Problem: Falls - Risk of:  Goal: Will remain free from falls  Description: Will remain free from falls  Outcome: Ongoing  Goal: Absence of physical injury  Description: Absence of physical injury  Outcome: Ongoing     Problem: Gas Exchange - Impaired:  Goal: Levels of oxygenation will improve  Description: Levels of oxygenation will improve  Outcome: Ongoing  Note: Attempting to titrate oxygen down from 3L.      Problem: Pain:  Goal: Pain level will decrease  Description: Pain level will decrease  Outcome: Ongoing  Goal: Control of acute pain  Description: Control of acute pain  Outcome: Ongoing

## 2020-08-12 NOTE — PROGRESS NOTES
Occupational Therapy   Occupational Therapy Initial Assessment and Treatment Note 1x  Date: 2020   Patient Name: Praveena Steen  MRN: [id number]     : 1945    Date of Service: 2020    Discharge Recommendations:  24 hour supervision or assist     Assessment   Assessment: OT eval completed. Pt demonstrated ability to perform basic self-care and transfers at Ind level. O2 sats did decrease with 2.5LO2 to 84% but improved to 92%. No further OT needed in acute care. Decision Making: Medium Complexity  OT Education: OT Role  Patient Education: Disease specific ed:  energy conservation for ADLs and PLB. Pt reported understanding of instructions. REQUIRES OT FOLLOW UP: No  Activity Tolerance  Activity Tolerance: Patient Tolerated treatment well  Activity Tolerance: 2.5LO2, O2 sats decreased to 84% but improved with seated rest break to 92%  Safety Devices  Safety Devices in place: Yes  Type of devices: Nurse notified;Gait belt;Call light within reach; Left in chair;Chair alarm in place         Patient Diagnosis(es): The primary encounter diagnosis was COVID-19 virus infection. Diagnoses of Septicemia ([address] Anemia, unspecified type, and Hypokalemia were also pertinent to this visit. has a past medical history of Cancer ([address] Hyperlipidemia, and Lung cancer ([address] has a past surgical history that includes Breast surgery; Skin cancer excision; bronchoscopy (N/A, 2020); bronchoscopy (2020); bronchoscopy (2020); bronchoscopy (2020); and Port Surgery (N/A, 2020).        Restrictions  Restrictions/Precautions  Restrictions/Precautions: General Precautions, Fall Risk, Isolation  Position Activity Restriction  Other position/activity restrictions: up with assist; 2.5L O2, droplet plus, COVID r/o    Subjective   General  Chart Reviewed: Yes, Progress Notes, History and Physical  Patient assessed for rehabilitation services?: Yes  Additional Pertinent Hx: hx lung cancer  Family / Caregiver Present: No  Referring Practitioner: Kahlil Grissom  Diagnosis: anemia  Subjective  Subjective: Pt agreeable to OT  General Comment  Comments: RN approved therapy  Patient Currently in Pain: Denies  Pain Assessment  Pain Assessment: 0-10  Pain Level: 1  Vital Signs  Temp: 97.9 °F (36.6 °C)  Temp Source: Oral  Pulse: 79  Heart Rate Source: Monitor  Resp: 20  BP: (!) 146/82  BP Location: Right upper arm  MAP (mmHg): 101  Level of Consciousness: Alert  MEWS Score: 1  Patient Currently in Pain: Denies  Oxygen Therapy  SpO2: 94 %  O2 Device: Nasal cannula  O2 Flow Rate (L/min): 3 L/min(Titrated to 2L)  Social/Functional History  Social/Functional History  Lives With: Daughter  Type of Home: ([employer])  Home Layout: Two level, Bed/Bath upstairs(~7 steps to main level from ground level. Once on main level then no stairs)  Home Access: Stairs to enter with rails  Entrance Stairs - Number of Steps: 7  Bathroom Shower/Tub: Walk-in shower  Bathroom Equipment: Shower chair  Home Equipment: 4 wheeled walker  ADL Assistance: Needs assistance(daughter assists with bathing and dressing)  Homemaking Responsibilities: No  Ambulation Assistance: Independent(with [id number])  Transfer Assistance: Independent     Objective   Vision: Impaired  Vision Exceptions: Wears glasses at all times  Hearing: Within functional limits    Orientation  Overall Orientation Status: Within Functional Limits     Balance  Sitting Balance: Independent  Standing Balance: Independent  Functional Mobility  Functional - Mobility Device: No device  Activity: (in room from one side of bed to otherside where bsc was placed)  Assist Level: Independent  Toilet Transfers  Toilet - Technique: Ambulating  Equipment Used: Extra wide bedside commode(bsc used d/t O2 line)  Toilet Transfer: Independent  ADL  Grooming: Independent  UE Dressing: Supervision; Independent  LE Dressing: Supervision; Independent  Tone RUE  RUE Tone: Normotonic  Tone LUE  LUE Tone: Normotonic  Coordination  Movements Are Fluid And Coordinated: Yes     Bed mobility  Supine to Sit: Modified independent  Transfers  Stand Pivot Transfers: Independent  Sit to stand: Independent  Stand to sit: Independent     Cognition  Overall Cognitive Status: WNL     Sensation  Overall Sensation Status: WFL      LUE AROM (degrees)  LUE AROM : WFL  RUE AROM (degrees)  RUE AROM : WFL  LUE Strength  Gross LUE Strength: WFL  RUE Strength  Gross RUE Strength: WFL          AM-PAC Score      AM-PAC Inpatient Daily Activity Raw Score: 23 (08/12/20 1642)  AM-PAC Inpatient ADL T-Scale Score : 51.12 (08/12/20 1642)  ADL Inpatient CMS 0-100% Score: 15.86 (08/12/20 1642)  ADL Inpatient CMS G-Code Modifier : CI (08/12/20 1642)    Goals  Short term goals  Time Frame for Short term goals: 1x  Short term goal 1: Demo Ind level of function with basic ADLs. Goal met 8/12  Short term goal 2: Perform functional mobility at Ind level. Goal met 8/12     Therapy Time   Individual Concurrent Group Co-treatment   Time In 1449         Time Out 1523         Minutes 34         Timed Code Treatment Minutes: 24 Minutes(10 min eval)    If pt is discharged prior to next OT session, this note will serve as the discharge summary.   Allison May OT

## 2020-08-13 LAB
A/G RATIO: 0.8 (ref 1.1–2.2)
ALBUMIN SERPL-MCNC: 2.6 G/DL (ref 3.4–5)
ALP BLD-CCNC: 92 U/L (ref 40–129)
ALT SERPL-CCNC: 46 U/L (ref 10–40)
ANION GAP SERPL CALCULATED.3IONS-SCNC: 9 MMOL/L (ref 3–16)
AST SERPL-CCNC: 51 U/L (ref 15–37)
BILIRUB SERPL-MCNC: 0.4 MG/DL (ref 0–1)
BUN BLDV-MCNC: 8 MG/DL (ref 7–20)
CALCIUM SERPL-MCNC: 7.5 MG/DL (ref 8.3–10.6)
CHLORIDE BLD-SCNC: 97 MMOL/L (ref 99–110)
CO2: 24 MMOL/L (ref 21–32)
CREAT SERPL-MCNC: 0.6 MG/DL (ref 0.6–1.2)
FERRITIN: 4246 NG/ML (ref 15–150)
GFR AFRICAN AMERICAN: >60
GFR NON-AFRICAN AMERICAN: >60
GLOBULIN: 3.1 G/DL
GLUCOSE BLD-MCNC: 88 MG/DL (ref 70–99)
HCT VFR BLD CALC: 27.8 % (ref 36–48)
HEMOGLOBIN: 9.1 G/DL (ref 12–16)
MCH RBC QN AUTO: 29.1 PG (ref 26–34)
MCHC RBC AUTO-ENTMCNC: 32.6 G/DL (ref 31–36)
MCV RBC AUTO: 89.3 FL (ref 80–100)
PDW BLD-RTO: 18.5 % (ref 12.4–15.4)
PLATELET # BLD: 297 K/UL (ref 135–450)
PMV BLD AUTO: 7.1 FL (ref 5–10.5)
POTASSIUM REFLEX MAGNESIUM: 3.9 MMOL/L (ref 3.5–5.1)
POTASSIUM SERPL-SCNC: 3.9 MMOL/L (ref 3.5–5.1)
PROCALCITONIN: 0.5 NG/ML (ref 0–0.15)
RBC # BLD: 3.12 M/UL (ref 4–5.2)
REASON FOR REJECTION: NORMAL
REJECTED TEST: NORMAL
SARS-COV-2, NAAT: DETECTED
SODIUM BLD-SCNC: 130 MMOL/L (ref 136–145)
TOTAL PROTEIN: 5.7 G/DL (ref 6.4–8.2)
WBC # BLD: 31.9 K/UL (ref 4–11)

## 2020-08-13 PROCEDURE — 82728 ASSAY OF FERRITIN: CPT

## 2020-08-13 PROCEDURE — 80053 COMPREHEN METABOLIC PANEL: CPT

## 2020-08-13 PROCEDURE — U0002 COVID-19 LAB TEST NON-CDC: HCPCS

## 2020-08-13 PROCEDURE — 6370000000 HC RX 637 (ALT 250 FOR IP): Performed by: INTERNAL MEDICINE

## 2020-08-13 PROCEDURE — 6360000002 HC RX W HCPCS: Performed by: INTERNAL MEDICINE

## 2020-08-13 PROCEDURE — 85027 COMPLETE CBC AUTOMATED: CPT

## 2020-08-13 PROCEDURE — 99232 SBSQ HOSP IP/OBS MODERATE 35: CPT | Performed by: INTERNAL MEDICINE

## 2020-08-13 PROCEDURE — 2580000003 HC RX 258: Performed by: INTERNAL MEDICINE

## 2020-08-13 PROCEDURE — 2580000003 HC RX 258: Performed by: NURSE PRACTITIONER

## 2020-08-13 PROCEDURE — 6370000000 HC RX 637 (ALT 250 FOR IP): Performed by: NURSE PRACTITIONER

## 2020-08-13 PROCEDURE — C9113 INJ PANTOPRAZOLE SODIUM, VIA: HCPCS | Performed by: INTERNAL MEDICINE

## 2020-08-13 PROCEDURE — 2700000000 HC OXYGEN THERAPY PER DAY

## 2020-08-13 PROCEDURE — 84145 PROCALCITONIN (PCT): CPT

## 2020-08-13 PROCEDURE — 94761 N-INVAS EAR/PLS OXIMETRY MLT: CPT

## 2020-08-13 PROCEDURE — 6360000002 HC RX W HCPCS: Performed by: NURSE PRACTITIONER

## 2020-08-13 PROCEDURE — 1200000000 HC SEMI PRIVATE

## 2020-08-13 RX ADMIN — ATORVASTATIN CALCIUM 40 MG: 40 TABLET, FILM COATED ORAL at 09:35

## 2020-08-13 RX ADMIN — ALLOPURINOL 300 MG: 300 TABLET ORAL at 09:34

## 2020-08-13 RX ADMIN — Medication 2 G: at 09:34

## 2020-08-13 RX ADMIN — ENOXAPARIN SODIUM 30 MG: 30 INJECTION SUBCUTANEOUS at 20:54

## 2020-08-13 RX ADMIN — DEXAMETHASONE 10 MG: 4 TABLET ORAL at 09:34

## 2020-08-13 RX ADMIN — Medication 2 G: at 17:31

## 2020-08-13 RX ADMIN — POTASSIUM CHLORIDE 20 MEQ: 20 TABLET, EXTENDED RELEASE ORAL at 20:53

## 2020-08-13 RX ADMIN — Medication 10 ML: at 09:36

## 2020-08-13 RX ADMIN — POTASSIUM CHLORIDE 20 MEQ: 20 TABLET, EXTENDED RELEASE ORAL at 09:35

## 2020-08-13 RX ADMIN — Medication 2 G: at 13:00

## 2020-08-13 RX ADMIN — ENOXAPARIN SODIUM 30 MG: 30 INJECTION SUBCUTANEOUS at 09:35

## 2020-08-13 RX ADMIN — CEFEPIME 2 G: 2 INJECTION, POWDER, FOR SOLUTION INTRAMUSCULAR; INTRAVENOUS at 09:35

## 2020-08-13 RX ADMIN — Medication 10 ML: at 20:54

## 2020-08-13 RX ADMIN — ACETAMINOPHEN 650 MG: 325 TABLET ORAL at 03:25

## 2020-08-13 RX ADMIN — PANTOPRAZOLE SODIUM 40 MG: 40 INJECTION, POWDER, FOR SOLUTION INTRAVENOUS at 09:35

## 2020-08-13 ASSESSMENT — PAIN SCALES - GENERAL
PAINLEVEL_OUTOF10: 0
PAINLEVEL_OUTOF10: 4

## 2020-08-13 NOTE — PROGRESS NOTES
Pulmonary & Critical Care Medicine ICU Progress Note      52-year-old male with history of past history of smoking, COPD/emphysema, CLL, metastatic neuroendocrine lung cancer, COVID-19 infection, acute hypoxemic respiratory failure. Presented to the ER, was found to be severely anemic, received PRBC. The patient received chemotherapy last week, but she denies this, says her chemotherapy was 3 weeks ago. Reportedly the patient's daughter is also COVID positive, lives with her. Testing was done at the [facility]. There is some question about it being a false positive test, further testing has been sent. Events of Last 24 hours: The patient says she feels better, SaO2 is in the low 90s, oxygen fluctuating between 2.5 and 4 LPM.  She has mild cough, nonproductive. She has mild shortness of breath. She denies chest pain, abdominal pain. She has a and improved appetite, denies GI or  symptoms. Invasive Lines:   Port R subclavian        IV:    Vitals:  BP (!) 155/87   Pulse 68   Temp 97.8 °F (36.6 °C) (Oral)   Resp 14   Ht 5' 3.5\" (1.613 m)   Wt 147 lb 6.4 oz (66.9 kg)   SpO2 93%   BMI 25.70 kg/m²         Intake/Output Summary (Last 24 hours) at 8/13/2020 0806  Last data filed at 8/13/2020 0428  Gross per 24 hour   Intake 750 ml   Output 1650 ml   Net -900 ml       EXAM:  Due to the current efforts to prevent transmission of COVID-19 and also the need to preserve PPE for other caregivers, a face-to-face encounter with the patient was not performed. That being said, all relevant records and diagnostic tests were reviewed, including laboratory results and imaging. Please reference any relevant documentation elsewhere. Care will be coordinated with the primary service.       Medications:  Scheduled Meds:   sodium chloride flush  10 mL Intravenous 2 times per day    enoxaparin  30 mg Subcutaneous BID    dexamethasone  6 mg Intravenous Q24H    pantoprazole  40 mg Intravenous Daily    sodium chloride  20 mL Intravenous Once    cefepime  2 g Intravenous Q12H    allopurinol  300 mg Oral Daily    atorvastatin  40 mg Oral Daily    potassium chloride  20 mEq Oral BID    sodium chloride  2 g Oral TID WC       PRN Meds:  sodium chloride flush, acetaminophen **OR** acetaminophen, albuterol, polyethylene glycol, promethazine **OR** ondansetron    Results:  CBC:   Recent Labs     08/11/20 0530 08/12/20 0445 08/13/20  0545   WBC 31.2* 31.8* 31.9*   HGB 9.7* 9.7* 9.1*   HCT 29.7* 29.7* 27.8*   MCV 90.3 89.9 89.3    314 297     BMP:   Recent Labs     08/11/20 0530 08/12/20 0445 08/13/20  0338   * 134* 130*   K 4.2 3.8 3.9  3.9   CL 97* 98* 97*   CO2 23 25 24   BUN 8 8 8   CREATININE 0.7 0.7 0.6     LIVER PROFILE:   Recent Labs     08/11/20 0530 08/12/20 0445 08/13/20  0338   AST 53* 59* 51*   ALT 40 53* 46*   BILITOT 0.4 0.4 0.4   ALKPHOS 88 86 92       Cultures:  Negative so far    Films:  CXR 8/7/2020 reviewed by me       Assessment and plan:  Acute respiratory failure. On oxygen 2 LPM this afternoon, relatively stable  COVID-19 infection. Followed by ID, on Decadron, dose increased by ID. Elevated CRP, d-dimer and LD. High risk for deterioration, monitor closely. She feels better for the last 2 days, oxygen requirement has not significantly increased. D-dimer had decreased, increased, and is down again. Repeat COVID test not back yet  ? Pneumonia. On cefepime. Procalcitonin elevated, could be confounded by lung cancer. ID following  Metastatic small cell lung cancer. Being followed by oncology  COPD/centrilobular emphysema. On PRN albuterol  Leukocytosis. Probably due to infection and steroid. Appears stable for now, discussed with Dr. Surinder Vann. Will hold off escalating treatment for COVID 19 for now. Repeat still pending  Anemia. Transfused, H&H slightly lower  Hyponatremia. Likely due to small cell lung cancer. Slightly worse today  Elevated liver enzymes.

## 2020-08-13 NOTE — PROGRESS NOTES
Hospitalist Progress Note    Date of Admission: 8/7/2020    Chief Complaint: Nausea/Vomiting    Hospital Course:  77 y.o. female who presented to [facility] with c/o weakness fatigue. Ms Abdirahman Sethi was recently diagnosed with neuroendocrine ca with liver and lung mets. She follows with [facility] s/p carboplatin and -16. She reports increase WOB, fatigue and general malaise. She tested +for COVID and came to the ED for further eval. Upon arrival, she was noted to have an h/h of 4.8/14, WBC of 25 dioni k of 2.9. Her temp was 100.9    Subjective: She continues to improve. She denies any significant shortness of breath. Minimal cough. Ongoing minimal oxygen requirement. She is anxious to discharge home. Labs:   Recent Labs     08/11/20 0530 08/12/20 0445 08/13/20  0545   WBC 31.2* 31.8* 31.9*   HGB 9.7* 9.7* 9.1*   HCT 29.7* 29.7* 27.8*    314 297     Recent Labs     08/11/20 0530 08/12/20 0445 08/13/20  0338   * 134* 130*   K 4.2 3.8 3.9  3.9   CL 97* 98* 97*   CO2 23 25 24   BUN 8 8 8   CREATININE 0.7 0.7 0.6   CALCIUM 8.4 8.3 7.5*     Recent Labs     08/11/20 0530 08/12/20 0445 08/13/20  0338   AST 53* 59* 51*   ALT 40 53* 46*   BILITOT 0.4 0.4 0.4   ALKPHOS 88 86 92     No results for input(s): INR in the last 72 hours. Physical Exam Performed:    BP (!) 150/95   Pulse 76   Temp 97.6 °F (36.4 °C) (Oral)   Resp 16   Ht 5' 3.5\" (1.613 m)   Wt 147 lb 6.4 oz (66.9 kg)   SpO2 93%   BMI 25.70 kg/m²   General appearance:  Ill appearing female lying in bed, No apparent distress, appears stated age and cooperative. HEENT:  Normal cephalic, atraumatic without obvious deformity. Pupils equal, round, and reactive to light. Extra ocular muscles intact. Conjunctivae/corneas clear. Neck: Supple, with full range of motion. No jugular venous distention. Trachea midline. Respiratory:  Normal respiratory effort. Diminished with crackles.     Cardiovascular:  Regular rate and rhythm with normal S1/S2 without murmurs, rubs or gallops. Abdomen: Soft, non-tender, non-distended with normal bowel sounds. Musculoskeletal:  No clubbing, cyanosis or edema bilaterally. Full range of motion without deformity. Skin: Pallor,  texture, turgor normal.  No rashes or lesions. Neurologic:  Neurovascularly intact without any focal sensory/motor deficits. Cranial nerves: II-XII intact, grossly non-focal.  Psychiatric:  Alert and oriented, thought content appropriate, normal insight  Capillary Refill: Brisk,< 3 seconds   Peripheral Pulses: +2 palpable, equal bilaterally     Assessment/Plan:    Active Hospital Problems    Diagnosis    Pulmonary infiltrates [R91.8]    Small cell lung cancer (HCC) [C34.90]    Hepatic metastasis (HCC) [C78.7]    Acute blood loss anemia [D62]    Elevated procalcitonin [R79.89]    Hyponatremia [E87.1]    Former smoker [Z87.891]    Anemia [D64.9]    Sepsis ([facility] [A41.9]    Lung cancer ([facility] [C34.90]    Hyperlipidemia [E78.5]    2019 novel coronavirus disease (COVID-19) [U07.1]    Hypokalemia [E87.6]    Hilar adenopathy [R59.0]    Centrilobular emphysema (HCC) [J43.2]    N&V (nausea and vomiting) [R11.2]    Leukocytosis [D72.829]     Acute blood loss anemia, likely chemo induced, improving. S/P 6 units PRBC at admission. Hemoglobin dramatically improved and remains at stable level. Monitor. COVID 19 infection  Minimal symptoms. Noted to have positive testing as an outpatient from the [address] [place] [address]. Formal PCR testing is still pending here at the hospital.  A rapid COVID test was collected on 8/13/2020 which was positive however. Continue empiric Decadron with Lovenox PPX (given sig elevation of d dimer and increased r/f prothrombotic state)  ID following  Continue Cefepime alone for now     Neuroendocrine Ca with lung and liver mets, CLL  Heme/onc consulted   completed C4 carbo/VP16/tecentriq 7/20-7/22     COPD: Stable.   Continue bronchodilators. Hypokalemia, resolved. Monitor and replete as needed      DVT Prophylaxis: Lovenox BID   Diet: DIET GENERAL;  Code Status: Full Code  PT/OT Eval Status: Pending clinical course    Dispo -continue droplet plus isolation. Possible discharge home in next 1-2 days, most likely require home oxygen.      Caron Gilbert MD

## 2020-08-13 NOTE — PROGRESS NOTES
Infectious Disease Follow up Notes    CC :  COVID-19      Antibiotics:   Cefepime 2g q12 - d7, started 8/7/20  IV decadron     Admit Date:   8/7/2020  Hospital Day: 7    Subjective:   Had a low grade temperature overnight 99.2, currently afebrile. On 3L NC. \"I feel great. \"  More active in the room, eating more. Kayli Alfonso for discharge but understanding need to stay. Objective:     Patient Vitals for the past 8 hrs:   BP Temp Temp src Pulse Resp SpO2   08/13/20 0947 (!) 143/82 -- -- 74 16 94 %   08/13/20 0801 (!) 158/109 97.6 °F (36.4 °C) Oral 95 -- 94 %   08/13/20 0418 -- -- -- 68 -- 93 %   08/13/20 0409 (!) 155/87 -- -- 78 -- (!) 87 %   08/13/20 0401 (!) 171/83 -- -- -- -- --   08/13/20 0325 -- 97.8 °F (36.6 °C) Oral -- -- --     Due to the current efforts to prevent transmission of COVID-19 and also the need to preserve PPE for other caregivers, a face-to-face encounter with the patient was not performed. That being said, all relevant records and diagnostic tests were reviewed, including laboratory results and imaging. Please reference any relevant documentation elsewhere. Care will be coordinated with the primary service.       Scheduled Meds:   dexamethasone  10 mg Oral Daily    sodium chloride flush  10 mL Intravenous 2 times per day    enoxaparin  30 mg Subcutaneous BID    pantoprazole  40 mg Intravenous Daily    sodium chloride  20 mL Intravenous Once    cefepime  2 g Intravenous Q12H    allopurinol  300 mg Oral Daily    atorvastatin  40 mg Oral Daily    potassium chloride  20 mEq Oral BID    sodium chloride  2 g Oral TID WC          Data Review:    Lab Results   Component Value Date    WBC 31.9 (H) 08/13/2020    HGB 9.1 (L) 08/13/2020    HCT 27.8 (L) 08/13/2020    MCV 89.3 08/13/2020     08/13/2020     Lab Results   Component Value Date    CREATININE 0.6 08/13/2020    BUN 8 08/13/2020     (L)

## 2020-08-13 NOTE — PROGRESS NOTES
ONCOLOGY HEMATOLOGY CARE PROGRESS NOTE      SUBJECTIVE:     Wants to go home, WBC 31. Feels well. Breathing stable. ID notes reviewed. Chest xray appears worse. Hgb 9       ROS:   The remaining 10 point review of symptoms is unremarkable. OBJECTIVE        Physical    VITALS:  BP (!) 143/82   Pulse 74   Temp 97.6 °F (36.4 °C) (Oral)   Resp 16   Ht 5' 3.5\" (1.613 m)   Wt 147 lb 6.4 oz (66.9 kg)   SpO2 94%   BMI 25.70 kg/m²   TEMPERATURE:  Current - Temp: 97.6 °F (36.4 °C);  Max - Temp  Av.1 °F (36.7 °C)  Min: 97.6 °F (36.4 °C)  Max: 99.2 °F (37.3 °C)  PULSE OXIMETRY RANGE: SpO2  Av.3 %  Min: 87 %  Max: 94 %  24HR INTAKE/OUTPUT:      Intake/Output Summary (Last 24 hours) at 2020 1147  Last data filed at 2020 1000  Gross per 24 hour   Intake 750 ml   Output 300 ml   Net 450 ml       PE: deferred per covid 19 rule out policy       Data      Recent Labs     20  0530 20  0545   WBC 31.2* 31.8* 31.9*   HGB 9.7* 9.7* 9.1*   HCT 29.7* 29.7* 27.8*    314 297   MCV 90.3 89.9 89.3        Recent Labs     205 20  0338   * 134* 130*   K 4.2 3.8 3.9  3.9   CL 97* 98* 97*   CO2 23 25 24   BUN 8 8 8   CREATININE 0.7 0.7 0.6     Recent Labs     20  0530 205 20  0338   AST 53* 59* 51*   ALT 40 53* 46*   BILITOT 0.4 0.4 0.4   ALKPHOS 88 86 92       Magnesium:    Lab Results   Component Value Date    MG 1.50 2020    MG 1.10 2020    MG 1.70 2020         Problem List  Patient Active Problem List   Diagnosis    Acute hyponatremia    N&V (nausea and vomiting)    Leukocytosis    Lung mass    Multiple lung nodules    Hilar adenopathy    Centrilobular emphysema (HCC)    Smoker    Anemia    Sepsis ([facility]    Lung cancer ([facility]    Hyperlipidemia    2019 novel coronavirus disease (COVID-19)    Hypokalemia    Pulmonary infiltrates    Small cell lung cancer ([facility]  Hepatic metastasis (HCC)    Acute blood loss anemia    Elevated procalcitonin    Hyponatremia    Former smoker       ASSESSMENT AND PLAN:    COVID-19 positive:  Possible pneumonia also  -Patient is being followed by pulmonary and ID  -She is on cefepime       Extensive stage small cell lung carcinoma:  -Status post 4 cycles of carboplatinum/-16/Tecentriq  -She is due for restaging CTs and if they are improved she will be maintained on Tecentriq  -This is not urgent  -The patient is not neutropenic  - re-staging scans once COVID status is known- can order outpatient     CLL:  -No indication for treatment  - WBC 31 now, baseline is 20s- cont to trend out     Anemia:  -She was severely anemic on admission  -completed Venofer this admit   -Hemoglobin prior to admission was over 8- Hgb now 9         ONCOLOGIC DISPOSITION: per IM and ID - maybe tomorrow pending repeat COVID status       Enrrique Martínez CNP   May be reached through [address] [place] [address]

## 2020-08-14 VITALS
BODY MASS INDEX: 23.67 KG/M2 | DIASTOLIC BLOOD PRESSURE: 87 MMHG | RESPIRATION RATE: 18 BRPM | OXYGEN SATURATION: 92 % | WEIGHT: 138.67 LBS | HEIGHT: 64 IN | TEMPERATURE: 97.7 F | SYSTOLIC BLOOD PRESSURE: 154 MMHG | HEART RATE: 72 BPM

## 2020-08-14 PROBLEM — C7B.8 NEUROENDOCRINE CARCINOMA METASTATIC TO LIVER (HCC): Status: ACTIVE | Noted: 2020-08-08

## 2020-08-14 PROBLEM — C7A.8 NEUROENDOCRINE CARCINOMA METASTATIC TO LIVER (HCC): Status: ACTIVE | Noted: 2020-08-08

## 2020-08-14 LAB
A/G RATIO: 1 (ref 1.1–2.2)
ACANTHOCYTES: ABNORMAL
ALBUMIN SERPL-MCNC: 3 G/DL (ref 3.4–5)
ALP BLD-CCNC: 93 U/L (ref 40–129)
ALT SERPL-CCNC: 63 U/L (ref 10–40)
ANION GAP SERPL CALCULATED.3IONS-SCNC: 13 MMOL/L (ref 3–16)
ANISOCYTOSIS: ABNORMAL
AST SERPL-CCNC: 61 U/L (ref 15–37)
BANDED NEUTROPHILS RELATIVE PERCENT: 6 % (ref 0–7)
BASOPHILS ABSOLUTE: 0 K/UL (ref 0–0.2)
BASOPHILS RELATIVE PERCENT: 0 %
BILIRUB SERPL-MCNC: 0.4 MG/DL (ref 0–1)
BUN BLDV-MCNC: 6 MG/DL (ref 7–20)
BURR CELLS: ABNORMAL
CALCIUM SERPL-MCNC: 8.2 MG/DL (ref 8.3–10.6)
CHLORIDE BLD-SCNC: 95 MMOL/L (ref 99–110)
CO2: 27 MMOL/L (ref 21–32)
CREAT SERPL-MCNC: <0.5 MG/DL (ref 0.6–1.2)
D DIMER: 2479 NG/ML DDU (ref 0–229)
EOSINOPHILS ABSOLUTE: 0 K/UL (ref 0–0.6)
EOSINOPHILS RELATIVE PERCENT: 0 %
GFR AFRICAN AMERICAN: >60
GFR NON-AFRICAN AMERICAN: >60
GLOBULIN: 3 G/DL
GLUCOSE BLD-MCNC: 99 MG/DL (ref 70–99)
HCT VFR BLD CALC: 28.7 % (ref 36–48)
HEMATOLOGY PATH CONSULT: NO
HEMOGLOBIN: 9.6 G/DL (ref 12–16)
LYMPHOCYTES ABSOLUTE: 21.2 K/UL (ref 1–5.1)
LYMPHOCYTES RELATIVE PERCENT: 60 %
MACROCYTES: ABNORMAL
MCH RBC QN AUTO: 29.6 PG (ref 26–34)
MCHC RBC AUTO-ENTMCNC: 33.4 G/DL (ref 31–36)
MCV RBC AUTO: 88.5 FL (ref 80–100)
METAMYELOCYTES RELATIVE PERCENT: 1 %
MONOCYTES ABSOLUTE: 1.1 K/UL (ref 0–1.3)
MONOCYTES RELATIVE PERCENT: 3 %
NEUTROPHILS ABSOLUTE: 13.1 K/UL (ref 1.7–7.7)
NEUTROPHILS RELATIVE PERCENT: 30 %
OVALOCYTES: ABNORMAL
PDW BLD-RTO: 18.8 % (ref 12.4–15.4)
PLATELET # BLD: 379 K/UL (ref 135–450)
PLATELET SLIDE REVIEW: ADEQUATE
PMV BLD AUTO: 7 FL (ref 5–10.5)
POIKILOCYTES: ABNORMAL
POLYCHROMASIA: ABNORMAL
POTASSIUM REFLEX MAGNESIUM: 3.7 MMOL/L (ref 3.5–5.1)
RBC # BLD: 3.24 M/UL (ref 4–5.2)
SCHISTOCYTES: ABNORMAL
SODIUM BLD-SCNC: 135 MMOL/L (ref 136–145)
TOTAL PROTEIN: 6 G/DL (ref 6.4–8.2)
WBC # BLD: 35.4 K/UL (ref 4–11)

## 2020-08-14 PROCEDURE — 99232 SBSQ HOSP IP/OBS MODERATE 35: CPT | Performed by: INTERNAL MEDICINE

## 2020-08-14 PROCEDURE — 94761 N-INVAS EAR/PLS OXIMETRY MLT: CPT

## 2020-08-14 PROCEDURE — C9113 INJ PANTOPRAZOLE SODIUM, VIA: HCPCS | Performed by: INTERNAL MEDICINE

## 2020-08-14 PROCEDURE — 6360000002 HC RX W HCPCS: Performed by: INTERNAL MEDICINE

## 2020-08-14 PROCEDURE — 6370000000 HC RX 637 (ALT 250 FOR IP): Performed by: INTERNAL MEDICINE

## 2020-08-14 PROCEDURE — 6360000002 HC RX W HCPCS: Performed by: NURSE PRACTITIONER

## 2020-08-14 PROCEDURE — 80053 COMPREHEN METABOLIC PANEL: CPT

## 2020-08-14 PROCEDURE — 2580000003 HC RX 258: Performed by: NURSE PRACTITIONER

## 2020-08-14 PROCEDURE — 2700000000 HC OXYGEN THERAPY PER DAY

## 2020-08-14 PROCEDURE — 85379 FIBRIN DEGRADATION QUANT: CPT

## 2020-08-14 PROCEDURE — 85025 COMPLETE CBC W/AUTO DIFF WBC: CPT

## 2020-08-14 RX ORDER — DEXAMETHASONE 2 MG/1
10 TABLET ORAL DAILY
Qty: 15 TABLET | Refills: 0 | Status: SHIPPED | OUTPATIENT
Start: 2020-08-15 | End: 2020-08-18

## 2020-08-14 RX ADMIN — Medication 2 G: at 13:06

## 2020-08-14 RX ADMIN — ALLOPURINOL 300 MG: 300 TABLET ORAL at 08:55

## 2020-08-14 RX ADMIN — Medication 2 G: at 08:55

## 2020-08-14 RX ADMIN — ENOXAPARIN SODIUM 30 MG: 30 INJECTION SUBCUTANEOUS at 08:55

## 2020-08-14 RX ADMIN — DEXAMETHASONE 10 MG: 4 TABLET ORAL at 08:55

## 2020-08-14 RX ADMIN — PANTOPRAZOLE SODIUM 40 MG: 40 INJECTION, POWDER, FOR SOLUTION INTRAVENOUS at 08:55

## 2020-08-14 RX ADMIN — ATORVASTATIN CALCIUM 40 MG: 40 TABLET, FILM COATED ORAL at 08:54

## 2020-08-14 RX ADMIN — POTASSIUM CHLORIDE 20 MEQ: 20 TABLET, EXTENDED RELEASE ORAL at 08:55

## 2020-08-14 RX ADMIN — Medication 10 ML: at 08:55

## 2020-08-14 ASSESSMENT — PAIN SCALES - GENERAL
PAINLEVEL_OUTOF10: 0

## 2020-08-14 NOTE — DISCHARGE INSTR - DIET

## 2020-08-14 NOTE — HOME CARE
Maybelle Route will require the following home care treatments or therapies: Skilled Nursing, vital signs, medication compliance and education, PT/OT, wound care, teaching and management of medical conditions, etc.  Home care will be necessary because of COPD, COVID-19 Infection. The patient is in agreement to receiving home care.

## 2020-08-14 NOTE — PROGRESS NOTES
Positive Covid result on chart. Communicated with [facility], efaxed lab result and recent physician's note.  JEFF Hodges IP

## 2020-08-14 NOTE — PROGRESS NOTES
Nutrition Assessment     Type and Reason for Visit: Initial    Nutrition Recommendations/Plan:   Continue general diet      Nutrition Assessment:  LOS assessment: Pt currently in droplet plus isolation for CV-19 r/o. Ordered on general diet. Eating slightly better now than PTA. Plans to d/c soon. Current Nutrition Therapies:    DIET GENERAL; Anthropometric Measures:  · Height: 5' 3.5\" (161.3 cm)  · Current Body Wt: 138 lb (62.6 kg)   · BMI: 24.1    Nutrition Diagnosis:   No nutrition diagnosis at this time     Nutrition Interventions:   Food and/or Nutrient Delivery:  Continue Current Diet  Nutrition Education/Counseling:  No recommendation at this time   Coordination of Nutrition Care:  No recommendation at this time      Discharge Planning:    Continue current diet     Electronically signed by Hamzah Hewitt.  Den Johns RD, LD on 8/14/20 at 10:38 AM EDT    Contact: [phone]

## 2020-08-14 NOTE — PROGRESS NOTES
08/14/2020    AST 61 08/14/2020    PROT 6.0 08/14/2020    BILITOT 0.4 08/14/2020    LABALBU 3.0 08/14/2020       8/8 8/9 8/10 8/11 8/12 8/13   procal   1.8 - - 0.53  0.50  Ferritin  979 1232 1624 2772  ddimer  1208 2255 1384 2561 2346  CRP   213 128 149        MICRO:  8/8 BC x2 neg   MRSA screen neg    Legionella and pneumococcal ag neg   8/13 COVID NAAT +      IMAGING:  CXR 8/7/20   Impression    Bibasilar airspace disease greater right lower lobe compatible with    pneumonia. Julieth Whalen is given history of COVID-19 positivity. CXR 8/11/20   Impression    Multifocal bilateral airspace disease has increased, compatible with    patient's history of pneumonia. Assessment:     Patient Active Problem List    Diagnosis Date Noted    Pulmonary infiltrates 08/08/2020    Small cell lung cancer ([facility] 08/08/2020    Hepatic metastasis ([facility] 08/08/2020    Acute blood loss anemia 08/08/2020    Elevated procalcitonin 08/08/2020    Hyponatremia 08/08/2020    Former smoker 08/08/2020    Anemia 08/07/2020    Sepsis ([facility] 08/07/2020    Lung cancer ([facility] 08/07/2020    Hyperlipidemia 08/07/2020    2019 novel coronavirus disease (COVID-19) 08/07/2020    Hypokalemia 08/07/2020    Lung mass     Multiple lung nodules     Hilar adenopathy     Centrilobular emphysema (HCC)     Smoker     Acute hyponatremia 05/04/2020    N&V (nausea and vomiting) 05/04/2020    Leukocytosis 05/04/2020       CLL  Extensive stage SCLC.   Last received carboplatin and -16 7/20-7/22/0    Admitted 8/7/20 with fever, cough, SOB  +COVID test as outpt prior to admission on 8/6/20, repeat PCR collected 8/8/20 and pending     Hypoxemic respiratory failure     Possible concomitant bacterial pneumonia  Elevated procal on admission   Cefepime day 7    Persistently elevated WBC, chronic, in the context of CLL     Severe anemia on admission hgb was 4.8  Stable post-transfusion     NKDA     Plan:   Continue steroid, dose increased, po route - 10d total     She will need supplemental oxygen after DC  I decreased oxygen to 2L and she remains 91-94%.   Would be ok for DC if she remains stable on 2L NC  She has pulse ox at home, dtr is RN     Needs 20 days isolation from date of onset of symptoms which was ~8/6/20      D/w RN, Dr. Tami Dorman MD  Phone: [phone]   Fax : [phone]

## 2020-08-14 NOTE — CARE COORDINATION
[facility]    DC order noted, all docs needed have been faxed to [facility] for home care services.     Home care to see patient within 24-48 hrs    Satunrino Henning RN, BSN CTN  [facility] [phone]

## 2020-08-14 NOTE — DISCHARGE INSTR - COC
Continuity of Care Form    Patient Name: Galina Oliva   :  1945  MRN:  [id number]    Admit date:  2020  Discharge date:  20     Code Status Order: Full Code   Advance Directives:   Advance Care Flowsheet Documentation     Date/Time Healthcare Directive Type of Healthcare Directive Copy in [address] Agent's Name Healthcare Agent's Phone Number    20 [phone]  No, patient does not have an advance directive for healthcare treatment -- -- -- -- --          Admitting Physician:  Roni Griffin MD  PCP: Luis Manuel Haney    Discharging Nurse: [facility] Unit/Room#: 0221/0221-01  Discharging Unit Phone Number: ***    Emergency Contact:   Extended Emergency Contact Information  Primary Emergency Contact: [address] Phone: [phone]  Relation: Child    Past Surgical History:  Past Surgical History:   Procedure Laterality Date    BREAST SURGERY      fibroid tumors removed bilateral breast    BRONCHOSCOPY N/A 2020    BRONCHOSCOPY ENDOBRONCHIAL ULTRASOUND with ANESTHESIA performed by Arjun Renteria MD at [facility]  2020    BRONCHOSCOPY/TRANSBRONCHIAL NEEDLE BIOPSY performed by Arjun Renteria MD at [facility]  2020    BRONCHOSCOPY/TRANSBRONCHIAL NEEDLE BIOPSY ADDL LOBE performed by Arjun Renteria MD at [facility]  2020    BRONCHOSCOPY DIAGNOSTIC OR CELL [address] performed by Arjun Renteria MD at [address] [place] [address] E N/A 2020    RIGHT SUBCLAVIAN VEIN PORT A CATH INSERTION performed by Pete Pendleton MD at [facility]      mid chest       Immunization History: There is no immunization history on file for this patient.     Active Problems:  Patient Active Problem List   Diagnosis Code    Acute hyponatremia E87.1    N&V (nausea and vomiting) R11.2    Leukocytosis D72.829    Lung mass R91.8    Multiple lung nodules R91.8    Hilar adenopathy R59.0    Centrilobular emphysema (HCC) J43.2    Smoker F17.200    Anemia D64.9    Sepsis (HCC) A41.9    Lung cancer (HCC) C34.90    Hyperlipidemia E78.5    2019 novel coronavirus disease (COVID-19) U07.1    Hypokalemia E87.6    Pulmonary infiltrates R91.8    Small cell lung cancer (HCC) C34.90    Hepatic metastasis (HCC) C78.7    Acute blood loss anemia D62    Elevated procalcitonin R79.89    Hyponatremia E87.1    Former smoker Z87.891       Isolation/Infection:   Isolation          Droplet Plus        Patient Infection Status     Infection Onset Added Last Indicated Last Indicated By Review Planned Expiration Resolved Resolved By    COVID-19 08/13/20 08/13/20 08/13/20 COVID-19  08/27/20      Resolved    COVID-19 Rule Out 08/08/20 08/08/20 08/13/20 COVID-19 (Ordered)   08/13/20 Rule-Out Test Resulted          Nurse Assessment:  Last Vital Signs: BP (!) 154/87   Pulse 72   Temp 97.7 °F (36.5 °C) (Oral)   Resp 18   Ht 5' 3.5\" (1.613 m)   Wt 138 lb 10.7 oz (62.9 kg)   SpO2 92%   BMI 24.18 kg/m²     Last documented pain score (0-10 scale): Pain Level: 0  Last Weight:   Wt Readings from Last 1 Encounters:   08/14/20 138 lb 10.7 oz (62.9 kg)     Mental Status:  oriented, alert, coherent, logical, thought processes intact and able to concentrate and follow conversation    IV Access:  - None    Nursing Mobility/ADLs:  Walking   Independent  Transfer  Independent  Bathing  Independent  Dressing  Independent  Toileting  Independent  Feeding  Independent  Med Admin  Independent  Med Delivery   whole    Wound Care Documentation and Therapy:        Elimination:  Continence:   · Bowel:  Yes  · Bladder: Yes  Urinary Catheter: None   Colostomy/Ileostomy/Ileal Conduit: No       Date of Last BM: 8/14/2020    Intake/Output Summary (Last 24 hours) at 8/14/2020 1626  Last data filed at 8/14/2020 1305  Gross per 24 hour   Intake 660 ml   Output 1350 ml   Net -690 ml I/O last 3 completed shifts: In: 65 [P.O.:660]  Out: 1350 [Urine:1350]    Safety Concerns:     None    Impairments/Disabilities:      None    Nutrition Therapy:  Current Nutrition Therapy:   - Oral Diet:  General    Routes of Feeding: Oral  Liquids: No Restrictions  Daily Fluid Restriction: no  Last Modified Barium Swallow with Video (Video Swallowing Test): not done    Treatments at the Time of Hospital Discharge:   Respiratory Treatments:   Oxygen Therapy:  is on oxygen at 2 L/min per nasal cannula. Ventilator:    - No ventilator support    Rehab Therapies: Physical Therapy, Occupational Therapy and Nurse  Weight Bearing Status/Restrictions: No weight bearing restirctions  Other Medical Equipment (for information only, NOT a DME order):   Other Treatments: HOME HEALTH CARE: [address] [address] agency to establish plan of care for patient over 60 day period   Nursing  Initial home SN evaluation visit to occur within 24-48 hours for:  1)  medication management  2)  VS and clinical assessment  3)  S&S chronic disease exacerbation education + when to contact MD/NP  4)  care coordination  Medication Reconciliation during 1st SN visit  PT/OT/Speech   Evaluations in home within 24-48 hours of discharge to include DME and home safety   Frontload therapy 5 days, then 3x a week   OT to evaluate if patient has [address] needs for personal care    evaluation within 24-48 hours to evaluate resources & insurance for potential AL, [place], LTC, and Medicaid options   Palliative Care referral within 5 days of hospital discharge   PCP Visit scheduled within 3 - 7 days of hospital discharge [address] (If patient is agreeable and meets guidelines)      Patient's personal belongings (please select all that are sent with patient):  None    RN SIGNATURE:  Electronically signed by Adams Osborn RN on 8/14/20 at 5:11 PM EDT    CASE MANAGEMENT/SOCIAL WORK SECTION    Inpatient Status Date: 8/7/20    Readmission Risk Assessment Score:  Readmission Risk              Risk of Unplanned Readmission:        29           Discharging to Facility/ Agency   Name:  [facility]    Address: [address] [place] [address]., [address] [place] [address]., [place] [address]  Phone: [phone]  Fax: [phone]      / signature: {Esignature:[id number]}    PHYSICIAN SECTION    Prognosis: Good    Condition at Discharge: Stable    Rehab Potential (if transferring to Rehab): Good    Recommended Labs or Other Treatments After Discharge:   Home Care    Physician Certification: I certify the above information and transfer of Brenda Kamara  is necessary for the continuing treatment of the diagnosis listed and that she requires Home Care for greater 30 days.      Update Admission H&P: No change in H&P    PHYSICIAN SIGNATURE:  Electronically signed by Misty Jeffery MD on 8/14/20 at 16:17 PM EDT

## 2020-08-14 NOTE — CARE COORDINATION
Per [employer] message received from Dr Ganga Reid, pt is ready for discharge today - pending home oxygen. CM called and notified Rommel Vizcaino with [facility] for o2 and home care orders/signed MELISSA. CM left for Abiola Vigil with [facility] [employer] home care to follow for orders for skilled nursing.      Collette Medicus, JEFF CM

## 2020-08-14 NOTE — DISCHARGE SUMMARY
Hospital Medicine Discharge Summary    Patient: Semaj Aguero     Age: 77 y.o. Gender: female  : 1945   MRN: [id number]  Code Status: Full     Admit Date: 2020   Discharge Date: 2020    Disposition:  Home with Kamini Vazquez    Condition at Discharge: Stable    Primary Care Provider: Johnathan Jodee    Admitting Physician: Maribell Chan MD  Discharge Physician: Ashley Garcia MD       Discharge Diagnoses: Active Hospital Problems    Diagnosis    Acute respiratory failure with hypoxia (HCC) [J96.01]    Pneumonia of both lower lobes due to infectious organism ([facility] [J18.1]    Pulmonary infiltrates [R91.8]    Small cell lung cancer ([facility] [C34.90]    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 ([facility] [C7A.8, C7B.8]    Acute blood loss anemia [D62]    Elevated procalcitonin [R79.89]    Hyponatremia [E87.1]    Former smoker [Z87.891]    Anemia [D64.9]    Sepsis ([address] [A41.9]    Lung cancer ([facility] [C34.90]    Hyperlipidemia [E78.5]    2019 novel coronavirus disease (COVID-19) [U07.1]    Hypokalemia [E87.6]    Hilar adenopathy [R59.0]    Centrilobular emphysema (HCC) [J43.2]    N&V (nausea and vomiting) [R11.2]    Leukocytosis [facility] Course:     77 y.o. female who presented to [facility] with c/o weakness fatigue. Ms César Eng was recently diagnosed with neuroendocrine ca with liver and lung mets. She follows with [facility] s/p carboplatin and -16. She reports increase WOB, fatigue and general malaise. She tested +for COVID and came to the ED for further eval. Upon arrival, she was noted to have an h/h of 4.8/14, WBC of 25 dioni k of 2.9. Her temp was 100.9    Assessment/Plan:    Acute blood loss anemia, likely chemo induced, improving. S/P 6 units PRBC at admission. Hemoglobin dramatically improved and remains at stable level. COVID 19 infection  Minimal symptoms. Noted to have positive testing as an outpatient from the [address] [place] [address].   Repeat rapid testing was negative as well. Continue empiric Decadron x 10 days  ID following  Given co-morbidities and severity of illness, needs 20 days isolation from date of onset of symptoms which was ~8/6/20.     Neuroendocrine Ca with lung and liver mets, CLL  Heme/onc consulted   completed C4 carbo/VP16/tecentriq 7/20-7/22     COPD: Stable. Continue bronchodilators. Exam:   BP (!) 154/87   Pulse 72   Temp 97.7 °F (36.5 °C) (Oral)   Resp 18   Ht 5' 3.5\" (1.613 m)   Wt 138 lb 10.7 oz (62.9 kg)   SpO2 92%   BMI 24.18 kg/m²   General appearance:  Ill appearing female lying in bed, No apparent distress, appears stated age and cooperative. HEENT:  Normal cephalic, atraumatic without obvious deformity. Pupils equal, round, and reactive to light. Extra ocular muscles intact. Conjunctivae/corneas clear. Neck: Supple, with full range of motion. No jugular venous distention. Trachea midline. Respiratory:  Normal respiratory effort. Diminished with crackles. Cardiovascular:  Regular rate and rhythm with normal S1/S2 without murmurs, rubs or gallops. Abdomen: Soft, non-tender, non-distended with normal bowel sounds. Musculoskeletal:  No clubbing, cyanosis or edema bilaterally. Full range of motion without deformity. Skin: Pallor,  texture, turgor normal.  No rashes or lesions. Neurologic:  Neurovascularly intact without any focal sensory/motor deficits. Cranial nerves: II-XII intact, grossly non-focal.  Psychiatric:  Alert and oriented, thought content appropriate, normal insight  Capillary Refill: Brisk,< 3 seconds   Peripheral Pulses: +2 palpable, equal bilaterally       Patient Discharge Instructions: Follow up:  1. Primary Care Provider Dr. Nallely Clemente in the next 1-2 weeks.   2.  Oncology in the next few weeks      Discharge Medications:   Discharge Medication List as of 8/14/2020  5:12 PM      START taking these medications    Details   dexamethasone (DECADRON) 2 MG tablet Take 5 tablets by mouth daily for 3 days, Disp-15 tablet,R-0Normal           Discharge Medication List as of 8/14/2020  5:12 PM        Discharge Medication List as of 8/14/2020  5:12 PM      CONTINUE these medications which have NOT CHANGED    Details   potassium chloride (KLOR-CON M) 20 MEQ extended release tablet Take 1 tablet by mouth 2 times daily, Disp-60 tablet, R-0Normal      sodium chloride 1 g tablet Take 2 tablets by mouth 3 times daily (with meals), Disp-90 tablet, R-3Normal      albuterol sulfate HFA (PROAIR HFA) 108 (90 Base) MCG/ACT inhaler Inhale 2 puffs into the lungs every 4 hours as needed for Wheezing or Shortness of Breath, Disp-1 Inhaler, R-3Normal      ondansetron (ZOFRAN ODT) 4 MG disintegrating tablet Take 2 tablets by mouth every 8 hours as needed for Nausea or Vomiting, Disp-20 tablet, R-5Normal      promethazine (PHENERGAN) 12.5 MG tablet Take 1 tablet by mouth every 6 hours as needed for Nausea, Disp-25 tablet, R-5Normal      atorvastatin (LIPITOR) 40 MG tablet Take 1 tablet by mouth daily, Disp-30 tablet, R-3Normal      furosemide (LASIX) 20 MG tablet Take 1 tablet by mouth 2 times daily, Disp-60 tablet, R-0Normal      allopurinol (ZYLOPRIM) 300 MG tablet Take 1 tablet by mouth daily, Disp-30 tablet, R-3Normal           Discharge Medication List as of 8/14/2020  5:12 PM            Significant Test Results    Xr Chest Portable    Result Date: 8/11/2020  EXAMINATION: ONE XRAY VIEW OF THE CHEST 8/11/2020 9:40 am COMPARISON: 08/07/2020 HISTORY: ORDERING SYSTEM PROVIDED HISTORY: COVID 19, hypoxia TECHNOLOGIST PROVIDED HISTORY: Reason for exam:->COVID 19, hypoxia Reason for Exam: COVID 19, hypoxia Acuity: Acute Type of Exam: Initial FINDINGS: Port is seen in the right chest wall. Cardiac leads project over the chest. Bibasilar heterogeneous pulmonary opacity is slightly greater as compared to prior. Developing right upper lobe opacity is also seen. No pneumothorax.  Cardiac and mediastinal silhouettes are unchanged. Multifocal bilateral airspace disease has increased, compatible with patient's history of pneumonia. Ct Chest Abdomen Pelvis W Contrast    Result Date: 8/28/2020  EXAMINATION: CT OF THE CHEST, ABDOMEN, AND PELVIS WITH CONTRAST 8/28/2020 10:49 am TECHNIQUE: CT of the chest, abdomen and pelvis was performed with the administration of intravenous contrast. Multiplanar reformatted images are provided for review. Dose modulation, iterative reconstruction, and/or weight based adjustment of the mA/kV was utilized to reduce the radiation dose to as low as reasonably achievable. COMPARISON: 05/12/2020 05/06/2020 HISTORY: ORDERING SYSTEM PROVIDED HISTORY: Malignant neoplasm of upper lobe of right lung [facility]) TECHNOLOGIST PROVIDED HISTORY: Additional Contrast?->Oral Reason for exam:->cancer fu Reason for Exam: cancer follow up, lung cancer, kidney cancer Acuity: Unknown Type of Exam: Subsequent/Follow-up FINDINGS: Chest: Mediastinum: Tip of MediPort projects in distal SVC . There is persistent mediastinal adenopathy. Previously described confluent dom conglomerate in the region of the AP window, which measured 5.1 x 2.8 cm, now measures approximately 9 mm by 10 mm. .. Subcarinal dom conglomerate now measures 2.5 cm x 1.8 cm, previously 1.9 cm x 1.2 cm. A right hilar dom conglomerate now measures 1.4 cm x 1.1 cm, previously subcentimeter in size. Atherosclerotic change seen in aorta. No intimal flap seen. No pericardial effusion. Thyroid gland appears unchanged Lungs/pleura: Respiratory motion artifact limits evaluation of fine pulmonary parenchymal change Linear and curvilinear opacity seen in the apices, compatible with scarring. Pleural calcifications are also seen in the apices On the left, a few scattered ground-glass opacities are seen in the left upper and left lower lobe. No significant pleural fluid. When compared to prior there is decreased nodularity the left lung. Cheyenne Dwyer  An index nodule in the left upper lobe which measured 1.2 x 1.0 cm is no longer noted. Additional nodular densities on the left are also smaller. A small 7 mm nodule in the left lower lobe however is unchanged measuring 7 mm. On the right scattered ground-glass opacities are seen in the right upper, right middle and right lower lobe. Scattered areas of bronchial wall thickening are seen. A few filling defects are seen in the lower lobe airways, likely mucous plugging. The scattered ground-glass opacities throughout the right lung are new or more pronounced compared to prior. Punctate noncalcified pulmonary nodules in the right lower lobe are unchanged, the largest of which measures 6 mm in size or less. Soft Tissues/Bones: Spurring is seen in the spine Abdomen/Pelvis: Organs: Previously seen hepatic metastasis have resolved and now difficult to perceive. .  No intrahepatic ductal dilatation. No perihepatic fluid No splenomegaly. No perisplenic fluid Left cardiophrenic lymph node which measured 1.6 x 1.0 cm is smaller in size, now measuring 2 mm in short axis. Right adrenal gland is unchanged Left adrenal nodule measures 1.8 cm by 1.7 cm, similar to prior when the prior study is remeasured in a similar fashion. No peripancreatic inflammatory change There is lobular contour to the left kidney. No hydronephrosis. There is lobular contour to the right kidney. No hydronephrosis GI/Bowel: No significant small bowel distention. Moderate stool seen in the colon. Scattered colonic diverticula are seen. No bowel obstruction. Pelvis: No free fluid in pelvis. No pelvic adenopathy Peritoneum/Retroperitoneum: A small left-sided periaortic node measures 5 mm, previously 9-10 mm. Atherosclerotic change seen in abdominal aorta. Abdominal aorta is stable in caliber Bones/Soft Tissues: Spurring is seen in the spine.   Spurring is seen in the hips     Within the chest, there is persistent mediastinal adenopathy, increased in some regions but decreased in others. There is decreased nodularity throughout the left lung. A few scattered nodules remain on the right and left, which are similar in size Scattered ground-glass opacity seen throughout the lungs bilaterally, increased compared to prior, likely postinflammatory-infectious Within the abdomen and pelvis, hepatic metastasis have resolved. A left periaortic node is also smaller in size. Consults:     IP CONSULT TO HOSPITALIST  IP CONSULT TO ONCOLOGY  IP CONSULT TO PHARMACY  IP CONSULT TO INFECTIOUS DISEASES  IP CONSULT TO CRITICAL CARE  IP CONSULT TO PALLIATIVE CARE  IP CONSULT TO HOME CARE NEEDS    Labs: For convenience and continuity at follow-up the following most recent labs are provided:    Lab Results   Component Value Date    WBC 35.4 08/14/2020    HGB 9.6 08/14/2020    HCT 28.7 08/14/2020    MCV 88.5 08/14/2020     08/14/2020     08/14/2020    K 3.7 08/14/2020    CL 95 08/14/2020    CO2 27 08/14/2020    BUN 6 08/14/2020    CREATININE <0.5 08/14/2020    CALCIUM 8.2 08/14/2020    PHOS 1.2 08/08/2020    TROPONINI <0.01 08/07/2020    ALKPHOS 93 08/14/2020    ALT 63 08/14/2020    AST 61 08/14/2020    BILITOT 0.4 08/14/2020    LABALBU 3.0 08/14/2020    LDLCALC 92 05/03/2020    TRIG 68 05/03/2020     Lab Results   Component Value Date    INR 1.25 (H) 08/08/2020    INR 0.97 05/08/2020         The patient was seen and examined on day of discharge and this discharge summary is in conjunction with any daily progress note from day of discharge. Time spent on discharge is more than 30 minutes in the examination, evaluation, counseling and review of medications and discharge plan. Signed:    Jorge Green MD   9/9/2020    Thank you Nav Martin for the opportunity to be involved in this patient's care. If you have any questions or concerns please feel free to contact my office [phone].

## 2020-08-14 NOTE — CARE COORDINATION
[facility]    Referral received from  to follow for home care services. I will follow for needs, and speak with patient to verify demos.     Michael George RN, BSN CTN  [facility] [phone]

## 2020-08-14 NOTE — PROGRESS NOTES
Chart reviewed. Okay to dc home from heme standpoint. Follow up office next week. WBC likely reactive/CLL. No further work up. Discussed with Dr. Paramjit Horn.

## 2020-08-14 NOTE — CARE COORDINATION
CASE MANAGEMENT DISCHARGE SUMMARY      Discharge to: home with General acute hospital and new home o2 with [facility] (dx: Covid 23)    New Durable Medical Equipment ordered/agency: home oxygen per [employer].     Transportation:    Family/car: family    Confirmed discharge plan with:     Patient: yes     Family, name and contact number: pt states she will notify her sister of discharge, \"because I have to make arrangements for her to pick me up. And she lives 30 minutes away\"     Facility/Agency, name:  MELISSA/AVS faxed to [facility] agency per liaison, [facility]. VM left      RN, name: Funmi Soriano    Note: Discharging nurse to complete MELISSA, reconcile AVS, and place final copy with patient's discharge packet. RN to ensure that written prescriptions for Level II medications are sent with patient as per protocol.     Antwan Gay RN CM

## 2020-08-14 NOTE — PROGRESS NOTES
Oxygen documentation:    1. O2 saturation at REST on ROOM AIR = 89%    If saturation is 89% or above please proceed with steps 2 and 3. 2. O2 saturation with AMBULATION of 5feet on ROOM AIR = 87%  3.  O2 saturation with AMBULATION on 2 liter/min = 92%    DCP notified:    Electronically signed by Sola Simpson RN on 8/14/2020 at 4:39 PM

## 2020-08-14 NOTE — PROGRESS NOTES
Pulmonary & Critical Care Medicine ICU Progress Note      70-year-old male with history of past history of smoking, COPD/emphysema, CLL, metastatic neuroendocrine lung cancer, COVID-19 infection, acute hypoxemic respiratory failure. Presented to the ER, was found to be severely anemic, received PRBC. The patient received chemotherapy last week, but she denies this, says her chemotherapy was 3 weeks ago. Reportedly the patient's daughter is also COVID positive, lives with her. Testing was done at the [facility]. There is some question about it being a false positive test, further testing has been sent. Events of Last 24 hours: The patient says she feels better, SaO2 is in the low 90s on oxygen at 2 LPM.  She has mild cough, nonproductive. She denies shortness of breath. She denies chest pain, abdominal pain. She has an improved appetite, denies GI or  symptoms. She questions why she cannot go home with oxygen, says her daughter has COVID-19 and is getting ready to go for work      Invasive Lines:   Port R subclavian        IV:    Vitals:  BP (!) 154/87   Pulse 72   Temp 97.7 °F (36.5 °C) (Oral)   Resp 18   Ht 5' 3.5\" (1.613 m)   Wt 138 lb 10.7 oz (62.9 kg)   SpO2 92%   BMI 24.18 kg/m²         Intake/Output Summary (Last 24 hours) at 8/14/2020 1259  Last data filed at 8/14/2020 1231  Gross per 24 hour   Intake 920 ml   Output 1350 ml   Net -430 ml       EXAM:  Due to the current efforts to prevent transmission of COVID-19 and also the need to preserve PPE for other caregivers, a face-to-face encounter with the patient was not performed. That being said, all relevant records and diagnostic tests were reviewed, including laboratory results and imaging. Please reference any relevant documentation elsewhere. Care will be coordinated with the primary service.       Medications:  Scheduled Meds:   dexamethasone  10 mg Oral Daily    sodium chloride flush  10 mL Intravenous 2 times per day  enoxaparin  30 mg Subcutaneous BID    pantoprazole  40 mg Intravenous Daily    sodium chloride  20 mL Intravenous Once    allopurinol  300 mg Oral Daily    atorvastatin  40 mg Oral Daily    potassium chloride  20 mEq Oral BID    sodium chloride  2 g Oral TID WC       PRN Meds:  sodium chloride flush, acetaminophen **OR** acetaminophen, albuterol, polyethylene glycol, promethazine **OR** ondansetron    Results:  CBC:   Recent Labs     08/12/20 0445 08/13/20  0545 08/14/20  0535   WBC 31.8* 31.9* 35.4*   HGB 9.7* 9.1* 9.6*   HCT 29.7* 27.8* 28.7*   MCV 89.9 89.3 88.5    297 379     BMP:   Recent Labs     08/12/20 0445 08/13/20 0338 08/14/20  0535   * 130* 135*   K 3.8 3.9  3.9 3.7   CL 98* 97* 95*   CO2 25 24 27   BUN 8 8 6*   CREATININE 0.7 0.6 <0.5*     LIVER PROFILE:   Recent Labs     08/12/20 0445 08/13/20 0338 08/14/20  0535   AST 59* 51* 61*   ALT 53* 46* 63*   BILITOT 0.4 0.4 0.4   ALKPHOS 86 92 93       Cultures:  Negative so far    Films:  CXR 8/7/2020 reviewed by me       Assessment and plan:  Acute respiratory failure. On oxygen 2 LPM this afternoon, relatively stable. Probably can be discharged home  COVID-19 infection. Followed by ID, on Decadron, dose increased by ID. Elevated CRP, d-dimer and LD. High risk for deterioration, monitor closely. She feels better rapid COVID test was positive again 7/13  ? Pneumonia. On cefepime. Procalcitonin elevated, could be confounded by lung cancer. ID following  Metastatic small cell lung cancer. Being followed by oncology  COPD/centrilobular emphysema. On PRN albuterol  Leukocytosis. Probably due to infection and steroid. Appears stable for now, discussed with Dr. Walker Gay. Will hold off escalating treatment for COVID 19 for now. Anemia. Transfused, H&H slightly lower  Hyponatremia. Likely due to small cell lung cancer. Slightly worse today  Elevated liver enzymes. Slightly better. Hyperlipidemia.   Per IM  PUD

## 2020-08-14 NOTE — PROGRESS NOTES
Discharge instructions and medications reviewed with patient at bedside. IV and Tele discontinued, patient has no questions at this time. New medication  prescription instructions explained in full. All belongings accounted for. Discharge packet copy sheet signed and placed in chart. Pt wheeled out front lobby by PCA. Oxygen provided upon discharge. Pt discharged on 2L with full tank.  Electronically signed by Cathi Alves RN on 8/14/2020 at 6:07 PM

## 2020-08-28 ENCOUNTER — HOSPITAL ENCOUNTER (OUTPATIENT)
Dept: CT IMAGING | Age: 75
Discharge: HOME | End: 2020-08-28
Payer: MEDICARE

## 2020-08-28 PROCEDURE — 6360000004 HC RX CONTRAST MEDICATION: Performed by: NURSE PRACTITIONER

## 2020-08-28 PROCEDURE — 74177 CT ABD & PELVIS W/CONTRAST: CPT

## 2020-08-28 RX ADMIN — IOHEXOL 50 ML: 240 INJECTION, SOLUTION INTRATHECAL; INTRAVASCULAR; INTRAVENOUS; ORAL at 10:57

## 2020-08-28 RX ADMIN — IOPAMIDOL 75 ML: 755 INJECTION, SOLUTION INTRAVENOUS at 10:57

## 2020-09-27 ENCOUNTER — HOSPITAL ENCOUNTER (INPATIENT)
Age: 75
LOS: 2 days | Discharge: HOME | DRG: 644 | End: 2020-09-29
Attending: EMERGENCY MEDICINE | Admitting: RADIOLOGY
Payer: MEDICARE

## 2020-09-27 ENCOUNTER — APPOINTMENT (OUTPATIENT)
Dept: CT IMAGING | Age: 75
DRG: 644 | End: 2020-09-27
Payer: MEDICARE

## 2020-09-27 LAB
A/G RATIO: 1.3 (ref 1.1–2.2)
ALBUMIN SERPL-MCNC: 4.2 G/DL (ref 3.4–5)
ALP BLD-CCNC: 99 U/L (ref 40–129)
ALT SERPL-CCNC: 25 U/L (ref 10–40)
ANION GAP SERPL CALCULATED.3IONS-SCNC: 11 MMOL/L (ref 3–16)
ANION GAP SERPL CALCULATED.3IONS-SCNC: 8 MMOL/L (ref 3–16)
AST SERPL-CCNC: 25 U/L (ref 15–37)
ATYPICAL LYMPHOCYTE RELATIVE PERCENT: 13 % (ref 0–6)
BASOPHILS ABSOLUTE: 0 K/UL (ref 0–0.2)
BASOPHILS RELATIVE PERCENT: 0 %
BILIRUB SERPL-MCNC: 0.6 MG/DL (ref 0–1)
BILIRUBIN URINE: NEGATIVE
BLOOD, URINE: ABNORMAL
BUN BLDV-MCNC: 14 MG/DL (ref 7–20)
BUN BLDV-MCNC: 17 MG/DL (ref 7–20)
CALCIUM SERPL-MCNC: 8.9 MG/DL (ref 8.3–10.6)
CALCIUM SERPL-MCNC: 9.3 MG/DL (ref 8.3–10.6)
CHLORIDE BLD-SCNC: 82 MMOL/L (ref 99–110)
CHLORIDE BLD-SCNC: 86 MMOL/L (ref 99–110)
CLARITY: CLEAR
CO2: 22 MMOL/L (ref 21–32)
CO2: 24 MMOL/L (ref 21–32)
COLOR: YELLOW
CREAT SERPL-MCNC: 0.7 MG/DL (ref 0.6–1.2)
CREAT SERPL-MCNC: 0.8 MG/DL (ref 0.6–1.2)
EKG ATRIAL RATE: 70 BPM
EKG DIAGNOSIS: NORMAL
EKG P AXIS: 74 DEGREES
EKG P-R INTERVAL: 182 MS
EKG Q-T INTERVAL: 420 MS
EKG QRS DURATION: 98 MS
EKG QTC CALCULATION (BAZETT): 453 MS
EKG R AXIS: 59 DEGREES
EKG T AXIS: 69 DEGREES
EKG VENTRICULAR RATE: 70 BPM
EOSINOPHILS ABSOLUTE: 0.2 K/UL (ref 0–0.6)
EOSINOPHILS RELATIVE PERCENT: 1 %
EPITHELIAL CELLS, UA: ABNORMAL /HPF (ref 0–5)
GFR AFRICAN AMERICAN: >60
GFR AFRICAN AMERICAN: >60
GFR NON-AFRICAN AMERICAN: >60
GFR NON-AFRICAN AMERICAN: >60
GLOBULIN: 3.3 G/DL
GLUCOSE BLD-MCNC: 118 MG/DL (ref 70–99)
GLUCOSE BLD-MCNC: 123 MG/DL (ref 70–99)
GLUCOSE URINE: NEGATIVE MG/DL
HCT VFR BLD CALC: 35.1 % (ref 36–48)
HEMATOLOGY PATH CONSULT: YES
HEMOGLOBIN: 11.7 G/DL (ref 12–16)
KETONES, URINE: NEGATIVE MG/DL
LEUKOCYTE ESTERASE, URINE: NEGATIVE
LIPASE: 29 U/L (ref 13–60)
LYMPHOCYTES ABSOLUTE: 13 K/UL (ref 1–5.1)
LYMPHOCYTES RELATIVE PERCENT: 46 %
MCH RBC QN AUTO: 30.7 PG (ref 26–34)
MCHC RBC AUTO-ENTMCNC: 33.4 G/DL (ref 31–36)
MCV RBC AUTO: 92 FL (ref 80–100)
MICROSCOPIC EXAMINATION: YES
MONOCYTES ABSOLUTE: 0.9 K/UL (ref 0–1.3)
MONOCYTES RELATIVE PERCENT: 4 %
NEUTROPHILS ABSOLUTE: 8 K/UL (ref 1.7–7.7)
NEUTROPHILS RELATIVE PERCENT: 36 %
NITRITE, URINE: NEGATIVE
OSMOLALITY URINE: 433 MOSM/KG (ref 390–1070)
OSMOLALITY: 254 MOSM/KG (ref 280–301)
OVALOCYTES: ABNORMAL
PDW BLD-RTO: 18.3 % (ref 12.4–15.4)
PH UA: 5.5 (ref 5–8)
PLATELET # BLD: 305 K/UL (ref 135–450)
PLATELET SLIDE REVIEW: ADEQUATE
PMV BLD AUTO: 7.1 FL (ref 5–10.5)
POTASSIUM SERPL-SCNC: 4.1 MMOL/L (ref 3.5–5.1)
POTASSIUM SERPL-SCNC: 4.3 MMOL/L (ref 3.5–5.1)
PROTEIN UA: NEGATIVE MG/DL
RBC # BLD: 3.82 M/UL (ref 4–5.2)
RBC UA: ABNORMAL /HPF (ref 0–4)
SLIDE REVIEW: ABNORMAL
SODIUM BLD-SCNC: 115 MMOL/L (ref 136–145)
SODIUM BLD-SCNC: 118 MMOL/L (ref 136–145)
SODIUM BLD-SCNC: 121 MMOL/L (ref 136–145)
SODIUM URINE: 90 MMOL/L
SPECIFIC GRAVITY UA: 1.01 (ref 1–1.03)
TOTAL PROTEIN: 7.5 G/DL (ref 6.4–8.2)
URINE TYPE: ABNORMAL
UROBILINOGEN, URINE: 0.2 E.U./DL
WBC # BLD: 22.1 K/UL (ref 4–11)
WBC UA: ABNORMAL /HPF (ref 0–5)

## 2020-09-27 PROCEDURE — 96374 THER/PROPH/DIAG INJ IV PUSH: CPT

## 2020-09-27 PROCEDURE — 6360000002 HC RX W HCPCS: Performed by: NURSE PRACTITIONER

## 2020-09-27 PROCEDURE — 93010 ELECTROCARDIOGRAM REPORT: CPT | Performed by: INTERNAL MEDICINE

## 2020-09-27 PROCEDURE — 85025 COMPLETE CBC W/AUTO DIFF WBC: CPT

## 2020-09-27 PROCEDURE — 36415 COLL VENOUS BLD VENIPUNCTURE: CPT

## 2020-09-27 PROCEDURE — 6370000000 HC RX 637 (ALT 250 FOR IP): Performed by: NURSE PRACTITIONER

## 2020-09-27 PROCEDURE — 6370000000 HC RX 637 (ALT 250 FOR IP): Performed by: INTERNAL MEDICINE

## 2020-09-27 PROCEDURE — 99285 EMERGENCY DEPT VISIT HI MDM: CPT

## 2020-09-27 PROCEDURE — 1200000000 HC SEMI PRIVATE

## 2020-09-27 PROCEDURE — 74177 CT ABD & PELVIS W/CONTRAST: CPT

## 2020-09-27 PROCEDURE — 84300 ASSAY OF URINE SODIUM: CPT

## 2020-09-27 PROCEDURE — 83935 ASSAY OF URINE OSMOLALITY: CPT

## 2020-09-27 PROCEDURE — 80053 COMPREHEN METABOLIC PANEL: CPT

## 2020-09-27 PROCEDURE — 81001 URINALYSIS AUTO W/SCOPE: CPT

## 2020-09-27 PROCEDURE — 84295 ASSAY OF SERUM SODIUM: CPT

## 2020-09-27 PROCEDURE — 6360000004 HC RX CONTRAST MEDICATION: Performed by: EMERGENCY MEDICINE

## 2020-09-27 PROCEDURE — 2580000003 HC RX 258: Performed by: NURSE PRACTITIONER

## 2020-09-27 PROCEDURE — 83930 ASSAY OF BLOOD OSMOLALITY: CPT

## 2020-09-27 PROCEDURE — 6360000002 HC RX W HCPCS: Performed by: EMERGENCY MEDICINE

## 2020-09-27 PROCEDURE — 2580000003 HC RX 258: Performed by: EMERGENCY MEDICINE

## 2020-09-27 PROCEDURE — 93005 ELECTROCARDIOGRAM TRACING: CPT | Performed by: EMERGENCY MEDICINE

## 2020-09-27 PROCEDURE — 83690 ASSAY OF LIPASE: CPT

## 2020-09-27 RX ORDER — SODIUM CHLORIDE 1000 MG
2 TABLET, SOLUBLE MISCELLANEOUS
Status: DISCONTINUED | OUTPATIENT
Start: 2020-09-27 | End: 2020-09-28

## 2020-09-27 RX ORDER — 0.9 % SODIUM CHLORIDE 0.9 %
1000 INTRAVENOUS SOLUTION INTRAVENOUS ONCE
Status: COMPLETED | OUTPATIENT
Start: 2020-09-27 | End: 2020-09-27

## 2020-09-27 RX ORDER — ACETAMINOPHEN 325 MG/1
650 TABLET ORAL EVERY 6 HOURS PRN
Status: DISCONTINUED | OUTPATIENT
Start: 2020-09-27 | End: 2020-09-29 | Stop reason: HOSPADM

## 2020-09-27 RX ORDER — ONDANSETRON 2 MG/ML
4 INJECTION INTRAMUSCULAR; INTRAVENOUS EVERY 6 HOURS PRN
Status: DISCONTINUED | OUTPATIENT
Start: 2020-09-27 | End: 2020-09-29 | Stop reason: HOSPADM

## 2020-09-27 RX ORDER — ACETAMINOPHEN 650 MG/1
650 SUPPOSITORY RECTAL EVERY 6 HOURS PRN
Status: DISCONTINUED | OUTPATIENT
Start: 2020-09-27 | End: 2020-09-29 | Stop reason: HOSPADM

## 2020-09-27 RX ORDER — SODIUM CHLORIDE 0.9 % (FLUSH) 0.9 %
10 SYRINGE (ML) INJECTION PRN
Status: DISCONTINUED | OUTPATIENT
Start: 2020-09-27 | End: 2020-09-29 | Stop reason: HOSPADM

## 2020-09-27 RX ORDER — ATORVASTATIN CALCIUM 40 MG/1
40 TABLET, FILM COATED ORAL DAILY
Status: DISCONTINUED | OUTPATIENT
Start: 2020-09-27 | End: 2020-09-29 | Stop reason: HOSPADM

## 2020-09-27 RX ORDER — SODIUM CHLORIDE 0.9 % (FLUSH) 0.9 %
10 SYRINGE (ML) INJECTION EVERY 12 HOURS SCHEDULED
Status: DISCONTINUED | OUTPATIENT
Start: 2020-09-27 | End: 2020-09-29 | Stop reason: HOSPADM

## 2020-09-27 RX ORDER — PROMETHAZINE HYDROCHLORIDE 25 MG/1
12.5 TABLET ORAL EVERY 6 HOURS PRN
Status: DISCONTINUED | OUTPATIENT
Start: 2020-09-27 | End: 2020-09-29 | Stop reason: HOSPADM

## 2020-09-27 RX ORDER — POLYETHYLENE GLYCOL 3350 17 G/17G
17 POWDER, FOR SOLUTION ORAL DAILY PRN
Status: DISCONTINUED | OUTPATIENT
Start: 2020-09-27 | End: 2020-09-29 | Stop reason: HOSPADM

## 2020-09-27 RX ORDER — SENNA PLUS 8.6 MG/1
1 TABLET ORAL 2 TIMES DAILY
Status: ON HOLD | COMMUNITY
End: 2021-02-26 | Stop reason: HOSPADM

## 2020-09-27 RX ORDER — OXYCODONE HYDROCHLORIDE AND ACETAMINOPHEN 325; 5 MG/5ML; MG/5ML
SOLUTION ORAL 2 TIMES DAILY PRN
Status: ON HOLD | COMMUNITY
End: 2021-02-23 | Stop reason: CLARIF

## 2020-09-27 RX ORDER — ALBUTEROL SULFATE 90 UG/1
2 AEROSOL, METERED RESPIRATORY (INHALATION) EVERY 4 HOURS PRN
Status: DISCONTINUED | OUTPATIENT
Start: 2020-09-27 | End: 2020-09-29 | Stop reason: HOSPADM

## 2020-09-27 RX ORDER — ALLOPURINOL 300 MG/1
300 TABLET ORAL DAILY
Status: DISCONTINUED | OUTPATIENT
Start: 2020-09-27 | End: 2020-09-29 | Stop reason: HOSPADM

## 2020-09-27 RX ORDER — ONDANSETRON 2 MG/ML
4 INJECTION INTRAMUSCULAR; INTRAVENOUS ONCE
Status: COMPLETED | OUTPATIENT
Start: 2020-09-27 | End: 2020-09-27

## 2020-09-27 RX ORDER — SODIUM CHLORIDE 1000 MG
1 TABLET, SOLUBLE MISCELLANEOUS 2 TIMES DAILY
Status: ON HOLD | COMMUNITY
End: 2020-09-29 | Stop reason: HOSPADM

## 2020-09-27 RX ADMIN — SODIUM CHLORIDE, PRESERVATIVE FREE 10 ML: 5 INJECTION INTRAVENOUS at 22:30

## 2020-09-27 RX ADMIN — Medication 15 G: at 22:30

## 2020-09-27 RX ADMIN — IOPAMIDOL 75 ML: 755 INJECTION, SOLUTION INTRAVENOUS at 09:28

## 2020-09-27 RX ADMIN — ALLOPURINOL 300 MG: 300 TABLET ORAL at 18:35

## 2020-09-27 RX ADMIN — ONDANSETRON 4 MG: 2 INJECTION INTRAMUSCULAR; INTRAVENOUS at 08:58

## 2020-09-27 RX ADMIN — ENOXAPARIN SODIUM 40 MG: 40 INJECTION SUBCUTANEOUS at 18:34

## 2020-09-27 RX ADMIN — Medication 2 G: at 18:35

## 2020-09-27 RX ADMIN — SODIUM CHLORIDE 1000 ML: 9 INJECTION, SOLUTION INTRAVENOUS at 08:58

## 2020-09-27 RX ADMIN — ATORVASTATIN CALCIUM 40 MG: 40 TABLET, FILM COATED ORAL at 18:34

## 2020-09-27 SDOH — HEALTH STABILITY: MENTAL HEALTH: HOW OFTEN DO YOU HAVE A DRINK CONTAINING ALCOHOL?: NEVER

## 2020-09-27 ASSESSMENT — PAIN SCALES - GENERAL: PAINLEVEL_OUTOF10: 0

## 2020-09-27 NOTE — ED NOTES
Meal tray arrived and given to patient. Pt updated on ready admit bed status.         Peggy Bell RN  09/27/20 6601

## 2020-09-27 NOTE — ED NOTES
Writer contacted C3 to see when nurse was coming to take report. [place] states nurse on the way to ED now.       Aaron Newby RN  09/27/20 0172

## 2020-09-27 NOTE — ED NOTES
Transport arrived to take pt to ready admit bed, 349. Still waiting for C3 nurse to provide report.       Clayton Irby RN  09/27/20 [id number]

## 2020-09-27 NOTE — H&P
Hospital Medicine History & Physical      PCP: Jane Reeves    Date of Admission: 9/27/2020    Date of Service: Pt seen/examined on 9/27/2020 and Admitted to Inpatient with expected LOS greater than two midnights due to medical therapy. Chief Complaint:  Emesis    History Of Present Illness:    77 y.o. 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 The patient is currently receiving carboplatin and -16 every 3 weeks. Her last cycle was 2.5 weeks ago. She was feeling well, but over the last couple of days, noticed she was nauseous and staggering a bit when she ambulated. On the day of admission, she started vomiting this AM, so sought emergency treatment. She has a history of difficulty with hyponatremia and has been hospitalized for such. She was treated with a fluid restriction and salt tablets as an outpatient. She tells me that her salt tablet dosing was decreased by, she believes, Dr. Mary Cruz. She was initially on 2 tablets TID, then weaned to BID a couple of weeks ago. She denies fever, chills, cough, SOB, chest pain, abdominal pain. She takes Immodium as needed for diarrhea from chemo.     Past Medical History:          Diagnosis Date    Cancer ([address]     COVID-19 08/13/2020    Hyperlipidemia     [facility] [address] [address]        Past Surgical History:          Procedure Laterality Date    BREAST SURGERY      fibroid tumors removed bilateral breast    BRONCHOSCOPY N/A 5/11/2020    BRONCHOSCOPY ENDOBRONCHIAL ULTRASOUND with ANESTHESIA performed by Spencer Edgar MD at [address] [place] [address]  5/11/2020    BRONCHOSCOPY/TRANSBRONCHIAL NEEDLE BIOPSY performed by Spencer Edgar MD at [address] [place] [address]  5/11/2020    BRONCHOSCOPY/TRANSBRONCHIAL NEEDLE BIOPSY ADDL LOBE performed by Spencer Edgar MD at [address] [place] [address]  5/11/2020    BRONCHOSCOPY DIAGNOSTIC OR CELL [address] ONLY performed by Nallely Varela MD at [address] [place] [address] N/A 5/13/2020    RIGHT SUBCLAVIAN VEIN PORT A CATH INSERTION performed by Holland Peña MD at [address]       Medications Prior to Admission:      Prior to Admission medications    Medication Sig Start Date End Date Taking? Authorizing Provider   potassium chloride (KLOR-CON M) 20 MEQ extended release tablet Take 1 tablet by mouth 2 times daily 5/18/20   Elizabeth Dyson MD   sodium chloride 1 g tablet Take 2 tablets by mouth 3 times daily (with meals) 5/18/20   Elizabeth Dyson MD   albuterol sulfate HFA (PROAIR HFA) 108 (90 Base) MCG/ACT inhaler Inhale 2 puffs into the lungs every 4 hours as needed for Wheezing or Shortness of Breath 5/18/20   Phyllistine Shape, APRN - CNP   ondansetron (ZOFRAN ODT) 4 MG disintegrating tablet Take 2 tablets by mouth every 8 hours as needed for Nausea or Vomiting 5/18/20   Phyllistine Shape, APRN - CNP   promethazine (PHENERGAN) 12.5 MG tablet Take 1 tablet by mouth every 6 hours as needed for Nausea 5/18/20   Phyllistine Shape, APRN - CNP   atorvastatin (LIPITOR) 40 MG tablet Take 1 tablet by mouth daily 5/18/20   Phyllistine Shape, APRN - CNP   furosemide (LASIX) 20 MG tablet Take 1 tablet by mouth 2 times daily 5/18/20   Phyllistine Shape, APRN - CNP   allopurinol (ZYLOPRIM) 300 MG tablet Take 1 tablet by mouth daily 5/18/20   Phyllistine Shape, APRN - CNP       Allergies:  Patient has no known allergies. Social History:      The patient currently lives with her daughter. TOBACCO:   reports that she quit smoking about 5 months ago. She has never used smokeless tobacco.  ETOH:   has no history on file for alcohol. E-Cigarettes Vaping or Juuling     Questions Responses    Vaping Use     Start Date     Does device contain nicotine? Quit Date     Vaping Type             Family History:      Reviewed in detail.  Positive as follows:    History reviewed. No pertinent family history. REVIEW OF SYSTEMS:   Pertinent positives as noted in the HPI. All other systems reviewed and negative. PHYSICAL EXAM PERFORMED:    /82   Pulse 79   Temp 97.1 °F (36.2 °C)   Resp 18   Wt 133 lb (60.3 kg)   SpO2 96%   BMI 23.19 kg/m²     General appearance:  Pleasant female in no apparent distress, appears stated age and cooperative. HEENT:  Normal cephalic, atraumatic without obvious deformity. Pupils equal, round, and reactive to light. Extra ocular muscles intact. Conjunctivae/corneas clear. Neck: Supple, with full range of motion. No jugular venous distention. Trachea midline. Respiratory:  Normal respiratory effort. Clear to auscultation, bilaterally without Rales/Wheezes/Rhonchi. Cardiovascular:  Regular rate and rhythm with normal S1/S2 without murmurs, rubs or gallops. Abdomen: Soft, non-tender, non-distended with normal bowel sounds. Musculoskeletal:  No clubbing, cyanosis or edema bilaterally. Full range of motion without deformity. Skin: Skin color, texture, turgor normal.  No rashes or lesions. Neurologic:  Neurovascularly intact without any focal sensory/motor deficits. Cranial nerves: II-XII intact, grossly non-focal.  Psychiatric:  Alert and oriented, thought content appropriate, normal insight  Capillary Refill: Brisk,< 3 seconds   Peripheral Pulses: +2 palpable, equal bilaterally       Labs:     Recent Labs     09/27/20  0831   WBC 22.1*   HGB 11.7*   HCT 35.1*        Recent Labs     09/27/20  0831   *   K 4.1   CL 82*   CO2 22   BUN 17   CREATININE 0.8   CALCIUM 9.3     Recent Labs     09/27/20  0831   AST 25   ALT 25   BILITOT 0.6   ALKPHOS 99     No results for input(s): INR in the last 72 hours. No results for input(s): Zettie Binet in the last 72 hours.     Urinalysis:      Lab Results   Component Value Date    NITRU Negative 09/27/2020    WBCUA None seen 09/27/2020    BACTERIA 2+ 08/08/2020 RBCUA 3-4 09/27/2020    BLOODU SMALL 09/27/2020    SPECGRAV 1.010 09/27/2020    GLUCOSEU Negative 09/27/2020       Radiology:     EKG:  I have reviewed the EKG with the following interpretation: NSR, rate 70, no acute ST abnormalities. CT ABDOMEN PELVIS W IV CONTRAST Additional Contrast? None   Final Result   Progression in left cardiophrenic adenopathy, compared with 08/28/2020 and   05/12/2020. Other subcentimeter gastrohepatic and retroperitoneal nodes are   stable since 08/28/2020. Multiple hepatic nodules, with apparent progression since 08/28/2020, but   regression since 05/12/2020. Stable left adrenal nodule. No acute inflammatory abnormality is identified. ASSESSMENT:    Active Hospital Problems    Diagnosis Date Noted    Hyponatremia [E87.1] 08/08/2020         PLAN:    Symptomatic hyponatremia   - this is a recurrent problem for the patient. Likely multifactorial due to hypovolemia  compounded by a SIADH component, and weaning of salt tablets. - received 1L of IVF in the ED.  - ED physician spoke with nephrology would recommended salt tablets and a fluid restriction, which were ordered. - check urine Na, osmo, serum osmo. - q6 hour Na levels. - monitor on tele. 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 - leukocytosis noted. - CT abd done in ED reveals possible worsening of metastatic disease.  - supportive care. - consult [facility]. Anemia, chronic - due to ca. - follow CBC. DVT Prophylaxis: Lovenox  Diet: No diet orders on file  Code Status: Prior    PT/OT Eval Status: not indicated    Dispo - pending improvement of Na - likely 1-3 days inpatient       DEVORAH Urbina - SLOANE    Thank you Heidi Gowers for the opportunity to be involved in this patient's care. If you have any questions or concerns please feel free to contact me at [phone].

## 2020-09-27 NOTE — ED PROVIDER NOTES
CHIEF COMPLAINT  Emesis (started this am, getting treatment for lung cancer )      HISTORY OF Warren Goncalves is a 77 y.o. female presents to the ED with vomiting and not feeling well. The patient was feeling somewhat unwell, lightheaded and nauseous yesterday started vomiting several times this morning, no diarrhea, no constipation. She has had no dysuria, urinary frequency urgency did state that her urine seemed somewhat darker. She has a history of metastatic lung cancer on chemotherapy, has had improving scans as of August..  No other complaints, modifying factors or associated symptoms. I have reviewed the following from the nursing documentation. Past Medical History:   Diagnosis Date    [facility] [address] [address]     COVID-19 08/13/2020    Hyperlipidemia     Lung cancer ([facility] [address]      Past Surgical History:   Procedure Laterality Date    BREAST SURGERY      fibroid tumors removed bilateral breast    BRONCHOSCOPY N/A 5/11/2020    BRONCHOSCOPY ENDOBRONCHIAL ULTRASOUND with ANESTHESIA performed by Ellie Elena MD at [address] [place] [address]  5/11/2020    BRONCHOSCOPY/TRANSBRONCHIAL NEEDLE BIOPSY performed by Ellie Elena MD at [address] [place] [address]  5/11/2020    BRONCHOSCOPY/TRANSBRONCHIAL NEEDLE BIOPSY ADDL LOBE performed by Ellie Elena MD at [address] [place] [address]  5/11/2020    BRONCHOSCOPY DIAGNOSTIC OR CELL [address] ONLY performed by Ellie Elena MD at [address] [facility] N/A 5/13/2020    RIGHT SUBCLAVIAN VEIN PORT A CATH INSERTION performed by Warren Madison MD at [facility]      mid chest     History reviewed. No pertinent family history. Social History     Socioeconomic History    Marital status:       Spouse name: Not on file    Number of children: Not on file    Years of education: Not on file    Highest education level: Not on file   Occupational History    Not on file   Social Needs    needed for Nausea or Vomiting 20 tablet 5    promethazine (PHENERGAN) 12.5 MG tablet Take 1 tablet by mouth every 6 hours as needed for Nausea 25 tablet 5    atorvastatin (LIPITOR) 40 MG tablet Take 1 tablet by mouth daily 30 tablet 3    furosemide (LASIX) 20 MG tablet Take 1 tablet by mouth 2 times daily 60 tablet 0    allopurinol (ZYLOPRIM) 300 MG tablet Take 1 tablet by mouth daily 30 tablet 3     No Known Allergies    REVIEW OF SYSTEMS  10 systems reviewed, pertinent positives per HPI otherwise noted to be negative. PHYSICAL EXAM  /72   Pulse 73   Temp 97.6 °F (36.4 °C) (Oral)   Resp 18   Wt 133 lb (60.3 kg)   SpO2 97%   BMI 23.19 kg/m²   GENERAL APPEARANCE: Awake and alert. Cooperative. Tired, nontoxic, mild distress  HEAD: Normocephalic. Atraumatic. EYES: PERRL. EOM's grossly intact. ENT: Mucous membranes are dry. NECK: Supple. No JVD. HEART: RRR. No murmurs  LUNGS: Respirations unlabored. No rales, rhonchi or wheezing  ABDOMEN: Soft. Non-distended. Non-tender. No guarding or rebound. Normal bowel sounds. EXTREMITIES: No peripheral edema. Moves all extremities equally. All extremities neurovascularly intact. SKIN: Warm and dry. No acute rashes. NEUROLOGICAL: Alert and oriented. Cranial nerves II through XII intact. No gross facial drooping. Strength 5/5, sensation intact. No truncal ataxia. PSYCHIATRIC: Normal mood and affect. LABS  I have reviewed all labs for this visit.    Results for orders placed or performed during the hospital encounter of 09/27/20   CBC Auto Differential   Result Value Ref Range    WBC 22.1 (H) 4.0 - 11.0 K/uL    RBC 3.82 (L) 4.00 - 5.20 M/uL    Hemoglobin 11.7 (L) 12.0 - 16.0 g/dL    Hematocrit 35.1 (L) 36.0 - 48.0 %    MCV 92.0 80.0 - 100.0 fL    MCH 30.7 26.0 - 34.0 pg    MCHC 33.4 31.0 - 36.0 g/dL    RDW 18.3 (H) 12.4 - 15.4 %    Platelets 482 401 - 616 K/uL    MPV 7.1 5.0 - 10.5 fL    PLATELET SLIDE REVIEW Adequate     SLIDE REVIEW see below Path Consult Yes     Neutrophils % 36.0 %    Lymphocytes % 46.0 %    Monocytes % 4.0 %    Eosinophils % 1.0 %    Basophils % 0.0 %    Neutrophils Absolute 8.0 (H) 1.7 - 7.7 K/uL    Lymphocytes Absolute 13.0 (H) 1.0 - 5.1 K/uL    Monocytes Absolute 0.9 0.0 - 1.3 K/uL    Eosinophils Absolute 0.2 0.0 - 0.6 K/uL    Basophils Absolute 0.0 0.0 - 0.2 K/uL    Atypical Lymphocytes Relative 13 (H) 0 - 6 %    Ovalocytes Occasional (A)    Comprehensive Metabolic Panel   Result Value Ref Range    Sodium 115 (LL) 136 - 145 mmol/L    Potassium 4.1 3.5 - 5.1 mmol/L    Chloride 82 (L) 99 - 110 mmol/L    CO2 22 21 - 32 mmol/L    Anion Gap 11 3 - 16    Glucose 118 (H) 70 - 99 mg/dL    BUN 17 7 - 20 mg/dL    CREATININE 0.8 0.6 - 1.2 mg/dL    GFR Non-African American >60 >60    GFR African American >60 >60    Calcium 9.3 8.3 - 10.6 mg/dL    Total Protein 7.5 6.4 - 8.2 g/dL    Alb 4.2 3.4 - 5.0 g/dL    Albumin/Globulin Ratio 1.3 1.1 - 2.2    Total Bilirubin 0.6 0.0 - 1.0 mg/dL    Alkaline Phosphatase 99 40 - 129 U/L    ALT 25 10 - 40 U/L    AST 25 15 - 37 U/L    Globulin 3.3 g/dL   Lipase   Result Value Ref Range    Lipase 29.0 13.0 - 60.0 U/L   EKG 12 Lead   Result Value Ref Range    Ventricular Rate 70 BPM    Atrial Rate 70 BPM    P-R Interval 182 ms    QRS Duration 98 ms    Q-T Interval 420 ms    QTc Calculation (Bazett) 453 ms    P Axis 74 degrees    R Axis 59 degrees    T Axis 69 degrees    Diagnosis       Normal sinus rhythmRight atrial enlargementBorderline ECGWhen compared with ECG of 07-AUG-2020 16:38,No significant change was found       EKG  The Ekg interpreted by me shows  normal sinus rhythm with a rate of 70  Axis is   Normal  QTc is  normal  Intervals and Durations are unremarkable.       ST Segments: normal  No significant change from prior EKG dated May 2020        RADIOLOGY  Ct Abdomen Pelvis W Iv Contrast Additional Contrast? None    Result Date: 9/27/2020  EXAMINATION: CT OF THE ABDOMEN AND PELVIS WITH CONTRAST soft tissues are unremarkable. Progression in left cardiophrenic adenopathy, compared with 08/28/2020 and 05/12/2020. Other subcentimeter gastrohepatic and retroperitoneal nodes are stable since 08/28/2020. Multiple hepatic nodules, with apparent progression since 08/28/2020, but regression since 05/12/2020. Stable left adrenal nodule. No acute inflammatory abnormality is identified. Ct Chest Abdomen Pelvis W Contrast    Result Date: 8/28/2020  EXAMINATION: CT OF THE CHEST, ABDOMEN, AND PELVIS WITH CONTRAST 8/28/2020 10:49 am TECHNIQUE: CT of the chest, abdomen and pelvis was performed with the administration of intravenous contrast. Multiplanar reformatted images are provided for review. Dose modulation, iterative reconstruction, and/or weight based adjustment of the mA/kV was utilized to reduce the radiation dose to as low as reasonably achievable. COMPARISON: 05/12/2020 05/06/2020 HISTORY: ORDERING SYSTEM PROVIDED HISTORY: Malignant neoplasm of upper lobe of right lung [facility]) TECHNOLOGIST PROVIDED HISTORY: Additional Contrast?->Oral Reason for exam:->cancer fu Reason for Exam: cancer follow up, lung cancer, kidney cancer Acuity: Unknown Type of Exam: Subsequent/Follow-up FINDINGS: Chest: Mediastinum: Tip of MediPort projects in distal SVC . There is persistent mediastinal adenopathy. Previously described confluent dom conglomerate in the region of the AP window, which measured 5.1 x 2.8 cm, now measures approximately 9 mm by 10 mm. .. Subcarinal dom conglomerate now measures 2.5 cm x 1.8 cm, previously 1.9 cm x 1.2 cm. A right hilar dom conglomerate now measures 1.4 cm x 1.1 cm, previously subcentimeter in size. Atherosclerotic change seen in aorta. No intimal flap seen. No pericardial effusion.  Thyroid gland appears unchanged Lungs/pleura: Respiratory motion artifact limits evaluation of fine pulmonary parenchymal change Linear and curvilinear opacity seen in the apices, compatible with scarring. Pleural calcifications are also seen in the apices On the left, a few scattered ground-glass opacities are seen in the left upper and left lower lobe. No significant pleural fluid. When compared to prior there is decreased nodularity the left lung. . An index nodule in the left upper lobe which measured 1.2 x 1.0 cm is no longer noted. Additional nodular densities on the left are also smaller. A small 7 mm nodule in the left lower lobe however is unchanged measuring 7 mm. On the right scattered ground-glass opacities are seen in the right upper, right middle and right lower lobe. Scattered areas of bronchial wall thickening are seen. A few filling defects are seen in the lower lobe airways, likely mucous plugging. The scattered ground-glass opacities throughout the right lung are new or more pronounced compared to prior. Punctate noncalcified pulmonary nodules in the right lower lobe are unchanged, the largest of which measures 6 mm in size or less. Soft Tissues/Bones: Spurring is seen in the spine Abdomen/Pelvis: Organs: Previously seen hepatic metastasis have resolved and now difficult to perceive. .  No intrahepatic ductal dilatation. No perihepatic fluid No splenomegaly. No perisplenic fluid Left cardiophrenic lymph node which measured 1.6 x 1.0 cm is smaller in size, now measuring 2 mm in short axis. Right adrenal gland is unchanged Left adrenal nodule measures 1.8 cm by 1.7 cm, similar to prior when the prior study is remeasured in a similar fashion. No peripancreatic inflammatory change There is lobular contour to the left kidney. No hydronephrosis. There is lobular contour to the right kidney. No hydronephrosis GI/Bowel: No significant small bowel distention. Moderate stool seen in the colon. Scattered colonic diverticula are seen. No bowel obstruction. Pelvis: No free fluid in pelvis.   No pelvic adenopathy Peritoneum/Retroperitoneum: A small left-sided periaortic node measures 5 mm, previously 9-10 mm. Atherosclerotic change seen in abdominal aorta. Abdominal aorta is stable in caliber Bones/Soft Tissues: Spurring is seen in the spine. Spurring is seen in the hips     Within the chest, there is persistent mediastinal adenopathy, increased in some regions but decreased in others. There is decreased nodularity throughout the left lung. A few scattered nodules remain on the right and left, which are similar in size Scattered ground-glass opacity seen throughout the lungs bilaterally, increased compared to prior, likely postinflammatory-infectious Within the abdomen and pelvis, hepatic metastasis have resolved. A left periaortic node is also smaller in size. ED COURSE/MDM  Patient seen and evaluated. Old records reviewed. Labs and imaging reviewed and results discussed with patient. Patient came in with nausea vomiting, concerns include progression of metastatic disease, chemotherapy side effects, enteritis. She was given fluids, antiemetics is feeling somewhat better with this. Was found to be significantly hyponatremic with a serum sodium of 115. I have consulted with nephrology as she has had this before and will admit for electrolyte replacement and nausea control. CT scan showed some concern for progression of metastatic disease. Patient was given scripts for the following medications. I counseled patient how to take these medications. New Prescriptions    No medications on file       CLINICAL IMPRESSION  1. Hyponatremia    2.  Non-intractable vomiting with nausea, unspecified vomiting type                      Brandi Benitez MD  09/27/20 1010

## 2020-09-27 NOTE — PROGRESS NOTES
Pt admitted to Room 323  Pt a/o. VSS. Oriented to room. Denies c/o pain. Denies nausea at present; states zofran in ER helped a lot. Assessment complete as charted. Call light in reach. Denies other needs. Will continue to monitor.

## 2020-09-27 NOTE — ED NOTES
[facility] @ 1008  Re: hyponatremia, lung ca   Per Travis Alvarez called back and spoke to Travis @ [address],  09/27/20 1109

## 2020-09-27 NOTE — CONSULTS
Fear of current or ex partner: Not on file     Emotionally abused: Not on file     Physically abused: Not on file     Forced sexual activity: Not on file   Other Topics Concern    Not on file   Social History Narrative    Not on file       History reviewed. No pertinent family history. allopurinol, 300 mg, Daily  atorvastatin, 40 mg, Daily  sodium chloride, 2 g, TID WC  sodium chloride flush, 10 mL, 2 times per day  enoxaparin, 40 mg, Daily           albuterol sulfate HFA, 2 puff, Q4H PRN  sodium chloride flush, 10 mL, PRN  acetaminophen, 650 mg, Q6H PRN    Or  acetaminophen, 650 mg, Q6H PRN  polyethylene glycol, 17 g, Daily PRN  promethazine, 12.5 mg, Q6H PRN    Or  ondansetron, 4 mg, Q6H PRN        Patient has no known allergies. DIET GENERAL; Daily Fluid Restriction: 1500 ml    REVIEW OF SYSTEMS:   POSITIVE ITEMS IN BOLD:  GEN:  fevers, chills, sweats, fatigue and weight loss   EYE: eyelid swelling, no redness  ENT:   nasal congestion, sore mouth and sore throat   Resp:   cough, hemoptysis, pneumonia or dyspnea on exertion   Card: chest pain, chest pressure/discomfort, dyspnea, palpitations,  lower extremity edema   GI: nausea, vomiting, diarrhea, constipation and abdominal pain   :  dysuria, nocturia, urinary incontinence, hesitancy and hematuria   Derm:  rash, skin lesion(s), pruritus and dryness   Neuro:  headaches, dizziness, seizures, gait problems, tremor and weakness   MS:  myalgias, arthralgias, neck pain and back pain     Physical Examination:  Vitals:  Reviewed.    Vitals:    09/27/20 1217 09/27/20 1404 09/27/20 1429 09/27/20 1435   BP: 132/82 110/89 116/79    Pulse: 79 75 74    Resp: 18 20 16    Temp: 97.1 °F (36.2 °C) 98.2 °F (36.8 °C) 98.5 °F (36.9 °C)    TempSrc:  Oral Oral    SpO2: 96% 96% 95%    Weight:       Height:    5' 3.5\" (1.613 m)       Gen appearance: Alert, appears stated age and cooperative   Eyes: Eyelids,conjunctiva and pupils look normal   ENT: External inspection of the ears and nose are within normal limits             Oral mucosa  Is moist   Neuro: Grossly no focal neurological deficits, normal sensation, grossly cranial nerves intact   Neck:  No JVD, no mass, no thyroid enlargement   Cardio: S1 S2 normal, No added sounds   Resp: normal effort, clear to auscultation     GI:  Soft, non-tender, BS +          No palpable kidney, no renal angle tenderness   MS: No swollen or tender joints, no cyanosis, clubbing   DERM: no rashes, thickening   EDEMA: None. I/Os:      No intake or output data in the 24 hours ending 09/27/20 1758     No intake/output data recorded. No intake/output data recorded. LABS:    CBC:   Recent Labs     09/27/20  0831   WBC 22.1*   HGB 11.7*   HCT 35.1*        BMP:    Recent Labs     09/27/20  0831 09/27/20  1410 09/27/20  1719   * 118* 121*   K 4.1 4.3  --    CL 82* 86*  --    CO2 22 24  --    BUN 17 14  --    CREATININE 0.8 0.7  --    GLUCOSE 118* 123*  --      ABGs: No results found for: PHART, PO2ART, [id number]          Assessment / Plan:    Hyponatremia  · SIADH. With small cell /neuroendocrine tumor with liver and lung mets status post carboplatin/-16 completed about 4 cycles  · Sodium 115 on admission  · Check urine sodium and osmolality. · Check serum osmolality. · Previously TSH, lipids, cortisol, LFT normal  Patient could not tolerate Urea packets  Start back on salt tablets 2 g 3 times daily. Fluid restriction. Check postvoid residual volume.   goal sodium to be kept around 120--122 overnight. May need urea packets again if nausea improves.     Small Cell Lung Ca  metastatic small cell neuroendocrine carcinoma  Metastatic disease to liver.     Chemo  5/14/20--> 8/2020  CT lung-and liver mets, and also has adrenal mass/nodule     Nausea:.   cancer burden and current chemo treatment related. Symptomatic treatment.        History of CLL.      confirmed with CLL on bone marrow biopsy  -No planned treatment per Heme/Onc    COPD without acute exacerbation     Recent COVID-19 pneumonia in August 2020. Patient full code. I thank Dr. Mariaelena Moy MD for consulting [place] in the care of your patient. Please call with any questions or concerns. M[place]. [facility]. Off: [phone]  Cell: [phone].  ( until 5 pm)

## 2020-09-27 NOTE — PROGRESS NOTES
4 Eyes Skin Assessment     The patient is being assess for  Admission    I agree that 2 RN's have performed a thorough Head to Toe Skin Assessment on the patient. ALL assessment sites listed below have been assessed. Areas assessed by both nurses: LV\  [x]   Head, Face, and Ears   [x]   Shoulders, Back, and Chest  [x]   Arms, Elbows, and Hands   [x]   Coccyx, Sacrum, and IschIum  [x]   Legs, Feet, and Heels        Does the Patient have Skin Breakdown?   No         Ramone Prevention initiated:  NA   Wound Care Orders initiated:  NA      WOC nurse consulted for Pressure Injury (Stage 3,4, Unstageable, DTI, NWPT, and Complex wounds), New and Established Ostomies:  NA      Nurse 1 eSignature: Electronically signed by Marco Cason RN on 9/27/20 at [address] PM EDT    **SHARE this note so that the co-signing nurse is able to place an eSignature**    Nurse 2 eSignature: Electronically signed by Jennie Velasco RN on 9/27/20 at 11:34 PM EDT

## 2020-09-28 LAB
ANION GAP SERPL CALCULATED.3IONS-SCNC: 10 MMOL/L (ref 3–16)
ATYPICAL LYMPHOCYTE RELATIVE PERCENT: 4 % (ref 0–6)
BANDED NEUTROPHILS RELATIVE PERCENT: 1 % (ref 0–7)
BASOPHILS ABSOLUTE: 0 K/UL (ref 0–0.2)
BASOPHILS RELATIVE PERCENT: 0 %
BUN BLDV-MCNC: 27 MG/DL (ref 7–20)
CALCIUM SERPL-MCNC: 8.7 MG/DL (ref 8.3–10.6)
CHLORIDE BLD-SCNC: 85 MMOL/L (ref 99–110)
CO2: 26 MMOL/L (ref 21–32)
CORTISOL TOTAL: 8.9 UG/DL
CREAT SERPL-MCNC: 0.6 MG/DL (ref 0.6–1.2)
EOSINOPHILS ABSOLUTE: 0.2 K/UL (ref 0–0.6)
EOSINOPHILS RELATIVE PERCENT: 1 %
GFR AFRICAN AMERICAN: >60
GFR NON-AFRICAN AMERICAN: >60
GLUCOSE BLD-MCNC: 103 MG/DL (ref 70–99)
HCT VFR BLD CALC: 31.4 % (ref 36–48)
HEMATOLOGY PATH CONSULT: NO
HEMATOLOGY PATH CONSULT: NORMAL
HEMOGLOBIN: 10.5 G/DL (ref 12–16)
LYMPHOCYTES ABSOLUTE: 11.7 K/UL (ref 1–5.1)
LYMPHOCYTES RELATIVE PERCENT: 58 %
MCH RBC QN AUTO: 30.7 PG (ref 26–34)
MCHC RBC AUTO-ENTMCNC: 33.4 G/DL (ref 31–36)
MCV RBC AUTO: 91.9 FL (ref 80–100)
MONOCYTES ABSOLUTE: 0.8 K/UL (ref 0–1.3)
MONOCYTES RELATIVE PERCENT: 4 %
NEUTROPHILS ABSOLUTE: 6.2 K/UL (ref 1.7–7.7)
NEUTROPHILS RELATIVE PERCENT: 32 %
OVALOCYTES: ABNORMAL
PDW BLD-RTO: 18.2 % (ref 12.4–15.4)
PLATELET # BLD: 292 K/UL (ref 135–450)
PLATELET SLIDE REVIEW: ADEQUATE
PMV BLD AUTO: 7.4 FL (ref 5–10.5)
POTASSIUM REFLEX MAGNESIUM: 3.9 MMOL/L (ref 3.5–5.1)
RBC # BLD: 3.42 M/UL (ref 4–5.2)
SLIDE REVIEW: ABNORMAL
SMUDGE CELLS: PRESENT
SODIUM BLD-SCNC: 117 MMOL/L (ref 136–145)
SODIUM BLD-SCNC: 120 MMOL/L (ref 136–145)
SODIUM BLD-SCNC: 120 MMOL/L (ref 136–145)
SODIUM BLD-SCNC: 121 MMOL/L (ref 136–145)
WBC # BLD: 18.9 K/UL (ref 4–11)

## 2020-09-28 PROCEDURE — 2700000000 HC OXYGEN THERAPY PER DAY

## 2020-09-28 PROCEDURE — 94761 N-INVAS EAR/PLS OXIMETRY MLT: CPT

## 2020-09-28 PROCEDURE — 36415 COLL VENOUS BLD VENIPUNCTURE: CPT

## 2020-09-28 PROCEDURE — 82533 TOTAL CORTISOL: CPT

## 2020-09-28 PROCEDURE — 84295 ASSAY OF SERUM SODIUM: CPT

## 2020-09-28 PROCEDURE — 6360000002 HC RX W HCPCS: Performed by: NURSE PRACTITIONER

## 2020-09-28 PROCEDURE — 80048 BASIC METABOLIC PNL TOTAL CA: CPT

## 2020-09-28 PROCEDURE — 2580000003 HC RX 258: Performed by: NURSE PRACTITIONER

## 2020-09-28 PROCEDURE — 85025 COMPLETE CBC W/AUTO DIFF WBC: CPT

## 2020-09-28 PROCEDURE — 6370000000 HC RX 637 (ALT 250 FOR IP): Performed by: NURSE PRACTITIONER

## 2020-09-28 PROCEDURE — 6370000000 HC RX 637 (ALT 250 FOR IP): Performed by: INTERNAL MEDICINE

## 2020-09-28 PROCEDURE — 1200000000 HC SEMI PRIVATE

## 2020-09-28 RX ORDER — FUROSEMIDE 20 MG/1
20 TABLET ORAL DAILY
Status: DISCONTINUED | OUTPATIENT
Start: 2020-09-28 | End: 2020-09-28

## 2020-09-28 RX ORDER — TOLVAPTAN 15 MG/1
7.5 TABLET ORAL ONCE
Status: COMPLETED | OUTPATIENT
Start: 2020-09-28 | End: 2020-09-28

## 2020-09-28 RX ADMIN — SODIUM CHLORIDE, PRESERVATIVE FREE 10 ML: 5 INJECTION INTRAVENOUS at 08:11

## 2020-09-28 RX ADMIN — ACETAMINOPHEN 650 MG: 325 TABLET ORAL at 08:11

## 2020-09-28 RX ADMIN — Medication 15 G: at 08:11

## 2020-09-28 RX ADMIN — ENOXAPARIN SODIUM 40 MG: 40 INJECTION SUBCUTANEOUS at 08:11

## 2020-09-28 RX ADMIN — Medication 2 G: at 12:10

## 2020-09-28 RX ADMIN — FUROSEMIDE 20 MG: 20 TABLET ORAL at 12:10

## 2020-09-28 RX ADMIN — TOLVAPTAN 7.5 MG: 15 TABLET ORAL at 18:03

## 2020-09-28 RX ADMIN — ATORVASTATIN CALCIUM 40 MG: 40 TABLET, FILM COATED ORAL at 08:11

## 2020-09-28 RX ADMIN — Medication 2 G: at 08:11

## 2020-09-28 RX ADMIN — ALLOPURINOL 300 MG: 300 TABLET ORAL at 08:11

## 2020-09-28 RX ADMIN — SODIUM CHLORIDE, PRESERVATIVE FREE 10 ML: 5 INJECTION INTRAVENOUS at 20:00

## 2020-09-28 ASSESSMENT — PAIN SCALES - GENERAL: PAINLEVEL_OUTOF10: 3

## 2020-09-28 NOTE — PLAN OF CARE
Problem: Pain:  Goal: Patient's pain/discomfort is manageable  Description: Patient's pain/discomfort is manageable  9/28/2020 0928 by Isi Rodrigues RN  Outcome: Ongoing  Note: Pt reporting a headache 4/10 on the pain scale. PRN tylenol given per MAR. Will continue to monitor and reassess pain as needed.

## 2020-09-28 NOTE — PROGRESS NOTES
Perfect Serve sent to Dr. Fernando Richards: FYI: Panic lab value of a sodium of 117. Just wanted to notify you. Thank you!

## 2020-09-28 NOTE — PLAN OF CARE
LAST [facility] OFFICE NOTE FROM 20     Patient Name: Radha Lentz   Patient : 1945   Patient MRN: [id number]   Primary Oncologist: Queta Lombardo [address] [place] [address]   Referring Physician: Buddy Olson MD (Medical Oncology)   Date of Service: 2020   Chief Complaint  Small cell carcinoma of lung (disorder) ( Stage Date: Unknown, Stage IVB )  Problem List  Small cell carcinoma of lung (disorder) ( Stage Date: Unknown, Stage IVB )   Anemia   CLL   Dehydration   Effects of immunotherapy   Fever   Liver metastasis   Neoplasm related pain   Small cell lung cancer ( Stage Date: Unknown, Stage IVB )    HPI  Admitted hyponatremia and found to have a lung mass with liver mets. Bx confirmed small cell neuroendocrine malignancy. She was treated with cycle 1 Carbo and  16, 5/14, 15, and 16. She tolerated chemo well. Paresh Covarrubias was given outpatient. Nephrology was consulted for hyponatremia as well. She is maintained on salt tablets. Na to improve further with chemo treatment as her low sodium levels are related to a paraneoplastic process from her CA. She had a port placed. MRI brain negative. She is maintained on allopurinol. Previous Therapies  Current Therapy   [facility] Hydration Cycle Length: 1 Number Cycles: 1 Start: C1D1 on 2020 Assoc Dx: Small cell carcinoma of lung (disorder) LOT: Toxicity Management 2020 C1 D1  Sodium Chloride IV 0.9 %, .9 % 1000 mL, Dose modified  Atezolizumab Q21D Cycle Length: 21 Number Cycles: 8 Start: Enrique Cunningham on 10/01/2020 Assoc Dx: Small cell carcinoma of lung (disorder) LOT: Maintenance Stage: IVB 10/01/2020 C8 D1  Tecentriq (Atezolizumab IV), 1200 mg  Interval History   Milady Murphy returns today for follow-up. She reports she feels well and has no new complaints today. She tolerated immunotherapy very well. She denies shortness of breath, cough, chest pain, nausea or vomiting, bowel complaints. She has no rash or skin changes.    Review of Systems  Per interval history; otherwise 10 point ROS is negative   Vital Signs  Blood pressure: 120/82, Pulse: 78, Temperature: 98.3 F, Respirations: 15, O2 sat: , Pain Scale: 0, Height: 63 in, Weight: 136.6 lb, BSA: 1.66, BMI: 24.2 kg/m2   Physical Exam   CONSTITUTIONAL: awake, alert, cooperative, no apparent distress   EYES: sclera clear and conjunctiva normal  NECK: supple, symmetrical  HEMATOLOGIC/LYMPHATIC: no cervical, supraclavicular or axillary lymphadenopathy   LUNGS: no increased work of breathing and clear to auscultation   CARDIOVASCULAR: regular rate and rhythm, normal S1 and S2, no murmur noted  ABDOMEN: normal bowel sounds x 4, soft, non-distended, non-tender, no masses palpated, no hepatosplenomgaly   MUSCULOSKELETAL: full range of motion noted  NEUROLOGIC: awake, alert, oriented to name, place and time. Motor skills grossly intact. SKIN: Normal skin color, texture, turgor and no jaundice.  appears intact, port in right chest with no infectious symptoms  EXTREMITIES: no LE edema     Labs  CBC   Lab Results 09/17/2020 09/10/2020 08/14/2020 07/20/2020 06/29/2020 06/23/2020   CBC                     WBC x 10^3/uL 16.1 (H) 20.6 (H)    21.4 (H) 19.8 (H)      RBC x 10^6/uL 3.53 (L) 3.67 (L)    2.58 (L) 3.14 (L)      HGB g/dL 10.8 (L) 11.3    8.1 (L) 9.7 (L)      HCT % 34.3 35.4    24.6 (L) 29.3 (L)      MCV fL 97.2 (H) 96.5 (H)    95.3 (H) 93.3      MCH pg 30.6 30.8    31.4 30.9      MCHC g/dL 31.5 (L) 31.9 (L)    32.9 33.1      RDW-CV, % 17.9 (H) 18.6 (H)    18.2 (H) 15.7 (H)      PLT x 10^3/uL 216.0 414.0 (H)    502.0 (H) 712.0 (H)      Janine % 39.2 28.2 (L)    33.7 (L) 37.9      LY % 53.8 (H) 65.3 (H)    61.7 (H) 57.0 (H)      MO % 6.0 4.8    4.4 (L) 4.9      EO % 0.9 1.7    0.1 (L) 0.1 (L)      BA % 0.1 0.0 (L)    0.1 0.1      Janine # (ANC) x 10^3/uL 6.29 (H) 5.80    7.21 (H) 7.55 (H)      LY # x 10^3/uL 8.64 (H) 13.48 (H)    13.20 (H) 11.30 (H)      MO # x 10^3/uL 0.97 (H) 1.00 (H)    0.95 (H) 0.97 (H)      EO # x 10^3/uL 0.14 0.35    0.02 (L) 0.01 (L)      BA # x 10^3/uL 0.01 0.01    0.03 0.01      CMP   Lab Results 09/17/2020 09/10/2020 08/14/2020 07/20/2020 06/29/2020 06/23/2020   Chemistries                     Glucose mg/dL    81    101 96      BUN mg/dL    12    7 11      Creatinine mg/dL    0.85 (H)    1.0 1.1      BUN/Creatinine ratio    14.1               Sodium mmol/L    140    146 (H) 135      Potassium mmol/L    4.1    3.5 (L) 3.7      Chloride mmol/L    104    102 99      CO2 mmol/L    26.6    29 28      Anion gap, mmol/L    9.4               Calcium mg/dL    9.4    9.0 8.8      Albumin g/dL    3.5    3.3 3.1 (L)      Total protein g/dL    6.6    6.5 6.2 (L)      A/G ratio    1.1               Bilirubin, total mg/dL    0.3    0.6 0.5      Alkaline phosphatase U/L    97    100 108      AST/SGOT U/L    18    22 24      ALT/SGPT U/L    14    21 31      GFR non-African American, estimated mL/min/1.73m2    >60.0    54.2 (L) 48.6 (L)      GFR African American, estimated mL/min/1.73m2    >60.0    >60.0 58.7 (L)        Lab Results 09/17/2020 09/10/2020 08/14/2020 07/20/2020 06/29/2020 06/23/2020   Immunohematology                     ABO/Rh                O POS; O POS   Antibody screen                NEG     Lab Results 09/17/2020 09/10/2020 08/14/2020 07/20/2020 06/29/2020 06/23/2020   Anemia Labs                     Iron / FE ug/dL             37 (L)      TIBC ug/dL             210 (L)      Ferritin ng/mL             431.1 (H)      Iron, % saturation %             18      Imaging  Bone Scan 5/13/2020    IMPRESSION:  Multifocal degenerative change, without a generalized scintigraphic pattern of osseous metastatic disease    MRI Brain 5/12/2020    IMPRESSION:  No brain metastases visualized. CT Abdomen and Pelvis 5/12/2020    IMPRESSION:  1. Numerous (approximately 9) liver masses compatible with metastatic disease. 2. Abnormal lymph nodes in the upper abdomen and left cardiophrenic region most suspicious for metastatic disease.  PET/CT can be considered. 3. Left adrenal nodule, suspicious for metastatic disease as no remote studies are available for comparison. PET/CT can be considered. 4. Partial visualization of a loculated left pleural effusion    CT Chest 5/6/2020    IMPRESSION:  1. Left paramediastinal mass highly suspicious for malignancy. 2. Left hilar adenopathy, most compatible with metastatic disease. 3. Scattered pulmonary nodules bilaterally as described. Both benign and metastatic disease remain differential considerations. No remote studies are available for comparison. 4. Consolidative opacities in the bilateral lung apices, right greater than left. Findings are favored to represent pleuroparenchymal scarring over malignancy. Further evaluation with PET/CT is recommended. 5. Left adrenal nodularity concerning for metastatic disease given the patient's chest findings. However, prior CT report dated November 2017 indicates the presence of a left adrenal nodule favored to represent an adenoma. PET/CT can be obtained. 6. Left para-aortic lymph nodes versus additional left adrenal nodules, reactive lymphadenopathy    CT CAP 8/28/2020    IMPRESSION:  Within the chest, there is persistent mediastinal adenopathy, increased in some regions but decreased in others. There is decreased nodularity throughout the left lung. A few scattered nodules remain on the right and left, which are similar in size   Scattered ground-glass opacity seen throughout the lungs bilaterally, increased compared to prior, likely postinflammatory-infectious Within the abdomen and pelvis, hepatic metastasis have resolved. A left periaortic node is also smaller in size. OCM - Patient Care Management   Pain, if applicable: percocet as needed        ECOG Status 0 Normal activity. Fully active, able to carry on all pre-disease performance without restriction. (Date: 09/17/2020)   Depression Status Was screened;  Outcome positive: No; Screening Date: 09/10/2020; Screening Tool: Patient Health Questionnaire (PHQ9)   Psycho-social PHQ-9 Follow-up Plan (if applicable): no s/s depression today     Research    Would you like this patient screened for clinical trial eligibility? If yes, please enter the order for \"Research: Screen for Eligibility\"    Assessment & Plan  -Metastatic small cell lung cancer  -CT scan reviewed with the patient and she is had an exceptional response  -Liver metastasis are greatly improved  -Status post cycle #1 Tecentriq  -No autoimmune concerns today  -RTC in 2 weeks for further f/u and treatment    Chronic lymphocytic leukemia:  -No indication for treatment at this time  -We will continue to monitor at each visit. SIADH/hyponatremia:  -This is managed by nephrology  -This appears to have cleared with chemotherapy    Pain:  -She has a pain contract on file  -She takes Percocet as needed. She is asking for refill today    Anemia:  -Stable  . Recent imaging and labs were reviewed and discussed with the patient. Patient was instructed to stop at our  to make their next appointment before leaving. They will call in the interim with any questions/concerns/new symptoms. This plan was discussed with the patient and they verbalized understanding and acceptance of the plan. Dr. Jerod Delgado was available for consultation for this visit. I have recommended that the patient follow CDC guidelines for prevention of COVID-19 infection. DEVORAH Allen, OCN  Medical Oncology, [facility]  Note Recipients:    Electronically signed by Libertad Bran.  Mckinley COLEMAN 09/17/2020 11:29 EDT

## 2020-09-28 NOTE — PROGRESS NOTES
Evaluation:   Food/Nutrient Intake Outcomes:  Supplement Intake, Food and Nutrient Intake  Physical Signs/Symptoms Outcomes:  Weight     Discharge Planning:    Continue Oral Nutrition Supplement, Continue current diet     Electronically signed by Ish Villalobos RD, LD on 9/28/20 at 2:34 PM EDT    Contact: [phone]

## 2020-09-28 NOTE — PLAN OF CARE
Problem: Falls - Risk of:  Goal: Will remain free from falls  Description: Will remain free from falls  Outcome: Ongoing  Goal: Absence of physical injury  Description: Absence of physical injury  Outcome: Ongoing     Problem: Infection:  Goal: Will remain free from infection  Description: Will remain free from infection  Outcome: Ongoing     Problem: Safety:  Goal: Free from accidental physical injury  Description: Free from accidental physical injury  Outcome: Ongoing  Goal: Free from intentional harm  Description: Free from intentional harm  Outcome: Ongoing     Problem: Daily Care:  Goal: Daily care needs are met  Description: Daily care needs are met  Outcome: Ongoing

## 2020-09-28 NOTE — PROGRESS NOTES
Sodium very low  Dc salt lasix  And start on samsca with monitoring  Ok to lift fluid restriction when on samsca

## 2020-09-28 NOTE — PROGRESS NOTES
Pt is a/o x4. VSS. Assessment as charted. - Pt has unlabored breathing w/ crackled lung sounds- SpO2 was teetering from 88-92 on RA- 1L O2 NC was applied (pts nocturnal baseline). - Pt does have a slight loss of appetite. Pt is currently resting in her bed that is locked and in its lowest position w/ her call light within reach, non-skid socks on, and bed alarm off d/t pt being independent. Pt denies any other needs at this time. Will continue to monitor.

## 2020-09-28 NOTE — PROGRESS NOTES
[facility]    [facility]            [phone]        She is admitted with severe hyponatremia. 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 female  presented to  the hospital on 9/27/2020 with hyponatremia. Her sodium was 115 on admission. She was recently diagnosed for positive COVID in August 2020. She also has recent diagnosis of lung cancer and was found to have SIADH and was here on May 2020 and was discharged home on salt tabs. Her dose of salt tablet was discussed increased to 1 g twice a day. 3 days before admission she also had nausea and some vomiting.     Seen with -no family    ROS:     SOB-   none  Edema-   none  Nausea/vomiting/diarrhea-   none  Poor appetite/oral intake-  No  Confusion  No  Difficulty passing urine-  No  Drop in urine output-  No  Any other complaints-  No  All other ROS are reviewed and are Negative    Vitals:     Vitals:    09/28/20 0808   BP: 126/68   Pulse: 82   Resp: 18   Temp: 97.6 °F (36.4 °C)   SpO2: 93%       I & O:       Intake/Output Summary (Last 24 hours) at 9/28/2020 1143  Last data filed at 9/28/2020 0816  Gross per 24 hour   Intake 1530 ml   Output 500 ml   Net 1030 ml         Physical Examination:     General appearance: Anxious- no, distressed- no, in good spirits- no  Alopecia  HEENT: Lips- normal, teeth- ok , oral mucosa- moist  Neck : Mass- no, appears symmetrical, JVD- not visible  Respiratory: Respiratory effort-stable on oxygen, wheeze- no, crackles - no  Cardiovascular:  Ausculation- No M/R/G, Edema no  Abdomen: visible mass- no, distention- no, scar- no, tenderness- no                            hepatosplenomegaly-  no  Musculoskeletal:  clubbing no,cyanosis- no , digital ischemia- no                           muscle strength- grossly normal , tone - grossly normal  Skin: rashes- no , ulcers- no, induration- no, tightening - no  Psychiatric:  Judgement and insight- normal           AAO X 3  Additional positive findings: -       Meds:      allopurinol  300 mg Oral Daily    atorvastatin  40 mg Oral Daily    sodium chloride  2 g Oral TID WC    sodium chloride flush  10 mL Intravenous 2 times per day    enoxaparin  40 mg Subcutaneous Daily    urea  15 g Oral BID       Labs:     Recent Labs     09/27/20  0831 09/28/20  0519   WBC 22.1* 18.9*   HGB 11.7* 10.5*   HCT 35.1* 31.4*    292          Recent Labs     09/27/20  0831 09/27/20  1410 09/27/20  1719 09/27/20  2336 09/28/20  0519   * 118* 121* 120* 121*   K 4.1 4.3  --   --  3.9   CL 82* 86*  --   --  85*   CO2 22 24  --   --  26   BUN 17 14  --   --  27*   CREATININE 0.8 0.7  --   --  0.6   GLUCOSE 118* 123*  --   --  103*

## 2020-09-28 NOTE — CONSULTS
Hematology/Oncology Consultation         Patient:   Bola Pinto       Reason for Consult:  Small cell lung cancer on active immunotherapy   Requesting Physician: No ref. provider found    Chief Complaint:     Chief Complaint   Patient presents with    Emesis     started this am, getting treatment for lung cancer                   History Obtained from:     patient, electronic medical record    History of Present Illness:     Bola Pinto is a 77 y.o. female  with significant past medical history of extensive stage small cell lung cancer and CLL who presents with hyponatremia and nausea. Her sodium levels in the ED were 115. This AM her sodium levels are 121. No headache or double vision. She is feeling stronger. She is due for immunotherapy on 10-1. We are consulted for continuity of care and to determine if the recurrent hyponatremia is related to progression of disease. CT scans from 8-28-20 with mixed response. CT A/P now shows improved disease from May 2020 but progression from Aug 2020 since starting immunotherapy. WBC 18.      Past Medical History:         Diagnosis Date    [facility] ([facility]     COVID-19 08/13/2020    Hyperlipidemia     Lung cancer ([facility]        Past Surgical History:         Procedure Laterality Date    BREAST SURGERY      fibroid tumors removed bilateral breast    BRONCHOSCOPY N/A 5/11/2020    BRONCHOSCOPY ENDOBRONCHIAL ULTRASOUND with ANESTHESIA performed by Devante Byrnes MD at [address] [place] [address]  5/11/2020    BRONCHOSCOPY/TRANSBRONCHIAL NEEDLE BIOPSY performed by Devante Byrnes MD at [address] [place] [address]  5/11/2020    BRONCHOSCOPY/TRANSBRONCHIAL NEEDLE BIOPSY ADDL LOBE performed by Devante Byrnes MD at [address] [place] [address]  5/11/2020    BRONCHOSCOPY DIAGNOSTIC OR CELL [address] ONLY performed by Devante Byrnes MD at [facility] N/A 5/13/2020    RIGHT SUBCLAVIAN VEIN PORT A CATH INSERTION performed by Dominique Hess MD at [facility]      mid chest       Current Medications:     Current Facility-Administered Medications   Medication Dose Route Frequency Provider Last Rate Last Dose    albuterol sulfate  (90 Base) MCG/ACT inhaler 2 puff  2 puff Inhalation Q4H PRN Wilbur Morena, APRN - CNP        allopurinol (ZYLOPRIM) tablet 300 mg  300 mg Oral Daily Wilbur Morena, APRN - CNP   300 mg at 09/28/20 7360    atorvastatin (LIPITOR) tablet 40 mg  40 mg Oral Daily Wilbur Morena, APRN - CNP   40 mg at 09/28/20 0399    sodium chloride tablet 2 g  2 g Oral TID WC Wilbur Morena, APRN - CNP   2 g at 09/28/20 9957    sodium chloride flush 0.9 % injection 10 mL  10 mL Intravenous 2 times per day Wilbur Mornea, APRN - CNP   10 mL at 09/28/20 0811    sodium chloride flush 0.9 % injection 10 mL  10 mL Intravenous PRN Wilbur Morena, APRN - CNP        acetaminophen (TYLENOL) tablet 650 mg  650 mg Oral Q6H PRN Wilbur Morena, APRN - CNP   650 mg at 09/28/20 0580    Or    acetaminophen (TYLENOL) suppository 650 mg  650 mg Rectal Q6H PRN Wilbur Morena, APRN - CNP        polyethylene glycol (GLYCOLAX) packet 17 g  17 g Oral Daily PRN Wilbur Morena, APRN - CNP        promethazine (PHENERGAN) tablet 12.5 mg  12.5 mg Oral Q6H PRN Wilbur Morena, APRN - CNP        Or    ondansetron [facility]) injection 4 mg  4 mg Intravenous Q6H PRN Wilbur Morena, APRN - CNP        enoxaparin (LOVENOX) injection 40 mg  40 mg Subcutaneous Daily Wilbur Morena, APRN - CNP   40 mg at 09/28/20 9818    urea (URE-NA) packet 15 g  15 g Oral BID Lu Valdivia MD   15 g at 09/28/20 6751       Allergies:     No Known Allergies    Social History:     Social History     Socioeconomic History    Marital status:       Spouse name: Not on file    Number of children: 1    Years of education: Not on file    Highest education level: Not on file   Occupational History    Not on file   Social Needs    Financial resource strain: Not on file    Food insecurity     Worry: Not on file     Inability: Not on file    Transportation needs     Medical: Not on file     Non-medical: Not on file   Tobacco Use    Smoking status: Former Smoker     Last attempt to quit: 2020     Years since quittin.4    Smokeless tobacco: Never Used   Substance and Sexual Activity    Alcohol use: Never     Frequency: Never    Drug use: Never    Sexual activity: Not Currently   Lifestyle    Physical activity     Days per week: Not on file     Minutes per session: Not on file    Stress: Not on file   Relationships    Social connections     Talks on phone: Not on file     Gets together: Not on file     Attends Rastafari service: Not on file     Active member of club or organization: Not on file     Attends meetings of clubs or organizations: Not on file     Relationship status: Not on file    Intimate partner violence     Fear of current or ex partner: Not on file     Emotionally abused: Not on file     Physically abused: Not on file     Forced sexual activity: Not on file   Other Topics Concern    Not on file   Social History Narrative    Not on file     Social History     Substance and Sexual Activity   Drug Use Never     Social History     Substance and Sexual Activity   Alcohol Use Never    Frequency: Never     Social History     Substance and Sexual Activity   Sexual Activity Not Currently     Social History     Tobacco Use   Smoking Status Former Smoker    Last attempt to quit: 2020    Years since quittin.4   Smokeless Tobacco Never Used       Family History:     History reviewed. No pertinent family history. Review of Systems:     Constitutional: Denies fever, sweats, weight loss. .     Eyes: No visual changes or diplopia. No scleral icterus. ENT: No Headaches, no hearing loss, no  vertigo. No mouth sores or sore throat.   Cardiovascular: No chest pain, no dyspnea on exertion, no palpitations, no  loss of consciousness. Respiratory: No cough, no  wheezing, no dyspnea, no sputum production. No hemoptysis. .    Gastrointestinal: No abdominal pain, no appetite loss, no blood in stools. No change in bowel habits. Genitourinary: No dysuria, trouble voiding, or hematuria. Musculoskeletal:  Generalized weakness. No joint complaints. Integumentary: No rash or pruritis. Neurological: No headache, diplopia. No change in gait, balance, or coordination. No paresthesias. Endocrine: No temperature intolerance. No excessive thirst, fluid intake, or urination. Hematologic/Lymphatic: No abnormal bruising or ecchymoses, no blood clots or swollen lymph nodes. Allergic/Immunologic: No nasal congestion or hives. Physical Exam:     /68   Pulse 82   Temp 97.6 °F (36.4 °C) (Oral)   Resp 18   Ht 5' 3.5\" (1.613 m)   Wt 133 lb (60.3 kg)   SpO2 93%   BMI 23.19 kg/m²     CONSTITUTIONAL: awake, alert, cooperative, no apparent distress. EYES:  Pupils equal, round and reactive to light, sclera non-icteric, conjunctiva normal  ENT:  Normocephalic, without obvious abnormality, atraumatic, sinuses nontender on palpation, external ears without lesions, oral pharynx with moist mucus membranes, no mucositis.   NECK:  Supple, symmetrical, trachea midline, no adenopathy, thyroid symmetric, not enlarged and no tenderness, skin normal  HEMATOLOGIC/LYMPHATICS:  no cervical lymphadenopathy, no supraclavicular lymphadenopathy, no axillary lymphadenopathy and no inguinal lymphadenopathy  BACK:  Symmetric, no curvature, spinous processes are non-tender on palpation, paraspinous muscles are non-tender on palpation, no costal vertebral tenderness  LUNGS:  Clear to auscultation bilaterally, no crackles or wheezing  CARDIOVASCULAR:  Regular rate and rhythm, normal S1 and S2, no S3 or S4, and no murmur noted  ABDOMEN:  Normal bowel sounds, soft, non-distended, non-tender, no masses palpated, no hepatosplenomegally  MUSCULOSKELETAL:  There is no redness, warmth, or swelling of the joints  NEUROLOGIC:   No focal findings. SKIN:  Scattered bruising and ecchymoses. EXT: without clubbing, cyanosis or edema. Data:     CBC:  Recent Labs     09/28/20  0519 09/27/20  0831   WBC 18.9* 22.1*   HGB 10.5* 11.7*   HCT 31.4* 35.1*   MCV 91.9 92.0    305       BMP:  Recent Labs     09/28/20  0519 09/27/20  2336 09/27/20  1719 09/27/20  1410 09/27/20  0831   * 120* 121* 118* 115*   K 3.9  --   --  4.3 4.1   CO2 26  --   --  24 22   BUN 27*  --   --  14 17   CREATININE 0.6  --   --  0.7 0.8       HEPATIC:  Recent Labs     09/27/20  0831   AST 25   ALT 25   ALKPHOS 99   PROT 7.5   BILITOT 0.6       TUMOR MARKERS:  No results for input(s): PSA, CEA, , SV9021,  in the last 720 hours. MAGNESIUM:    Lab Results   Component Value Date    MG 1.50 08/08/2020       PT/INR:    No results found for: PTINR    Lab Results   Component Value Date    INR 1.25 08/08/2020       PTT:    Lab Results   Component Value Date    APTT 37.3 08/08/2020       U/A:    Lab Results   Component Value Date    COLORU Yellow 09/27/2020    PHUR 5.5 09/27/2020    WBCUA None seen 09/27/2020    RBCUA 3-4 09/27/2020    BACTERIA 2+ 08/08/2020    CLARITYU Clear 09/27/2020    SPECGRAV 1.010 09/27/2020    LEUKOCYTESUR Negative 09/27/2020    UROBILINOGEN 0.2 09/27/2020    BILIRUBINUR Negative 09/27/2020    BLOODU SMALL 09/27/2020    GLUCOSEU Negative 09/27/2020       Imaging:       Progression in left cardiophrenic adenopathy, compared with 08/28/2020 and    05/12/2020.  Other subcentimeter gastrohepatic and retroperitoneal nodes are    stable since 08/28/2020.         Multiple hepatic nodules, with apparent progression since 08/28/2020, but    regression since 05/12/2020.         Stable left adrenal nodule.         No acute inflammatory abnormality is identified.        Impression:     See below     Plan:     Hyponatremia  - Na 121 today, per renal team   - related to paraneoplastic disease from underlying malignancy   -  CT A/P reviewed from yesterday- possible pseudo-progression with immunotherapy on board- Tecentriq due 10-1. Dr. Jeanette Foster to review as well. - Mixed response on CT 8-28-20     Small Cell lung CA, extensive stage with liver mets   - due to cycle 2 Tecentriq on 10-1; hold if still admitted     CLL  - WBC stable at 18      Dr. Jeanette Foster to see in the AM.     Thank you very much for allowing me to participate in the care of this patient.     Johanna Rodriguez CNP   Medical Oncology/Hematology    [facility] [place] [address]  Phone: [phone]  Fax: [phone]

## 2020-09-28 NOTE — PROGRESS NOTES
Shift assessment completed. Pt A&O x4, VSS. Non skid socks on. Pt independent with ambulation. Pt on room air at 94%. Denies any needs at this time. Bed locked and in lowest position. Call light and bedside table within reach. Will continue to monitor.

## 2020-09-28 NOTE — CARE COORDINATION
CASE MANAGEMENT INITIAL ASSESSMENT      Reviewed chart and completed assessment with: Patient   Explained Case Management role/services. Yes    Primary contact information:Roxana Spear dtr [phone]    Health Care Decision Maker:Yes  Who do you trust or have selected to make healthcare decisions for you? Name:   Natalia Degroot - dtr   Phone  Number: [phone]  Can this person be reached and be able to respond quickly, such as within a few minutes or hours? Yes  Who would be your back-up decision maker? Name Shay Gage- sister  Phone Number:[phone]    Admit date/status:09/27/2020   Diagnosis:Hyponatremia    Is this a Readmission?:  No      Insurance:[employer] Medicare   Precert required for SNF: Yes       3 night stay required: No    Living arrangements, Adls, care needs, prior to admission:Home with dtr, IPTA, needs assistance with transportation- family supports need    Transportation: needs, support supporting at this time     Durable Medical Equipment at home:  Walker__Cane__RTS__ BSC__Shower Chair__  02_x_ HHN__ CPAP__  BiPap__  Hospital Bed__ W/C___ Other__________  Carlos Eduardo Wilson supports 02 needs     Services in the home and/or outpatient, prior to admission:02 thru [facility] [phone], [facility]       PT/OT recs:Sw to follow for therapy [facility] Exemption Notification (HEN):will need for SNF placement     Barriers to discharge: none     Plan/comments: Plan to return home with support from dtr, whom works full time. Reports having stairs to enter but is able to manage on own. Family supports transportation needs. Prior use of [facility] and [facility] for 02 needs. SW will to follow for discharge needs.      ADWOA Santiago      ECOC on chart for MD signature

## 2020-09-29 VITALS
TEMPERATURE: 98.1 F | DIASTOLIC BLOOD PRESSURE: 79 MMHG | HEART RATE: 91 BPM | RESPIRATION RATE: 14 BRPM | WEIGHT: 133 LBS | OXYGEN SATURATION: 94 % | HEIGHT: 64 IN | SYSTOLIC BLOOD PRESSURE: 116 MMHG | BODY MASS INDEX: 22.71 KG/M2

## 2020-09-29 LAB
CORTISOL - AM: 9.9 UG/DL (ref 4.3–22.4)
SODIUM BLD-SCNC: 122 MMOL/L (ref 136–145)
SODIUM BLD-SCNC: 128 MMOL/L (ref 136–145)

## 2020-09-29 PROCEDURE — 82533 TOTAL CORTISOL: CPT

## 2020-09-29 PROCEDURE — 2580000003 HC RX 258: Performed by: NURSE PRACTITIONER

## 2020-09-29 PROCEDURE — 84295 ASSAY OF SERUM SODIUM: CPT

## 2020-09-29 PROCEDURE — 36591 DRAW BLOOD OFF VENOUS DEVICE: CPT

## 2020-09-29 PROCEDURE — 6360000002 HC RX W HCPCS: Performed by: NURSE PRACTITIONER

## 2020-09-29 PROCEDURE — 6370000000 HC RX 637 (ALT 250 FOR IP): Performed by: NURSE PRACTITIONER

## 2020-09-29 PROCEDURE — 6370000000 HC RX 637 (ALT 250 FOR IP): Performed by: INTERNAL MEDICINE

## 2020-09-29 PROCEDURE — 2700000000 HC OXYGEN THERAPY PER DAY

## 2020-09-29 PROCEDURE — 94761 N-INVAS EAR/PLS OXIMETRY MLT: CPT

## 2020-09-29 RX ORDER — SODIUM CHLORIDE 1000 MG
2 TABLET, SOLUBLE MISCELLANEOUS
Qty: 90 TABLET | Refills: 3 | Status: ON HOLD | OUTPATIENT
Start: 2020-09-29 | End: 2021-02-26 | Stop reason: HOSPADM

## 2020-09-29 RX ORDER — SODIUM CHLORIDE 1000 MG
2 TABLET, SOLUBLE MISCELLANEOUS
Status: DISCONTINUED | OUTPATIENT
Start: 2020-09-29 | End: 2020-09-29 | Stop reason: HOSPADM

## 2020-09-29 RX ORDER — FUROSEMIDE 20 MG/1
20 TABLET ORAL DAILY
Qty: 60 TABLET | Refills: 0 | Status: ON HOLD | OUTPATIENT
Start: 2020-09-29 | End: 2020-10-19 | Stop reason: HOSPADM

## 2020-09-29 RX ORDER — FUROSEMIDE 20 MG/1
20 TABLET ORAL DAILY
Status: DISCONTINUED | OUTPATIENT
Start: 2020-09-29 | End: 2020-09-29 | Stop reason: HOSPADM

## 2020-09-29 RX ADMIN — Medication 2 G: at 13:16

## 2020-09-29 RX ADMIN — ATORVASTATIN CALCIUM 40 MG: 40 TABLET, FILM COATED ORAL at 10:40

## 2020-09-29 RX ADMIN — ENOXAPARIN SODIUM 40 MG: 40 INJECTION SUBCUTANEOUS at 10:40

## 2020-09-29 RX ADMIN — ALLOPURINOL 300 MG: 300 TABLET ORAL at 10:40

## 2020-09-29 RX ADMIN — SODIUM CHLORIDE, PRESERVATIVE FREE 10 ML: 5 INJECTION INTRAVENOUS at 10:40

## 2020-09-29 RX ADMIN — FUROSEMIDE 20 MG: 20 TABLET ORAL at 10:40

## 2020-09-29 NOTE — PROGRESS NOTES
ONCOLOGY HEMATOLOGY CARE PROGRESS NOTE      SUBJECTIVE:     The patient states she feels much better this morning. ROS:   The remaining 10 point review of symptoms is unremarkable. OBJECTIVE        Physical    VITALS:  /79   Pulse 95   Temp 98.2 °F (36.8 °C) (Oral)   Resp 16   Ht 5' 3.5\" (1.613 m)   Wt 133 lb (60.3 kg)   SpO2 98%   BMI 23.19 kg/m²   TEMPERATURE:  Current - Temp: 98.2 °F (36.8 °C); Max - Temp  Av.1 °F (36.7 °C)  Min: 97.6 °F (36.4 °C)  Max: 98.2 °F (36.8 °C)  PULSE OXIMETRY RANGE: SpO2  Av.4 %  Min: 93 %  Max: 98 %  24HR INTAKE/OUTPUT:      Intake/Output Summary (Last 24 hours) at 2020 0843  Last data filed at 2020 0602  Gross per 24 hour   Intake 960 ml   Output 1000 ml   Net -40 ml       CONSTITUTIONAL:  awake, alert, cooperative, no apparent distress, HEENT oral pharynx , no scleral icterus  HEMATOLOGIC/LYMPHATICS:  no cervical lymphadenopathy, no supraclavicular lymphadenopathy, no axillary lymphadenopathy and no inguinal lymphadenopathy  LUNGS:  No increased work of breathing, good air exchange, clear to auscultation bilaterally, no crackles or wheezing  CARDIOVASCULAR:  , regular rate and rhythm, normal S1 and S2, no S3 or S4, and no murmur noted  ABDOMEN:  No scars, normal bowel sounds, soft, non-distended, non-tender, no masses palpated, no hepatosplenomegally  MUSCULOSKELETAL:  There is no redness, warmth, or swelling of the joints. EXTREMETIES: No clubbing cynosis or edema  NEUROLOGIC:  Awake, alert, oriented to name, place and time. Cranial nerves II-XII are grossly intact. Motor is 5 out of 5 bilaterally.    SKIN:  no bruising or bleeding      Data      Recent Labs     20  0831 20  0519   WBC 22.1* 18.9*   HGB 11.7* 10.5*   HCT 35.1* 31.4*    292   MCV 92.0 91.9        Recent Labs     20  0831 20  1410  20  0519  20  1930 20  0006 20  0600   * 118*   < > 121*   < > 120* 122* 128*   K 4.1 4.3  --  3.9  --   --   --   --    CL 82* 86*  --  85*  --   --   --   --    CO2 22 24  --  26  --   --   --   --    BUN 17 14  --  27*  --   --   --   --    CREATININE 0.8 0.7  --  0.6  --   --   --   --     < > = values in this interval not displayed.      Recent Labs     09/27/20  0831   AST 25   ALT 25   BILITOT 0.6   ALKPHOS 99       Magnesium:    Lab Results   Component Value Date    MG 1.50 08/08/2020    MG 1.10 08/07/2020    MG 1.70 05/05/2020         Problem List  Patient Active Problem List   Diagnosis    Acute hyponatremia    N&V (nausea and vomiting)    Leukocytosis    Lung mass    Multiple lung nodules    Hilar adenopathy    Centrilobular emphysema (HCC)    Smoker    Anemia    Sepsis ([address]    Lung [facility]    Hyperlipidemia    2019 novel coronavirus disease (COVID-19)    Hypokalemia    Pulmonary infiltrates    Small cell lung cancer ([facility]    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 ([facility]    Acute blood loss anemia    Elevated procalcitonin    Hyponatremia    Former smoker    Acute respiratory failure with hypoxia (HCC)    Pneumonia of both lower lobes due to infectious organism [facility])       ASSESSMENT AND PLAN:    Small cell lung carcinoma:  -The patient appeared to have a very good response to carboplatinum/-16  -She is status post 1 cycle of Tecentriq and did very well  -Repeat CT scans done in August showed a very good response particularly in the liver    Hyponatremia:  -This is very concerning because she presented with hyponatremia  -Repeat CT scans question whether her disease is progressed since August, but this could also be a flare reaction from Tecentriq and sometimes this is difficult to sort out  -If she has had a quick relapse, that her prognosis is extremely poor  -Her sodium is up to 121 today  -I would prefer to give her 1 or 2 more cycles of Tecentriq prior to a second line chemotherapy to see if this would improve her

## 2020-09-29 NOTE — CARE COORDINATION
Discharge orders noted. Patient was IPTA and per Barbara Key CNP and [employer] RN patient has no needs for discharge.

## 2020-09-29 NOTE — PLAN OF CARE
Problem: Infection:  Goal: Will remain free from infection  Description: Will remain free from infection  Outcome: Ongoing     Problem: Safety:  Goal: Free from accidental physical injury  Description: Free from accidental physical injury  Outcome: Ongoing  Goal: Free from intentional harm  Description: Free from intentional harm  Outcome: Ongoing  Problem: Daily Care:  Goal: Daily care needs are met  Description: Daily care needs are met  Outcome: Ongoing     Problem: Nutrition  Goal: Optimal nutrition therapy  Outcome: Ongoing

## 2020-09-29 NOTE — PROGRESS NOTES
Hospitalist Progress Note      PCP: Javier Patient    Date of Admission: 9/27/2020    Chief Complaint: Emesis    Hospital Course: 77 y.o. female, with a PMH of neuroendocrine ca with mets to the liver/lungs, currently undergoing chemotherapy, who presented to [facility] with emesis. The patient is currently receiving carboplatin and -16 every 3 weeks. Her last cycle was 2.5 weeks ago. She was feeling well, but over the last couple of days, noticed she was nauseous and staggering a bit when she ambulated. On the day of admission, she started vomiting this AM, so sought emergency treatment. She has a history of difficulty with hyponatremia and has been hospitalized for such. She was treated with a fluid restriction and salt tablets as an outpatient. She tells me that her salt tablet dosing was decreased by, she believes, Dr. Serena Benitez. She was initially on 2 tablets TID, then weaned to BID a couple of weeks ago. She denies fever, chills, cough, SOB, chest pain, abdominal pain. She takes Immodium as needed for diarrhea from chemo. Subjective: Feeling much better. Na up to 121. Nausea resolved.       Medications:  Reviewed    Infusion Medications   Scheduled Medications    allopurinol  300 mg Oral Daily    atorvastatin  40 mg Oral Daily    sodium chloride flush  10 mL Intravenous 2 times per day    enoxaparin  40 mg Subcutaneous Daily     PRN Meds: albuterol sulfate HFA, sodium chloride flush, acetaminophen **OR** acetaminophen, polyethylene glycol, promethazine **OR** ondansetron      Intake/Output Summary (Last 24 hours) at 9/28/2020 2005  Last data filed at 9/28/2020 0816  Gross per 24 hour   Intake 1530 ml   Output 500 ml   Net 1030 ml       Physical Exam Performed:    /73   Pulse 83   Temp 98.2 °F (36.8 °C) (Axillary)   Resp 16   Ht 5' 3.5\" (1.613 m)   Wt 133 lb (60.3 kg)   SpO2 93%   BMI 23.19 kg/m²     General appearance: No apparent distress, appears stated age and cooperative. HEENT: Pupils equal, round, and reactive to light. Conjunctivae/corneas clear. Neck: Supple, with full range of motion. No jugular venous distention. Trachea midline. Respiratory:  Normal respiratory effort. Clear to auscultation, bilaterally without Rales/Wheezes/Rhonchi. Cardiovascular: Regular rate and rhythm with normal S1/S2 without murmurs, rubs or gallops. Abdomen: Soft, non-tender, non-distended with normal bowel sounds. Musculoskeletal: No clubbing, cyanosis or edema bilaterally. Full range of motion without deformity. Skin: Skin color, texture, turgor normal.  No rashes or lesions. Neurologic:  Neurovascularly intact without any focal sensory/motor deficits. Cranial nerves: II-XII intact, grossly non-focal.  Psychiatric: Alert and oriented, thought content appropriate, normal insight  Capillary Refill: Brisk,< 3 seconds   Peripheral Pulses: +2 palpable, equal bilaterally       Labs:   Recent Labs     09/27/20  0831 09/28/20  0519   WBC 22.1* 18.9*   HGB 11.7* 10.5*   HCT 35.1* 31.4*    292     Recent Labs     09/27/20  0831 09/27/20  1410  09/27/20  2336 09/28/20  0519 09/28/20  1058   * 118*   < > 120* 121* 117*   K 4.1 4.3  --   --  3.9  --    CL 82* 86*  --   --  85*  --    CO2 22 24  --   --  26  --    BUN 17 14  --   --  27*  --    CREATININE 0.8 0.7  --   --  0.6  --    CALCIUM 9.3 8.9  --   --  8.7  --     < > = values in this interval not displayed. Recent Labs     09/27/20  0831   AST 25   ALT 25   BILITOT 0.6   ALKPHOS 99     No results for input(s): INR in the last 72 hours. No results for input(s): Daly Beat in the last 72 hours.     Urinalysis:      Lab Results   Component Value Date    NITRU Negative 09/27/2020    WBCUA None seen 09/27/2020    BACTERIA 2+ 08/08/2020    RBCUA 3-4 09/27/2020    BLOODU SMALL 09/27/2020    SPECGRAV 1.010 09/27/2020    GLUCOSEU Negative 09/27/2020       Radiology:  CT ABDOMEN PELVIS W IV CONTRAST Additional Contrast? None   Final Result   Progression in left cardiophrenic adenopathy, compared with 08/28/2020 and   05/12/2020. Other subcentimeter gastrohepatic and retroperitoneal nodes are   stable since 08/28/2020. Multiple hepatic nodules, with apparent progression since 08/28/2020, but   regression since 05/12/2020. Stable left adrenal nodule. No acute inflammatory abnormality is identified. Assessment/Plan:    Active Hospital Problems    Diagnosis    Hyponatremia [E87.1]     Symptomatic hyponatremia   - this is a recurrent problem for the patient. Likely multifactorial due to hypovolemia  compounded by a SIADH component, and weaning of salt tablets. - received 1L of IVF in the ED. - urine Na, osmo, serum osmo reviewed. - samsca given 9/28.  - q6 hour Na levels. - monitor on tele.     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 - leukocytosis noted. - CT abd done in ED reveals possible worsening of metastatic disease.  - supportive care. - consult [facility].     Anemia, chronic - due to ca.   - follow CBC.       DVT Prophylaxis:Lovenox  Diet: DIET GENERAL; Daily Fluid Restriction: 2000 ml  Dietary Nutrition Supplements: Standard High Calorie Oral Supplement  Code Status: Full Code    PT/OT Eval Status: not indicated    Dispo - when Na improved    DEVORAH Stevens - CNP

## 2020-09-29 NOTE — PROGRESS NOTES
[facility]    [facility]            [phone]        She is admitted with severe hyponatremia. 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 female  presented to  the hospital on 9/27/2020 with hyponatremia. Her sodium was 115 on admission. She was recently diagnosed for positive COVID in August 2020. She also has recent diagnosis of lung cancer and was found to have SIADH and was here on May 2020 and was discharged home on salt tabs. Her dose of salt tablet was discussed increased to 1 g twice a day. 3 days before admission she also had nausea and some vomiting.     Seen with -no family    ROS:     SOB-   none  Edema-   none  Nausea/vomiting/diarrhea-   none  Poor appetite/oral intake-  No  Confusion  No  Difficulty passing urine-  No  Drop in urine output-  No  Any other complaints-  No  All other ROS are reviewed and are Negative    Vitals:     Vitals:    09/29/20 0752   BP:    Pulse:    Resp:    Temp:    SpO2: 98%       I & O:       Intake/Output Summary (Last 24 hours) at 9/29/2020 1003  Last data filed at 9/29/2020 0602  Gross per 24 hour   Intake 960 ml   Output 1000 ml   Net -40 ml         Physical Examination:     General appearance: Anxious- no, distressed- no, in good spirits- no  Alopecia  HEENT: Lips- normal, teeth- ok , oral mucosa- moist  Neck : Mass- no, appears symmetrical, JVD- not visible  Respiratory: Respiratory effort-stable on oxygen, wheeze- no, crackles - no  Cardiovascular: Ausculation- No M/R/G, Edema no  Abdomen: visible mass- no, distention- no, scar- no, tenderness- no                            hepatosplenomegaly-  no  Musculoskeletal:  clubbing no,cyanosis- no , digital ischemia- no                           muscle strength- grossly normal , tone - grossly normal  Skin: rashes- no , ulcers- no, induration- no, tightening - no  Psychiatric:  Judgement and insight- normal           AAO X 3  Additional positive findings: -       Meds:      sodium chloride  2 g Oral TID WC    furosemide  20 mg Oral Daily    allopurinol  300 mg Oral Daily    atorvastatin  40 mg Oral Daily    sodium chloride flush  10 mL Intravenous 2 times per day    enoxaparin  40 mg Subcutaneous Daily       Labs:     Recent Labs     09/27/20  0831 09/28/20  0519   WBC 22.1* 18.9*   HGB 11.7* 10.5*   HCT 35.1* 31.4*    292          Recent Labs     09/27/20  0831 09/27/20  1410  09/28/20  0519  09/28/20  1930 09/29/20  0006 09/29/20  0600   * 118*   < > 121*   < > 120* 122* 128*   K 4.1 4.3  --  3.9  --   --   --   --    CL 82* 86*  --  85*  --   --   --   --    CO2 22 24  --  26  --   --   --   --    BUN 17 14  --  27*  --   --   --   --    CREATININE 0.8 0.7  --  0.6  --   --   --   --    GLUCOSE 118* 123*  --  103*  --   --   --   --     < > = values in this interval not displayed.

## 2020-10-10 ENCOUNTER — HOSPITAL ENCOUNTER (INPATIENT)
Age: 75
LOS: 9 days | Discharge: HOME | DRG: 181 | End: 2020-10-19
Attending: EMERGENCY MEDICINE | Admitting: INTERNAL MEDICINE
Payer: MEDICARE

## 2020-10-10 ENCOUNTER — APPOINTMENT (OUTPATIENT)
Dept: CT IMAGING | Age: 75
DRG: 181 | End: 2020-10-10
Payer: MEDICARE

## 2020-10-10 LAB
A/G RATIO: 1.6 (ref 1.1–2.2)
ALBUMIN SERPL-MCNC: 3.9 G/DL (ref 3.4–5)
ALP BLD-CCNC: 101 U/L (ref 40–129)
ALT SERPL-CCNC: 45 U/L (ref 10–40)
ANION GAP SERPL CALCULATED.3IONS-SCNC: 9 MMOL/L (ref 3–16)
AST SERPL-CCNC: 39 U/L (ref 15–37)
BACTERIA: ABNORMAL /HPF
BILIRUB SERPL-MCNC: 0.5 MG/DL (ref 0–1)
BILIRUBIN URINE: NEGATIVE
BLOOD, URINE: ABNORMAL
BUN BLDV-MCNC: 8 MG/DL (ref 7–20)
CALCIUM SERPL-MCNC: 8.6 MG/DL (ref 8.3–10.6)
CHLORIDE BLD-SCNC: 82 MMOL/L (ref 99–110)
CLARITY: CLEAR
CO2: 26 MMOL/L (ref 21–32)
COLOR: YELLOW
CREAT SERPL-MCNC: 0.6 MG/DL (ref 0.6–1.2)
EPITHELIAL CELLS, UA: ABNORMAL /HPF (ref 0–5)
GFR AFRICAN AMERICAN: >60
GFR NON-AFRICAN AMERICAN: >60
GLOBULIN: 2.5 G/DL
GLUCOSE BLD-MCNC: 99 MG/DL (ref 70–99)
GLUCOSE URINE: NEGATIVE MG/DL
HCT VFR BLD CALC: 31.4 % (ref 36–48)
HEMOGLOBIN: 10.6 G/DL (ref 12–16)
KETONES, URINE: NEGATIVE MG/DL
LEUKOCYTE ESTERASE, URINE: NEGATIVE
MCH RBC QN AUTO: 30.9 PG (ref 26–34)
MCHC RBC AUTO-ENTMCNC: 33.9 G/DL (ref 31–36)
MCV RBC AUTO: 91.3 FL (ref 80–100)
MICROSCOPIC EXAMINATION: YES
NITRITE, URINE: NEGATIVE
PDW BLD-RTO: 17.2 % (ref 12.4–15.4)
PH UA: 6 (ref 5–8)
PLATELET # BLD: 290 K/UL (ref 135–450)
PMV BLD AUTO: 7 FL (ref 5–10.5)
POTASSIUM SERPL-SCNC: 3.4 MMOL/L (ref 3.5–5.1)
PROTEIN UA: NEGATIVE MG/DL
RBC # BLD: 3.44 M/UL (ref 4–5.2)
RBC UA: ABNORMAL /HPF (ref 0–4)
SODIUM BLD-SCNC: 117 MMOL/L (ref 136–145)
SODIUM BLD-SCNC: 121 MMOL/L (ref 136–145)
SODIUM BLD-SCNC: 122 MMOL/L (ref 136–145)
SPECIFIC GRAVITY UA: 1.02 (ref 1–1.03)
TOTAL PROTEIN: 6.4 G/DL (ref 6.4–8.2)
URINE REFLEX TO CULTURE: ABNORMAL
URINE TYPE: ABNORMAL
UROBILINOGEN, URINE: 0.2 E.U./DL
WBC # BLD: 18.5 K/UL (ref 4–11)
WBC UA: ABNORMAL /HPF (ref 0–5)

## 2020-10-10 PROCEDURE — 96374 THER/PROPH/DIAG INJ IV PUSH: CPT

## 2020-10-10 PROCEDURE — 6360000002 HC RX W HCPCS: Performed by: INTERNAL MEDICINE

## 2020-10-10 PROCEDURE — 2580000003 HC RX 258: Performed by: NURSE PRACTITIONER

## 2020-10-10 PROCEDURE — 84295 ASSAY OF SERUM SODIUM: CPT

## 2020-10-10 PROCEDURE — 36415 COLL VENOUS BLD VENIPUNCTURE: CPT

## 2020-10-10 PROCEDURE — 6370000000 HC RX 637 (ALT 250 FOR IP): Performed by: NURSE PRACTITIONER

## 2020-10-10 PROCEDURE — 6370000000 HC RX 637 (ALT 250 FOR IP): Performed by: INTERNAL MEDICINE

## 2020-10-10 PROCEDURE — 93005 ELECTROCARDIOGRAM TRACING: CPT | Performed by: NURSE PRACTITIONER

## 2020-10-10 PROCEDURE — 72131 CT LUMBAR SPINE W/O DYE: CPT

## 2020-10-10 PROCEDURE — 2580000003 HC RX 258: Performed by: INTERNAL MEDICINE

## 2020-10-10 PROCEDURE — 74176 CT ABD & PELVIS W/O CONTRAST: CPT

## 2020-10-10 PROCEDURE — 1200000000 HC SEMI PRIVATE

## 2020-10-10 PROCEDURE — 80053 COMPREHEN METABOLIC PANEL: CPT

## 2020-10-10 PROCEDURE — 85027 COMPLETE CBC AUTOMATED: CPT

## 2020-10-10 PROCEDURE — 81001 URINALYSIS AUTO W/SCOPE: CPT

## 2020-10-10 PROCEDURE — 99285 EMERGENCY DEPT VISIT HI MDM: CPT

## 2020-10-10 PROCEDURE — 6360000002 HC RX W HCPCS: Performed by: NURSE PRACTITIONER

## 2020-10-10 PROCEDURE — 94150 VITAL CAPACITY TEST: CPT

## 2020-10-10 RX ORDER — 0.9 % SODIUM CHLORIDE 0.9 %
1000 INTRAVENOUS SOLUTION INTRAVENOUS ONCE
Status: COMPLETED | OUTPATIENT
Start: 2020-10-10 | End: 2020-10-10

## 2020-10-10 RX ORDER — SODIUM CHLORIDE 1000 MG
2 TABLET, SOLUBLE MISCELLANEOUS
Status: DISCONTINUED | OUTPATIENT
Start: 2020-10-10 | End: 2020-10-12

## 2020-10-10 RX ORDER — POLYETHYLENE GLYCOL 3350 17 G/17G
17 POWDER, FOR SOLUTION ORAL DAILY PRN
Status: DISCONTINUED | OUTPATIENT
Start: 2020-10-10 | End: 2020-10-19 | Stop reason: HOSPADM

## 2020-10-10 RX ORDER — ACETAMINOPHEN 325 MG/1
650 TABLET ORAL EVERY 6 HOURS PRN
Status: DISCONTINUED | OUTPATIENT
Start: 2020-10-10 | End: 2020-10-19 | Stop reason: HOSPADM

## 2020-10-10 RX ORDER — PROMETHAZINE HYDROCHLORIDE 25 MG/1
12.5 TABLET ORAL EVERY 6 HOURS PRN
Status: DISCONTINUED | OUTPATIENT
Start: 2020-10-10 | End: 2020-10-19 | Stop reason: HOSPADM

## 2020-10-10 RX ORDER — ACETAMINOPHEN 650 MG/1
650 SUPPOSITORY RECTAL EVERY 6 HOURS PRN
Status: DISCONTINUED | OUTPATIENT
Start: 2020-10-10 | End: 2020-10-19 | Stop reason: HOSPADM

## 2020-10-10 RX ORDER — SODIUM CHLORIDE 0.9 % (FLUSH) 0.9 %
10 SYRINGE (ML) INJECTION EVERY 12 HOURS SCHEDULED
Status: DISCONTINUED | OUTPATIENT
Start: 2020-10-10 | End: 2020-10-19 | Stop reason: HOSPADM

## 2020-10-10 RX ORDER — MORPHINE SULFATE 4 MG/ML
4 INJECTION, SOLUTION INTRAMUSCULAR; INTRAVENOUS ONCE
Status: COMPLETED | OUTPATIENT
Start: 2020-10-10 | End: 2020-10-10

## 2020-10-10 RX ORDER — SENNA PLUS 8.6 MG/1
1 TABLET ORAL 2 TIMES DAILY
Status: DISCONTINUED | OUTPATIENT
Start: 2020-10-10 | End: 2020-10-19 | Stop reason: HOSPADM

## 2020-10-10 RX ORDER — OXYCODONE HYDROCHLORIDE AND ACETAMINOPHEN 5; 325 MG/1; MG/1
1 TABLET ORAL EVERY 6 HOURS PRN
Status: DISCONTINUED | OUTPATIENT
Start: 2020-10-10 | End: 2020-10-12

## 2020-10-10 RX ORDER — ALBUTEROL SULFATE 90 UG/1
2 AEROSOL, METERED RESPIRATORY (INHALATION) EVERY 4 HOURS PRN
Status: DISCONTINUED | OUTPATIENT
Start: 2020-10-10 | End: 2020-10-19 | Stop reason: HOSPADM

## 2020-10-10 RX ORDER — SODIUM CHLORIDE 9 MG/ML
INJECTION, SOLUTION INTRAVENOUS CONTINUOUS
Status: DISCONTINUED | OUTPATIENT
Start: 2020-10-10 | End: 2020-10-10

## 2020-10-10 RX ORDER — SODIUM CHLORIDE 0.9 % (FLUSH) 0.9 %
10 SYRINGE (ML) INJECTION PRN
Status: DISCONTINUED | OUTPATIENT
Start: 2020-10-10 | End: 2020-10-19 | Stop reason: HOSPADM

## 2020-10-10 RX ORDER — POTASSIUM CHLORIDE 20 MEQ/1
40 TABLET, EXTENDED RELEASE ORAL ONCE
Status: COMPLETED | OUTPATIENT
Start: 2020-10-10 | End: 2020-10-10

## 2020-10-10 RX ORDER — ONDANSETRON 2 MG/ML
4 INJECTION INTRAMUSCULAR; INTRAVENOUS EVERY 6 HOURS PRN
Status: DISCONTINUED | OUTPATIENT
Start: 2020-10-10 | End: 2020-10-19 | Stop reason: HOSPADM

## 2020-10-10 RX ORDER — ATORVASTATIN CALCIUM 40 MG/1
40 TABLET, FILM COATED ORAL DAILY
Status: DISCONTINUED | OUTPATIENT
Start: 2020-10-10 | End: 2020-10-19 | Stop reason: HOSPADM

## 2020-10-10 RX ADMIN — PROMETHAZINE HYDROCHLORIDE 12.5 MG: 25 TABLET ORAL at 22:16

## 2020-10-10 RX ADMIN — SODIUM CHLORIDE 1000 ML: 9 INJECTION, SOLUTION INTRAVENOUS at 13:08

## 2020-10-10 RX ADMIN — ATORVASTATIN CALCIUM 40 MG: 40 TABLET, FILM COATED ORAL at 20:04

## 2020-10-10 RX ADMIN — SENNOSIDES 8.6 MG: 8.6 TABLET, FILM COATED ORAL at 20:04

## 2020-10-10 RX ADMIN — MORPHINE SULFATE 4 MG: 4 INJECTION, SOLUTION INTRAMUSCULAR; INTRAVENOUS at 16:56

## 2020-10-10 RX ADMIN — MORPHINE SULFATE 4 MG: 4 INJECTION, SOLUTION INTRAMUSCULAR; INTRAVENOUS at 13:08

## 2020-10-10 RX ADMIN — Medication 2 G: at 20:04

## 2020-10-10 RX ADMIN — POTASSIUM CHLORIDE 40 MEQ: 20 TABLET, EXTENDED RELEASE ORAL at 16:56

## 2020-10-10 RX ADMIN — SODIUM CHLORIDE: 9 INJECTION, SOLUTION INTRAVENOUS at 18:11

## 2020-10-10 RX ADMIN — ONDANSETRON 4 MG: 2 INJECTION INTRAMUSCULAR; INTRAVENOUS at 18:11

## 2020-10-10 ASSESSMENT — PAIN DESCRIPTION - LOCATION
LOCATION: BACK
LOCATION: BACK

## 2020-10-10 ASSESSMENT — PAIN SCALES - GENERAL
PAINLEVEL_OUTOF10: 0
PAINLEVEL_OUTOF10: 3
PAINLEVEL_OUTOF10: 0
PAINLEVEL_OUTOF10: 7
PAINLEVEL_OUTOF10: 0
PAINLEVEL_OUTOF10: 4
PAINLEVEL_OUTOF10: 6

## 2020-10-10 ASSESSMENT — ENCOUNTER SYMPTOMS
VOMITING: 0
COUGH: 0
NAUSEA: 1
EYES NEGATIVE: 1
BLOOD IN STOOL: 0
BACK PAIN: 1
ALLERGIC/IMMUNOLOGIC NEGATIVE: 1
DIARRHEA: 0
ABDOMINAL DISTENTION: 0
SHORTNESS OF BREATH: 0
CONSTIPATION: 0
ABDOMINAL PAIN: 0

## 2020-10-10 ASSESSMENT — PAIN DESCRIPTION - PROGRESSION: CLINICAL_PROGRESSION: GRADUALLY IMPROVING

## 2020-10-10 ASSESSMENT — PAIN DESCRIPTION - PAIN TYPE
TYPE: ACUTE PAIN
TYPE: ACUTE PAIN

## 2020-10-10 NOTE — ED NOTES
Power port confirm from letter from oncology and xray on 8/7/2020.      Aki Herrera RN  10/10/20 9640

## 2020-10-10 NOTE — ED NOTES
1552 - PS to hospitalists  Re: hyponatremia, low back pain, metastatic disease  1625 - Dr Ulice Fraction called back to speak with NP Verner Leather Lovas  10/10/20 5421

## 2020-10-10 NOTE — ED NOTES
Pt fully ambulatory. Pain in left flank /lumbar region. Denies urinary sx.  Abdomen soft and non tender     Aniceto Dunn RN  10/10/20 8833

## 2020-10-10 NOTE — PROGRESS NOTES
Received patient from ED alert and oriented times 4 pleasant and cooperative had large emesis green liquid  Monitor number 30 verified with the cmu oriented to room and treatment plan Mouth care given following emesis.

## 2020-10-10 NOTE — ED NOTES
Copy of letter of smart port placed in chart     Aki Herrera, JEFF  10/10/20 1150 [facility], RN  10/10/20 1122

## 2020-10-10 NOTE — PROGRESS NOTES
RESPIRATORY THERAPY ASSESSMENT    Name:  Yazan Beltran  Medical Record Number:  [id number]  Age: 77 y.o. Gender: female  : 1945  Today's Date:  10/10/2020  Room:  [facility]045-    Assessment     Is the patient being admitted for a COPD or Asthma exacerbation? No   (If yes the patient will be seen every 4 hours for the first 24 hours and then reassessed)    Patient Admission Diagnosis      Allergies  No Known Allergies    Minimum Predicted Vital Capacity:     804          Actual Vital Capacity:      900              Pulmonary History:former smoker, lung CA  Home Oxygen Therapy:  0.5 liters/min via nasal cannula at night  Home Respiratory Therapy:Albuterol MDI PRN  Current Respiratory Therapy:   Treatment Type: IS       Respiratory Severity Index(RSI)   Patients with orders for inhalation medications, oxygen, or any therapeutic treatment modality will be placed on Respiratory Protocol. They will be assessed with the first treatment and at least every 72 hours thereafter. The following severity scale will be used to determine frequency of treatment intervention.     Smoking History: Pulmonary Disease or Smoking History, Greater than 15 pack year = 2    Social History  Social History     Tobacco Use    Smoking status: Former Smoker     Last attempt to quit: 2020     Years since quittin.4    Smokeless tobacco: Never Used   Substance Use Topics    Alcohol use: Never     Frequency: Never    Drug use: Never       Recent Surgical History: None = 0  Past Surgical History  Past Surgical History:   Procedure Laterality Date    BREAST SURGERY      fibroid tumors removed bilateral breast    BRONCHOSCOPY N/A 2020    BRONCHOSCOPY ENDOBRONCHIAL ULTRASOUND with ANESTHESIA performed by Nallely Varela MD at [address] [place] [address]  2020    BRONCHOSCOPY/TRANSBRONCHIAL NEEDLE BIOPSY performed by Nallely Varela MD at [address] [place] [address]  2020 BRONCHOSCOPY/TRANSBRONCHIAL NEEDLE BIOPSY ADDL LOBE performed by Bassam Maher MD at [address]  5/11/2020    BRONCHOSCOPY DIAGNOSTIC OR CELL [address] performed by [facility] N/A 5/13/2020    RIGHT SUBCLAVIAN VEIN PORT A CATH INSERTION performed by Glenetta Cockayne, MD at [address]      mid chest       Level of Consciousness: Alert, Oriented, and Cooperative = 0    Level of Activity: Walking unassisted = 0    Respiratory Pattern: Regular Pattern; RR 8-20 = 0    Breath Sounds: Diminshed bilaterally and/or crackles = 2    Sputum   ,  ,    Cough: Strong, spontaneous, non-productive = 0    Vital Signs   BP (!) 156/96   Pulse 77   Temp 97.8 °F (36.6 °C) (Oral)   Resp 16   Ht 5' 3.5\" (1.613 m)   Wt 135 lb 11.2 oz (61.6 kg)   SpO2 92%   BMI 23.66 kg/m²   SPO2 (COPD values may differ): Greater than or equal to 92% on room air = 0    Peak Flow (asthma only): not applicable = 0    RSI: 0-4 = See once and convert to home regimen or discontinue        Plan       Goals: medication delivery    Patient/caregiver was educated on the proper method of use for Respiratory Care Devices:  Yes      Level of patient/caregiver understanding able to:   ? Verbalize understanding   ? Demonstrate understanding       ? Teach back        ? Needs reinforcement       ? No available caregiver               ? Other:     Response to education:  Excellent     Is patient being placed on Home Treatment Regimen? Yes     Does the patient have everything they need prior to discharge? NA     Comments: pt seen and chart reviewed    Plan of Care: albuterol prn    Electronically signed by Trinda Boxer, RCP on 10/10/2020 at 7:26 PM    Respiratory Protocol Guidelines     1. Assessment and treatment by Respiratory Therapy will be initiated for medication and therapeutic interventions upon initiation of aerosolized medication.   2. Physician will be contacted for respiratory rate (RR) greater than 35 breaths per minute. Therapy will be held for heart rate (HR) greater than 140 beats per minute, pending direction from physician. 3. Bronchodilators will be administered via Metered Dose Inhaler (MDI) with spacer when the following criteria are met:  a. Alert and cooperative     b. HR < 140 bpm  c. RR < 30 bpm                d. Can demonstrate a 2-3 second inspiratory hold  4. Bronchodilators will be administered via Hand Held Nebulizer [facility]) to patients when ANY of the following criteria are met  a. Incognizant or uncooperative          b. Patients treated with HHN at Home        c. Unable to demonstrate proper use of MDI with spacer     d. RR > 30 bpm   5. Bronchodilators will be delivered via Metered Dose Inhaler (MDI), HHN, Aerogen to intubated patients on mechanical ventilation. 6. Inhalation medication orders will be delivered and/or substituted as outlined below. Aerosolized Medications Ordering and Administration Guidelines:    1. All Medications will be ordered by a physician, and their frequency and/or modality will be adjusted as defined by the patients Respiratory Severity Index (RSI) score. 2. If the patient does not have documented COPD, consider discontinuing anticholinergics when RSI is less than 9.  3. If the bronchospasm worsens (increased RSI), then the bronchodilator frequency can be increased to a maximum of every 4 hours. If greater than every 4 hours is required, the physician will be contacted. 4. If the bronchospasm improves, the frequency of the bronchodilator can be decreased, based on the patient's RSI, but not less than home treatment regimen frequency. 5. Bronchodilator(s) will be discontinued if patient has a RSI less than 9 and has received no scheduled or as needed treatment for 72  Hrs. Patients Ordered on a Mucolytic Agent:    1. Must always be administered with a bronchodilator.     2. Discontinue if patient experiences worsened bronchospasm, or secretions have lessened to the point that the patient is able to clear them with a cough. Anti-inflammatory and Combination Medications:    1. If the patient lacks prior history of lung disease, is not using inhaled anti-inflammatory medication at home, and lacks wheezing by examination or by history for at least 24 hours, contact physician for possible discontinuation.

## 2020-10-10 NOTE — ED NOTES
Pain in left side of back/flank. Denies urinary sx. Pain is worse with lying flat and rom.      Bruce Tyler, RN  10/10/20 8200

## 2020-10-10 NOTE — PROGRESS NOTES
Thank you for consulting [facility] in the care of your patient. Patient was recently discharged from hospital with similar issues of hyponatremia. She was on Samsca then switched to lasix on discharge with sodium of 129 on discharge. Sodium now 117 and patient received fluid boluses. Holding further fluids and repeat labs with last sodium at 1225. Will follow Na levels every 4 hrs. Goal is 125 by noon tomorrow. A full consult will follow but if you have any questions I can be reached through our office at [phone] or through [address] [place] [address]. Thank you.   Dr. Shahid Phillip

## 2020-10-10 NOTE — ED NOTES

## 2020-10-10 NOTE — ED PROVIDER NOTES
**ADVANCED PRACTICE PROVIDER, I HAVE EVALUATED THIS [facility]  ED  EMERGENCY DEPARTMENT ENCOUNTER      Pt Name: Libertad Carlos  OHK:[id number]  Jimmygfstanislaw 1945  Date of evaluation: 10/10/2020  Provider: DEVORAH Herrera CNP      Chief Complaint:    Chief Complaint   Patient presents with    Back Pain     started 1 week ago. hx of kidney and lung cancer       Nursing Notes, Past Medical Hx, Past Surgical Hx, Social Hx, Allergies, and Family Hx were all reviewed and agreed with or any disagreements were addressed in the HPI.    HPI:  (Location, Duration, Timing, Severity, Quality, Assoc Sx, Context, Modifying factors)  This is a  77 y.o. female who presents with midline low back pain as well as left sided flank pain for one week. States no injury or fall. States she does have a history of kidney and lung cancer and is on chemo. Last chemo treatment was two weeks ago. Denies any fevers, chills, nausea, vomiting, chest pain, cough, pain with urination, blood in the urine, diarrhea or constipation.  Rates pain a 8/10  Denies any numbness or tingling of lower extremities, loss/retention of bowel or bladder, recent surgeries or saddle anesthesia's/    PastMedical/Surgical History:      Diagnosis Date    Cancer [facility])     Chronic hyponatremia     Dr Alejandrina Haq, [facility]    COVID-23 08/13/2020    Hyperlipidemia     Lung [facility] ([facility] [address]          Procedure Laterality Date    BREAST SURGERY      fibroid tumors removed bilateral breast    BRONCHOSCOPY N/A 5/11/2020    BRONCHOSCOPY ENDOBRONCHIAL ULTRASOUND with ANESTHESIA performed by Matilda Galvan MD at [address] [place] [address]  5/11/2020    BRONCHOSCOPY/TRANSBRONCHIAL NEEDLE BIOPSY performed by Matilda Galvan MD at [address] [place] [address]  5/11/2020    BRONCHOSCOPY/TRANSBRONCHIAL NEEDLE BIOPSY ADDL LOBE performed by Matilda Galvan MD at [address] [place] [address]  5/11/2020 BRONCHOSCOPY DIAGNOSTIC OR CELL [address] ONLY performed by Ellie Elena MD at [address] N/A 5/13/2020    RIGHT SUBCLAVIAN VEIN PORT A CATH INSERTION [facility]      mid [facility]       Medications:  Previous Medications    ALBUTEROL SULFATE HFA (PROAIR HFA) 108 (90 BASE) MCG/ACT INHALER    Inhale 2 puffs into the lungs every 4 hours as needed for Wheezing or Shortness of Breath    ATORVASTATIN (LIPITOR) 40 MG TABLET    Take 1 tablet by mouth daily    FUROSEMIDE (LASIX) 20 MG TABLET    Take 1 tablet by mouth daily    ONDANSETRON (ZOFRAN ODT) 4 MG DISINTEGRATING TABLET    Take 2 tablets by mouth every 8 hours as needed for Nausea or Vomiting    OXYCODONE-ACETAMINOPHEN (ROXICET) 5-325 MG/5ML SOLUTION    Take by mouth 2 times daily as needed for Pain. POTASSIUM CHLORIDE (KLOR-CON M) 20 MEQ EXTENDED RELEASE TABLET    Take 1 tablet by mouth 2 times daily    PROMETHAZINE (PHENERGAN) 12.5 MG TABLET    Take 1 tablet by mouth every 6 hours as needed for Nausea    SENNA (SENOKOT) 8.6 MG TABLET    Take 1 tablet by mouth 2 times daily    SODIUM CHLORIDE 1 G TABLET    Take 2 tablets by mouth 3 times daily (with meals)         Review of Systems:  Review of Systems   Constitutional: Negative. HENT: Negative. Eyes: Negative. Respiratory: Negative for cough and shortness of breath. Cardiovascular: Negative for chest pain. Gastrointestinal: Positive for nausea. Negative for abdominal distention, abdominal pain, blood in stool, constipation, diarrhea and vomiting. States nausea is chronic   Endocrine: Negative. Genitourinary: Positive for flank pain. Negative for difficulty urinating, dysuria, hematuria and pelvic pain. Musculoskeletal: Positive for back pain. Negative for myalgias, neck pain and neck stiffness. Skin: Negative. Allergic/Immunologic: Negative.     Neurological: Negative for dizziness, seizures, syncope, visualized the radiologic plain film image(s) with the below findings:        Interpretation per the Radiologist below, if available at the time of this note:    CT ABDOMEN PELVIS WO CONTRAST Additional Contrast? None   Final Result   1. Progression of metastatic disease within the lower chest and abdomen. 2. New small left pleural effusion. Increased lingular opacity could   represent atelectasis, focal pneumonia, or be related to metastatic disease. CT LUMBAR SPINE WO CONTRAST   Final Result   No acute fractures identified. L1-2 mild retrolisthesis, considered degenerative. Multilevel spondylosis, with disproportionate involvement of L4-5 and L5-S1. Ct Abdomen Pelvis W Iv Contrast Additional Contrast? None    Result Date: 9/27/2020  EXAMINATION: CT OF THE ABDOMEN AND PELVIS WITH CONTRAST 9/27/2020 9:24 am TECHNIQUE: CT of the abdomen and pelvis was performed with the administration of intravenous contrast. Multiplanar reformatted images are provided for review. Dose modulation, iterative reconstruction, and/or weight based adjustment of the mA/kV was utilized to reduce the radiation dose to as low as reasonably achievable. COMPARISON: CT abdomen and pelvis, 08/28/2020, 05/12/2020. HISTORY: ORDERING SYSTEM PROVIDED HISTORY: vomiting, h/o lung cancer, liver valente TECHNOLOGIST PROVIDED HISTORY: Reason for exam:->vomiting, h/o lung cancer, liver valente Additional Contrast?->None Reason for Exam: vomiting today Acuity: Acute Type of Exam: Initial Relevant Medical/Surgical History: lung and kidney cancer FINDINGS: Lower Chest: The lung bases are clear. In the left cardiophrenic space, there are several enlarged lymph nodes, the largest measuring 12 x 14 mm (previously 2 mm on 08/28/2020 and 10 mm on 05/12/2028). Organs: There are scattered hepatic hypodensities.   These lesions were not apparent on 08/28/2020, but are present on 05/12/2020 and relatively decreased compared to this older scan.  2 sample lesions are noted in the right lobe, one measuring 7 mm (image 49, series 2) (previously 15 mm)  and the other measuring 7 mm (image 59) (previously 19 mm). The gallbladder, pancreas and spleen are unremarkable. There is a stable 18 x 17 mm nodule of the left adrenal gland. The kidneys enhance symmetrically. There is no hydronephrosis. GI/Bowel: The stomach and small bowel are unremarkable. There is no focal mural thickening of the colon. Appendix is normal. There is normal enhancement of the mesenteric vessels. Pelvis: The urinary bladder and uterus are unremarkable. No adenopathy is seen. Peritoneum/Retroperitoneum: Subcentimeter gastrohepatic and retroperitoneal nodes are unchanged. Bones/Soft Tissues: At L1-2 there is mild anterolisthesis. No lytic or blastic bone lesions are appreciated. Superficial soft tissues are unremarkable. Progression in left cardiophrenic adenopathy, compared with 08/28/2020 and 05/12/2020. Other subcentimeter gastrohepatic and retroperitoneal nodes are stable since 08/28/2020. Multiple hepatic nodules, with apparent progression since 08/28/2020, but regression since 05/12/2020. Stable left adrenal nodule. No acute inflammatory abnormality is identified. MEDICAL DECISION MAKING / ED COURSE:      PROCEDURES:   Procedures    None    Patient was given:  Medications   morphine (PF) injection 4 mg (has no administration in time range)   potassium chloride (KLOR-CON M) extended release tablet 40 mEq (has no administration in time range)   morphine (PF) injection 4 mg (4 mg Intravenous Given 10/10/20 1308)   0.9 % sodium chloride bolus (0 mLs Intravenous Stopped 10/10/20 1446)       Patient is alert and oriented x4. Skin is pwd, no cyanosis or pallor. Moist mucus membranes. Heart with RRR. Lungs are  Clear to auscultation. Abdomen is soft, non-tender and non-distended.  There is left sided CVA tenderness with palpation, as well as midline lumbar pain with palpation. No deformity noted. Decreased range of motion with bending due to pain. Bilateral lower extremities with equal strength. DTRs are normal. Pulses and neurovascular status intact  Differentials: metastatic disease, low back pain, sciatica, stress fracture, renal calculi, UTI  Labs: hyponatremia  Urine: no signs of UTI  CT:Progression of metastatic disease within the lower chest and abdomen. 2. New small left pleural effusion.  Increased lingular opacity could   represent atelectasis, focal pneumonia, or be related to metastatic disease. NS bolus given along with morphine for pain  Spoke with Dr. Bg Bennett from oncology and he states to admit for the hyponatremia  Diagnosis: hyponatremia, metastatic disease, low back Pain  Patient will be admitted for further evaluation and treatment  hospitalist consulted and accepts admission    The patient tolerated their visit well. I evaluated the patient. The physician was available for consultation as needed. The patient and / or the family were informed of the results of any tests, a time was given to answer questions, a plan was proposed and they agreed with plan. CLINICAL IMPRESSION:  1. Hyponatremia    2. Metastatic malignant neoplasm, unspecified site ([facility]    3. Acute midline low back pain without sciatica        DISPOSITION        PATIENT REFERRED TO:  No follow-up provider specified.     DISCHARGE MEDICATIONS:  New Prescriptions    No medications on file       DISCONTINUED MEDICATIONS:  Discontinued Medications    ALLOPURINOL (ZYLOPRIM) 300 MG TABLET    Take 1 tablet by mouth daily              (Please note the MDM and HPI sections of this note were completed with a voice recognition program.  Efforts were made to edit the dictations but occasionally words are mis-transcribed.)    Electronically signed, DEOVRAH Bustillos CNP,         DEVORAH Bustillos CNP  10/10/20 9429

## 2020-10-10 NOTE — ED NOTES
1357 - PS to Dr Carolyn Alegria at Hem/Onc  Re: hyponatremia 117  - Dr Sen's pt  [phone] - Dr Lisseth Rosales called back to speak with NP Edda Tucker  10/10/20 4918

## 2020-10-10 NOTE — H&P
Hospital Medicine History & Physical      PCP: Boogie Camacho    Date of Admission: 10/10/2020    Date of Service: Pt seen/examined on 10/10/20 and Admitted to Inpatient with expected LOS greater than two midnights due to medical therapy. Chief Complaint: Back pain      History Of Present Illness:  (    79 y.o. female who presented to [facility] with above complaint. She has a history of lung cancer kidney cancer metastasis to liver and lymph nodes in the abdomen. She has been getting chemotherapy. She has back pain and is kind of worsened for last couple of days as a reason she came to the emergency room. She told she has good appetite no nausea no vomiting diarrhea or any urinary complaints. No cough or shortness of breath. Past Medical History:          Diagnosis Date    Cancer [facility])     Chronic hyponatremia     Dr Shira Epley, [facility] Nephrology    COVID-23 08/13/2020    Hyperlipidemia     Lung cancer [facility])        Past Surgical History:          Procedure Laterality Date    BREAST SURGERY      fibroid tumors removed bilateral breast    BRONCHOSCOPY N/A 5/11/2020    BRONCHOSCOPY ENDOBRONCHIAL ULTRASOUND with ANESTHESIA performed by Tiera Maurer MD at [address]  5/11/2020    BRONCHOSCOPY/TRANSBRONCHIAL NEEDLE BIOPSY performed by Tiera Maurer MD at [address]  5/11/2020    BRONCHOSCOPY/TRANSBRONCHIAL NEEDLE BIOPSY ADDL LOBE performed by Tiera Maurer MD at [address]  5/11/2020    BRONCHOSCOPY DIAGNOSTIC OR CELL [address] ONLY performed by Tiera Maurer MD at [address] N/A 5/13/2020    RIGHT SUBCLAVIAN VEIN PORT A CATH INSERTION performed by Torrie Ying MD at [facility]      mid chest       Medications Prior to Admission:      Prior to Admission medications    Medication Sig Start Date End Date Taking?  Authorizing Provider   furosemide (LASIX) 20 MG tablet Take 1 tablet by mouth daily 9/29/20  Yes DEVORAH Romero CNP   sodium chloride 1 g tablet Take 2 tablets by mouth 3 times daily (with meals) 9/29/20  Yes DEVORAH Romero CNP   oxyCODONE-acetaminophen (ROXICET) 5-325 MG/5ML solution Take by mouth 2 times daily as needed for Pain. Yes Historical Provider, MD   senna (SENOKOT) 8.6 MG tablet Take 1 tablet by mouth 2 times daily   Yes Historical Provider, MD   potassium chloride (KLOR-CON M) 20 MEQ extended release tablet Take 1 tablet by mouth 2 times daily 5/18/20  Yes Tab Trujillo MD   albuterol sulfate HFA (PROAIR HFA) 108 (90 Base) MCG/ACT inhaler Inhale 2 puffs into the lungs every 4 hours as needed for Wheezing or Shortness of Breath 5/18/20  Yes DEVORAH Dunaway CNP   ondansetron (ZOFRAN ODT) 4 MG disintegrating tablet Take 2 tablets by mouth every 8 hours as needed for Nausea or Vomiting 5/18/20  Yes DEVORAH Dunaway CNP   promethazine (PHENERGAN) 12.5 MG tablet Take 1 tablet by mouth every 6 hours as needed for Nausea 5/18/20  Yes DEVORAH Dunaway CNP   atorvastatin (LIPITOR) 40 MG tablet Take 1 tablet by mouth daily 5/18/20  Yes DEVORAH Dunaway CNP       Allergies:  Patient has no known allergies. Social History:      The patient currently lives at home    TOBACCO:   reports that she quit smoking about 5 months ago. She has never used smokeless tobacco.  ETOH:   reports no history of alcohol use. E-Cigarettes Vaping or Juuling     Questions Responses    Vaping Use Never User    Start Date     Does device contain nicotine? Quit Date     Vaping Type             Family History:       Reviewed in detail and negative for DM, CAD, Cancer, CVA. Positive as follows:    History reviewed. No pertinent family history. REVIEW OF SYSTEMS:   Pertinent positives as noted in the HPI. All other systems reviewed and negative.     PHYSICAL EXAM PERFORMED:    /76   Pulse 73   Temp 97.5 °F (36.4 °C) (Oral)   Resp 16   Ht 5' 3.5\" (1.613 m)   Wt 135 lb 11.2 oz (61.6 kg)   SpO2 93%   BMI 23.66 kg/m²     General appearance:  No apparent distress, appears stated age and cooperative. HEENT:  Normal cephalic, atraumatic without obvious deformity. Pupils equal, round, and reactive to light. Extra ocular muscles intact. Conjunctivae/corneas clear. Neck: Supple, with full range of motion. No jugular venous distention. Trachea midline. Respiratory:  Normal respiratory effort. Clear to auscultation, bilaterally without Rales/Wheezes/Rhonchi. Cardiovascular:  Regular rate and rhythm with normal S1/S2 without murmurs, rubs or gallops. Abdomen: Soft, non-tender, non-distended with normal bowel sounds. Musculoskeletal:  No clubbing, cyanosis or edema bilaterally. Full range of motion without deformity. Skin: Skin color, texture, turgor normal.  No rashes or lesions. Neurologic:  Neurovascularly intact without any focal sensory/motor deficits. Cranial nerves: II-XII intact, grossly non-focal.  Psychiatric:  Alert and oriented, thought content appropriate, normal insight  Capillary Refill: Brisk,< 3 seconds   Peripheral Pulses: +2 palpable, equal bilaterally       Labs:     Recent Labs     10/10/20  1225   WBC 18.5*   HGB 10.6*   HCT 31.4*        Recent Labs     10/10/20  1225 10/10/20  1849   * 122*   K 3.4*  --    CL 82*  --    CO2 26  --    BUN 8  --    CREATININE 0.6  --    CALCIUM 8.6  --      Recent Labs     10/10/20  1225   AST 39*   ALT 45*   BILITOT 0.5   ALKPHOS 101     No results for input(s): INR in the last 72 hours. No results for input(s): [employer] in the last 72 hours.     Urinalysis:      Lab Results   Component Value Date    NITRU Negative 10/10/2020    WBCUA None seen 10/10/2020    BACTERIA Rare 10/10/2020    RBCUA 3-4 10/10/2020    BLOODU SMALL 10/10/2020    SPECGRAV 1.020 10/10/2020    GLUCOSEU Negative 10/10/2020       Radiology:     CXR: I have reviewed the CXR with the following interpretation:   EKG:  I have reviewed the EKG with the following interpretation: Normal sinus rhythm 73/min    CT ABDOMEN PELVIS WO CONTRAST Additional Contrast? None   Final Result   1. Progression of metastatic disease within the lower chest and abdomen. 2. New small left pleural effusion. Increased lingular opacity could   represent atelectasis, focal pneumonia, or be related to metastatic disease. CT LUMBAR SPINE WO CONTRAST   Final Result   No acute fractures identified. L1-2 mild retrolisthesis, considered degenerative. Multilevel spondylosis, with disproportionate involvement of L4-5 and L5-S1.             ASSESSMENT:    Active Hospital Problems    Diagnosis Date Noted    Hyponatremia [E87.1] 08/08/2020         PLAN:  Back pain-imaging did not reveal any acute pathology and she does not have any focal neurological symptoms over the lower extremities.-Percocet PRN    Hyponatremia-he has chronic hyponatremia but is worsened now currently she is asymptomatic-continue sodium tablets, normal saline monitor sodium consider nephrology evaluation    Leukocytosis secondary to chronic lymphocytic leukemia    Hypokalemia replace and monitor      DVT Prophylaxis: Lovenox  Diet: DIET GENERAL;  Code Status: Full Code    PT/OT Eval Status:     Dispo -admitted to [place] [id number] with telemetry       Kianna Carlson MD    Thank you Librado Jimenez for the opportunity to be involved in this patient's care. If you have any questions or concerns please feel free to contact me at [phone].

## 2020-10-11 LAB
ANION GAP SERPL CALCULATED.3IONS-SCNC: 9 MMOL/L (ref 3–16)
BASOPHILS ABSOLUTE: 0.1 K/UL (ref 0–0.2)
BASOPHILS RELATIVE PERCENT: 0.6 %
BUN BLDV-MCNC: 7 MG/DL (ref 7–20)
CALCIUM SERPL-MCNC: 8.7 MG/DL (ref 8.3–10.6)
CHLORIDE BLD-SCNC: 86 MMOL/L (ref 99–110)
CO2: 25 MMOL/L (ref 21–32)
CREAT SERPL-MCNC: 0.6 MG/DL (ref 0.6–1.2)
EKG ATRIAL RATE: 73 BPM
EKG DIAGNOSIS: NORMAL
EKG P AXIS: 65 DEGREES
EKG P-R INTERVAL: 204 MS
EKG Q-T INTERVAL: 410 MS
EKG QRS DURATION: 94 MS
EKG QTC CALCULATION (BAZETT): 451 MS
EKG R AXIS: 29 DEGREES
EKG T AXIS: 64 DEGREES
EKG VENTRICULAR RATE: 73 BPM
EOSINOPHILS ABSOLUTE: 0.1 K/UL (ref 0–0.6)
EOSINOPHILS RELATIVE PERCENT: 0.6 %
GFR AFRICAN AMERICAN: >60
GFR NON-AFRICAN AMERICAN: >60
GLUCOSE BLD-MCNC: 98 MG/DL (ref 70–99)
HCT VFR BLD CALC: 30.3 % (ref 36–48)
HEMATOLOGY PATH CONSULT: NO
HEMOGLOBIN: 10.3 G/DL (ref 12–16)
LYMPHOCYTES ABSOLUTE: 13.3 K/UL (ref 1–5.1)
LYMPHOCYTES RELATIVE PERCENT: 71 %
MCH RBC QN AUTO: 31 PG (ref 26–34)
MCHC RBC AUTO-ENTMCNC: 33.9 G/DL (ref 31–36)
MCV RBC AUTO: 91.5 FL (ref 80–100)
MONOCYTES ABSOLUTE: 0.6 K/UL (ref 0–1.3)
MONOCYTES RELATIVE PERCENT: 3.4 %
NEUTROPHILS ABSOLUTE: 4.6 K/UL (ref 1.7–7.7)
NEUTROPHILS RELATIVE PERCENT: 24.4 %
PDW BLD-RTO: 16.9 % (ref 12.4–15.4)
PLATELET # BLD: 263 K/UL (ref 135–450)
PMV BLD AUTO: 6.7 FL (ref 5–10.5)
POTASSIUM REFLEX MAGNESIUM: 3.8 MMOL/L (ref 3.5–5.1)
RBC # BLD: 3.32 M/UL (ref 4–5.2)
SODIUM BLD-SCNC: 120 MMOL/L (ref 136–145)
SODIUM BLD-SCNC: 122 MMOL/L (ref 136–145)
SODIUM URINE: 143 MMOL/L
URIC ACID, SERUM: 3.3 MG/DL (ref 2.6–6)
WBC # BLD: 18.7 K/UL (ref 4–11)

## 2020-10-11 PROCEDURE — 85025 COMPLETE CBC W/AUTO DIFF WBC: CPT

## 2020-10-11 PROCEDURE — 93010 ELECTROCARDIOGRAM REPORT: CPT | Performed by: INTERNAL MEDICINE

## 2020-10-11 PROCEDURE — 6360000002 HC RX W HCPCS: Performed by: INTERNAL MEDICINE

## 2020-10-11 PROCEDURE — 1200000000 HC SEMI PRIVATE

## 2020-10-11 PROCEDURE — 6370000000 HC RX 637 (ALT 250 FOR IP): Performed by: INTERNAL MEDICINE

## 2020-10-11 PROCEDURE — 84300 ASSAY OF URINE SODIUM: CPT

## 2020-10-11 PROCEDURE — 2580000003 HC RX 258: Performed by: INTERNAL MEDICINE

## 2020-10-11 PROCEDURE — 36415 COLL VENOUS BLD VENIPUNCTURE: CPT

## 2020-10-11 PROCEDURE — 84295 ASSAY OF SERUM SODIUM: CPT

## 2020-10-11 PROCEDURE — 80048 BASIC METABOLIC PNL TOTAL CA: CPT

## 2020-10-11 PROCEDURE — 83935 ASSAY OF URINE OSMOLALITY: CPT

## 2020-10-11 PROCEDURE — 84550 ASSAY OF BLOOD/URIC ACID: CPT

## 2020-10-11 RX ORDER — FUROSEMIDE 20 MG/1
20 TABLET ORAL DAILY
Status: DISCONTINUED | OUTPATIENT
Start: 2020-10-11 | End: 2020-10-12

## 2020-10-11 RX ORDER — SODIUM CHLORIDE 9 MG/ML
INJECTION, SOLUTION INTRAVENOUS CONTINUOUS
Status: DISCONTINUED | OUTPATIENT
Start: 2020-10-11 | End: 2020-10-11

## 2020-10-11 RX ADMIN — SODIUM CHLORIDE, PRESERVATIVE FREE 10 ML: 5 INJECTION INTRAVENOUS at 20:48

## 2020-10-11 RX ADMIN — FUROSEMIDE 20 MG: 20 TABLET ORAL at 15:36

## 2020-10-11 RX ADMIN — ENOXAPARIN SODIUM 40 MG: 40 INJECTION SUBCUTANEOUS at 08:41

## 2020-10-11 RX ADMIN — SENNOSIDES 8.6 MG: 8.6 TABLET, FILM COATED ORAL at 08:41

## 2020-10-11 RX ADMIN — OXYCODONE HYDROCHLORIDE AND ACETAMINOPHEN 1 TABLET: 5; 325 TABLET ORAL at 20:48

## 2020-10-11 RX ADMIN — SODIUM CHLORIDE, PRESERVATIVE FREE 10 ML: 5 INJECTION INTRAVENOUS at 08:43

## 2020-10-11 RX ADMIN — SODIUM CHLORIDE: 9 INJECTION, SOLUTION INTRAVENOUS at 06:57

## 2020-10-11 RX ADMIN — OXYCODONE HYDROCHLORIDE AND ACETAMINOPHEN 1 TABLET: 5; 325 TABLET ORAL at 06:57

## 2020-10-11 RX ADMIN — Medication 2 G: at 08:41

## 2020-10-11 RX ADMIN — ATORVASTATIN CALCIUM 40 MG: 40 TABLET, FILM COATED ORAL at 08:41

## 2020-10-11 RX ADMIN — SENNOSIDES 8.6 MG: 8.6 TABLET, FILM COATED ORAL at 20:48

## 2020-10-11 RX ADMIN — Medication 2 G: at 12:58

## 2020-10-11 RX ADMIN — Medication 2 G: at 17:52

## 2020-10-11 ASSESSMENT — PAIN SCALES - GENERAL
PAINLEVEL_OUTOF10: 4
PAINLEVEL_OUTOF10: 3
PAINLEVEL_OUTOF10: 3
PAINLEVEL_OUTOF10: 0

## 2020-10-11 NOTE — PROGRESS NOTES
Hospitalist Progress Note      PCP: Fady Patrick    Date of Admission: 10/10/2020    Chief Complaint: back pain       Hospital Course: H & P reviewed. Pt admitted with back pain, hyponatremia       Subjective:     Patient reports back pain improved. Denies any dizziness, nausea or vomiting    Medications:  Reviewed    Infusion Medications    sodium chloride 50 mL/hr at 10/11/20 0657     Scheduled Medications    atorvastatin  40 mg Oral Daily    senna  1 tablet Oral BID    sodium chloride  2 g Oral TID WC    sodium chloride flush  10 mL Intravenous 2 times per day    enoxaparin  40 mg Subcutaneous Daily     PRN Meds: albuterol sulfate HFA, oxyCODONE-acetaminophen, sodium chloride flush, acetaminophen **OR** acetaminophen, polyethylene glycol, promethazine **OR** ondansetron      Intake/Output Summary (Last 24 hours) at 10/11/2020 1026  Last data filed at 10/11/2020 0553  Gross per 24 hour   Intake 1000 ml   Output 200 ml   Net 800 ml       Physical Exam Performed:    /79   Pulse 74   Temp 98.1 °F (36.7 °C) (Oral)   Resp 18   Ht 5' 3.5\" (1.613 m)   Wt 136 lb 2.1 oz (61.7 kg)   SpO2 94%   BMI 23.74 kg/m²     General appearance: No apparent distress, appears stated age and cooperative. Bald  HEENT: Pupils equal, round, and reactive to light. Conjunctivae/corneas clear. Neck: Supple, with full range of motion. No jugular venous distention. Trachea midline. Respiratory:  Normal respiratory effort. Clear to auscultation, bilaterally without Rales/Wheezes/Rhonchi. Cardiovascular: Regular rate and rhythm with normal S1/S2 without murmurs, rubs or gallops. Abdomen: Soft, non-tender, non-distended with normal bowel sounds. Musculoskeletal: No clubbing, cyanosis or edema bilaterally. Skin: Warm and dry  Neurologic:  Neurovascularly intact without any focal sensory/motor deficits.  Cranial nerves: II-XII intact, grossly non-focal.  Psychiatric: Alert and oriented, thought content appropriate, progression of  metastatic disease noted on CT. Oncology recs appreciated.          Leukocytosis:  secondary to chronic lymphocytic leukemia     Hypokalemia:  Replaced.  Improved.        DVT Prophylaxis: Lovenox  Diet: DIET GENERAL;  Code Status: Full Code    PT/OT Eval Status: Ordered      Dispo -1 to 2 days pending clinical course    Vicente Spear MD

## 2020-10-11 NOTE — CONSULTS
ONCOLOGY HEMATOLOGY CARE CONSULT NOTE      Requesting Physician:  Kristi Manrique, APRN - CNP    CHIEF COMPLAINT:  Small cell lung cancer        HISTORY OF PRESENT ILLNESS:      Ms. Shaun Thomas  is a 77 y.o. female we are seeing in consultation for small cell lung cancer. She is cared for by my partner, Dr. Amanda Acevedo. This patient is being treated with Atezolizumab. She last received cycle cycle 2 on 10/01/2020. She is due for cycle #3 on 10/22/2020. She was seen in the ER for back pain. A CT showed progression in the lower chest and abdomen. Na was 117 mmol/L. Nephrology has been consulted.     Past Medical History:    Past Medical History:   Diagnosis Date    Cancer [facility])     Chronic hyponatremia     Dr Alexi Melgar, [facility] Nephrology    COVID-23 08/13/2020    Hyperlipidemia     Lung cancer [facility])      Past Surgical History:    Past Surgical History:   Procedure Laterality Date    BREAST SURGERY      fibroid tumors removed bilateral breast    BRONCHOSCOPY N/A 5/11/2020    BRONCHOSCOPY ENDOBRONCHIAL ULTRASOUND with ANESTHESIA performed by Amaya Young MD at [facility]  5/11/2020    BRONCHOSCOPY/TRANSBRONCHIAL NEEDLE BIOPSY performed by Amaya Young MD at [facility]  5/11/2020    BRONCHOSCOPY/TRANSBRONCHIAL NEEDLE BIOPSY ADDL LOBE performed by Amaya Young MD at [facility]  5/11/2020    BRONCHOSCOPY DIAGNOSTIC OR CELL [address] performed by Amaya Young MD at [address] N/A 5/13/2020    RIGHT SUBCLAVIAN VEIN PORT A CATH INSERTION performed by Roge Murphy MD at [address]      mid chest       Current Medications:    Current Facility-Administered Medications   Medication Dose Route Frequency Provider Last Rate Last Dose    0.9 % sodium chloride infusion   Intravenous Continuous Del DO Garth 50 mL/hr at 10/11/20 0657      albuterol sulfate  (90 Base) MCG/ACT inhaler 2 puff  2 puff Inhalation Q4H PRN Adrienne Antunez MD        atorvastatin (LIPITOR) tablet 40 mg  40 mg Oral Daily Adrienne Antunez MD   40 mg at 10/11/20 0841    oxyCODONE-acetaminophen (PERCOCET) 5-325 MG per tablet 1 tablet  1 tablet Oral Q6H PRN Adrienne Antunez MD   1 tablet at 10/11/20 0657    senna (SENOKOT) tablet 8.6 mg  1 tablet Oral BID Adrienne Antunez MD   8.6 mg at 10/11/20 0841    sodium chloride tablet 2 g  2 g Oral TID WC Adrienne Antunez MD   2 g at 10/11/20 0841    sodium chloride flush 0.9 % injection 10 mL  10 mL Intravenous 2 times per day Adrienne Antunez MD   10 mL at 10/11/20 0843    sodium chloride flush 0.9 % injection 10 mL  10 mL Intravenous PRN Adrienne Antunez MD        acetaminophen (TYLENOL) tablet 650 mg  650 mg Oral Q6H PRN Adrienne Antunez MD        Or    acetaminophen (TYLENOL) suppository 650 mg  650 mg Rectal Q6H PRN Adrienne Antunez MD        polyethylene glycol (GLYCOLAX) packet 17 g  17 g Oral Daily PRN Adrienne Antunez MD        promethazine (PHENERGAN) tablet 12.5 mg  12.5 mg Oral Q6H PRN Adrienne Antunez MD   12.5 mg at 10/10/20 2216    Or    ondansetron (ZOFRAN) injection 4 mg  4 mg Intravenous Q6H PRN Adrienne Antunez MD   4 mg at 10/10/20 1811    enoxaparin (LOVENOX) injection 40 mg  40 mg Subcutaneous Daily Adrienne Antunez MD   40 mg at 10/11/20 0841     Allergies:  No Known Allergies    Social History:   Social History     Socioeconomic History    Marital status:       Spouse name: Not on file    Number of children: 1    Years of education: Not on file    Highest education level: Not on file   Occupational History    Not on file   Social Needs    Financial resource strain: Not on file    Food insecurity     Worry: Not on file     Inability: Not on file    Transportation needs     Medical: Not on file     Non-medical: Not on file   Tobacco Use    Smoking status: Former Smoker     Last attempt to quit: 2020     Years since quittin.4    Smokeless tobacco: Never Used   Substance and Sexual Activity    Alcohol use: Never     Frequency: Never    Drug use: Never    Sexual activity: Not Currently   Lifestyle    Physical activity     Days per week: Not on file     Minutes per session: Not on file    Stress: Not on file   Relationships    Social connections     Talks on phone: Not on file     Gets together: Not on file     Attends [employer] service: Not on file     Active member of club or organization: Not on file     Attends meetings of clubs or organizations: Not on file     Relationship status: Not on file    Intimate partner violence     Fear of current or ex partner: Not on file     Emotionally abused: Not on file     Physically abused: Not on file     Forced sexual activity: Not on file   Other Topics Concern    Not on file   Social History Narrative    Not on file          Family History:     History reviewed. No pertinent family history.   REVIEW OF SYSTEMS:    Review of Systems    PHYSICAL EXAM:      Vitals:  /79   Pulse 74   Temp 98.1 °F (36.7 °C) (Oral)   Resp 18   Ht 5' 3.5\" (1.613 m)   Wt 136 lb 2.1 oz (61.7 kg)   SpO2 94%   BMI 23.74 kg/m²     CONSTITUTIONAL:  awake, alert, cooperative, no apparent distress, and appears stated age NAD  EYES:  pupils equal, round and reactive to light, extra ocular muscles intact,sclera clear, conjunctiva normal  NECK:  Supple, symmetrical, trachea midline, no adenopathy, thyroid symmetric, not enlarged and no tenderness, skin normal  HEMATOLOGIC/LYMPHATICS:  no cervical lymphadenopathy, nosupraclavicular lymphadenopathy, no axillary lymphadenopathy and no inguinal lymphadenopathy  LUNGS:  No increased work of breathing, good air exchange, clear to auscultation bilaterally, no crackles or wheezing  CARDIOVASCULAR:  , regular rate and rhythm, normal S1 and S2, no S3 or S4, and no murmur noted  ABDOMEN:  No scars, normal bowel sounds, soft, non-distended, non-tender, no masses palpated, no hepatosplenomegally      MUSCULOSKELETAL:  There is no redness, warmth, or swelling of the joints. Full range of motion noted. Motor strength is 5 out of 5 all extremities bilaterally. NEUROLOGIC:  Awake, alert, oriented to name, place andtime. Cranial nerves II-XII are grossly intact. Motor is 5 out of 5 bilaterally. SKIN:  no bruising or bleeding      DATA:    PT/INR:    Recent Labs     10/10/20  1225   PROT 6.4     PTT:No results for input(s): APTT in the last 72 hours. CMP:    Lab Results   Component Value Date     10/11/2020    K 3.8 10/11/2020    CL 86 10/11/2020    CO2 25 10/11/2020    BUN 7 10/11/2020    PROT 6.4 10/10/2020     Magnesium:    Lab Results   Component Value Date    MG 1.50 08/08/2020     Phosphorus:  No components found for: PO4  Calcium:  No components found for: CA  CBC:    Lab Results   Component Value Date    WBC 18.7 10/11/2020    RBC 3.32 10/11/2020    HGB 10.3 10/11/2020    HCT 30.3 10/11/2020    MCV 91.5 10/11/2020    RDW 16.9 10/11/2020     10/11/2020     DIFF:    Lab Results   Component Value Date    MCV 91.5 10/11/2020    RDW 16.9 10/11/2020      LDH:  @labcrnt(LDH)@  Uric Acid:  @labcrnt(URIC)@    Radiology Review:  CT ABDOMEN PELVIS WO CONTRAST (10/10/2020): Impression    1. Progression of metastatic disease within the lower chest and abdomen.     2. New small left pleural effusion.  Increased lingular opacity could    represent atelectasis, focal pneumonia, or be related to metastatic disease.             Problem List  Patient Active Problem List   Diagnosis    Acute hyponatremia    N&V (nausea and vomiting)    Leukocytosis    Lung mass    Multiple lung nodules    Hilar adenopathy    Centrilobular emphysema (HCC)    Smoker    Anemia    Sepsis ([address]    [facility]    Hyperlipidemia    2019 novel coronavirus disease (COVID-19)    Hypokalemia    Pulmonary infiltrates    Small cell lung cancer [facility])    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    Acute blood loss anemia    Elevated procalcitonin    Hyponatremia    Former smoker    Acute respiratory failure with hypoxia (HCC)    Pneumonia of both lower lobes due to infectious organism       IMPRESSION/RECOMMENDATIONS:  1.) Small cell kim cancer  - She is progressing on Atezolizumab,  Her liver has significantly progressed. - Lurbinectedin is an option for her.    2.) Hpyonatremia  - Defer to nephrology    3.) Pain   - Takes Oxycodone/APAP 5/325 mg tabs at home. - She is receiving the same pain meds as an inaptient. Thank you for asking me to see the patient.        Oni Howe MD  PleaseContact Through [facility]

## 2020-10-11 NOTE — CONSULTS
[address] [place] [address] Nephrology   [facility]  [phone]  Nephrology Consult Note          Patient ID: Bal Gibbs  Referring/ Physician: Mariaelena Moy MD      Assessment/Plan:   Bal Gibbs is being seen by nephrology for hyponatremia. Seen and examined at bedside with daughter present. Her nausea and vomiting is resolved. She is eating well and tolerating PO. No chest pain, SOB or edema. Plan:   - stop fluids  - resume lasix 20 mg daily. - decrease to q 8 hr sodium checks. - continue salt tabs 2 g TID. - pain and nausea control.   - med list reviewed, no issues. - strict IO and daily weights. - no urine studies available, will check uosm and ucr, uric acid. 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 On salt tabs 2 g TID at home and lasix. Had uncontrolled pain and nausea which probably precipitated this acute drop. She was given IVFs Na 118 > 122 > 120 now stable there. [facility] Neph[facility] would like to thank you for the opportunity to serve this patient. Please don't hesitate to call with any questions or concerns. Boo Crowe MD  [facility] Neph[facility]  [address] [facility], [address]  Fax: [phone]  24 hrs Answering service [phone]   7 am-5 pm at [facility] or cell phone      CC/Reason for consult:   Reason for consult: hyponatremia. Chief Complaint   Patient presents with    Back Pain     started 1 week ago. hx of kidney and lung cancer         HPI/Hospital Course:   Bal Gibbs is a 77 y.o. female presented to the hospital on   10/10/2020 with uncontrolled pain in her back . Has a h/o lung cancer with liver mets. She had been having uncontrolled back pain the past week at home. She developed nausea and vomiting and was unable to take much PO. On presentation to ED sodium found to be low at 117. Given IVFs to 122. These were stopped. IVFs resumed when dropped to 120.  Now looking euvolemci and eating and drinking. Home lasix and salt tabs resumed. Review of Systems:   [address] All other remaining systems are negative. Constitutional:  fever, chills, weakness, weight change, fatigue,      Skin:  rash, pruritus, hair loss, bruising, dry skin, petechiae. Head, Face, Neck   headaches, swelling,  cervical adenopathy. Respiratory: shortness of breath, cough, or wheezing  Cardiovascular: chest pain, palpitations, dizzy, edema  Gastrointestinal: nausea, vomiting, diarrhea, constipation,belly pain    Yellow skin, blood in stool  Musculoskeletal:  back pain, muscle weakness, gait problems,       joint pain or swelling. Genitourinary:  dysuria, poor urine flow, flank pain, blood in urine  Neurologic:  vertigo, TIA'S, syncope, seizures, focal weakness  Psychosocial:  insomnia, anxiety, or depression. Additional positive findings: - negative     PMH/SH/FH:    Medical Hx: reviewed and updated as appropriate  Past Medical History:   Diagnosis Date    Cancer [facility])     Chronic hyponatremia     Dr Katelynn Acosta, [facility] Nephrology    COVID-19 2020    Hyperlipidemia     Lung cancer [facility])          Surgical Hx: reviewed and updated as appropriate   has a past surgical history that includes Breast surgery; Skin cancer excision; bronchoscopy (N/A, 2020); bronchoscopy (2020); bronchoscopy (2020); bronchoscopy (2020); and Port Surgery (N/A, 2020). Social Hx: reviewed and updated as appropriate  Social History     Tobacco Use    Smoking status: Former Smoker     Last attempt to quit: 2020     Years since quittin.4    Smokeless tobacco: Never Used   Substance Use Topics    Alcohol use: Never     Frequency: Never        Family hx: reviewed and updated as appropriate  family history is not on file.     Medications:   atorvastatin, 40 mg, Daily  senna, 1 tablet, BID  sodium chloride, 2 g, TID WC  sodium chloride flush, 10 mL, 2 times per day  enoxaparin, 40

## 2020-10-12 ENCOUNTER — APPOINTMENT (OUTPATIENT)
Dept: MRI IMAGING | Age: 75
DRG: 181 | End: 2020-10-12
Payer: MEDICARE

## 2020-10-12 LAB
OSMOLALITY URINE: 721 MOSM/KG (ref 390–1070)
SODIUM BLD-SCNC: 117 MMOL/L (ref 136–145)
SODIUM BLD-SCNC: 119 MMOL/L (ref 136–145)
SODIUM BLD-SCNC: 120 MMOL/L (ref 136–145)

## 2020-10-12 PROCEDURE — 97110 THERAPEUTIC EXERCISES: CPT

## 2020-10-12 PROCEDURE — 84295 ASSAY OF SERUM SODIUM: CPT

## 2020-10-12 PROCEDURE — 6360000002 HC RX W HCPCS: Performed by: INTERNAL MEDICINE

## 2020-10-12 PROCEDURE — 2580000003 HC RX 258: Performed by: INTERNAL MEDICINE

## 2020-10-12 PROCEDURE — 6370000000 HC RX 637 (ALT 250 FOR IP): Performed by: INTERNAL MEDICINE

## 2020-10-12 PROCEDURE — 6370000000 HC RX 637 (ALT 250 FOR IP): Performed by: NURSE PRACTITIONER

## 2020-10-12 PROCEDURE — 97162 PT EVAL MOD COMPLEX 30 MIN: CPT

## 2020-10-12 PROCEDURE — 97165 OT EVAL LOW COMPLEX 30 MIN: CPT

## 2020-10-12 PROCEDURE — 97116 GAIT TRAINING THERAPY: CPT

## 2020-10-12 PROCEDURE — 36415 COLL VENOUS BLD VENIPUNCTURE: CPT

## 2020-10-12 PROCEDURE — 2060000000 HC ICU INTERMEDIATE R&B

## 2020-10-12 RX ORDER — TOLVAPTAN 15 MG/1
7.5 TABLET ORAL ONCE
Status: COMPLETED | OUTPATIENT
Start: 2020-10-12 | End: 2020-10-12

## 2020-10-12 RX ORDER — MORPHINE SULFATE 2 MG/ML
2 INJECTION, SOLUTION INTRAMUSCULAR; INTRAVENOUS
Status: DISCONTINUED | OUTPATIENT
Start: 2020-10-12 | End: 2020-10-19 | Stop reason: HOSPADM

## 2020-10-12 RX ORDER — OXYCODONE AND ACETAMINOPHEN 10; 325 MG/1; MG/1
1 TABLET ORAL EVERY 4 HOURS PRN
Status: DISCONTINUED | OUTPATIENT
Start: 2020-10-12 | End: 2020-10-19 | Stop reason: HOSPADM

## 2020-10-12 RX ADMIN — Medication 2 G: at 07:53

## 2020-10-12 RX ADMIN — SODIUM CHLORIDE, PRESERVATIVE FREE 10 ML: 5 INJECTION INTRAVENOUS at 07:56

## 2020-10-12 RX ADMIN — SENNOSIDES 8.6 MG: 8.6 TABLET, FILM COATED ORAL at 07:54

## 2020-10-12 RX ADMIN — PROMETHAZINE HYDROCHLORIDE 12.5 MG: 25 TABLET ORAL at 07:51

## 2020-10-12 RX ADMIN — ENOXAPARIN SODIUM 40 MG: 40 INJECTION SUBCUTANEOUS at 07:55

## 2020-10-12 RX ADMIN — TOLVAPTAN 7.5 MG: 15 TABLET ORAL at 12:44

## 2020-10-12 RX ADMIN — ONDANSETRON 4 MG: 2 INJECTION INTRAMUSCULAR; INTRAVENOUS at 20:26

## 2020-10-12 RX ADMIN — OXYCODONE HYDROCHLORIDE AND ACETAMINOPHEN 1 TABLET: 10; 325 TABLET ORAL at 17:56

## 2020-10-12 RX ADMIN — FUROSEMIDE 20 MG: 20 TABLET ORAL at 07:54

## 2020-10-12 RX ADMIN — ATORVASTATIN CALCIUM 40 MG: 40 TABLET, FILM COATED ORAL at 07:54

## 2020-10-12 RX ADMIN — SODIUM CHLORIDE, PRESERVATIVE FREE 10 ML: 5 INJECTION INTRAVENOUS at 20:29

## 2020-10-12 RX ADMIN — OXYCODONE HYDROCHLORIDE AND ACETAMINOPHEN 1 TABLET: 5; 325 TABLET ORAL at 03:22

## 2020-10-12 RX ADMIN — ONDANSETRON 4 MG: 2 INJECTION INTRAMUSCULAR; INTRAVENOUS at 05:42

## 2020-10-12 RX ADMIN — OXYCODONE HYDROCHLORIDE AND ACETAMINOPHEN 1 TABLET: 5; 325 TABLET ORAL at 09:55

## 2020-10-12 ASSESSMENT — PAIN SCALES - GENERAL
PAINLEVEL_OUTOF10: 6
PAINLEVEL_OUTOF10: 0
PAINLEVEL_OUTOF10: 1
PAINLEVEL_OUTOF10: 0
PAINLEVEL_OUTOF10: 6
PAINLEVEL_OUTOF10: 4
PAINLEVEL_OUTOF10: 4
PAINLEVEL_OUTOF10: 0
PAINLEVEL_OUTOF10: 8

## 2020-10-12 ASSESSMENT — PAIN DESCRIPTION - ORIENTATION: ORIENTATION: LEFT;LOWER

## 2020-10-12 ASSESSMENT — PAIN DESCRIPTION - LOCATION
LOCATION: BACK
LOCATION: BACK
LOCATION: GENERALIZED

## 2020-10-12 ASSESSMENT — PAIN DESCRIPTION - DESCRIPTORS
DESCRIPTORS: CONSTANT;ACHING
DESCRIPTORS: DISCOMFORT

## 2020-10-12 ASSESSMENT — PAIN DESCRIPTION - PAIN TYPE: TYPE: ACUTE PAIN

## 2020-10-12 NOTE — PROGRESS NOTES
SM sent to Dr. Jose Christensen regarding critical sodium level of 117. Pt educated on the importance of a bed alarm considering her risk for seizures. Pt refusing at this time. Agrees to call out when she needs to use the restroom.

## 2020-10-12 NOTE — PROGRESS NOTES
End of shift report given to Aleah Lazo RN at bedside. Patient alert and oriented. Bed in lowest position with wheels locked. Call light within reach. No further needs at this time.

## 2020-10-12 NOTE — PROGRESS NOTES
Physical Therapy    Facility/Department: [address] [place] [address] PCU  Initial Assessment and Discharge    NAME: Pradip Richards  : 1945  MRN: [id number]    Date of Service: 10/12/2020    Discharge Recommendations:  Home with assist PRN   PT Equipment Recommendations  Equipment Needed: No    Assessment   Assessment: Pt is a 76 y/o female who presents with hyponatremia and back pain. Pt currently demonstrates independence with transfers, bed mobility and gait. No further skilled PT indicated at this time. Anticipate pt will be safe to return home with PRN A. Prognosis: Good  Decision Making: Medium Complexity  PT Education: PT Role;General Safety  Patient Education: Pt verbalized understanding  REQUIRES PT FOLLOW UP: No  Activity Tolerance  Activity Tolerance: Patient Tolerated treatment well       Patient Diagnosis(es): The primary encounter diagnosis was Hyponatremia. Diagnoses of Metastatic malignant neoplasm, unspecified site ([facility] [address] and Acute midline low back pain without sciatica were also pertinent to this visit. has a past medical history of Cancer ([facility] [address] Chronic hyponatremia, COVID-19, Hyperlipidemia, and Lung cancer ([facility] [address] has a past surgical history that includes Breast surgery; Skin cancer excision; bronchoscopy (N/A, 2020); bronchoscopy (2020); bronchoscopy (2020); bronchoscopy (2020); and Port Surgery (N/A, 2020). Restrictions  Restrictions/Precautions  Restrictions/Precautions: Fall Risk(medium fall risk per nursing)  Vision/Hearing  Vision: Impaired  Vision Exceptions: Wears glasses at all times  Hearing: Exceptions to [facility]  Hearing Exceptions: Hard of hearing/hearing concerns; No hearing aid     Subjective  General  Chart Reviewed: Yes  Patient assessed for rehabilitation services?: Yes  Family / Caregiver Present: No  Referring Practitioner: Balta Roper MD  Referral Date : 10/11/20  Diagnosis: hyponatremia  Follows Commands: Within Functional Limits  General Comment  Comments: Pt supine in bed upon arrival. RN cleared pt for therapy. Subjective  Subjective: Pt agreeable to therapy. Reports being up ad christel in her room. Pain Screening  Patient Currently in Pain: Yes  Pain Assessment  Pain Assessment: 0-10  Pain Level: 4  Pain Location: Back  Vital Signs  Patient Currently in Pain: Yes     Orientation  Orientation  Overall Orientation Status: Within Functional Limits  Social/Functional History  Social/Functional History  Lives With: Daughter  Type of Home: House  Home Layout: Able to Live on Main level with bedroom/bathroom  Home Access: Stairs to enter with rails  Entrance Stairs - Number of Steps: 6  Entrance Stairs - Rails: Both  Bathroom Shower/Tub: Tub/Shower unit  Bathroom Toilet: Standard  Bathroom Equipment: Tub transfer bench  Home Equipment: [employer], Oxygen(walker unknown type, .5 L O2)  Receives Help From: Family(daughter works full-time at a dermatologist office, occasional assist from granddaughter)  ADL Assistance: Independent  Homemaking Assistance: Independent  Homemaking Responsibilities: Yes  Meal Prep Responsibility: Secondary  Laundry Responsibility: Secondary  Cleaning Responsibility: Secondary  Bill Paying/Finance Responsibility: Primary  Shopping Responsibility: Secondary  Dependent Care Responsibility: Secondary  Health Care Management: Secondary  Ambulation Assistance: Independent(with walker in community or furniture walking)  Transfer Assistance: Independent  Active : No  Occupation: Retired  Type of occupation:   Leisure & Hobbies: watch TV, clean, cook    Objective    AROM RLE (degrees)  RLE AROM: WFL  AROM LLE (degrees)  LLE AROM : WFL  Strength RLE  Strength RLE: WFL  Strength LLE  Strength LLE: WFL     Sensation  Overall Sensation Status: WFL  Bed mobility  Supine to Sit: Independent  Sit to Supine: Independent  Transfers  Sit to Stand: Independent  Stand to sit:  Independent  Ambulation  Ambulation?: Yes  Ambulation 1  Surface: level tile  Device: No Device  Assistance: Independent  Quality of Gait: step through gait pattern, fair caitlin, steady with turns, no loss of balance  Distance: [de-identified]' within room  Stairs/Curb  Stairs?: No(anticpate independent from observed mobility; pt reports no concerns with stairs at home)     Balance  Sitting - Static: Good  Sitting - Dynamic: Good  Standing - Static: Good  Standing - Dynamic: Good      Plan   Plan  Times per week: 1x only  Current Treatment Recommendations: Functional Mobility Training  Safety Devices  Type of devices: Left in bed, Call light within reach    AM-PAC Score  AM-PAC Inpatient Mobility Raw Score : 24 (10/12/20 1144)  AM-PAC Inpatient T-Scale Score : 61.14 (10/12/20 1144)  Mobility Inpatient CMS 0-100% Score: 0 (10/12/20 1144)  Mobility Inpatient CMS G-Code Modifier : [address] (10/12/20 [facility])        Goals  Short term goals  Time Frame for Short term goals: 1 day  Short term goal 1: Pt will ambulate 48' independently - GOAL MET 10/12  Patient Goals   Patient goals : \"to go back home and have less pain. \"     Therapy Time   Individual Concurrent Group Co-treatment   Time In 1120         Time Out 1138         Minutes 18         Timed Code Treatment Minutes: [address], [address] [facility] [id number]

## 2020-10-12 NOTE — PROGRESS NOTES
Hospitalist Progress Note      PCP: Rani Baig    Date of Admission: 10/10/2020    Chief Complaint: Back Pain    Subjective: no new c/o. Medications:  Reviewed    Infusion Medications   Scheduled Medications    tolvaptan  7.5 mg Oral Once    atorvastatin  40 mg Oral Daily    senna  1 tablet Oral BID    sodium chloride flush  10 mL Intravenous 2 times per day    enoxaparin  40 mg Subcutaneous Daily     PRN Meds: morphine, oxyCODONE-acetaminophen, albuterol sulfate HFA, sodium chloride flush, acetaminophen **OR** acetaminophen, polyethylene glycol, promethazine **OR** ondansetron      Intake/Output Summary (Last 24 hours) at 10/12/2020 1206  Last data filed at 10/12/2020 1030  Gross per 24 hour   Intake 1680 ml   Output 1730 ml   Net -50 ml       Physical Exam Performed:    BP (!) 153/83   Pulse 73   Temp 97.8 °F (36.6 °C) (Oral)   Resp 16   Ht 5' 3.5\" (1.613 m)   Wt 137 lb 8 oz (62.4 kg)   SpO2 96%   BMI 23.97 kg/m²     General appearance: No apparent distress, appears stated age and cooperative. HEENT: Pupils equal, round, and reactive to light. Conjunctivae/corneas clear. Neck: Supple, with full range of motion. No jugular venous distention. Trachea midline. Respiratory:  Normal respiratory effort. Clear to auscultation, bilaterally without Rales/Wheezes/Rhonchi. Cardiovascular: Regular rate and rhythm with normal S1/S2 without murmurs, rubs or gallops. Abdomen: Soft, non-tender, non-distended with normal bowel sounds. Musculoskeletal: No clubbing, cyanosis or edema bilaterally. Full range of motion without deformity. Skin: Skin color, texture, turgor normal.  No rashes or lesions. Neurologic:  Neurovascularly intact without any focal sensory/motor deficits.  Cranial nerves: II-XII intact, grossly non-focal.  Psychiatric: Alert and oriented, thought content appropriate, normal insight  Capillary Refill: Brisk,< 3 seconds   Peripheral Pulses: +2 palpable, equal bilaterally Labs:   Recent Labs     10/10/20  1225 10/11/20  0545   WBC 18.5* 18.7*   HGB 10.6* 10.3*   HCT 31.4* 30.3*    263     Recent Labs     10/10/20  1225  10/11/20  0545  10/11/20  1559 10/12/20  0032 10/12/20  0834   *   < > 120*   < > 122* 119* 117*   K 3.4*  --  3.8  --   --   --   --    CL 82*  --  86*  --   --   --   --    CO2 26  --  25  --   --   --   --    BUN 8  --  7  --   --   --   --    CREATININE 0.6  --  0.6  --   --   --   --    CALCIUM 8.6  --  8.7  --   --   --   --     < > = values in this interval not displayed. Recent Labs     10/10/20  1225   AST 39*   ALT 45*   BILITOT 0.5   ALKPHOS 101     No results for input(s): INR in the last 72 hours. No results for input(s): Towana Ball in the last 72 hours. Urinalysis:      Lab Results   Component Value Date    NITRU Negative 10/10/2020    WBCUA None seen 10/10/2020    BACTERIA Rare 10/10/2020    RBCUA 3-4 10/10/2020    BLOODU SMALL 10/10/2020    SPECGRAV 1.020 10/10/2020    GLUCOSEU Negative 10/10/2020       Consults:    IP CONSULT TO ONCOLOGY  IP CONSULT TO HOSPITALIST  IP CONSULT TO NEPHROLOGY      Assessment/Plan:    Active Hospital Problems    Diagnosis    Hyponatremia [E87.1]    Hyperlipidemia [E78.5]          Back pain - imaging did not reveal any acute pathology and she does not have any focal neurological symptoms over the lower extremities. Continue Percocet PRN.     Small cell lung cancer - w/ progression of  metastatic disease noted on CT. Oncology consulted and appreciated.      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 Follow serial labs off IVF. Reviewed and documented as above. Nephrology consulted and appreciated. CLL - w/out acute issues. Continue Heme/Onc f/u as previously arranged. Anemia - etiology clinically unable to determine, w/out evidence of active bleeding/hemolysis. Asymptomatic w/out indication for transfusion. Follow serial labs.   Reviewed and documented as

## 2020-10-12 NOTE — PROGRESS NOTES
ONCOLOGY HEMATOLOGY CARE PROGRESS NOTE      SUBJECTIVE:     No headache. No confusion. No family present. Cont to have L sided back pain. ROS:   The remaining 10 point review of symptoms is unremarkable. OBJECTIVE        Physical    VITALS:  BP (!) 153/83   Pulse 73   Temp 97.8 °F (36.6 °C) (Oral)   Resp 16   Ht 5' 3.5\" (1.613 m)   Wt 137 lb 8 oz (62.4 kg)   SpO2 96%   BMI 23.97 kg/m²   TEMPERATURE:  Current - Temp: 97.8 °F (36.6 °C); Max - Temp  Av.9 °F (36.6 °C)  Min: 97.5 °F (36.4 °C)  Max: 98.3 °F (36.8 °C)  PULSE OXIMETRY RANGE: SpO2  Av.2 %  Min: 93 %  Max: 96 %  24HR INTAKE/OUTPUT:      Intake/Output Summary (Last 24 hours) at 10/12/2020 1040  Last data filed at 10/12/2020 1030  Gross per 24 hour   Intake 1680 ml   Output 1730 ml   Net -50 ml       CONSTITUTIONAL:  awake, alert, cooperative, no apparent distress, HEENT oral pharynx , no scleral icterus  HEMATOLOGIC/LYMPHATICS:  no cervical lymphadenopathy, no supraclavicular lymphadenopathy, no axillary lymphadenopathy and no inguinal lymphadenopathy  LUNGS:  No increased work of breathing, good air exchange, clear to auscultation bilaterally, no crackles or wheezing  CARDIOVASCULAR:  , regular rate and rhythm, normal S1 and S2, no S3 or S4, and no murmur noted  ABDOMEN:  No scars, normal bowel sounds, soft, non-distended, non-tender, no masses palpated, no hepatosplenomegally  MUSCULOSKELETAL:  There is no redness, warmth, or swelling of the joints. EXTREMETIES: No clubbing cynosis or edema  NEUROLOGIC:  Awake, alert, oriented to name, place and time. Cranial nerves II-XII are grossly intact. Motor is 5 out of 5 bilaterally.    SKIN:  no bruising or bleeding      Data      Recent Labs     10/10/20  1225 10/11/20  0545   WBC 18.5* 18.7*   HGB 10.6* 10.3*   HCT 31.4* 30.3*    263   MCV 91.3 91.5        Recent Labs     10/10/20  1225  10/11/20  0545  10/11/20  1559 10/12/20  0032 10/12/20  7763 *   < > 120*   < > 122* 119* 117*   K 3.4*  --  3.8  --   --   --   --    CL 82*  --  86*  --   --   --   --    CO2 26  --  25  --   --   --   --    BUN 8  --  7  --   --   --   --    CREATININE 0.6  --  0.6  --   --   --   --     < > = values in this interval not displayed. Recent Labs     10/10/20  1225   AST 39*   ALT 45*   BILITOT 0.5   ALKPHOS 101       Magnesium:    Lab Results   Component Value Date    MG 1.50 08/08/2020    MG 1.10 08/07/2020    MG 1.70 05/05/2020         Problem List  Patient Active Problem List   Diagnosis    Acute hyponatremia    N&V (nausea and vomiting)    Leukocytosis    Lung mass    Multiple lung nodules    Hilar adenopathy    Centrilobular emphysema (HCC)    Smoker    Anemia    Sepsis ([address]    Lung cancer ([facility]    Hyperlipidemia    2019 novel coronavirus disease (COVID-19)    Hypokalemia    Pulmonary infiltrates    Small cell lung cancer ([address]    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 - Lurbinectedin is an option for her. Dr. Melissa Pradhan to discuss with patient tomorrow AM. Will start chemo outpatient. No role for inpatient.      2.) Hpyonatremia  - Defer to nephrology  - Rupali Hernandes started today 10-12      3.) Pain   - Takes Oxycodone/APAP 5/325 mg tabs at home. - Increase Percocet to 10 mg   - Add Morphine 2 mg IVP every 3 hours   - Her hyponatremia can be related to uncontrolled pain and release of ADH (discussed with renal)         Thank you for asking me to see the patient.          ONCOLOGIC DISPOSITION: await stabilization of sodium levels       Anh Soliz CNP  [facility]   Please contact through [facility]

## 2020-10-12 NOTE — PROGRESS NOTES
Comprehensive Nutrition Assessment    Type and Reason for Visit:  Initial, Positive Nutrition Screen    Nutrition Recommendations/Plan:   1. Continue general diet  2. Add Ensure with meals - monitor po intakes and need for adjustment  3. Encourage po intakes  4. Monitor po intakes, nutrition adequacy, weights, pertinent labs, BMs    Nutrition Assessment:  Positive nutrition screen for poor appetite and N/V. Pt nutritionally compromised d/t report of decreased appetite d/t N/V. Pt with increased nutritional needs d/t chemotherapy. Pt states that her appetite is normally so-so, but it has been particularly low over the past 1.5 days. Pt denied having any recent weight loss. Pt favorable to adding ONS to help her meet needs as she drinks 1 ONS/day at home. Will order. Will continue general diet. Malnutrition Assessment:  Malnutrition Status: At risk for malnutrition (Comment)    Context:  Acute Illness     Findings of the 6 clinical characteristics of malnutrition:  Energy Intake:  Mild decrease in energy intake (Comment)  Weight Loss:  No significant weight loss     Body Fat Loss:  Unable to assess     Muscle Mass Loss:  Unable to assess    Fluid Accumulation:  No significant fluid accumulation     Strength:  Not Performed    Estimated Daily Nutrient Needs:  Energy (kcal):  5079-4006; Weight Used for Energy Requirements:  Current     Protein (g):  75-87; Weight Used for Protein Requirements:  Current        Fluid (ml/day):  1 ml/kcal; Weight Used for Fluid Requirements:  Current      Nutrition Related Findings:  Pt c/o nausea and vomiting.  Antiemetics available      Wounds:  None       Current Nutrition Therapies:    DIET GENERAL;  Dietary Nutrition Supplements: Standard High Calorie Oral Supplement    Anthropometric Measures:  · Height: 5' 3.5\" (161.3 cm)  · Current Body Weight: 137 lb 8 oz (62.4 kg)   · Usual Body Weight: (135-140 lb)     · Ideal Body Weight: 118 lbs  · BMI: 24  · BMI Categories: Normal Weight (BMI 22.0 to 24.9) age over 72       Nutrition Diagnosis:   · Inadequate protein-energy intake related to inadequate protein-energy intake as evidenced by vomiting, poor intake prior to admission      Nutrition Interventions:   Food and/or Nutrient Delivery:  Continue Current Diet, Start Oral Nutrition Supplement  Nutrition Education/Counseling:  No recommendation at this time   Coordination of Nutrition Care:  Continued Inpatient Monitoring    Goals:  Pt will tolerate diet without N/V and meet 50% or more of nutrition needs       Nutrition Monitoring and Evaluation:   Food/Nutrient Intake Outcomes:  Food and Nutrient Intake, Supplement Intake  Physical Signs/Symptoms Outcomes:  Nausea or Vomiting     Discharge Planning:    Continue current diet, Continue Oral Nutrition Supplement     Electronically signed by Caitlin Trimble RD, LD on 10/12/20 at 11:29 AM EDT    Contact: [phone] HR stable On, BB -Increased ,HOLD Eliquis 2x day, D/W Cards Re holding  AC for thoracocentesis is OK, HOLD AC dr Yee- pt in NSR  Sotalol 80 mg 2x day -Stopped as d/w Dr Yee ,pt with ESRD  Start on  Lopressor 75 mg q 12 hrs HR stable On, BB  75 mg q 12 hrs-Increased ,HOLD Eliquis 2x day, D/W Cards Re holding  AC for thoracocentesis is OK, HOLD AC dr Yee- pt in NSR  Sotalol 80 mg 2x day -Stopped as d/w Dr Yee ,pt with ESRD  Start on  Lopressor 75 mg q 12 hrs

## 2020-10-12 NOTE — PLAN OF CARE
Problem: Nutrition  Goal: Optimal nutrition therapy  Outcome: Ongoing  Note: Nutrition Problem #1: Inadequate protein-energy intake  Intervention: Food and/or Nutrient Delivery: Continue Current Diet, Start Oral Nutrition Supplement  Nutritional Goals: Pt will tolerate diet without N/V and meet 50% or more of nutrition needs

## 2020-10-12 NOTE — PROGRESS NOTES
Occupational Therapy   Occupational Therapy Initial Assessment and Treatment  Date: 10/12/2020   Patient Name: Oralia Virk  MRN: [id number]     : 1945    Date of Service: 10/12/2020    Discharge Recommendations:  Home with assist PRN  OT Equipment Recommendations  Equipment Needed: Yes  Other: tub transfer bench    Assessment   Performance deficits / Impairments: Decreased endurance;Decreased strength  Assessment: Patient presents with low sodium and back pain and PMH of cancer and today with decreased strength and endurance. She verbalizes understanding to continue with UE endurance training to exercises to maximize potential for discharge to least restrictive setting. On today's evaluation, patient performs with mod I/I for light ADLs and is appropriate for discharge to home setting with assist as needed. Prognosis: Good  Decision Making: Low Complexity  OT Education: OT Role;Plan of Care;Home Exercise Program;Precautions; Equipment  Patient Education: disease specific: UE HEP, equipment for home  REQUIRES OT FOLLOW UP: Yes  Activity Tolerance  Activity Tolerance: Patient Tolerated treatment well  Safety Devices  Safety Devices in place: Yes  Type of devices: Call light within reach; Nurse notified and stated patient can call out for the bathroom if needed       Patient Diagnosis(es): The primary encounter diagnosis was Hyponatremia. Diagnoses of Metastatic malignant neoplasm, unspecified site ([address] and Acute midline low back pain without sciatica were also pertinent to this visit. has a past medical history of Cancer ([address] Chronic hyponatremia, COVID-19, Hyperlipidemia, and Lung cancer ([facility] [address] has a past surgical history that includes Breast surgery; Skin cancer excision; bronchoscopy (N/A, 2020); bronchoscopy (2020); bronchoscopy (2020); bronchoscopy (2020); and Port Surgery (N/A, 2020).     Restrictions   general precautions, fall risk    Subjective   General  Chart Reviewed: Yes  Patient assessed for rehabilitation services?: Yes  Additional Pertinent Hx: small cell lung cancer s/p metastasis within the lower chest and abdomen  Family / Caregiver Present: No  Referring Practitioner: Hunter, 10/11  Diagnosis: Hyponatremia  Subjective  Subjective: Patient agreeable to OT evaluation and treatment. States that she feels like she is doing pretty well with mobility but today is not feeling well and is having back pain. RN requesting patient to notify RN if she needs to get up, but states it is okay for patient to not have bed alarm on due to patient request as patient is in agreement to call out. Patient Currently in Pain: Yes  Pain Assessment  Pain Assessment: 0-10  Pain Level: 6  Pain Type: Acute pain  Pain Location: Back  Pain Orientation: Left; Lower  Pain Descriptors: Constant; Aching  Pre Treatment Pain Screening  Intervention List: Patient able to continue with treatment  Comments / Details: reports she was just given medication  Vital Signs  Temp: 97.8 °F (36.6 °C)  Pulse: 73  Resp: 16  BP: (!) 153/83  MAP (mmHg): 106  Level of Consciousness: Alert  MEWS Score: 1  Patient Currently in Pain: Yes  Oxygen Therapy  SpO2: 96 %  O2 Device: None (Room air)  O2 Flow Rate (L/min): 0 L/min  Social/Functional History  Social/Functional History  Lives With: Daughter  Type of Home: House  Home Layout: Able to Live on Main level with bedroom/bathroom  Home Access: Stairs to enter with rails  Entrance Stairs - Number of Steps: 6  Entrance Stairs - Rails: Both  Bathroom Shower/Tub: Tub/Shower unit  Bathroom Toilet: Standard  Bathroom Equipment: Tub transfer bench  Home Equipment: [employer], Oxygen(walker unknown type, .5 L O2)  Receives Help From: Family(daughter works full-time at a dermatologist office, occasional assist from granddaughter)  ADL Assistance: [facility] [facility] [address]: Independent  Homemaking Responsibilities: Yes  Meal Prep Responsibility: Secondary  Laundry Responsibility: Secondary  Cleaning Responsibility: Secondary  Bill Paying/Finance Responsibility: Primary  Shopping Responsibility: Secondary  Dependent Care Responsibility: Secondary  Health Care Management: Secondary  Ambulation Assistance: Independent(with walker in community or furniture walking)  Transfer Assistance: Independent  Active : No  Occupation: Retired  Type of occupation:   Leisure & Hobbies: watch TV, clean, cook     Objective   Vision: Impaired  Vision Exceptions: Wears glasses at all times  Hearing: Exceptions to [facility]  Hearing Exceptions: Hard of hearing/hearing concerns; No hearing aid    Orientation  Overall Orientation Status: Within Normal Limits  Observation/Palpation  Posture: Good  Balance  Sitting Balance: Independent  Standing Balance: Modified independent   Functional Mobility  Activity: To/from bathroom  Assist Level: Modified independent   Functional Mobility Comments: slow speed, wide base of support  Toilet Transfers  Toilet - Technique: Ambulating  Equipment Used: Standard toilet  Toilet Transfer: Modified independent  Toilet Transfers Comments: slow stand to sit, slow sit to stand  ADL  Grooming: Independent  UE Dressing: Independent  LE Dressing: Independent  Toileting: Independent  Tone RUE  RUE Tone: Normotonic  Tone LUE  LUE Tone: Normotonic  Coordination  Movements Are Fluid And Coordinated: Yes     Bed mobility  Supine to Sit: Modified independent  Sit to Supine: Modified independent  Comment: head of bed elevated  Transfers  Sit to stand: Modified independent  Stand to sit: Modified independent  Vision - Basic Assessment  Patient Visual Report: No visual complaint reported.   Cognition  Overall Cognitive Status: WFL  Cognition Comment: flat affect initially, \"I don't keep track of what date it is\"  Perception  Overall Perceptual Status: WFL     Sensation  Overall Sensation Status: WFL(no tingling and numbness reported throughout BUE/BLE)  Type of ROM/Therapeutic Exercise  Type of ROM/Therapeutic Exercise: AROM  Exercises  Scapular Retraction: 10x BUE  Horizontal ABduction: 10x BUE  Horizontal ADduction: 10x BUE  Elbow Flexion: 10x BUE with chest press  Elbow Extension: 10x BUE with chest press  Other: cues for setup and completion, handout provided for [employer] Code: [email or web address] URL: [email or web address] Date: 10/12/2020 Prepared by: Regi Spann  Exercises Seated Scapular Retraction- 10 reps- 2 sets- 3x daily- 7x weekly Seated Shoulder Horizontal Abduction and Adduction- 10 reps- 2 sets- 3x daily- 7x weekly Seated Chest Press- 10 reps- 2 sets- 3x daily- 7x weekly     LUE AROM (degrees)  LUE AROM : WFL  RUE AROM (degrees)  RUE AROM : WFL  LUE Strength  Gross LUE Strength: Exceptions to [facility]  LUE Strength Comment: 3+/5 in LUE shoulder for flexion and abduction  RUE Strength  Gross RUE Strength: Exceptions to [facility]  RUE Strength Comment: 4/5 shoulder level flexion/abduction     Hand Dominance  Hand Dominance: Right    Plan   Plan  Times per week: 1 additional visit  Plan weeks: 1           AM-PAC Score  AM-[facility] Inpatient Daily Activity Raw Score: 24 (10/12/20 0913)  AM-PAC Inpatient ADL T-Scale Score : 57.54 (10/12/20 0913)  ADL Inpatient CMS 0-100% Score: 0 (10/12/20 0913)  ADL Inpatient CMS G-Code Modifier : [address] (10/12/20 0913)    Goals  Short term goals  Time Frame for Short term goals: 1 additional session  Short term goal 1: Patient will demonstrate independence in participation of UE strengthening exercises with use of handout PRN.   Patient Goals   Patient goals : to be able to get better in regards to increasing sodium level and reducing pain     Therapy Time   Individual Concurrent Group Co-treatment   Time In 0834         Time Out 0918         Minutes 44         Timed Code Treatment Minutes: 25 Minutes       Regi Spann OTR/L    If this patient discharges from acute care setting prior to completion of discharge summary, let this note serve as discharge note as it was functioning as assessed prior to discharge. Thank you.   Cathryn Fitch, OTR/L

## 2020-10-12 NOTE — PLAN OF CARE
Pt educated on role of OT. Please see OT evaluation & treatment notes for specific information re: plan of care, interventions provided and progress toward goals. Thank you.  Shiv Padilla, OTR/L

## 2020-10-12 NOTE — PROGRESS NOTES
[address] [place] [address] Nephrology   [facility]  [phone]  Nephrology Consult Note          Patient ID: Shubham Betts  Referring/ Physician: Enma Brown MD      Assessment/Plan:   Shubham Betts is being seen by nephrology for hyponatremia. Seen and examined at bedside with RN bedside  Pain not controlled. 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 On salt tabs 2 g TID at home and lasix. Had uncontrolled pain and nausea which probably precipitated this acute drop. Repeat hospitalization with hyponatremia      [facility] would like to thank you for the opportunity to serve this patient. Please don't hesitate to call with any questions or concerns. Araceli Bautista MD  [facility]  [address], [address] [place] [address] Ave  Fax: [phone]  24 hrs Answering service [phone]   7 am-5 pm at Perfect serve or cell phone      CC/Reason for consult:   Reason for consult: hyponatremia. Chief Complaint   Patient presents with    Back Pain     started 1 week ago. hx of kidney and lung cancer         HPI/Hospital Course:   From consult note  Shubham Betts is a 77 y.o. female presented to the hospital on   10/10/2020 with uncontrolled pain in her back . Has a h/o lung cancer with liver mets. She had been having uncontrolled back pain the past week at home. She developed nausea and vomiting and was unable to take much PO. On presentation to ED sodium found to be low at 117. Given IVFs to 122. These were stopped. IVFs resumed when dropped to 120. Now looking euvolemci and eating and drinking. Home lasix and salt tabs resumed. Review of Systems:   [address] All other remaining systems are negative.     Constitutional:  fever, chills, weakness, weight change, fatigue,      Skin:  rash, pruritus, hair loss, bruising, dry skin, petechiae. Head, Face, Neck   headaches, swelling,  cervical adenopathy. Respiratory: shortness of breath, cough, or wheezing  Cardiovascular: chest pain, palpitations, dizzy, edema  Gastrointestinal: nausea, vomiting, diarrhea, constipation,belly pain    Yellow skin, blood in stool  Musculoskeletal:  back pain, muscle weakness, gait problems,       joint pain or swelling. Genitourinary:  dysuria, poor urine flow, flank pain, blood in urine  Neurologic:  vertigo, TIA'S, syncope, seizures, focal weakness  Psychosocial:  insomnia, anxiety, or depression. Additional positive findings: - negative     PMH/SH/FH:    Medical Hx: reviewed and updated as appropriate  Past Medical History:   Diagnosis Date    Cancer [facility])     Chronic hyponatremia     Dr Alexi Melgar, [facility]ph[facility]    COVID-19 2020    Hyperlipidemia     Lung cancer [facility])          Surgical Hx: reviewed and updated as appropriate   has a past surgical history that includes Breast surgery; Skin cancer excision; bronchoscopy (N/A, 2020); bronchoscopy (2020); bronchoscopy (2020); bronchoscopy (2020); and Port Surgery (N/A, 2020). Social Hx: reviewed and updated as appropriate  Social History     Tobacco Use    Smoking status: Former Smoker     Last attempt to quit: 2020     Years since quittin.4    Smokeless tobacco: Never Used   Substance Use Topics    Alcohol use: Never     Frequency: Never        Family hx: reviewed and updated as appropriate  family history is not on file. Medications:   tolvaptan, 7.5 mg, Once  atorvastatin, 40 mg, Daily  senna, 1 tablet, BID  sodium chloride flush, 10 mL, 2 times per day  enoxaparin, 40 mg, Daily       Patient has no known allergies.     Allergies:   No Known Allergies      Physical Exam/Objective:   Vitals:    10/12/20 0730   BP: (!) 153/83   Pulse: 73   Resp: 16   Temp: 97.8 °F (36.6 °C) SpO2: 96%       Intake/Output Summary (Last 24 hours) at 10/12/2020 1027  Last data filed at 10/12/2020 0543  Gross per 24 hour   Intake 1440 ml   Output 1730 ml   Net -290 ml         General appearance: pleasant lady  in no acute distress, comfortable, communicative and fully alert and oriented x 3. HEENT: Lips and teeth normal, no icterus, EOM intact, trachea midline. Neck : no masses, appears symmetrical and no JVD appreciated. Respiratory: Respiratory effort normal, bilateral equal chest rise. No wheeze, no crackles   Cardiovascular: Ausculation shows rrr. no edema   Abdomen: abdomen is soft, non distended, no masses, no pain with palpation. Musculoskeletal:  no joint swelling, no deformity, strength grossly normal.   Skin: no rashes, no induration, no tightening, no jaundice   Neuro/psychiatric:  Judgement and insight is normal. Follows commands, moves all extremities spontaneously     On oxygen    Data:   CBC:   Recent Labs     10/10/20  1225 10/11/20  0545   WBC 18.5* 18.7*   HGB 10.6* 10.3*   HCT 31.4* 30.3*    263     BMP:    Recent Labs     10/10/20  1225  10/11/20  0545  10/11/20  1559 10/12/20  0032 10/12/20  0834   *   < > 120*   < > 122* 119* 117*   K 3.4*  --  3.8  --   --   --   --    CL 82*  --  86*  --   --   --   --    CO2 26  --  25  --   --   --   --    BUN 8  --  7  --   --   --   --    CREATININE 0.6  --  0.6  --   --   --   --    GLUCOSE 99  --  98  --   --   --   --     < > = values in this interval not displayed.

## 2020-10-13 LAB
ALBUMIN SERPL-MCNC: 3.8 G/DL (ref 3.4–5)
ANION GAP SERPL CALCULATED.3IONS-SCNC: 10 MMOL/L (ref 3–16)
BUN BLDV-MCNC: 7 MG/DL (ref 7–20)
CALCIUM SERPL-MCNC: 8.7 MG/DL (ref 8.3–10.6)
CHLORIDE BLD-SCNC: 80 MMOL/L (ref 99–110)
CO2: 28 MMOL/L (ref 21–32)
CREAT SERPL-MCNC: 0.7 MG/DL (ref 0.6–1.2)
GFR AFRICAN AMERICAN: >60
GFR NON-AFRICAN AMERICAN: >60
GLUCOSE BLD-MCNC: 129 MG/DL (ref 70–99)
HCT VFR BLD CALC: 33 % (ref 36–48)
HEMOGLOBIN: 10.9 G/DL (ref 12–16)
MCH RBC QN AUTO: 30.7 PG (ref 26–34)
MCHC RBC AUTO-ENTMCNC: 33.1 G/DL (ref 31–36)
MCV RBC AUTO: 92.7 FL (ref 80–100)
PDW BLD-RTO: 17 % (ref 12.4–15.4)
PHOSPHORUS: 3.3 MG/DL (ref 2.5–4.9)
PLATELET # BLD: 248 K/UL (ref 135–450)
PMV BLD AUTO: 7.1 FL (ref 5–10.5)
POTASSIUM SERPL-SCNC: 3 MMOL/L (ref 3.5–5.1)
RBC # BLD: 3.56 M/UL (ref 4–5.2)
SODIUM BLD-SCNC: 118 MMOL/L (ref 136–145)
SODIUM BLD-SCNC: 120 MMOL/L (ref 136–145)
WBC # BLD: 20.1 K/UL (ref 4–11)

## 2020-10-13 PROCEDURE — 2700000000 HC OXYGEN THERAPY PER DAY

## 2020-10-13 PROCEDURE — 2060000000 HC ICU INTERMEDIATE R&B

## 2020-10-13 PROCEDURE — 94761 N-INVAS EAR/PLS OXIMETRY MLT: CPT

## 2020-10-13 PROCEDURE — 6370000000 HC RX 637 (ALT 250 FOR IP): Performed by: INTERNAL MEDICINE

## 2020-10-13 PROCEDURE — 85027 COMPLETE CBC AUTOMATED: CPT

## 2020-10-13 PROCEDURE — 2580000003 HC RX 258: Performed by: INTERNAL MEDICINE

## 2020-10-13 PROCEDURE — 6370000000 HC RX 637 (ALT 250 FOR IP): Performed by: NURSE PRACTITIONER

## 2020-10-13 PROCEDURE — 84295 ASSAY OF SERUM SODIUM: CPT

## 2020-10-13 PROCEDURE — 6360000002 HC RX W HCPCS: Performed by: INTERNAL MEDICINE

## 2020-10-13 PROCEDURE — 80069 RENAL FUNCTION PANEL: CPT

## 2020-10-13 PROCEDURE — 36415 COLL VENOUS BLD VENIPUNCTURE: CPT

## 2020-10-13 RX ORDER — TOLVAPTAN 15 MG/1
15 TABLET ORAL DAILY
Status: DISCONTINUED | OUTPATIENT
Start: 2020-10-13 | End: 2020-10-15

## 2020-10-13 RX ADMIN — POLYETHYLENE GLYCOL 3350 17 G: 17 POWDER, FOR SOLUTION ORAL at 00:23

## 2020-10-13 RX ADMIN — ONDANSETRON 4 MG: 2 INJECTION INTRAMUSCULAR; INTRAVENOUS at 15:44

## 2020-10-13 RX ADMIN — OXYCODONE HYDROCHLORIDE AND ACETAMINOPHEN 1 TABLET: 10; 325 TABLET ORAL at 06:01

## 2020-10-13 RX ADMIN — SODIUM CHLORIDE, PRESERVATIVE FREE 10 ML: 5 INJECTION INTRAVENOUS at 08:36

## 2020-10-13 RX ADMIN — SENNOSIDES 8.6 MG: 8.6 TABLET, FILM COATED ORAL at 00:23

## 2020-10-13 RX ADMIN — ONDANSETRON 4 MG: 2 INJECTION INTRAMUSCULAR; INTRAVENOUS at 09:43

## 2020-10-13 RX ADMIN — SENNOSIDES 8.6 MG: 8.6 TABLET, FILM COATED ORAL at 08:36

## 2020-10-13 RX ADMIN — POTASSIUM BICARBONATE 40 MEQ: 782 TABLET, EFFERVESCENT ORAL at 11:32

## 2020-10-13 RX ADMIN — ATORVASTATIN CALCIUM 40 MG: 40 TABLET, FILM COATED ORAL at 08:36

## 2020-10-13 RX ADMIN — OXYCODONE HYDROCHLORIDE AND ACETAMINOPHEN 1 TABLET: 10; 325 TABLET ORAL at 00:35

## 2020-10-13 RX ADMIN — SODIUM CHLORIDE, PRESERVATIVE FREE 10 ML: 5 INJECTION INTRAVENOUS at 22:23

## 2020-10-13 RX ADMIN — SENNOSIDES 8.6 MG: 8.6 TABLET, FILM COATED ORAL at 20:29

## 2020-10-13 RX ADMIN — OXYCODONE HYDROCHLORIDE AND ACETAMINOPHEN 1 TABLET: 10; 325 TABLET ORAL at 20:29

## 2020-10-13 RX ADMIN — ENOXAPARIN SODIUM 40 MG: 40 INJECTION SUBCUTANEOUS at 08:36

## 2020-10-13 RX ADMIN — Medication 15 MG: at 11:33

## 2020-10-13 ASSESSMENT — PAIN DESCRIPTION - ORIENTATION
ORIENTATION: LOWER
ORIENTATION: LOWER

## 2020-10-13 ASSESSMENT — PAIN DESCRIPTION - PAIN TYPE
TYPE: ACUTE PAIN
TYPE: ACUTE PAIN

## 2020-10-13 ASSESSMENT — PAIN DESCRIPTION - LOCATION
LOCATION: BACK
LOCATION: BACK

## 2020-10-13 ASSESSMENT — PAIN SCALES - GENERAL
PAINLEVEL_OUTOF10: 4
PAINLEVEL_OUTOF10: 4
PAINLEVEL_OUTOF10: 0
PAINLEVEL_OUTOF10: 4

## 2020-10-13 ASSESSMENT — PAIN DESCRIPTION - DESCRIPTORS
DESCRIPTORS: ACHING;SORE
DESCRIPTORS: SORE;ACHING

## 2020-10-13 ASSESSMENT — PAIN DESCRIPTION - PROGRESSION: CLINICAL_PROGRESSION: NOT CHANGED

## 2020-10-13 NOTE — PROGRESS NOTES
Pt with nausea and emesis of 300mL, prn zofran given. Will continue to monitor.   Rick De Los Santos  10/13/2020

## 2020-10-13 NOTE — CARE COORDINATION
CASE MANAGEMENT INITIAL ASSESSMENT      Reviewed chart and completed assessment with: patient   Explained Case Management role/services. Primary contact information: Pancho Valdez Decision Maker:  Who do you trust or have selected to make healthcare decisions for you? Name:    Kati Ramos  Phone  Number:  [phone]  Can this person be reached and be able to respond quickly, such as within a few minutes or hours? Yes  Who would be your back-up decision maker? Name  [address] [place] [address]   Phone Number: [phone]    [facility] date/status: 10/10/20 Inpatient  Diagnosis: hyponatremia   Is this a Readmission?:  Yes      Insurance: [employer]   Precert required for SNF: Yes       3 night stay required: No    Living arrangements, Adls, care needs, prior to admission: lives in a ranch style house with her daughter     Transportation: daughter     [address] at home: none    Services in the home and/or outpatient, prior to admission: none    Dialysis Facility (if applicable) no  · Name:  · Address:  · Dialysis Schedule:  · Phone:  · Fax:    PT/OT recs: none    Hospital Exemption Notification (HEN): not initiated     Barriers to discharge: none    Plan/comments: Patient is independent at home with the exception of driving. She has been active with [facility] in the past but is not interested in having home care again. Will continue to follow should any needs arise.

## 2020-10-13 NOTE — PROGRESS NOTES
Pt with nausea and emesis of 100mL, prn zofran given. Will continue to monitor.   Melecio Bowen  10/13/2020

## 2020-10-13 NOTE — PROGRESS NOTES
/79   Pulse 72   Temp 98 °F (36.7 °C) (Oral)   Resp 16   Ht 5' 3.5\" (1.613 m)   Wt 136 lb 2.1 oz (61.7 kg)   SpO2 93%   BMI 23.74 kg/m²  on room air. Pt resting quietly in bed, breakfast at bedside. Pt denies pain, discomfort or shortness of breath. Lungs diminished. NSR on tele. Pt encouraged to turn and reposition frequently, coccyx with some redness, blanchable. Pt denies any needs at this time. Bedside table and call light within reach. Pt medium fall risk, refusing bed alarm. Pt instructed to call out for any needs or assistance. Will continue to monitor. Vicky Harding  10/13/2020    Lab at bedside collecting a.m labs.   Vicky Hrading  10/13/2020

## 2020-10-13 NOTE — PLAN OF CARE
Problem: Falls - Risk of:  Goal: Will remain free from falls  Description: Will remain free from falls  Outcome: Met This Shift  Note: Pt is free from falls this shift. Bed in lowest position, side rails up x2, non skid footwear in place, pt medium fall risk. Pt independent, steady gait, calls out if has any needs/assistance. Problem: Nutrition  Goal: Optimal nutrition therapy  Outcome: Ongoing  Note: Pt has eaten at least 25% off all meals this shift, tolerating fluids, although pt with 2 emesis episodes measurable this shift. Prn zofran given to aide in nausea/vomiting. Problem: Pain:  Goal: Pain level will decrease  Description: Pain level will decrease  Outcome: Met This Shift  Note: Pt denies pain this shift when asked. Is able to use pain scale 0-10 and notify staff when needed. Problem: Bowel/Gastric:  Goal: Ability to achieve a regular elimination pattern will improve  Description: Ability to achieve a regular elimination pattern will improve  Outcome: Ongoing  Note: Pt without a bowel movement this shift, has scheduled senokot, has no complaints of feeling constipated.

## 2020-10-13 NOTE — PROGRESS NOTES
Occupational Therapy  Facility/Department: [facility] C4 PCU      Patient reports no issues with HEP. Patient declines participating at this time. Will sign off due to all goals met. No additional acute OT needs at this time. No billed units for this tx session.          Plan   Plan  Times per week: 1 additional visit; D/c from acute OT   Plan weeks: 1    Goals  Short term goals  Time Frame for Short term goals: 1 additional session  Short term goal 1: Patient will demonstrate independence in participation of UE strengthening exercises with use of handout PRN- GOAL MET 10/13  Patient Goals   Patient goals : to be able to get better in regards to increasing sodium level and reducing pain       Cristy Oleary, OTR/L

## 2020-10-13 NOTE — PROGRESS NOTES
Na 120 at this time, writer spoke to Dr. Luna Griffin, made aware, no new orders.   Patricia Pass  10/13/2020

## 2020-10-13 NOTE — PROGRESS NOTES
10/10/20  1225 10/11/20  0545   WBC 18.5* 18.7*   HGB 10.6* 10.3*   HCT 31.4* 30.3*    263     Recent Labs     10/10/20  1225  10/11/20  0545  10/12/20  0834 10/12/20  1559 10/12/20  2349   *   < > 120*   < > 117* 120* 120*   K 3.4*  --  3.8  --   --   --   --    CL 82*  --  86*  --   --   --   --    CO2 26  --  25  --   --   --   --    BUN 8  --  7  --   --   --   --    CREATININE 0.6  --  0.6  --   --   --   --    CALCIUM 8.6  --  8.7  --   --   --   --     < > = values in this interval not displayed. Recent Labs     10/10/20  1225   AST 39*   ALT 45*   BILITOT 0.5   ALKPHOS 101     No results for input(s): INR in the last 72 hours. No results for input(s): [place] in the last 72 hours. Urinalysis:      Lab Results   Component Value Date    NITRU Negative 10/10/2020    WBCUA None seen 10/10/2020    BACTERIA Rare 10/10/2020    RBCUA 3-4 10/10/2020    BLOODU SMALL 10/10/2020    SPECGRAV 1.020 10/10/2020    GLUCOSEU Negative 10/10/2020       Consults:    IP CONSULT TO ONCOLOGY  IP CONSULT TO HOSPITALIST  IP CONSULT TO NEPHROLOGY      Assessment/Plan:    Active Hospital Problems    Diagnosis    Hyponatremia [E87.1]    Hyperlipidemia [E78.5]          Back pain - imaging did not reveal any acute pathology and she does not have any focal neurological symptoms over the lower extremities. Continue Percocet PRN.     Small cell lung cancer - w/ progression of  metastatic disease noted on CT. Oncology consulted and appreciated.      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 Follow serial labs off IVF. Reviewed and documented as above. Nephrology consulted and appreciated - given Tolvaptan 12 October. CLL - w/out acute issues. Continue Heme/Onc f/u as previously arranged. Anemia - etiology clinically unable to determine, w/out evidence of active bleeding/hemolysis. Asymptomatic w/out indication for transfusion. Follow serial labs.   Reviewed and documented as above.    HyperLipidemia - controlled on home Statin. Continue, w/ f/u and med adjustment w/ PCP    HypoKalemia - etiology clinically unable to determine. Follow serial labs and replace PRN. Reviewed and documented as above. DVT Prophylaxis: LMWH  Diet: DIET GENERAL;  Dietary Nutrition Supplements: Standard High Calorie Oral Supplement  Code Status: Full Code      PT/OT Eval Status: seen w/ recs for home w/ assist.     Dispo - Possibly Wed/Thurs pending response to tx, and subspecialty recs.      Lindwood Cabot, MD

## 2020-10-13 NOTE — PROGRESS NOTES
Lab called with panic Na on renal panel 118, also K 3.0. Writer sent message to Dr. Yuniel Hanks regarding. Rudy Baca  10/13/2020    See new orders for samsca and 40 effer-k.   Rudy Baca  10/13/2020

## 2020-10-13 NOTE — PROGRESS NOTES
[address] [place] [address] Nephrology   [facility]  [phone]  Nephrology follow up note    Assessment/Plan:   Ana Luisa Swenson is being seen by nephrology for hyponatremia. 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 On salt tabs 2 g TID at home and lasix. Had uncontrolled pain and nausea which probably precipitated this acute drop. Repeat hospitalization with hyponatremia      [facility] would like to thank you for the opportunity to serve this patient. Please don't hesitate to call with any questions or concerns. Cherylene Lorenzo, MD  [facility]  [address] [place] [address], [address] [place] [address] Ave  Fax: [phone]  24 hrs Answering service [phone]   7 am-5 pm at Perfect serve or cell phone      CC/Reason for consult:   Reason for consult: hyponatremia. Chief Complaint   Patient presents with    Back Pain     started 1 week ago. hx of kidney and lung cancer         HPI/Hospital Course:   From consult note  Ana Luisa Swenson is a 77 y.o. female presented to the hospital on   10/10/2020 with uncontrolled pain in her back . Has a h/o lung cancer with liver mets. She had been having uncontrolled back pain the past week at home. She developed nausea and vomiting and was unable to take much PO. On presentation to ED sodium found to be low at 117. Given IVFs to 122. These were stopped. IVFs resumed when dropped to 120. Now looking euvolemci and eating and drinking. Home lasix and salt tabs resumed. Review of Systems:   [address]. All other remaining systems are negative.     Constitutional:  fever, chills, weakness, weight change, fatigue,      Skin:  rash, pruritus, hair loss, bruising, dry skin, petechiae. Head, Face, Neck   headaches, swelling,  cervical adenopathy. Respiratory: shortness of breath, cough, or wheezing  Cardiovascular: chest pain, palpitations, dizzy, edema  Gastrointestinal: nausea, vomiting, diarrhea, constipation,belly pain    Yellow skin, blood in stool  Musculoskeletal:  back pain, muscle weakness, gait problems,       joint pain or swelling. Genitourinary:  dysuria, poor urine flow, flank pain, blood in urine  Neurologic:  vertigo, TIA'S, syncope, seizures, focal weakness  Psychosocial:  insomnia, anxiety, or depression. Additional positive findings: - negative     PMH/SH/FH:    Medical Hx: reviewed and updated as appropriate  Past Medical History:   Diagnosis Date    Cancer [facility])     Chronic hyponatremia     Dr Belem Gramajo, [facility]    COVID-19 2020    Hyperlipidemia     Lung cancer [facility])          Surgical Hx: reviewed and updated as appropriate   has a past surgical history that includes Breast surgery; Skin cancer excision; bronchoscopy (N/A, 2020); bronchoscopy (2020); bronchoscopy (2020); bronchoscopy (2020); and Port Surgery (N/A, 2020). Social Hx: reviewed and updated as appropriate  Social History     Tobacco Use    Smoking status: Former Smoker     Last attempt to quit: 2020     Years since quittin.4    Smokeless tobacco: Never Used   Substance Use Topics    Alcohol use: Never     Frequency: Never        Family hx: reviewed and updated as appropriate  family history is not on file. Medications:   tolvaptan, 15 mg, Daily  potassium bicarb-citric acid, 40 mEq, Once  atorvastatin, 40 mg, Daily  senna, 1 tablet, BID  sodium chloride flush, 10 mL, 2 times per day  enoxaparin, 40 mg, Daily       Patient has no known allergies.     Allergies:   No Known Allergies      Physical Exam/Objective:   Vitals:    10/13/20 1103   BP: 101/63   Pulse: 75   Resp: 16   Temp: 97.8 °F (36.6 °C)   SpO2: 91% Intake/Output Summary (Last 24 hours) at 10/13/2020 1113  Last data filed at 10/13/2020 0947  Gross per 24 hour   Intake 310 ml   Output 750 ml   Net -440 ml         General appearance: pleasant lady  in no acute distress, comfortable, communicative and fully alert and oriented x 3. HEENT: Lips and teeth normal, no icterus, EOM intact, trachea midline. Neck : no masses, appears symmetrical and no JVD appreciated. Respiratory: Respiratory effort normal, bilateral equal chest rise. No wheeze, no crackles   Cardiovascular: Ausculation shows rrr. no edema   Abdomen: abdomen is soft, non distended, no masses, no pain with palpation. Musculoskeletal:  no joint swelling, no deformity, strength grossly normal.   Skin: no rashes, no induration, no tightening, no jaundice   Neuro/psychiatric:  Judgement and insight is normal. Follows commands, moves all extremities spontaneously     On oxygen    Data:   CBC:   Recent Labs     10/10/20  1225 10/11/20  0545 10/13/20  0835   WBC 18.5* 18.7* 20.1*   HGB 10.6* 10.3* 10.9*   HCT 31.4* 30.3* 33.0*    263 248     BMP:    Recent Labs     10/10/20  1225  10/11/20  0545  10/12/20  1559 10/12/20  2349 10/13/20  0835   *   < > 120*   < > 120* 120* 118*  120*   K 3.4*  --  3.8  --   --   --  3.0*   CL 82*  --  86*  --   --   --  80*   CO2 26  --  25  --   --   --  28   BUN 8  --  7  --   --   --  7   CREATININE 0.6  --  0.6  --   --   --  0.7   GLUCOSE 99  --  98  --   --   --  129*   PHOS  --   --   --   --   --   --  3.3    < > = values in this interval not displayed.

## 2020-10-13 NOTE — DISCHARGE SUMMARY
Hospital Medicine Discharge Summary    Patient ID: Unique Ortiz      Patient's PCP: Librado Jimenez    Admit Date: 9/27/2020     Discharge Date: 9/29/2020      Admitting Physician: Odell Gonzalez MD     Discharge Physician: DEVORAH Stevens - CNP     Discharge Diagnoses: Active Hospital Problems    Diagnosis    Hyponatremia [E87.1]       The patient was seen and examined on day of discharge and this discharge summary is in conjunction with any daily progress note from day of discharge. Hospital Course:     77 y. o. female, with a PMH of neuroendocrine ca with mets to the liver/lungs, currently undergoing chemotherapy, who presented to [facility] with emesis.  The patient is currently receiving carboplatin and -16 every 3 weeks. Lee Speed last cycle was 2.5 weeks ago. Elly Bingham was feeling well, but over the last couple of days, noticed she was nauseous and staggering a bit when she ambulated. On the day of admission, she started vomiting this AM, so sought emergency treatment.  She has a history of difficulty with hyponatremia and has been hospitalized for such.  She was treated with a fluid restriction and salt tablets as an outpatient. Elly Bingham tells me that her salt tablet dosing was decreased by, she believes, Dr. Marian Fountain was initially on 2 tablets TID, then weaned to BID a couple of weeks ago. Elly Bingham denies fever, chills, cough, SOB, chest pain, abdominal pain.  She takes Immodium as needed for diarrhea from chemo.     Symptomatic hyponatremia   - this is a recurrent problem for the patient.  Likely multifactorial due to hypovolemia  compounded by a SIADH component, and weaning of salt tablets. - received 1L of IVF in the ED. - urine Na, osmo, serum osmo reviewed. - samsca given 9/28. Discharged on salt tablets and Lasix. - needs weekly labs at discharge.     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 - leukocytosis noted.   - CT abd done in ED reveals possible worsening of metastatic disease.  - supportive care. - consult [facility].     Anemia, chronic - due to ca. Physical Exam Performed:     /79   Pulse 91   Temp 98.1 °F (36.7 °C) (Oral)   Resp 14   Ht 5' 3.5\" (1.613 m)   Wt 133 lb (60.3 kg)   SpO2 94%   BMI 23.19 kg/m²       General appearance:  No apparent distress, appears stated age and cooperative. HEENT:  Normal cephalic, atraumatic without obvious deformity. Pupils equal, round, and reactive to light. Extra ocular muscles intact. Conjunctivae/corneas clear. Neck: Supple, with full range of motion. No jugular venous distention. Trachea midline. Respiratory:  Normal respiratory effort. Clear to auscultation, bilaterally without Rales/Wheezes/Rhonchi. Cardiovascular:  Regular rate and rhythm with normal S1/S2 without murmurs, rubs or gallops. Abdomen: Soft, non-tender, non-distended with normal bowel sounds. Musculoskeletal:  No clubbing, cyanosis or edema bilaterally. Full range of motion without deformity. Skin: Skin color, texture, turgor normal.  No rashes or lesions. Neurologic:  Neurovascularly intact without any focal sensory/motor deficits. Cranial nerves: II-XII intact, grossly non-focal.  Psychiatric:  Alert and oriented, thought content appropriate, normal insight  Capillary Refill: Brisk,< 3 seconds   Peripheral Pulses: +2 palpable, equal bilaterally       Labs:  For convenience and continuity at follow-up the following most recent labs are provided:      CBC:    Lab Results   Component Value Date    WBC 20.1 10/13/2020    HGB 10.9 10/13/2020    HCT 33.0 10/13/2020     10/13/2020       Renal:    Lab Results   Component Value Date     10/13/2020     10/13/2020    K 3.0 10/13/2020    K 3.8 10/11/2020    CL 80 10/13/2020    CO2 28 10/13/2020    BUN 7 10/13/2020    CREATININE 0.7 10/13/2020    CALCIUM 8.7 10/13/2020    PHOS 3.3 10/13/2020         Significant Diagnostic Studies    Radiology:   CT ABDOMEN PELVIS W IV CONTRAST Additional Contrast? None   Final Result   Progression in left cardiophrenic adenopathy, compared with 08/28/2020 and   05/12/2020. Other subcentimeter gastrohepatic and retroperitoneal nodes are   stable since 08/28/2020. Multiple hepatic nodules, with apparent progression since 08/28/2020, but   regression since 05/12/2020. Stable left adrenal nodule. No acute inflammatory abnormality is identified. Consults:     IP CONSULT TO NEPHROLOGY  IP CONSULT TO HOSPITALIST  IP CONSULT TO ONCOLOGY  IP CONSULT TO PHARMACY    Disposition:  Home    Condition at Discharge:  Stable. Discharge Instructions/Follow-up:  F/u with PCP in 1-2 weeks. F/u with nephrology in 2 weeks. F/u with oncology as previously arranged. Needs weekly BMP. Code Status:  Full Code     Activity: activity as tolerated    Diet: General      Discharge Medications:     Discharge Medication List as of 9/29/2020  2:55 PM           Details   furosemide (LASIX) 20 MG tablet Take 1 tablet by mouth daily, Disp-60 tablet,R-0Print      sodium chloride 1 g tablet Take 2 tablets by mouth 3 times daily (with meals), Disp-90 tablet,R-3Normal              Details   oxyCODONE-acetaminophen (ROXICET) 5-325 MG/5ML solution Take by mouth 2 times daily as needed for Pain. Historical Med      senna (SENOKOT) 8.6 MG tablet Take 1 tablet by mouth 2 times dailyHistorical Med      potassium chloride (KLOR-CON M) 20 MEQ extended release tablet Take 1 tablet by mouth 2 times daily, Disp-60 tablet, R-0Normal      albuterol sulfate HFA (PROAIR HFA) 108 (90 Base) MCG/ACT inhaler Inhale 2 puffs into the lungs every 4 hours as needed for Wheezing or Shortness of Breath, Disp-1 Inhaler, R-3Normal      ondansetron (ZOFRAN ODT) 4 MG disintegrating tablet Take 2 tablets by mouth every 8 hours as needed for Nausea or Vomiting, Disp-20 tablet, R-5Normal      promethazine (PHENERGAN) 12.5 MG tablet Take 1 tablet by mouth every 6 hours as needed for Nausea, Disp-25 tablet, R-5Normal      atorvastatin (LIPITOR) 40 MG tablet Take 1 tablet by mouth daily, Disp-30 tablet, R-3Normal      allopurinol (ZYLOPRIM) 300 MG tablet Take 1 tablet by mouth daily, Disp-30 tablet, R-3Normal             Time Spent on discharge is more than 30 minutes in the examination, evaluation, counseling and review of medications and discharge plan. Signed:    DEVORAH Loredo CNP   10/13/2020      Thank you Kevin Helm for the opportunity to be involved in this patient's care. If you have any questions or concerns please feel free to contact me at [phone].

## 2020-10-14 LAB
ALBUMIN SERPL-MCNC: 3.9 G/DL (ref 3.4–5)
ANION GAP SERPL CALCULATED.3IONS-SCNC: 11 MMOL/L (ref 3–16)
BUN BLDV-MCNC: 8 MG/DL (ref 7–20)
CALCIUM SERPL-MCNC: 9.1 MG/DL (ref 8.3–10.6)
CHLORIDE BLD-SCNC: 79 MMOL/L (ref 99–110)
CO2: 28 MMOL/L (ref 21–32)
CREAT SERPL-MCNC: 0.6 MG/DL (ref 0.6–1.2)
GFR AFRICAN AMERICAN: >60
GFR NON-AFRICAN AMERICAN: >60
GLUCOSE BLD-MCNC: 99 MG/DL (ref 70–99)
HCT VFR BLD CALC: 32.3 % (ref 36–48)
HEMOGLOBIN: 10.9 G/DL (ref 12–16)
MCH RBC QN AUTO: 30.8 PG (ref 26–34)
MCHC RBC AUTO-ENTMCNC: 33.8 G/DL (ref 31–36)
MCV RBC AUTO: 91.2 FL (ref 80–100)
PDW BLD-RTO: 16.3 % (ref 12.4–15.4)
PHOSPHORUS: 2.7 MG/DL (ref 2.5–4.9)
PLATELET # BLD: 267 K/UL (ref 135–450)
PMV BLD AUTO: 7.4 FL (ref 5–10.5)
POTASSIUM SERPL-SCNC: 3.9 MMOL/L (ref 3.5–5.1)
RBC # BLD: 3.54 M/UL (ref 4–5.2)
SODIUM BLD-SCNC: 117 MMOL/L (ref 136–145)
SODIUM BLD-SCNC: 118 MMOL/L (ref 136–145)
SODIUM BLD-SCNC: 118 MMOL/L (ref 136–145)
SODIUM BLD-SCNC: 120 MMOL/L (ref 136–145)
SODIUM URINE: 84 MMOL/L
WBC # BLD: 22.9 K/UL (ref 4–11)

## 2020-10-14 PROCEDURE — 80069 RENAL FUNCTION PANEL: CPT

## 2020-10-14 PROCEDURE — 85027 COMPLETE CBC AUTOMATED: CPT

## 2020-10-14 PROCEDURE — 6370000000 HC RX 637 (ALT 250 FOR IP): Performed by: INTERNAL MEDICINE

## 2020-10-14 PROCEDURE — 84300 ASSAY OF URINE SODIUM: CPT

## 2020-10-14 PROCEDURE — 84295 ASSAY OF SERUM SODIUM: CPT

## 2020-10-14 PROCEDURE — 36415 COLL VENOUS BLD VENIPUNCTURE: CPT

## 2020-10-14 PROCEDURE — 6370000000 HC RX 637 (ALT 250 FOR IP): Performed by: NURSE PRACTITIONER

## 2020-10-14 PROCEDURE — 6360000002 HC RX W HCPCS: Performed by: INTERNAL MEDICINE

## 2020-10-14 PROCEDURE — 2580000003 HC RX 258: Performed by: INTERNAL MEDICINE

## 2020-10-14 PROCEDURE — 2060000000 HC ICU INTERMEDIATE R&B

## 2020-10-14 RX ORDER — SODIUM CHLORIDE 1000 MG
2 TABLET, SOLUBLE MISCELLANEOUS
Status: DISCONTINUED | OUTPATIENT
Start: 2020-10-14 | End: 2020-10-19 | Stop reason: HOSPADM

## 2020-10-14 RX ADMIN — OXYCODONE HYDROCHLORIDE AND ACETAMINOPHEN 1 TABLET: 10; 325 TABLET ORAL at 05:34

## 2020-10-14 RX ADMIN — SENNOSIDES 8.6 MG: 8.6 TABLET, FILM COATED ORAL at 08:27

## 2020-10-14 RX ADMIN — OXYCODONE HYDROCHLORIDE AND ACETAMINOPHEN 1 TABLET: 10; 325 TABLET ORAL at 16:40

## 2020-10-14 RX ADMIN — OXYCODONE HYDROCHLORIDE AND ACETAMINOPHEN 1 TABLET: 10; 325 TABLET ORAL at 20:41

## 2020-10-14 RX ADMIN — Medication 2 G: at 16:40

## 2020-10-14 RX ADMIN — SODIUM CHLORIDE, PRESERVATIVE FREE 10 ML: 5 INJECTION INTRAVENOUS at 08:26

## 2020-10-14 RX ADMIN — Medication 2 G: at 12:19

## 2020-10-14 RX ADMIN — ENOXAPARIN SODIUM 40 MG: 40 INJECTION SUBCUTANEOUS at 08:26

## 2020-10-14 RX ADMIN — ATORVASTATIN CALCIUM 40 MG: 40 TABLET, FILM COATED ORAL at 08:27

## 2020-10-14 RX ADMIN — Medication 10 ML: at 05:40

## 2020-10-14 RX ADMIN — Medication 15 MG: at 08:26

## 2020-10-14 RX ADMIN — ONDANSETRON 4 MG: 2 INJECTION INTRAMUSCULAR; INTRAVENOUS at 13:37

## 2020-10-14 RX ADMIN — SENNOSIDES 8.6 MG: 8.6 TABLET, FILM COATED ORAL at 20:40

## 2020-10-14 RX ADMIN — SODIUM CHLORIDE, PRESERVATIVE FREE 10 ML: 5 INJECTION INTRAVENOUS at 20:40

## 2020-10-14 RX ADMIN — PROMETHAZINE HYDROCHLORIDE 12.5 MG: 25 TABLET ORAL at 19:35

## 2020-10-14 RX ADMIN — ONDANSETRON 4 MG: 2 INJECTION INTRAMUSCULAR; INTRAVENOUS at 05:40

## 2020-10-14 RX ADMIN — OXYCODONE HYDROCHLORIDE AND ACETAMINOPHEN 1 TABLET: 10; 325 TABLET ORAL at 12:19

## 2020-10-14 ASSESSMENT — PAIN SCALES - GENERAL
PAINLEVEL_OUTOF10: 2
PAINLEVEL_OUTOF10: 3
PAINLEVEL_OUTOF10: 4
PAINLEVEL_OUTOF10: 5
PAINLEVEL_OUTOF10: 0
PAINLEVEL_OUTOF10: 3

## 2020-10-14 NOTE — PROGRESS NOTES
ONCOLOGY HEMATOLOGY CARE PROGRESS NOTE      SUBJECTIVE:     Na 118. She is sitting up in bed and looking good. She says her pain meds wear off around hour 3 or 4. But overall, she feels better and eager to go home. ROS:   The remaining 10 point review of symptoms is unremarkable. OBJECTIVE        Physical    VITALS:  /80   Pulse 69   Temp 97.7 °F (36.5 °C) (Oral)   Resp 16   Ht 5' 3.5\" (1.613 m)   Wt 136 lb 14.4 oz (62.1 kg)   SpO2 93%   BMI 23.87 kg/m²   TEMPERATURE:  Current - Temp: 97.7 °F (36.5 °C); Max - Temp  Av.7 °F (36.5 °C)  Min: 97.5 °F (36.4 °C)  Max: 97.8 °F (36.6 °C)  PULSE OXIMETRY RANGE: SpO2  Av.3 %  Min: 90 %  Max: 98 %  24HR INTAKE/OUTPUT:      Intake/Output Summary (Last 24 hours) at 10/14/2020 1028  Last data filed at 10/14/2020 0608  Gross per 24 hour   Intake 660 ml   Output 1150 ml   Net -490 ml       CONSTITUTIONAL:  awake, alert, cooperative, no apparent distress, HEENT oral pharynx , no scleral icterus  HEMATOLOGIC/LYMPHATICS:  no cervical lymphadenopathy, no supraclavicular lymphadenopathy, no axillary lymphadenopathy and no inguinal lymphadenopathy  LUNGS:  No increased work of breathing, good air exchange, clear to auscultation bilaterally, no crackles or wheezing  CARDIOVASCULAR:  , regular rate and rhythm, normal S1 and S2, no S3 or S4, and no murmur noted  ABDOMEN:  No scars, normal bowel sounds, soft, non-distended, non-tender, no masses palpated, no hepatosplenomegally  MUSCULOSKELETAL:  There is no redness, warmth, or swelling of the joints. EXTREMETIES: No clubbing cynosis or edema  NEUROLOGIC:  Awake, alert, oriented to name, place and time. Cranial nerves II-XII are grossly intact. Motor is 5 out of 5 bilaterally.    SKIN:  no bruising or bleeding      Data      Recent Labs     10/13/20  0835 10/14/20  0528   WBC 20.1* 22.9*   HGB 10.9* 10.9*   HCT 33.0* 32.3*    267   MCV 92.7 91.2        Recent Labs 10/13/20  0835  10/13/20  2359 10/14/20  0527 10/14/20  0528   *  120*   < > 118* 120* 118*   K 3.0*  --   --   --  3.9   CL 80*  --   --   --  79*   CO2 28  --   --   --  28   PHOS 3.3  --   --   --  2.7   BUN 7  --   --   --  8   CREATININE 0.7  --   --   --  0.6    < > = values in this interval not displayed. No results for input(s): AST, ALT, ALB, BILIDIR, BILITOT, ALKPHOS in the last 72 hours. Magnesium:    Lab Results   Component Value Date    MG 1.50 08/08/2020    MG 1.10 08/07/2020    MG 1.70 05/05/2020         Problem List  Patient Active Problem List   Diagnosis    Acute hyponatremia    N&V (nausea and vomiting)    Leukocytosis    Lung mass    Multiple lung nodules    Hilar adenopathy    Centrilobular emphysema (HCC)    Smoker    Anemia    Sepsis ([address]    Lung cancer ([address]    Hyperlipidemia    2019 novel coronavirus disease (COVID-19)    Hypokalemia    Pulmonary infiltrates    Small cell lung cancer ([address]    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 - Lurbinectedin is A very good option and is designed for people who have failed a platinum-based regimen for small cell lung carcinoma  -A plan of treatment will be placed today and hopefully we can start treating her early next week     2.) Hpyonatremia  - Defer to nephrology  - Chema Roman started today 10-12   -This is stable, but not great- 118     3.) Pain   - Takes Oxycodone/APAP 5/325 mg tabs at home.   - Increase Percocet to 10 mg   - Add Morphine 2 mg IVP every 3 hours   - Her hyponatremia can be related to uncontrolled pain and release of ADH (discussed with renal)   - Cont Perc 10 every 4 hours- if she needs this more frequently will need to drop the APAP and move to Oxy 10 mg every 3 hours PRN. No changes today. 4. 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 ONCOLOGIC DISPOSITION: await stabilization of sodium levels- can dc from heme onc standpoint. Will cancel her office visit for 10-15. Await POT insurance approval for new drug.         Johanna Rodriguez CNP   May be reached through [address] [place] [address]

## 2020-10-14 NOTE — PROGRESS NOTES
Hospitalist Progress Note      PCP: Natalia Cleaning    Date of Admission: 10/10/2020    Chief Complaint: Back Pain    Subjective: no new c/o still w/ N/V. Medications:  Reviewed    Infusion Medications   Scheduled Medications    tolvaptan  15 mg Oral Daily    atorvastatin  40 mg Oral Daily    senna  1 tablet Oral BID    sodium chloride flush  10 mL Intravenous 2 times per day    enoxaparin  40 mg Subcutaneous Daily     PRN Meds: morphine, oxyCODONE-acetaminophen, albuterol sulfate HFA, sodium chloride flush, acetaminophen **OR** acetaminophen, polyethylene glycol, promethazine **OR** ondansetron      Intake/Output Summary (Last 24 hours) at 10/14/2020 0727  Last data filed at 10/14/2020 0608  Gross per 24 hour   Intake 850 ml   Output 1600 ml   Net -750 ml       Physical Exam Performed:    /79   Pulse 80   Temp 97.8 °F (36.6 °C) (Oral)   Resp 16   Ht 5' 3.5\" (1.613 m)   Wt 136 lb 14.4 oz (62.1 kg)   SpO2 90%   BMI 23.87 kg/m²     General appearance: No apparent distress, appears stated age and cooperative. HEENT: Pupils equal, round, and reactive to light. Conjunctivae/corneas clear. Neck: Supple, with full range of motion. No jugular venous distention. Trachea midline. Respiratory:  Normal respiratory effort. Clear to auscultation, bilaterally without Rales/Wheezes/Rhonchi. Cardiovascular: Regular rate and rhythm with normal S1/S2 without murmurs, rubs or gallops. Abdomen: Soft, non-tender, non-distended with normal bowel sounds. Musculoskeletal: No clubbing, cyanosis or edema bilaterally. Full range of motion without deformity. Skin: Skin color, texture, turgor normal.  No rashes or lesions. Neurologic:  Neurovascularly intact without any focal sensory/motor deficits.  Cranial nerves: II-XII intact, grossly non-focal.  Psychiatric: Alert and oriented, thought content appropriate, normal insight  Capillary Refill: Brisk,< 3 seconds   Peripheral Pulses: +2 palpable, equal w/out indication for transfusion. Follow serial labs. Reviewed and documented as above. HyperLipidemia - controlled on home Statin. Continue, w/ f/u and med adjustment w/ PCP    HypoKalemia - etiology clinically unable to determine. Follow serial labs and replace PRN. Reviewed and documented as above. DVT Prophylaxis: LMWH  Diet: DIET GENERAL;  Dietary Nutrition Supplements: Standard High Calorie Oral Supplement  Code Status: Full Code      PT/OT Eval Status: seen w/ recs for home w/ assist.     Dispo - Possibly Thurs/Friday 15/16 October pending response to tx, and subspecialty recs.      Nadir Zendejas MD

## 2020-10-14 NOTE — PROGRESS NOTES
[address] [place] [address] Nephrology   [employer]  [phone]  Nephrology Consult Note          Patient ID: Xavier Jordan  Referring/ Physician: Stefano Lagunas MD      Assessment/Plan:   Xavier Jordan is being seen by nephrology for hyponatremia. 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 On salt tabs 2 g TID at home and lasix. Had uncontrolled pain and nausea which probably precipitated this acute drop. Repeat hospitalization with hyponatremia      [facility] Nephrology would like to thank you for the opportunity to serve this patient. Please don't hesitate to call with any questions or concerns. Ronal Chew MD  [facility] Nephrology  [address], [address] [place] [address]  Fax: [phone]  24 hrs Answering service [phone]   7 am-5 pm at Perfect serve or cell phone      CC/Reason for consult:   Reason for consult: hyponatremia. Chief Complaint   Patient presents with    Back Pain     started 1 week ago. hx of kidney and lung cancer         HPI/Hospital Course:   From consult note  Xavier Jordan is a 77 y.o. female presented to the hospital on   10/10/2020 with uncontrolled pain in her back . Has a h/o lung cancer with liver mets. She had been having uncontrolled back pain the past week at home. She developed nausea and vomiting and was unable to take much PO. On presentation to ED sodium found to be low at 117. Given IVFs to 122. These were stopped. IVFs resumed when dropped to 120. Now looking euvolemci and eating and drinking. Home lasix and salt tabs resumed. Review of Systems:   [address]  All other remaining systems are negative. Constitutional:  fever, chills, weakness, weight change, fatigue,      Skin:  rash, pruritus, hair loss, bruising, dry skin, petechiae. Head, Face, Neck   headaches, swelling,  cervical adenopathy. Respiratory: shortness of breath, cough, or wheezing  Cardiovascular: chest pain, palpitations, dizzy, edema  Gastrointestinal: nausea, vomiting, diarrhea, constipation,belly pain    Yellow skin, blood in stool  Musculoskeletal:  back pain, muscle weakness, gait problems,       joint pain or swelling. Genitourinary:  dysuria, poor urine flow, flank pain, blood in urine  Neurologic:  vertigo, TIA'S, syncope, seizures, focal weakness  Psychosocial:  insomnia, anxiety, or depression. Additional positive findings: - negative     PMH/SH/FH:    Medical Hx: reviewed and updated as appropriate  Past Medical History:   Diagnosis Date    Cancer [facility])     Chronic hyponatremia     Dr Alma Danielle, [facility] Nephrology    COVID-19 2020    Hyperlipidemia     Lung cancer [facility])          Surgical Hx: reviewed and updated as appropriate   has a past surgical history that includes Breast surgery; Skin cancer excision; bronchoscopy (N/A, 2020); bronchoscopy (2020); bronchoscopy (2020); bronchoscopy (2020); and Port Surgery (N/A, 2020). Social Hx: reviewed and updated as appropriate  Social History     Tobacco Use    Smoking status: Former Smoker     Last attempt to quit: 2020     Years since quittin.4    Smokeless tobacco: Never Used   Substance Use Topics    Alcohol use: Never     Frequency: Never        Family hx: reviewed and updated as appropriate  family history is not on file. Medications:   sodium chloride, 2 g, TID WC  tolvaptan, 15 mg, Daily  atorvastatin, 40 mg, Daily  senna, 1 tablet, BID  sodium chloride flush, 10 mL, 2 times per day  enoxaparin, 40 mg, Daily       Patient has no known allergies.     Allergies:   No Known Allergies      Physical Exam/Objective:   Vitals:    10/14/20 0815   BP: 129/80   Pulse: 69   Resp: 16   Temp: 97.7 °F (36.5 °C)   SpO2: 93%       Intake/Output Summary (Last 24 hours) at 10/14/2020 1133  Last data filed at 10/14/2020 0608  Gross per 24 hour   Intake 660 ml   Output 1150 ml   Net -490 ml         General appearance: pleasant lady  in no acute distress, comfortable, communicative and fully alert and oriented x 3. HEENT: Lips and teeth normal, no icterus, EOM intact, trachea midline. Neck : no masses, appears symmetrical and no JVD appreciated. Respiratory: Respiratory effort normal, bilateral equal chest rise. No wheeze, no crackles   Cardiovascular: Ausculation shows rrr. no edema   Abdomen: abdomen is soft, non distended, no masses, no pain with palpation. Musculoskeletal:  no joint swelling, no deformity, strength grossly normal.   Skin: no rashes, no induration, no tightening, no jaundice   Neuro/psychiatric:  Judgement and insight is normal. Follows commands, moves all extremities spontaneously     On oxygen    Data:   CBC:   Recent Labs     10/13/20  0835 10/14/20  0528   WBC 20.1* 22.9*   HGB 10.9* 10.9*   HCT 33.0* 32.3*    267     BMP:    Recent Labs     10/13/20  0835  10/13/20  2359 10/14/20  0527 10/14/20  0528   *  120*   < > 118* 120* 118*   K 3.0*  --   --   --  3.9   CL 80*  --   --   --  79*   CO2 28  --   --   --  28   BUN 7  --   --   --  8   CREATININE 0.7  --   --   --  0.6   GLUCOSE 129*  --   --   --  99   PHOS 3.3  --   --   --  2.7    < > = values in this interval not displayed.

## 2020-10-14 NOTE — PLAN OF CARE
Problem: Falls - Risk of:  Goal: Will remain free from falls  Description: Will remain free from falls  10/13/2020 2347 by Maite Hubbard RN  Outcome: Ongoing  10/13/2020 1911 by Brunilda Bryant RN  Outcome: Met This Shift  Note: Pt is free from falls this shift. Bed in lowest position, side rails up x2, non skid footwear in place, pt medium fall risk. Pt independent, steady gait, calls out if has any needs/assistance. Problem: Pain:  Goal: Pain level will decrease  Description: Pain level will decrease  10/13/2020 2347 by Maite Hubbard RN  Outcome: Ongoing  10/13/2020 1911 by Brunilda Bryant RN  Outcome: Met This Shift  Note: Pt denies pain this shift when asked. Is able to use pain scale 0-10 and notify staff when needed. Goal: Control of acute pain  Description: Control of acute pain  Outcome: Ongoing     Problem: Nutrition  Goal: Optimal nutrition therapy  10/13/2020 1911 by Brunilda Bryant RN  Outcome: Ongoing  Note: Pt has eaten at least 25% off all meals this shift, tolerating fluids, although pt with 2 emesis episodes measurable this shift. Prn zofran given to aide in nausea/vomiting. Problem: Bowel/Gastric:  Goal: Ability to achieve a regular elimination pattern will improve  Description: Ability to achieve a regular elimination pattern will improve  10/13/2020 1911 by Brunilda Bryant RN  Outcome: Ongoing  Note: Pt without a bowel movement this shift, has scheduled senokot, has no complaints of feeling constipated.

## 2020-10-14 NOTE — FLOWSHEET NOTE
This note also relates to the following rows which could not be included:  Pulse - Cannot attach notes to unvalidated device data       10/13/20 2025   Assessment   Charting Type Shift assessment   Neurological   Neuro (WDL) WDL   Level of Consciousness 0   Orientation Level Oriented X4;Oriented to place;Oriented to time;Oriented to situation;Oriented to person   Cognition Appropriate judgement; Appropriate safety awareness; Appropriate attention/concentration; Appropriate for developmental age; Follows commands   Language Clear   Size R Pupil (mm) 2   R Pupil Shape Round   R Pupil Reaction Brisk   Size L Pupil (mm) 2   L Pupil Shape Round   L Pupil Reaction Brisk   R Hand  Strong   L Hand  Strong   R Foot Dorsiflexion Moderate   L Foot Dorsiflexion Moderate   R Foot Plantar Flexion Strong   L Foot Plantar Flexion Strong   RUE Motor Response Responds to command;Normal extension;Normal flexion   Sensation RUE Full sensation; No numbness; No pain; No tingling   LUE Motor Response Responds to command;Normal extension;Normal flexion   Sensation LUE Full sensation; No numbness; No pain; No tingling   RLE Motor Response Responds to command;Normal extension;Normal flexion   Sensation RLE Full sensation; No numbness; No pain; No tingling   LLE Motor Response Responds to command;Normal extension;Normal flexion   Sensation LLE Full sensation; No numbness; No pain; No tingling   HEENT   HEENT (WDL) X   Right Eye Impaired vision   Left Eye Impaired vision   Teeth Dentures upper   Respiratory   Respiratory (WDL) X   Respiratory Pattern Regular   Respiratory Depth Normal   Respiratory Quality/Effort Unlabored   Chest Assessment Chest expansion symmetrical   L Breath Sounds Diminished;Fine Crackles   R Breath Sounds Diminished;Fine Crackles   Cardiac   Cardiac (WDL) WDL   Cardiac Regularity Regular   Heart Sounds S1, S2   Cardiac Rhythm NSR   Cardiac Monitor   Telemetry Monitor On Yes   Telemetry Audible Yes   Telemetry Alarms Set Yes Gastrointestinal   Abdominal (WDL) X   RUQ Bowel Sounds Active   LUQ Bowel Sounds Active   RLQ Bowel Sounds Active   LLQ Bowel Sounds Active   Abdomen Inspection Rounded; Soft   GI Symptoms Nausea;Vomiting   Peripheral Vascular   Peripheral Vascular (WDL) WDL   Skin Color/Condition   Skin Color/Condition (WDL) WDL   Skin Integrity   Skin Integrity (WDL) X   Skin Integrity Bruising   Location scattered   Skin Integrity Site 2   Skin Integrity Location 2 Redness   Location 2   (Coccyx and BLE Heels blanchable)   Musculoskeletal   Musculoskeletal (WDL) WDL   Genitourinary   Genitourinary (WDL) WDL   Anus/Rectum   Anus/Rectum (WDL) WDL   Psychosocial   Psychosocial (WDL) WDL

## 2020-10-15 ENCOUNTER — APPOINTMENT (OUTPATIENT)
Dept: GENERAL RADIOLOGY | Age: 75
DRG: 181 | End: 2020-10-15
Payer: MEDICARE

## 2020-10-15 LAB
ALBUMIN SERPL-MCNC: 3.9 G/DL (ref 3.4–5)
ANION GAP SERPL CALCULATED.3IONS-SCNC: 9 MMOL/L (ref 3–16)
BUN BLDV-MCNC: 7 MG/DL (ref 7–20)
CALCIUM SERPL-MCNC: 8.8 MG/DL (ref 8.3–10.6)
CHLORIDE BLD-SCNC: 79 MMOL/L (ref 99–110)
CO2: 29 MMOL/L (ref 21–32)
CREAT SERPL-MCNC: 0.7 MG/DL (ref 0.6–1.2)
GFR AFRICAN AMERICAN: >60
GFR NON-AFRICAN AMERICAN: >60
GLUCOSE BLD-MCNC: 107 MG/DL (ref 70–99)
HCT VFR BLD CALC: 31.2 % (ref 36–48)
HEMOGLOBIN: 10.7 G/DL (ref 12–16)
MAGNESIUM: 1.6 MG/DL (ref 1.8–2.4)
MCH RBC QN AUTO: 30.9 PG (ref 26–34)
MCHC RBC AUTO-ENTMCNC: 34.3 G/DL (ref 31–36)
MCV RBC AUTO: 90.2 FL (ref 80–100)
PDW BLD-RTO: 16.2 % (ref 12.4–15.4)
PHOSPHORUS: 3.5 MG/DL (ref 2.5–4.9)
PLATELET # BLD: 256 K/UL (ref 135–450)
PMV BLD AUTO: 6.8 FL (ref 5–10.5)
POTASSIUM SERPL-SCNC: 4 MMOL/L (ref 3.5–5.1)
RBC # BLD: 3.46 M/UL (ref 4–5.2)
SODIUM BLD-SCNC: 117 MMOL/L (ref 136–145)
SODIUM BLD-SCNC: 118 MMOL/L (ref 136–145)
SODIUM BLD-SCNC: 118 MMOL/L (ref 136–145)
WBC # BLD: 20.9 K/UL (ref 4–11)

## 2020-10-15 PROCEDURE — 6360000002 HC RX W HCPCS: Performed by: INTERNAL MEDICINE

## 2020-10-15 PROCEDURE — 2060000000 HC ICU INTERMEDIATE R&B

## 2020-10-15 PROCEDURE — 83735 ASSAY OF MAGNESIUM: CPT

## 2020-10-15 PROCEDURE — 85027 COMPLETE CBC AUTOMATED: CPT

## 2020-10-15 PROCEDURE — 2580000003 HC RX 258: Performed by: INTERNAL MEDICINE

## 2020-10-15 PROCEDURE — 6370000000 HC RX 637 (ALT 250 FOR IP): Performed by: INTERNAL MEDICINE

## 2020-10-15 PROCEDURE — 36415 COLL VENOUS BLD VENIPUNCTURE: CPT

## 2020-10-15 PROCEDURE — 80069 RENAL FUNCTION PANEL: CPT

## 2020-10-15 PROCEDURE — 6370000000 HC RX 637 (ALT 250 FOR IP): Performed by: NURSE PRACTITIONER

## 2020-10-15 PROCEDURE — 80400 ACTH STIMULATION PANEL: CPT

## 2020-10-15 PROCEDURE — 84295 ASSAY OF SERUM SODIUM: CPT

## 2020-10-15 PROCEDURE — 74018 RADEX ABDOMEN 1 VIEW: CPT

## 2020-10-15 RX ORDER — COSYNTROPIN 0.25 MG/ML
0.25 INJECTION, POWDER, FOR SOLUTION INTRAMUSCULAR; INTRAVENOUS ONCE
Status: COMPLETED | OUTPATIENT
Start: 2020-10-15 | End: 2020-10-15

## 2020-10-15 RX ORDER — TOLVAPTAN 30 MG/1
30 TABLET ORAL DAILY
Status: DISCONTINUED | OUTPATIENT
Start: 2020-10-15 | End: 2020-10-16

## 2020-10-15 RX ORDER — PROCHLORPERAZINE EDISYLATE 5 MG/ML
10 INJECTION INTRAMUSCULAR; INTRAVENOUS EVERY 6 HOURS PRN
Status: DISCONTINUED | OUTPATIENT
Start: 2020-10-15 | End: 2020-10-19 | Stop reason: HOSPADM

## 2020-10-15 RX ADMIN — SENNOSIDES 8.6 MG: 8.6 TABLET, FILM COATED ORAL at 09:09

## 2020-10-15 RX ADMIN — ONDANSETRON 4 MG: 2 INJECTION INTRAMUSCULAR; INTRAVENOUS at 05:35

## 2020-10-15 RX ADMIN — PROCHLORPERAZINE EDISYLATE 10 MG: 5 INJECTION INTRAMUSCULAR; INTRAVENOUS at 17:38

## 2020-10-15 RX ADMIN — SODIUM CHLORIDE, PRESERVATIVE FREE 10 ML: 5 INJECTION INTRAVENOUS at 11:34

## 2020-10-15 RX ADMIN — OXYCODONE HYDROCHLORIDE AND ACETAMINOPHEN 1 TABLET: 10; 325 TABLET ORAL at 12:00

## 2020-10-15 RX ADMIN — OXYCODONE HYDROCHLORIDE AND ACETAMINOPHEN 1 TABLET: 10; 325 TABLET ORAL at 05:15

## 2020-10-15 RX ADMIN — ATORVASTATIN CALCIUM 40 MG: 40 TABLET, FILM COATED ORAL at 09:09

## 2020-10-15 RX ADMIN — PROCHLORPERAZINE EDISYLATE 10 MG: 5 INJECTION INTRAMUSCULAR; INTRAVENOUS at 11:27

## 2020-10-15 RX ADMIN — ENOXAPARIN SODIUM 40 MG: 40 INJECTION SUBCUTANEOUS at 09:09

## 2020-10-15 RX ADMIN — SODIUM CHLORIDE, PRESERVATIVE FREE 10 ML: 5 INJECTION INTRAVENOUS at 22:26

## 2020-10-15 RX ADMIN — Medication 2 G: at 17:38

## 2020-10-15 RX ADMIN — Medication 2 G: at 11:27

## 2020-10-15 RX ADMIN — OXYCODONE HYDROCHLORIDE AND ACETAMINOPHEN 1 TABLET: 10; 325 TABLET ORAL at 16:06

## 2020-10-15 RX ADMIN — Medication 10 ML: at 05:35

## 2020-10-15 RX ADMIN — COSYNTROPIN 0.25 MG: 0.25 INJECTION, POWDER, LYOPHILIZED, FOR SOLUTION INTRAMUSCULAR; INTRAVENOUS at 11:30

## 2020-10-15 RX ADMIN — TOLVAPTAN 30 MG: 30 TABLET ORAL at 09:15

## 2020-10-15 RX ADMIN — SENNOSIDES 8.6 MG: 8.6 TABLET, FILM COATED ORAL at 22:26

## 2020-10-15 RX ADMIN — Medication 2 G: at 09:09

## 2020-10-15 ASSESSMENT — PAIN SCALES - GENERAL
PAINLEVEL_OUTOF10: 2
PAINLEVEL_OUTOF10: 8
PAINLEVEL_OUTOF10: 0
PAINLEVEL_OUTOF10: 1

## 2020-10-15 NOTE — PROGRESS NOTES
Recent Labs     10/13/20  0835  10/14/20  0528 10/14/20  1555 10/14/20  2347 10/15/20  0805   *  120*   < > 118* 117* 118* 117*   K 3.0*  --  3.9  --   --  4.0   CL 80*  --  79*  --   --  79*   CO2 28  --  28  --   --  29   PHOS 3.3  --  2.7  --   --  3.5   BUN 7  --  8  --   --  7   CREATININE 0.7  --  0.6  --   --  0.7    < > = values in this interval not displayed. No results for input(s): AST, ALT, ALB, BILIDIR, BILITOT, ALKPHOS in the last 72 hours. 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   - Lurbinectedin is A very good option and is designed for people who have failed a platinum-based regimen for small cell lung carcinoma  -A plan of treatment will be placed today and hopefully we can start treating her early next week     2.) Hpyonatremia  - Defer to nephrology  - Rupali Hernandes started today 10-12   -This is stable, but not great- 118  - could be Adrenal insuff secondary to immunotherapy - await ACTH stim test- may benefit from hydrocortisone- discussed with Dr. Cortez Rojas- would also help explain her nausea.      3.) Pain - Takes Oxycodone/APAP 5/325 mg tabs at home. - Increase Percocet to 10 mg   - Add Morphine 2 mg IVP every 3 hours   - Her hyponatremia can be related to uncontrolled pain and release of ADH (discussed with renal)   - Cont Perc 10 every 4 hours- if she needs this more frequently will need to drop the APAP and move to Oxy 10 mg every 3 hours PRN. No changes today. 4. 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 ONCOLOGIC DISPOSITION: await stabilization of sodium levels- can dc from heme onc standpoint. Will cancel her office visit for 10-15. Await POT insurance approval for new drug. Await ACTH stim test to rule out immunotherapy induced AI.        Benita Rangel CNP   May be reached through [address] [place] [address]

## 2020-10-15 NOTE — PLAN OF CARE
Problem: Falls - Risk of:  Goal: Will remain free from falls  Description: Will remain free from falls  Outcome: Ongoing     Problem: Pain:  Goal: Pain level will decrease  Description: Pain level will decrease  Outcome: Ongoing     Problem: Nutrition  Goal: Optimal nutrition therapy  Outcome: Ongoing     Problem:  Bowel/Gastric:  Goal: Ability to achieve a regular elimination pattern will improve  Description: Ability to achieve a regular elimination pattern will improve  Outcome: Ongoing

## 2020-10-15 NOTE — PROGRESS NOTES
Peripheral Pulses: +2 palpable, equal bilaterally       Labs:   Recent Labs     10/13/20  0835 10/14/20  0528 10/15/20  0805   WBC 20.1* 22.9* 20.9*   HGB 10.9* 10.9* 10.7*   HCT 33.0* 32.3* 31.2*    267 256     Recent Labs     10/13/20  0835  10/14/20  0528 10/14/20  1555 10/14/20  2347 10/15/20  0805   *  120*   < > 118* 117* 118* 117*   K 3.0*  --  3.9  --   --  4.0   CL 80*  --  79*  --   --  79*   CO2 28  --  28  --   --  29   BUN 7  --  8  --   --  7   CREATININE 0.7  --  0.6  --   --  0.7   CALCIUM 8.7  --  9.1  --   --  8.8   PHOS 3.3  --  2.7  --   --  3.5    < > = values in this interval not displayed. No results for input(s): AST, ALT, BILIDIR, BILITOT, ALKPHOS in the last 72 hours. No results for input(s): INR in the last 72 hours. No results for input(s): [place] in the last 72 hours. Urinalysis:      Lab Results   Component Value Date    NITRU Negative 10/10/2020    WBCUA None seen 10/10/2020    BACTERIA Rare 10/10/2020    RBCUA 3-4 10/10/2020    BLOODU SMALL 10/10/2020    SPECGRAV 1.020 10/10/2020    GLUCOSEU Negative 10/10/2020       Consults:    IP CONSULT TO ONCOLOGY  IP CONSULT TO HOSPITALIST  IP CONSULT TO NEPHROLOGY      Assessment/Plan:    Active Hospital Problems    Diagnosis    Hyponatremia [E87.1]    Hyperlipidemia [E78.5]          Back pain - imaging did not reveal any acute pathology and she does not have any focal neurological symptoms over the lower extremities. Continue Percocet PRN.     Small cell lung cancer - w/ progression of  metastatic disease noted on CT. Oncology consulted and appreciated.      Leukocytosis - of unclear etiology likely related to CLL. U/A reviewed and negative. C diff ordered and pending but in reality has not been having diarrhea. Follow but would not pursue further w/up unless significant clinical change. 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 Follow serial labs off IVF.   Reviewed and documented as above. Nephrology consulted and appreciated - given Tolvaptan 12/13 October. CLL - w/out acute issues. Continue Heme/Onc f/u as previously arranged. Anemia - etiology clinically unable to determine, w/out evidence of active bleeding/hemolysis. Stable and asymptomatic w/out indication for transfusion. Follow serial labs. Reviewed and documented as above. HyperLipidemia - controlled on home Statin. Continue, w/ f/u and med adjustment w/ PCP    HypoKalemia - etiology clinically unable to determine. Follow serial labs and replace PRN. Reviewed and documented as above. DVT Prophylaxis: LMWH  Diet: DIET GENERAL;  Dietary Nutrition Supplements: Standard High Calorie Oral Supplement  Code Status: Full Code      PT/OT Eval Status: seen w/ recs for home w/ assist.     Dispo - Possibly Friday 16 October pending response to tx, and subspecialty recs.      Babatunde Ramsey MD

## 2020-10-15 NOTE — FLOWSHEET NOTE
10/14/20 2026   Assessment   Charting Type Shift assessment   Neurological   Neuro (WDL) WDL   Level of Consciousness 0   Orientation Level Oriented X4;Oriented to place;Oriented to time;Oriented to situation;Oriented to person   Cognition Appropriate judgement; Appropriate safety awareness; Appropriate attention/concentration; Appropriate for developmental age; Follows commands   Language Clear   Size R Pupil (mm) 2   R Pupil Shape Round   R Pupil Reaction Brisk   Size L Pupil (mm) 2   L Pupil Shape Round   L Pupil Reaction Brisk   R Hand  Strong   L Hand  Strong   R Foot Dorsiflexion Moderate   L Foot Dorsiflexion Moderate   R Foot Plantar Flexion Strong   L Foot Plantar Flexion Strong   RUE Motor Response Responds to command;Normal extension;Normal flexion   Sensation RUE Full sensation; No numbness; No pain; No tingling   LUE Motor Response Responds to command;Normal extension;Normal flexion   Sensation LUE Full sensation; No numbness; No pain; No tingling   RLE Motor Response Responds to command;Normal extension;Normal flexion   Sensation RLE Full sensation; No numbness; No pain; No tingling   LLE Motor Response Responds to command;Normal extension;Normal flexion   Sensation LLE Full sensation; No numbness; No pain; No tingling   HEENT   HEENT (WDL) X   Right Eye Impaired vision   Left Eye Impaired vision   Teeth Dentures upper   Respiratory   Respiratory (WDL) X   Respiratory Pattern Regular   Respiratory Depth Normal   Respiratory Quality/Effort Unlabored   Chest Assessment Chest expansion symmetrical   L Breath Sounds Diminished;Fine Crackles   R Breath Sounds Diminished;Fine Crackles   Cardiac   Cardiac (WDL) WDL   Cardiac Regularity Regular   Heart Sounds S1, S2   Cardiac Rhythm NSR   Rhythm Interpretation   Pulse 71   Gastrointestinal   Abdominal (WDL) X   RUQ Bowel Sounds Active   LUQ Bowel Sounds Active   RLQ Bowel Sounds Active   LLQ Bowel Sounds Active   Abdomen Inspection Rounded; Soft   GI Symptoms Nausea;Vomiting   Relieved By Antiemetic   Peripheral Vascular   Peripheral Vascular (WDL) WDL   Skin Color/Condition   Skin Color/Condition (WDL) WDL   Skin Integrity   Skin Integrity (WDL) X   Musculoskeletal   Musculoskeletal (WDL) WDL   Genitourinary   Genitourinary (WDL) WDL   Anus/Rectum   Anus/Rectum (WDL) WDL   Psychosocial   Psychosocial (WDL) WDL

## 2020-10-15 NOTE — PROGRESS NOTES
[facility]   [phone]  [facility]. [employer]  Inpatient Progress note      Assessment/Plan:   Anne-Marie Molina is being seen by nephrology for hyponatremia. Seen and examined at bedside with RN bedside    Not feeling good, has nausea and poor appetite    Plan:   - increase samsca to 30 mg a day  - continue salt  - she has high sodium in the urine and has been on immunotherapy, Atezolimumab( associated with hyponatremia),  -  CT last doesn't mention adrenal mass    - ? 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 On salt tabs 2 g TID at home and lasix. Had uncontrolled pain and nausea which probably precipitated this acute drop. Repeat hospitalization with hyponatremia      [facility] would like to thank you for the opportunity to serve this patient. Please don't hesitate to call with any questions or concerns. Xiomara Chaves MD  [facility]  [address] [place] [address] [address] [place] [address], [address] [place] [address]  Fax: [phone]  24 hrs Answering service [phone]   7 am-5 pm at Perfect [employer] or cell phone      CC/Reason for consult:   Reason for consult: hyponatremia. Chief Complaint   Patient presents with    Back Pain     started 1 week ago. hx of kidney and lung cancer         HPI/Hospital Course:   From consult note  Anne-Marie Molina is a 77 y.o. female presented to the hospital on   10/10/2020 with uncontrolled pain in her back . Has a h/o lung cancer with liver mets. She had been having uncontrolled back pain the past week at home. She developed nausea and vomiting and was unable to take much PO. On presentation to ED sodium found to be low at 117. Given IVFs to 122. These were stopped. IVFs resumed when dropped to 120. Now looking euvolemci and eating and drinking.  Home lasix and salt tabs resumed. Review of Systems:   [address]. All other remaining systems are negative. Constitutional:  fever, chills, weakness, weight change, fatigue,      Skin:  rash, pruritus, hair loss, bruising, dry skin, petechiae. Head, Face, Neck   headaches, swelling,  cervical adenopathy. Respiratory: shortness of breath, cough, or wheezing  Cardiovascular: chest pain, palpitations, dizzy, edema  Gastrointestinal: nausea, vomiting, diarrhea, constipation,belly pain    Yellow skin, blood in stool  Musculoskeletal:  back pain, muscle weakness, gait problems,       joint pain or swelling. Genitourinary:  dysuria, poor urine flow, flank pain, blood in urine  Neurologic:  vertigo, TIA'S, syncope, seizures, focal weakness  Psychosocial:  insomnia, anxiety, or depression. Additional positive findings: - negative     PMH/SH/FH:    Medical Hx: reviewed and updated as appropriate  Past Medical History:   Diagnosis Date    Cancer [facility])     Chronic hyponatremia     Dr Chalo Matson, [facility]    COVID-19 2020    Hyperlipidemia     Lung cancer [facility])          Surgical Hx: reviewed and updated as appropriate   has a past surgical history that includes Breast surgery; Skin cancer excision; bronchoscopy (N/A, 2020); bronchoscopy (2020); bronchoscopy (2020); bronchoscopy (2020); and Port Surgery (N/A, 2020). Social Hx: reviewed and updated as appropriate  Social History     Tobacco Use    Smoking status: Former Smoker     Last attempt to quit: 2020     Years since quittin.4    Smokeless tobacco: Never Used   Substance Use Topics    Alcohol use: Never     Frequency: Never        Family hx: reviewed and updated as appropriate  family history is not on file.     Medications:   tolvaptan, 30 mg, Daily  cosyntropin, 0.25 mg, Once  sodium chloride, 2 g, TID WC  atorvastatin, 40 mg, Daily  senna, 1 tablet, BID  sodium chloride flush, 10 mL, 2 times per day  enoxaparin, 40 mg, Daily       Patient has no known allergies. Allergies:   No Known Allergies      Physical Exam/Objective:   Vitals:    10/15/20 0908   BP: 112/72   Pulse: 78   Resp: 16   Temp: 97.9 °F (36.6 °C)   SpO2: 92%       Intake/Output Summary (Last 24 hours) at 10/15/2020 1046  Last data filed at 10/15/2020 0538  Gross per 24 hour   Intake 480 ml   Output 850 ml   Net -370 ml         General appearance: pleasant lady  in no acute distress, comfortable, communicative and fully alert and oriented x 3. HEENT: Lips and teeth normal, no icterus, EOM intact, trachea midline. Neck : no masses, appears symmetrical and no JVD appreciated. Respiratory: Respiratory effort normal, bilateral equal chest rise. No wheeze, no crackles   Cardiovascular: Ausculation shows rrr. no edema   Abdomen: abdomen is soft, non distended, no masses, no pain with palpation. Musculoskeletal:  no joint swelling, no deformity, strength grossly normal.   Skin: no rashes, no induration, no tightening, no jaundice   Neuro/psychiatric:  Judgement and insight is normal. Follows commands, moves all extremities spontaneously     On oxygen    Data:   CBC:   Recent Labs     10/13/20  0835 10/14/20  0528 10/15/20  0805   WBC 20.1* 22.9* 20.9*   HGB 10.9* 10.9* 10.7*   HCT 33.0* 32.3* 31.2*    267 256     BMP:    Recent Labs     10/13/20  0835  10/14/20  0528 10/14/20  1555 10/14/20  2347 10/15/20  0805   *  120*   < > 118* 117* 118* 117*   K 3.0*  --  3.9  --   --  4.0   CL 80*  --  79*  --   --  79*   CO2 28  --  28  --   --  29   BUN 7  --  8  --   --  7   CREATININE 0.7  --  0.6  --   --  0.7   GLUCOSE 129*  --  99  --   --  107*   MG  --   --   --   --   --  1.60*   PHOS 3.3  --  2.7  --   --  3.5    < > = values in this interval not displayed.

## 2020-10-16 LAB
ALBUMIN SERPL-MCNC: 3.7 G/DL (ref 3.4–5)
ANION GAP SERPL CALCULATED.3IONS-SCNC: 10 MMOL/L (ref 3–16)
BUN BLDV-MCNC: 6 MG/DL (ref 7–20)
CALCIUM SERPL-MCNC: 8.8 MG/DL (ref 8.3–10.6)
CHLORIDE BLD-SCNC: 86 MMOL/L (ref 99–110)
CO2: 28 MMOL/L (ref 21–32)
CORTISOL 30 MIN: 27.6 UG/DL
CORTISOL 60 MIN: 34.9 UG/DL
CORTISOL BASE: 7.2 UG/DL
CREAT SERPL-MCNC: 0.6 MG/DL (ref 0.6–1.2)
GFR AFRICAN AMERICAN: >60
GFR NON-AFRICAN AMERICAN: >60
GLUCOSE BLD-MCNC: 104 MG/DL (ref 70–99)
HCT VFR BLD CALC: 29.7 % (ref 36–48)
HEMOGLOBIN: 10.1 G/DL (ref 12–16)
MCH RBC QN AUTO: 30.9 PG (ref 26–34)
MCHC RBC AUTO-ENTMCNC: 34 G/DL (ref 31–36)
MCV RBC AUTO: 91.1 FL (ref 80–100)
PDW BLD-RTO: 16.3 % (ref 12.4–15.4)
PHOSPHORUS: 2.9 MG/DL (ref 2.5–4.9)
PLATELET # BLD: 260 K/UL (ref 135–450)
PMV BLD AUTO: 7.1 FL (ref 5–10.5)
POTASSIUM SERPL-SCNC: 3.8 MMOL/L (ref 3.5–5.1)
RBC # BLD: 3.26 M/UL (ref 4–5.2)
SODIUM BLD-SCNC: 121 MMOL/L (ref 136–145)
SODIUM BLD-SCNC: 123 MMOL/L (ref 136–145)
SODIUM BLD-SCNC: 124 MMOL/L (ref 136–145)
SODIUM BLD-SCNC: 126 MMOL/L (ref 136–145)
WBC # BLD: 23 K/UL (ref 4–11)

## 2020-10-16 PROCEDURE — 6370000000 HC RX 637 (ALT 250 FOR IP): Performed by: NURSE PRACTITIONER

## 2020-10-16 PROCEDURE — 80069 RENAL FUNCTION PANEL: CPT

## 2020-10-16 PROCEDURE — 2580000003 HC RX 258: Performed by: INTERNAL MEDICINE

## 2020-10-16 PROCEDURE — 94761 N-INVAS EAR/PLS OXIMETRY MLT: CPT

## 2020-10-16 PROCEDURE — 6360000002 HC RX W HCPCS: Performed by: INTERNAL MEDICINE

## 2020-10-16 PROCEDURE — P9047 ALBUMIN (HUMAN), 25%, 50ML: HCPCS | Performed by: INTERNAL MEDICINE

## 2020-10-16 PROCEDURE — 85027 COMPLETE CBC AUTOMATED: CPT

## 2020-10-16 PROCEDURE — 84295 ASSAY OF SERUM SODIUM: CPT

## 2020-10-16 PROCEDURE — 2060000000 HC ICU INTERMEDIATE R&B

## 2020-10-16 PROCEDURE — 2700000000 HC OXYGEN THERAPY PER DAY

## 2020-10-16 PROCEDURE — 6370000000 HC RX 637 (ALT 250 FOR IP): Performed by: INTERNAL MEDICINE

## 2020-10-16 RX ORDER — TORSEMIDE 20 MG/1
10 TABLET ORAL DAILY
Status: DISCONTINUED | OUTPATIENT
Start: 2020-10-16 | End: 2020-10-19 | Stop reason: HOSPADM

## 2020-10-16 RX ORDER — ALBUMIN (HUMAN) 12.5 G/50ML
25 SOLUTION INTRAVENOUS 2 TIMES DAILY
Status: COMPLETED | OUTPATIENT
Start: 2020-10-16 | End: 2020-10-16

## 2020-10-16 RX ADMIN — Medication 2 G: at 18:40

## 2020-10-16 RX ADMIN — OXYCODONE HYDROCHLORIDE AND ACETAMINOPHEN 1 TABLET: 10; 325 TABLET ORAL at 10:05

## 2020-10-16 RX ADMIN — OXYCODONE HYDROCHLORIDE AND ACETAMINOPHEN 1 TABLET: 10; 325 TABLET ORAL at 00:28

## 2020-10-16 RX ADMIN — SODIUM CHLORIDE, PRESERVATIVE FREE 10 ML: 5 INJECTION INTRAVENOUS at 08:45

## 2020-10-16 RX ADMIN — ATORVASTATIN CALCIUM 40 MG: 40 TABLET, FILM COATED ORAL at 08:45

## 2020-10-16 RX ADMIN — SENNOSIDES 8.6 MG: 8.6 TABLET, FILM COATED ORAL at 08:45

## 2020-10-16 RX ADMIN — ENOXAPARIN SODIUM 40 MG: 40 INJECTION SUBCUTANEOUS at 08:44

## 2020-10-16 RX ADMIN — TOLVAPTAN 30 MG: 30 TABLET ORAL at 08:45

## 2020-10-16 RX ADMIN — POLYETHYLENE GLYCOL 3350 17 G: 17 POWDER, FOR SOLUTION ORAL at 10:05

## 2020-10-16 RX ADMIN — PROCHLORPERAZINE EDISYLATE 10 MG: 5 INJECTION INTRAMUSCULAR; INTRAVENOUS at 08:45

## 2020-10-16 RX ADMIN — OXYCODONE HYDROCHLORIDE AND ACETAMINOPHEN 1 TABLET: 10; 325 TABLET ORAL at 22:31

## 2020-10-16 RX ADMIN — SODIUM CHLORIDE, PRESERVATIVE FREE 10 ML: 5 INJECTION INTRAVENOUS at 21:20

## 2020-10-16 RX ADMIN — Medication 2 G: at 08:45

## 2020-10-16 RX ADMIN — TORSEMIDE 10 MG: 20 TABLET ORAL at 15:33

## 2020-10-16 RX ADMIN — OXYCODONE HYDROCHLORIDE AND ACETAMINOPHEN 1 TABLET: 10; 325 TABLET ORAL at 06:06

## 2020-10-16 RX ADMIN — Medication 2 G: at 12:08

## 2020-10-16 RX ADMIN — OXYCODONE HYDROCHLORIDE AND ACETAMINOPHEN 1 TABLET: 10; 325 TABLET ORAL at 15:34

## 2020-10-16 RX ADMIN — ALBUMIN (HUMAN) 25 G: 0.25 INJECTION, SOLUTION INTRAVENOUS at 21:20

## 2020-10-16 RX ADMIN — ALBUMIN (HUMAN) 25 G: 0.25 INJECTION, SOLUTION INTRAVENOUS at 15:33

## 2020-10-16 RX ADMIN — SENNOSIDES 8.6 MG: 8.6 TABLET, FILM COATED ORAL at 21:20

## 2020-10-16 ASSESSMENT — PAIN SCALES - GENERAL
PAINLEVEL_OUTOF10: 3
PAINLEVEL_OUTOF10: 3
PAINLEVEL_OUTOF10: 0
PAINLEVEL_OUTOF10: 3
PAINLEVEL_OUTOF10: 3
PAINLEVEL_OUTOF10: 4
PAINLEVEL_OUTOF10: 2
PAINLEVEL_OUTOF10: 0

## 2020-10-16 NOTE — PLAN OF CARE
Nutrition Problem #1: Inadequate protein-energy intake  Intervention: Food and/or Nutrient Delivery: Continue Current Diet, Continue Oral Nutrition Supplement  Nutritional Goals: Pt will tolerate diet without N/V and meet 50% or more of nutrition needs

## 2020-10-16 NOTE — PROGRESS NOTES
ONCOLOGY HEMATOLOGY CARE PROGRESS NOTE      SUBJECTIVE:     Sodium is now 124. Feels well. No issues. Hopes to leave today. Stim test is 27.6. ROS:   The remaining 10 point review of symptoms is unremarkable. OBJECTIVE        Physical    VITALS:  BP (!) 146/83   Pulse 77   Temp 97.8 °F (36.6 °C) (Oral)   Resp 18   Ht 5' 3.5\" (1.613 m)   Wt 135 lb 14.4 oz (61.6 kg)   SpO2 96%   BMI 23.70 kg/m²   TEMPERATURE:  Current - Temp: 97.8 °F (36.6 °C); Max - Temp  Av.8 °F (36.6 °C)  Min: 97.7 °F (36.5 °C)  Max: 98 °F (36.7 °C)  PULSE OXIMETRY RANGE: SpO2  Av.3 %  Min: 91 %  Max: 96 %  24HR INTAKE/OUTPUT:      Intake/Output Summary (Last 24 hours) at 10/16/2020 0814  Last data filed at 10/16/2020 1856  Gross per 24 hour   Intake 240 ml   Output 1300 ml   Net -1060 ml       CONSTITUTIONAL:  awake, alert, cooperative, no apparent distress, HEENT oral pharynx , no scleral icterus  HEMATOLOGIC/LYMPHATICS:  no cervical lymphadenopathy, no supraclavicular lymphadenopathy, no axillary lymphadenopathy and no inguinal lymphadenopathy  LUNGS:  No increased work of breathing, good air exchange, clear to auscultation bilaterally, no crackles or wheezing  CARDIOVASCULAR:  , regular rate and rhythm, normal S1 and S2, no S3 or S4, and no murmur noted  ABDOMEN:  No scars, normal bowel sounds, soft, non-distended, non-tender, no masses palpated, no hepatosplenomegally  MUSCULOSKELETAL:  There is no redness, warmth, or swelling of the joints. EXTREMETIES: No clubbing cynosis or edema  NEUROLOGIC:  Awake, alert, oriented to name, place and time. Cranial nerves II-XII are grossly intact. Motor is 5 out of 5 bilaterally.    SKIN:  no bruising or bleeding      Data      Recent Labs     10/14/20  0528 10/15/20  0805 10/16/20  0611   WBC 22.9* 20.9* 23.0*   HGB 10.9* 10.7* 10.1*   HCT 32.3* 31.2* 29.7*    256 260   MCV 91.2 90.2 91.1        Recent Labs     10/14/20  0528 10/15/20  0805 10/15/20  1550 10/16/20  0029 10/16/20  0611   *   < > 117* 118* 121* 124*   K 3.9  --  4.0  --   --  3.8   CL 79*  --  79*  --   --  86*   CO2 28  --  29  --   --  28   PHOS 2.7  --  3.5  --   --  2.9   BUN 8  --  7  --   --  6*   CREATININE 0.6  --  0.7  --   --  0.6    < > = values in this interval not displayed. No results for input(s): AST, ALT, ALB, BILIDIR, BILITOT, ALKPHOS in the last 72 hours. 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 - Lurbinectedin is A very good option and is designed for people who have failed a platinum-based regimen for small cell lung carcinoma       2.) Hpyonatremia  - Defer to nephrology  - Radha Rincon started today 10-12   -This is stable, but not great- 124 on salt tabs 2 grams TID and Samsca 30 daily   - could be Adrenal insuff secondary to immunotherapy - await ACTH stim test- may benefit from hydrocortisone- discussed with Dr. Blair Briceno- would also help explain her nausea.      3.) Pain   - Takes Oxycodone/APAP 5/325 mg tabs at home.   - Increase Percocet to 10 mg   - Add Morphine 2 mg IVP every 3 hours   - Her hyponatremia can be related to uncontrolled pain and release of ADH (discussed with renal)   - Cont Perc 10 every 4 hours- if she needs this more frequently will need to drop the APAP and move to Oxy 10 mg every 3 hours PRN. No changes today. 4. 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 ONCOLOGIC DISPOSITION: defer to renal team. Okay to dc from heme onc standpoint.      Read Nicolasa, CNP   May be reached through [address] [place] [address]

## 2020-10-16 NOTE — PROGRESS NOTES
Sodium not going up  Will give two dosages of albumin - known to help hyponatremia  Also torsemide    At [place]   May need   Urea- daily  Salt tabs 2 gm tid  Torsemide 10 mg a day    Working with pharmacy to determine cost of [place] outpatient

## 2020-10-16 NOTE — PROGRESS NOTES
[facility]   [phone]  [facility]. [employer]  Inpatient Progress note      Assessment/Plan:   Anne-Marie Molina is being seen by nephrology for hyponatremia. Seen and examined at bedside with RN bedside    Not feeling good, has nausea and poor appetite    Plan:   - got samsca- stop from now on  - continue salt for today  - urena  From tomorrow  - she has high sodium in the urine and has been on     - ? 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 On salt tabs 2 g TID at home and lasix. Had uncontrolled pain and nausea which probably precipitated this acute drop. Repeat hospitalization with hyponatremia      [facility] would like to thank you for the opportunity to serve this patient. Please don't hesitate to call with any questions or concerns. Xiomara Chaves MD  [facility]  [address], [address] [place] [address]  Fax: [phone]  24 hrs Answering service [phone]   7 am-5 pm at [employer] or cell phone      CC/Reason for consult:   Reason for consult: hyponatremia. Chief Complaint   Patient presents with    Back Pain     started 1 week ago. hx of kidney and lung cancer         HPI/Hospital Course:   From consult note  Anne-Marie Molina is a 77 y.o. female presented to the hospital on   10/10/2020 with uncontrolled pain in her back . Has a h/o lung cancer with liver mets. She had been having uncontrolled back pain the past week at home. She developed nausea and vomiting and was unable to take much PO. On presentation to ED sodium found to be low at 117. Given IVFs to 122. These were stopped. IVFs resumed when dropped to 120. Now looking euvolemci and eating and drinking. Home lasix and salt tabs resumed.        Review of Systems:   Positives Listed Bold. All other remaining systems are negative. Constitutional:  fever, chills, weakness, weight change, fatigue,      Skin:  rash, pruritus, hair loss, bruising, dry skin, petechiae. Head, Face, Neck   headaches, swelling,  cervical adenopathy. Respiratory: shortness of breath, cough, or wheezing  Cardiovascular: chest pain, palpitations, dizzy, edema  Gastrointestinal: nausea, vomiting, diarrhea, constipation,belly pain    Yellow skin, blood in stool  Musculoskeletal:  back pain, muscle weakness, gait problems,       joint pain or swelling. Genitourinary:  dysuria, poor urine flow, flank pain, blood in urine  Neurologic:  vertigo, TIA'S, syncope, seizures, focal weakness  Psychosocial:  insomnia, anxiety, or depression. Additional positive findings: - negative     PMH/SH/FH:    Medical Hx: reviewed and updated as appropriate  Past Medical History:   Diagnosis Date    Cancer [facility])     Chronic hyponatremia     Dr Suzanne Lawrence, [facility]    COVID-19 2020    Hyperlipidemia     Lung cancer [facility])          Surgical Hx: reviewed and updated as appropriate   has a past surgical history that includes Breast surgery; Skin cancer excision; bronchoscopy (N/A, 2020); bronchoscopy (2020); bronchoscopy (2020); bronchoscopy (2020); and Port Surgery (N/A, 2020). Social Hx: reviewed and updated as appropriate  Social History     Tobacco Use    Smoking status: Former Smoker     Last attempt to quit: 2020     Years since quittin.4    Smokeless tobacco: Never Used   Substance Use Topics    Alcohol use: Never     Frequency: Never        Family hx: reviewed and updated as appropriate  family history is not on file. Medications:   tolvaptan, 30 mg, Daily  sodium chloride, 2 g, TID WC  atorvastatin, 40 mg, Daily  senna, 1 tablet, BID  sodium chloride flush, 10 mL, 2 times per day  enoxaparin, 40 mg, Daily       Patient has no known allergies.     Allergies:   No Known Allergies      Physical Exam/Objective:   Vitals:    10/16/20 0847   BP:    Pulse:    Resp:    Temp:    SpO2: 96%       Intake/Output Summary (Last 24 hours) at 10/16/2020 1122  Last data filed at 10/16/2020 9934  Gross per 24 hour   Intake 360 ml   Output 1300 ml   Net -940 ml         General appearance: pleasant lady  in no acute distress, comfortable, communicative and fully alert and oriented x 3. HEENT: Lips and teeth normal, no icterus, EOM intact, trachea midline. Neck : no masses, appears symmetrical and no JVD appreciated. Respiratory: Respiratory effort normal, bilateral equal chest rise. No wheeze, no crackles   Cardiovascular: Ausculation shows rrr. no edema   Abdomen: abdomen is soft, non distended, no masses, no pain with palpation. Musculoskeletal:  no joint swelling, no deformity, strength grossly normal.   Skin: no rashes, no induration, no tightening, no jaundice   Neuro/psychiatric:  Judgement and insight is normal. Follows commands, moves all extremities spontaneously     On oxygen    Data:   CBC:   Recent Labs     10/14/20  0528 10/15/20  0805 10/16/20  0611   WBC 22.9* 20.9* 23.0*   HGB 10.9* 10.7* 10.1*   HCT 32.3* 31.2* 29.7*    256 260     BMP:    Recent Labs     10/14/20  0528  10/15/20  0805 10/15/20  1550 10/16/20  0029 10/16/20  0611   *   < > 117* 118* 121* 124*   K 3.9  --  4.0  --   --  3.8   CL 79*  --  79*  --   --  86*   CO2 28  --  29  --   --  28   BUN 8  --  7  --   --  6*   CREATININE 0.6  --  0.7  --   --  0.6   GLUCOSE 99  --  107*  --   --  104*   MG  --   --  1.60*  --   --   --    PHOS 2.7  --  3.5  --   --  2.9    < > = values in this interval not displayed.

## 2020-10-16 NOTE — CARE COORDINATION
10/16/20 Pt denied any needs, no needs per PT/OT. Pt still here for hyponatremia, hypokalemia, anemia and back pain. Will offer pt HHC on d/c.  Follow

## 2020-10-16 NOTE — PROGRESS NOTES
Comprehensive Nutrition Assessment    Type and Reason for Visit:  Reassess    Nutrition Recommendations/Plan:   1. General diet  2. Ensure chocolate with meals  3. Will monitor nutritional adequacy, nutrition-related labs, weights, BMs, and clinical progress     Nutrition Assessment:  Follow up:  Continues on General diet with Ensure chocolate with meals. Po intake 25-75% meals. Patient reported being less nauseated, no vomiting. Patient is drinking Ensure with meals. Appetite pending which food she is getting. Patient's daughter and granddaughter do the cooking at home,hopeful to eat more at home. Will continue to monitor. Malnutrition Assessment:  Malnutrition Status:   At risk for malnutrition (Comment)    Context:  Acute Illness       Estimated Daily Nutrient Needs:  Energy (kcal):  2183-4702; Weight Used for Energy Requirements:  Current     Protein (g):  75-87; Weight Used for Protein Requirements:  Current        Fluid (ml/day):  1 ml/kcal; Weight Used for Fluid Requirements:  Current      Nutrition Related Findings:  nausea improving      Wounds:  None       Current Nutrition Therapies:    DIET GENERAL;  Dietary Nutrition Supplements: Standard High Calorie Oral Supplement    Anthropometric Measures:  · Height: 5' 3.5\" (161.3 cm)  · Current Body Weight: 135 lb 14 oz (61.6 kg)   · Usual Body Weight: (135-140 lb)     · Ideal Body Weight: 118 lbs; % Ideal Body Weight     · BMI: 23.7  · BMI Categories: Normal Weight (BMI 22.0 to 24.9) age over 72       Nutrition Diagnosis:   · Inadequate protein-energy intake related to inadequate protein-energy intake as evidenced by vomiting, poor intake prior to admission    Nutrition Interventions:   Food and/or Nutrient Delivery:  Continue Current Diet, Continue Oral Nutrition Supplement  Nutrition Education/Counseling:  No recommendation at this time   Coordination of Nutrition Care:  Continued Inpatient Monitoring    Goals:  Pt will tolerate diet without N/V and meet 50% or more of nutrition needs       Nutrition Monitoring and Evaluation:   Behavioral-Environmental Outcomes:      Food/Nutrient Intake Outcomes:  Food and Nutrient Intake, Supplement Intake  Physical Signs/Symptoms Outcomes:  Nausea or Vomiting     Discharge Planning:    Continue current diet, Continue Oral Nutrition Supplement     Electronically signed by Marilu Gaona RD, LD on 10/16/20 at 3:16 PM EDT    Contact: Office: [phone]; [address] [place] [address] Road: [phone]

## 2020-10-16 NOTE — PROGRESS NOTES
Hospitalist Progress Note      PCP: Tyler Botello    Date of Admission: 10/10/2020    Chief Complaint: Back Pain    Subjective: no new c/o. N/V improved. Medications:  Reviewed    Infusion Medications   Scheduled Medications    tolvaptan  30 mg Oral Daily    sodium chloride  2 g Oral TID WC    atorvastatin  40 mg Oral Daily    senna  1 tablet Oral BID    sodium chloride flush  10 mL Intravenous 2 times per day    enoxaparin  40 mg Subcutaneous Daily     PRN Meds: prochlorperazine, morphine, oxyCODONE-acetaminophen, albuterol sulfate HFA, sodium chloride flush, acetaminophen **OR** acetaminophen, polyethylene glycol, promethazine **OR** ondansetron      Intake/Output Summary (Last 24 hours) at 10/16/2020 0905  Last data filed at 10/16/2020 0605  Gross per 24 hour   Intake 240 ml   Output 1300 ml   Net -1060 ml       Physical Exam Performed:    BP (!) 146/83   Pulse 77   Temp 97.8 °F (36.6 °C) (Oral)   Resp 18   Ht 5' 3.5\" (1.613 m)   Wt 135 lb 14.4 oz (61.6 kg)   SpO2 96%   BMI 23.70 kg/m²     General appearance: No apparent distress, appears stated age and cooperative. HEENT: Pupils equal, round, and reactive to light. Conjunctivae/corneas clear. Neck: Supple, with full range of motion. No jugular venous distention. Trachea midline. Respiratory:  Normal respiratory effort. Clear to auscultation, bilaterally without Rales/Wheezes/Rhonchi. Cardiovascular: Regular rate and rhythm with normal S1/S2 without murmurs, rubs or gallops. Abdomen: Soft, non-tender, non-distended with normal bowel sounds. Musculoskeletal: No clubbing, cyanosis or edema bilaterally. Full range of motion without deformity. Skin: Skin color, texture, turgor normal.  No rashes or lesions. Neurologic:  Neurovascularly intact without any focal sensory/motor deficits.  Cranial nerves: II-XII intact, grossly non-focal.  Psychiatric: Alert and oriented, thought content appropriate, normal insight  Capillary Refill: Reviewed and documented as above. Nephrology consulted and appreciated - given Tolvaptan 12/13/15 October. Cosyntropin ordered. CLL - w/out acute issues. Continue Heme/Onc f/u as previously arranged. Anemia - etiology clinically unable to determine, w/out evidence of active bleeding/hemolysis. Stable and asymptomatic w/out indication for transfusion. Follow serial labs. Reviewed and documented as above. HyperLipidemia - controlled on home Statin. Continue, w/ f/u and med adjustment w/ PCP    HypoKalemia - etiology clinically unable to determine. Follow serial labs and replace PRN. Reviewed and documented as above. DVT Prophylaxis: LMWH  Diet: DIET GENERAL;  Dietary Nutrition Supplements: Standard High Calorie Oral Supplement  Code Status: Full Code      PT/OT Eval Status: seen w/ recs for home w/ assist.     Dispo - Possibly Friday 16 October pending response to tx, and subspecialty recs.      Maranda Beal MD

## 2020-10-17 LAB
ALBUMIN SERPL-MCNC: 4.8 G/DL (ref 3.4–5)
ANION GAP SERPL CALCULATED.3IONS-SCNC: 10 MMOL/L (ref 3–16)
BUN BLDV-MCNC: 6 MG/DL (ref 7–20)
CALCIUM SERPL-MCNC: 9.4 MG/DL (ref 8.3–10.6)
CHLORIDE BLD-SCNC: 86 MMOL/L (ref 99–110)
CO2: 32 MMOL/L (ref 21–32)
CREAT SERPL-MCNC: 0.7 MG/DL (ref 0.6–1.2)
GFR AFRICAN AMERICAN: >60
GFR NON-AFRICAN AMERICAN: >60
GLUCOSE BLD-MCNC: 123 MG/DL (ref 70–99)
HCT VFR BLD CALC: 30.7 % (ref 36–48)
HEMOGLOBIN: 9.9 G/DL (ref 12–16)
MCH RBC QN AUTO: 30.1 PG (ref 26–34)
MCHC RBC AUTO-ENTMCNC: 32.3 G/DL (ref 31–36)
MCV RBC AUTO: 93.2 FL (ref 80–100)
PDW BLD-RTO: 16.6 % (ref 12.4–15.4)
PHOSPHORUS: 3.1 MG/DL (ref 2.5–4.9)
PLATELET # BLD: 259 K/UL (ref 135–450)
PMV BLD AUTO: 7.1 FL (ref 5–10.5)
POTASSIUM SERPL-SCNC: 3.4 MMOL/L (ref 3.5–5.1)
RBC # BLD: 3.29 M/UL (ref 4–5.2)
SODIUM BLD-SCNC: 128 MMOL/L (ref 136–145)
WBC # BLD: 20.3 K/UL (ref 4–11)

## 2020-10-17 PROCEDURE — 2580000003 HC RX 258: Performed by: INTERNAL MEDICINE

## 2020-10-17 PROCEDURE — 80069 RENAL FUNCTION PANEL: CPT

## 2020-10-17 PROCEDURE — 85027 COMPLETE CBC AUTOMATED: CPT

## 2020-10-17 PROCEDURE — 6370000000 HC RX 637 (ALT 250 FOR IP): Performed by: INTERNAL MEDICINE

## 2020-10-17 PROCEDURE — 6370000000 HC RX 637 (ALT 250 FOR IP): Performed by: NURSE PRACTITIONER

## 2020-10-17 PROCEDURE — 6360000002 HC RX W HCPCS: Performed by: INTERNAL MEDICINE

## 2020-10-17 PROCEDURE — 6370000000 HC RX 637 (ALT 250 FOR IP): Performed by: HOSPITALIST

## 2020-10-17 PROCEDURE — 2060000000 HC ICU INTERMEDIATE R&B

## 2020-10-17 RX ORDER — POLYETHYLENE GLYCOL 3350 17 G/17G
17 POWDER, FOR SOLUTION ORAL 2 TIMES DAILY
Status: DISCONTINUED | OUTPATIENT
Start: 2020-10-17 | End: 2020-10-19 | Stop reason: HOSPADM

## 2020-10-17 RX ADMIN — POLYETHYLENE GLYCOL 3350 17 G: 17 POWDER, FOR SOLUTION ORAL at 20:01

## 2020-10-17 RX ADMIN — ENOXAPARIN SODIUM 40 MG: 40 INJECTION SUBCUTANEOUS at 09:05

## 2020-10-17 RX ADMIN — ATORVASTATIN CALCIUM 40 MG: 40 TABLET, FILM COATED ORAL at 09:05

## 2020-10-17 RX ADMIN — POLYETHYLENE GLYCOL 3350 17 G: 17 POWDER, FOR SOLUTION ORAL at 14:35

## 2020-10-17 RX ADMIN — Medication 15 G: at 09:06

## 2020-10-17 RX ADMIN — SODIUM CHLORIDE, PRESERVATIVE FREE 10 ML: 5 INJECTION INTRAVENOUS at 20:04

## 2020-10-17 RX ADMIN — Medication 2 G: at 12:13

## 2020-10-17 RX ADMIN — BISACODYL 10 MG: 5 TABLET, COATED ORAL at 14:35

## 2020-10-17 RX ADMIN — TORSEMIDE 10 MG: 20 TABLET ORAL at 09:05

## 2020-10-17 RX ADMIN — SENNOSIDES 8.6 MG: 8.6 TABLET, FILM COATED ORAL at 20:02

## 2020-10-17 RX ADMIN — OXYCODONE HYDROCHLORIDE AND ACETAMINOPHEN 1 TABLET: 10; 325 TABLET ORAL at 12:13

## 2020-10-17 RX ADMIN — SODIUM CHLORIDE, PRESERVATIVE FREE 10 ML: 5 INJECTION INTRAVENOUS at 09:06

## 2020-10-17 RX ADMIN — Medication 2 G: at 09:05

## 2020-10-17 RX ADMIN — OXYCODONE HYDROCHLORIDE AND ACETAMINOPHEN 1 TABLET: 10; 325 TABLET ORAL at 20:02

## 2020-10-17 RX ADMIN — Medication 2 G: at 17:41

## 2020-10-17 RX ADMIN — SENNOSIDES 8.6 MG: 8.6 TABLET, FILM COATED ORAL at 09:05

## 2020-10-17 RX ADMIN — OXYCODONE HYDROCHLORIDE AND ACETAMINOPHEN 1 TABLET: 10; 325 TABLET ORAL at 05:27

## 2020-10-17 ASSESSMENT — PAIN DESCRIPTION - DESCRIPTORS
DESCRIPTORS: ACHING;SORE
DESCRIPTORS: ACHING;SORE

## 2020-10-17 ASSESSMENT — PAIN DESCRIPTION - ORIENTATION
ORIENTATION: LOWER
ORIENTATION: LOWER

## 2020-10-17 ASSESSMENT — PAIN SCALES - GENERAL
PAINLEVEL_OUTOF10: 4
PAINLEVEL_OUTOF10: 4
PAINLEVEL_OUTOF10: 0
PAINLEVEL_OUTOF10: 4
PAINLEVEL_OUTOF10: 4

## 2020-10-17 ASSESSMENT — PAIN DESCRIPTION - LOCATION
LOCATION: BACK
LOCATION: BACK

## 2020-10-17 ASSESSMENT — PAIN DESCRIPTION - PAIN TYPE
TYPE: ACUTE PAIN
TYPE: ACUTE PAIN

## 2020-10-17 NOTE — PROGRESS NOTES
Hospitalist Progress Note      PCP: Jarrod Garcia    Date of Admission: 10/10/2020    Chief Complaint: Back pain    Hospital Course:     Subjective: Denies any chest pain or shortness of breath complains of back pain , no bowel movement for 1 week. Medications:  Reviewed    Infusion Medications   Scheduled Medications    urea  15 g Oral Daily    torsemide  10 mg Oral Daily    sodium chloride  2 g Oral TID WC    atorvastatin  40 mg Oral Daily    senna  1 tablet Oral BID    sodium chloride flush  10 mL Intravenous 2 times per day    enoxaparin  40 mg Subcutaneous Daily     PRN Meds: prochlorperazine, morphine, oxyCODONE-acetaminophen, albuterol sulfate HFA, sodium chloride flush, acetaminophen **OR** acetaminophen, polyethylene glycol, promethazine **OR** ondansetron      Intake/Output Summary (Last 24 hours) at 10/17/2020 0931  Last data filed at 10/17/2020 0559  Gross per 24 hour   Intake 1080 ml   Output 2600 ml   Net -1520 ml       Physical Exam Performed:    /74   Pulse 71   Temp 98.1 °F (36.7 °C) (Oral)   Resp 16   Ht 5' 3.5\" (1.613 m)   Wt 133 lb 8 oz (60.6 kg)   SpO2 94%   BMI 23.28 kg/m²     General appearance: No apparent distress, appears stated age and cooperative. HEENT: Pupils equal, round, and reactive to light. Conjunctivae/corneas clear. Neck: Supple, with full range of motion. No jugular venous distention. Trachea midline. Respiratory:  Normal respiratory effort. Clear to auscultation, bilaterally without Rales/Wheezes/Rhonchi. Cardiovascular: Regular rate and rhythm with normal S1/S2 without murmurs, rubs or gallops. Abdomen: Soft, non-tender, non-distended with normal bowel sounds. Musculoskeletal: No clubbing, cyanosis or edema bilaterally. Full range of motion without deformity. Skin: Skin color, texture, turgor normal.  No rashes or lesions. Neurologic:  Neurovascularly intact without any focal sensory/motor deficits.  Cranial nerves: II-XII intact, grossly non-focal.  Psychiatric: Alert and oriented, thought content appropriate, normal insight  Capillary Refill: Brisk,< 3 seconds   Peripheral Pulses: +2 palpable, equal bilaterally       Labs:   Recent Labs     10/15/20  0805 10/16/20  0611 10/17/20  0532   WBC 20.9* 23.0* 20.3*   HGB 10.7* 10.1* 9.9*   HCT 31.2* 29.7* 30.7*    260 259     Recent Labs     10/15/20  0805  10/16/20  0611 10/16/20  1200 10/16/20  2131 10/17/20  0532   *   < > 124* 123* 126* 128*   K 4.0  --  3.8  --   --  3.4*   CL 79*  --  86*  --   --  86*   CO2 29  --  28  --   --  32   BUN 7  --  6*  --   --  6*   CREATININE 0.7  --  0.6  --   --  0.7   CALCIUM 8.8  --  8.8  --   --  9.4   PHOS 3.5  --  2.9  --   --  3.1    < > = values in this interval not displayed. No results for input(s): AST, ALT, BILIDIR, BILITOT, ALKPHOS in the last 72 hours. No results for input(s): INR in the last 72 hours. No results for input(s): Maegan Thiago in the last 72 hours. Urinalysis:      Lab Results   Component Value Date    NITRU Negative 10/10/2020    WBCUA None seen 10/10/2020    BACTERIA Rare 10/10/2020    RBCUA 3-4 10/10/2020    BLOODU SMALL 10/10/2020    SPECGRAV 1.020 10/10/2020    GLUCOSEU Negative 10/10/2020       Radiology:  XR ABDOMEN (KUB) (SINGLE AP VIEW)   Final Result   No significant finding in the abdomen. CT ABDOMEN PELVIS WO CONTRAST Additional Contrast? None   Final Result   1. Progression of metastatic disease within the lower chest and abdomen. 2. New small left pleural effusion. Increased lingular opacity could   represent atelectasis, focal pneumonia, or be related to metastatic disease. CT LUMBAR SPINE WO CONTRAST   Final Result   No acute fractures identified. L1-2 mild retrolisthesis, considered degenerative. Multilevel spondylosis, with disproportionate involvement of L4-5 and L5-S1.                  Assessment/Plan:    Active Hospital Problems    Diagnosis    Hyponatremia [E87.1]    Hyperlipidemia [E78.5]     1. Back pain imaging negative on as needed Percocet. 2.  Small cell lung cancer with progression of metastatic disease oncology consulted and following. 3.  Leukocytosis secondary to CLL oncology following. 4.  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  5.  Anemia stable.   6.  Hyperlipidemia continue with a statin    DVT Prophylaxis: Lovenox subcu  Diet: DIET GENERAL;  Dietary Nutrition Supplements: Standard High Calorie Oral Supplement  Code Status: Full Code    PT/OT Eval Status:     Alex Chambers MD

## 2020-10-17 NOTE — PROGRESS NOTES
Patient has no known allergies. Allergies:   No Known Allergies      Physical Exam/Objective:   Vitals:    10/17/20 0900   BP: 122/75   Pulse: 89   Resp: 16   Temp: 97.8 °F (36.6 °C)   SpO2: 95%       Intake/Output Summary (Last 24 hours) at 10/17/2020 1334  Last data filed at 10/17/2020 0900  Gross per 24 hour   Intake 1380 ml   Output 2600 ml   Net -1220 ml         General appearance: pleasant lady  in no acute distress, comfortable, communicative and fully alert and oriented x 3. HEENT: Lips and teeth normal, no icterus, EOM intact, trachea midline. Neck : no masses, appears symmetrical and no JVD appreciated. Respiratory: Respiratory effort normal, bilateral equal chest rise. No wheeze, no crackles   Cardiovascular: Ausculation shows rrr. no edema   Abdomen: abdomen is soft, non distended, no masses, no pain with palpation. Musculoskeletal:  no joint swelling, no deformity, strength grossly normal.   Skin: no rashes, no induration, no tightening, no jaundice   Neuro/psychiatric:  Judgement and insight is normal. Follows commands, moves all extremities spontaneously     On oxygen    Data:   CBC:   Recent Labs     10/15/20  0805 10/16/20  0611 10/17/20  0532   WBC 20.9* 23.0* 20.3*   HGB 10.7* 10.1* 9.9*   HCT 31.2* 29.7* 30.7*    260 259     BMP:    Recent Labs     10/15/20  0805  10/16/20  0611 10/16/20  1200 10/16/20  2131 10/17/20  0532   *   < > 124* 123* 126* 128*   K 4.0  --  3.8  --   --  3.4*   CL 79*  --  86*  --   --  86*   CO2 29  --  28  --   --  32   BUN 7  --  6*  --   --  6*   CREATININE 0.7  --  0.6  --   --  0.7   GLUCOSE 107*  --  104*  --   --  123*   MG 1.60*  --   --   --   --   --    PHOS 3.5  --  2.9  --   --  3.1    < > = values in this interval not displayed.

## 2020-10-17 NOTE — PROGRESS NOTES
ONCOLOGY HEMATOLOGY CARE PROGRESS NOTE      SUBJECTIVE:     The patient states she feels little better. ROS:   The remaining 10 point review of symptoms is unremarkable. OBJECTIVE        Physical    VITALS:  /75   Pulse 89   Temp 97.8 °F (36.6 °C) (Oral)   Resp 16   Ht 5' 3.5\" (1.613 m)   Wt 133 lb 8 oz (60.6 kg)   SpO2 95%   BMI 23.28 kg/m²   TEMPERATURE:  Current - Temp: 97.8 °F (36.6 °C); Max - Temp  Av.9 °F (36.6 °C)  Min: 97.7 °F (36.5 °C)  Max: 98.1 °F (36.7 °C)  PULSE OXIMETRY RANGE: SpO2  Av.8 %  Min: 86 %  Max: 97 %  24HR INTAKE/OUTPUT:      Intake/Output Summary (Last 24 hours) at 10/17/2020 1048  Last data filed at 10/17/2020 0559  Gross per 24 hour   Intake 1080 ml   Output 2600 ml   Net -1520 ml       CONSTITUTIONAL:  awake, alert, cooperative, no apparent distress, HEENT oral pharynx , no scleral icterus  HEMATOLOGIC/LYMPHATICS:  no cervical lymphadenopathy, no supraclavicular lymphadenopathy, no axillary lymphadenopathy and no inguinal lymphadenopathy  LUNGS:  No increased work of breathing, good air exchange, clear to auscultation bilaterally, no crackles or wheezing  CARDIOVASCULAR:  , regular rate and rhythm, normal S1 and S2, no S3 or S4, and no murmur noted  ABDOMEN:  No scars, normal bowel sounds, soft, non-distended, non-tender, no masses palpated, no hepatosplenomegally  MUSCULOSKELETAL:  There is no redness, warmth, or swelling of the joints. EXTREMETIES: No clubbing cynosis or edema  NEUROLOGIC:  Awake, alert, oriented to name, place and time. Cranial nerves II-XII are grossly intact. Motor is 5 out of 5 bilaterally.    SKIN:  no bruising or bleeding      Data      Recent Labs     10/15/20  0805 10/16/20  0611 10/17/20  0532   WBC 20.9* 23.0* 20.3*   HGB 10.7* 10.1* 9.9*   HCT 31.2* 29.7* 30.7*    260 259   MCV 90.2 91.1 93.2        Recent Labs     10/15/20  0805  10/16/20  0611 10/16/20  1200 10/16/20  9812 10/17/20  0532   *   < > 124* 123* 126* 128*   K 4.0  --  3.8  --   --  3.4*   CL 79*  --  86*  --   --  86*   CO2 29  --  28  --   --  32   PHOS 3.5  --  2.9  --   --  3.1   BUN 7  --  6*  --   --  6*   CREATININE 0.7  --  0.6  --   --  0.7    < > = values in this interval not displayed. No results for input(s): AST, ALT, ALB, BILIDIR, BILITOT, ALKPHOS in the last 72 hours. Magnesium:    Lab Results   Component Value Date    MG 1.60 10/15/2020    MG 1.50 08/08/2020    MG 1.10 08/07/2020         Problem List  Patient Active Problem List   Diagnosis    Acute hyponatremia    N&V (nausea and vomiting)    Leukocytosis    Lung mass    Multiple lung nodules    Hilar adenopathy    Centrilobular emphysema (HCC)    Smoker    Anemia    Sepsis ([address]    Lung cancer ([address]    Hyperlipidemia    2019 novel coronavirus disease (COVID-19)    Hypokalemia    Pulmonary infiltrates    Small cell lung cancer ([address]    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    Acute blood loss anemia    Elevated procalcitonin    Hyponatremia    Former smoker    Acute respiratory failure with hypoxia (HCC)    Pneumonia of both lower lobes due to infectious organism       ASSESSMENT AND PLAN:      1.) Small cell kim cancer  - She is progressing on Atezolizumab,  Her liver has significantly progressed. - Lurbinectedin is A very good option and is designed for people who have failed a platinum-based regimen for small cell lung carcinoma  -We may need to start weekly Taxol if she cannot be discharged due to her hyponatremia       2.) Hpyonatremia  - Defer to nephrology  - Camelia Light started today 10-12   -Her sodium is up to 128  -I do not think this is going to have a good resolution until she gets her small cell lung cancer treated.   Unfortunately, this will not be a permanent fix  - Waiting on nephrology opinion, but it does appear that she has some stimulation of her ACTH     3.) Pain   - Takes Oxycodone/APAP 5/325 mg tabs at home. - Increase Percocet to 10 mg   - Add Morphine 2 mg IVP every 3 hours   - Her hyponatremia can be related to uncontrolled pain and release of ADH (discussed with renal)   - Cont Perc 10 every 4 hours- if she needs this more frequently will need to drop the APAP and move to Oxy 10 mg every 3 hours PRN. No changes today.  -Her pain appears stable     4. 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 ONCOLOGIC DISPOSITION: defer to renal team. Okay to dc from heme onc standpoint.      Radha Platt MD  May be reached through [facility]

## 2020-10-18 LAB
ANION GAP SERPL CALCULATED.3IONS-SCNC: 11 MMOL/L (ref 3–16)
BUN BLDV-MCNC: 18 MG/DL (ref 7–20)
CALCIUM SERPL-MCNC: 9.3 MG/DL (ref 8.3–10.6)
CHLORIDE BLD-SCNC: 84 MMOL/L (ref 99–110)
CO2: 33 MMOL/L (ref 21–32)
CREAT SERPL-MCNC: 0.7 MG/DL (ref 0.6–1.2)
GFR AFRICAN AMERICAN: >60
GFR NON-AFRICAN AMERICAN: >60
GLUCOSE BLD-MCNC: 96 MG/DL (ref 70–99)
POTASSIUM REFLEX MAGNESIUM: 3.7 MMOL/L (ref 3.5–5.1)
SODIUM BLD-SCNC: 128 MMOL/L (ref 136–145)

## 2020-10-18 PROCEDURE — 2060000000 HC ICU INTERMEDIATE R&B

## 2020-10-18 PROCEDURE — 6370000000 HC RX 637 (ALT 250 FOR IP): Performed by: NURSE PRACTITIONER

## 2020-10-18 PROCEDURE — 80048 BASIC METABOLIC PNL TOTAL CA: CPT

## 2020-10-18 PROCEDURE — 2700000000 HC OXYGEN THERAPY PER DAY

## 2020-10-18 PROCEDURE — 6370000000 HC RX 637 (ALT 250 FOR IP): Performed by: INTERNAL MEDICINE

## 2020-10-18 PROCEDURE — 6370000000 HC RX 637 (ALT 250 FOR IP): Performed by: HOSPITALIST

## 2020-10-18 PROCEDURE — 6360000002 HC RX W HCPCS: Performed by: INTERNAL MEDICINE

## 2020-10-18 PROCEDURE — 94761 N-INVAS EAR/PLS OXIMETRY MLT: CPT

## 2020-10-18 PROCEDURE — 2580000003 HC RX 258: Performed by: INTERNAL MEDICINE

## 2020-10-18 PROCEDURE — 36591 DRAW BLOOD OFF VENOUS DEVICE: CPT

## 2020-10-18 RX ADMIN — OXYCODONE HYDROCHLORIDE AND ACETAMINOPHEN 1 TABLET: 10; 325 TABLET ORAL at 01:20

## 2020-10-18 RX ADMIN — Medication 2 G: at 16:28

## 2020-10-18 RX ADMIN — Medication 2 G: at 12:59

## 2020-10-18 RX ADMIN — POLYETHYLENE GLYCOL 3350 17 G: 17 POWDER, FOR SOLUTION ORAL at 08:50

## 2020-10-18 RX ADMIN — OXYCODONE HYDROCHLORIDE AND ACETAMINOPHEN 1 TABLET: 10; 325 TABLET ORAL at 20:58

## 2020-10-18 RX ADMIN — POLYETHYLENE GLYCOL 3350 17 G: 17 POWDER, FOR SOLUTION ORAL at 20:58

## 2020-10-18 RX ADMIN — OXYCODONE HYDROCHLORIDE AND ACETAMINOPHEN 1 TABLET: 10; 325 TABLET ORAL at 14:26

## 2020-10-18 RX ADMIN — SENNOSIDES 8.6 MG: 8.6 TABLET, FILM COATED ORAL at 20:58

## 2020-10-18 RX ADMIN — SENNOSIDES 8.6 MG: 8.6 TABLET, FILM COATED ORAL at 08:49

## 2020-10-18 RX ADMIN — ENOXAPARIN SODIUM 40 MG: 40 INJECTION SUBCUTANEOUS at 08:50

## 2020-10-18 RX ADMIN — Medication 2 G: at 08:49

## 2020-10-18 RX ADMIN — TORSEMIDE 10 MG: 20 TABLET ORAL at 08:49

## 2020-10-18 RX ADMIN — OXYCODONE HYDROCHLORIDE AND ACETAMINOPHEN 1 TABLET: 10; 325 TABLET ORAL at 05:22

## 2020-10-18 RX ADMIN — ATORVASTATIN CALCIUM 40 MG: 40 TABLET, FILM COATED ORAL at 08:49

## 2020-10-18 RX ADMIN — SODIUM CHLORIDE, PRESERVATIVE FREE 10 ML: 5 INJECTION INTRAVENOUS at 20:58

## 2020-10-18 RX ADMIN — SODIUM CHLORIDE, PRESERVATIVE FREE 10 ML: 5 INJECTION INTRAVENOUS at 08:55

## 2020-10-18 RX ADMIN — Medication 15 G: at 08:55

## 2020-10-18 ASSESSMENT — PAIN DESCRIPTION - ORIENTATION
ORIENTATION: LEFT;LOWER
ORIENTATION: LEFT;LOWER

## 2020-10-18 ASSESSMENT — PAIN SCALES - GENERAL
PAINLEVEL_OUTOF10: 4
PAINLEVEL_OUTOF10: 3
PAINLEVEL_OUTOF10: 4
PAINLEVEL_OUTOF10: 5
PAINLEVEL_OUTOF10: 0
PAINLEVEL_OUTOF10: 6
PAINLEVEL_OUTOF10: 5

## 2020-10-18 ASSESSMENT — PAIN DESCRIPTION - LOCATION
LOCATION: BACK
LOCATION: BACK

## 2020-10-18 ASSESSMENT — PAIN DESCRIPTION - PAIN TYPE
TYPE: ACUTE PAIN
TYPE: ACUTE PAIN;CHRONIC PAIN

## 2020-10-18 ASSESSMENT — PAIN DESCRIPTION - DESCRIPTORS
DESCRIPTORS: ACHING;SORE
DESCRIPTORS: ACHING;SORE

## 2020-10-18 NOTE — PROGRESS NOTES
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 currently on chemotherapy, CLL admitted with a worsening back pain, hyponatremia nephrology consulted and following    Subjective: Patient denies any chest pain no shortness of breath complains of still no BM      Medications:  Reviewed    Infusion Medications   Scheduled Medications    polyethylene glycol  17 g Oral BID    urea  15 g Oral Daily    torsemide  10 mg Oral Daily    sodium chloride  2 g Oral TID WC    atorvastatin  40 mg Oral Daily    senna  1 tablet Oral BID    sodium chloride flush  10 mL Intravenous 2 times per day    enoxaparin  40 mg Subcutaneous Daily     PRN Meds: prochlorperazine, morphine, oxyCODONE-acetaminophen, albuterol sulfate HFA, sodium chloride flush, acetaminophen **OR** acetaminophen, polyethylene glycol, promethazine **OR** ondansetron      Intake/Output Summary (Last 24 hours) at 10/18/2020 0948  Last data filed at 10/18/2020 0514  Gross per 24 hour   Intake 790 ml   Output 1300 ml   Net -510 ml       Physical Exam Performed:    BP (!) 144/75   Pulse 73   Temp 97.7 °F (36.5 °C) (Oral)   Resp 16   Ht 5' 3.5\" (1.613 m)   Wt 134 lb 6.4 oz (61 kg)   SpO2 93%   BMI 23.43 kg/m²     General appearance: No apparent distress, appears stated age and cooperative. HEENT: Pupils equal, round, and reactive to light. Conjunctivae/corneas clear. Neck: Supple, with full range of motion. No jugular venous distention. Trachea midline. Respiratory:  Normal respiratory effort. Clear to auscultation, bilaterally without Rales/Wheezes/Rhonchi. Cardiovascular: Regular rate and rhythm with normal S1/S2 without murmurs, rubs or gallops. Abdomen: Soft, non-tender, non-distended with normal bowel sounds.   Musculoskeletal: No clubbing, cyanosis or edema with disproportionate involvement of L4-5 and L5-S1. Assessment/Plan:    Active Hospital Problems    Diagnosis    Hyponatremia [E87.1]    Hyperlipidemia [E78.5]     1. Back pain imaging negative on as needed Percocet. 2.  Small cell lung cancer with progression of metastatic disease oncology consulted and following on Atezolizumab. 3.  Leukocytosis secondary to CLL oncology following. 4.  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  5.  Anemia stable. 6.  Hyperlipidemia continue with a statin.   7.  Constipation not resolved by MiraLAX Senokot will give enema today    DVT Prophylaxis: Lovenox subcu  Diet: DIET GENERAL;  Dietary Nutrition Supplements: Standard High Calorie Oral Supplement  Code Status: Full Code    PT/OT Eval Status:     Manuel Murphy MD

## 2020-10-18 NOTE — PROGRESS NOTES
128* 128*   K 3.8  --   --  3.4* 3.7   CL 86*  --   --  86* 84*   CO2 28  --   --  32 33*   PHOS 2.9  --   --  3.1  --    BUN 6*  --   --  6* 18   CREATININE 0.6  --   --  0.7 0.7    < > = values in this interval not displayed. No results for input(s): AST, ALT, ALB, BILIDIR, BILITOT, ALKPHOS in the last 72 hours. 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 - Lurbinectedin is A very good option and is designed for people who have failed a platinum-based regimen for small cell lung carcinoma  -We may need to start weekly Taxol if she cannot be discharged due to her hyponatremia       2.) Hpyonatremia  - Defer to nephrology  - Chema Roman started today 10-12   -Her sodium is up to 128  -I do not think this is going to have a good resolution until she gets her small cell lung cancer treated. Unfortunately, this will not be a permanent fix  - Waiting on nephrology opinion, but it does appear that she has some stimulation of her ACTH  And her sodium has been stable for 2 days     3.) Pain   - Takes Oxycodone/APAP 5/325 mg tabs at home.   - Increase Percocet to 10 mg   - Add Morphine 2 mg IVP every 3 hours   - Her hyponatremia can be related to uncontrolled pain and release of ADH (discussed with renal)   - Cont Perc 10 every 4 hours- if she needs this more frequently will need to drop the APAP and move to Oxy 10 mg every 3 hours PRN. No changes today.  -Her pain appears stable     4. Leukocytosis/CLL   - Likely reactive   - no fevers, cont to trend- no signs of infection   -Also patients with CLL will have an increased white blood count anytime there is stressed          Thank you for asking me to see the patient. ONCOLOGIC DISPOSITION: defer to renal team. Okay to dc from heme onc standpoint.      Flor Wilder MD  May be reached through [facility]

## 2020-10-18 NOTE — PLAN OF CARE
Problem: Pain:  Goal: Pain level will decrease  Description: Pain level will decrease  10/18/2020 1046 by Jennifer Santillan RN  Outcome: Ongoing  Note: Pt will be satisfied with pain control. Pt uses numeric pain rating scale with reassessments after pain med administration. Will continue to monitor progression throughout shift.

## 2020-10-18 NOTE — PLAN OF CARE
Problem: Falls - Risk of:  Goal: Will remain free from falls  Description: Will remain free from falls  Outcome: Ongoing     Problem: Nutrition  Goal: Optimal nutrition therapy  Outcome: Ongoing     Problem: Pain:  Goal: Pain level will decrease  Description: Pain level will decrease  Outcome: Ongoing     Problem:  Bowel/Gastric:  Goal: Ability to achieve a regular elimination pattern will improve  Description: Ability to achieve a regular elimination pattern will improve  Outcome: Ongoing

## 2020-10-18 NOTE — PROGRESS NOTES
[facility]   [phone]  [facility] [employer]  Inpatient Progress note      Assessment/Plan:   Bola Pinto is being seen by nephrology for hyponatremia. Seen and examined at bedside with RN bedside    Sodium is 128  Still has constipation  Preparing to get edema    Plan:   -Now off Samsca  Continue with salt and Urena  Will likely need salt, torsemide, urina at home  [address] will be providing free supply for the time being-also spoke to daughter and explained that she may have to bear the cost of urea out-of-pocket in the future    - ? 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 On salt tabs 2 g TID at home and lasix. Had uncontrolled pain and nausea which probably precipitated this acute drop. Repeat hospitalization with hyponatremia      [facility] would like to thank you for the opportunity to serve this patient. Please don't hesitate to call with any questions or concerns. Kiko Goldman MD  [facility]  [address] [place] [address], [address] [place] [address]  Fax: [phone]  24 hrs Answering service [phone]   7 am-5 pm at Perfect serve or cell phone      CC/Reason for consult:   Reason for consult: hyponatremia. Chief Complaint   Patient presents with    Back Pain     started 1 week ago. hx of kidney and lung cancer         HPI/Hospital Course:   From consult note  Bola Pinto is a 77 y.o. female presented to the hospital on   10/10/2020 with uncontrolled pain in her back . Has a h/o lung cancer with liver mets. She had been having uncontrolled back pain the past week at home. She developed nausea and vomiting and was unable to take much PO. On presentation to ED sodium found to be low at 117. Given IVFs to 122.  These were stopped. IVFs resumed when dropped to 120. Now looking euvolemci and eating and drinking. Home lasix and salt tabs resumed. Review of Systems:   [address] All other remaining systems are negative. Constitutional:  fever, chills, weakness, weight change, fatigue,      Skin:  rash, pruritus, hair loss, bruising, dry skin, petechiae. Head, Face, Neck   headaches, swelling,  cervical adenopathy. Respiratory: shortness of breath, cough, or wheezing  Cardiovascular: chest pain, palpitations, dizzy, edema  Gastrointestinal: nausea, vomiting, diarrhea, constipation,belly pain    Yellow skin, blood in stool  Musculoskeletal:  back pain, muscle weakness, gait problems,       joint pain or swelling. Genitourinary:  dysuria, poor urine flow, flank pain, blood in urine  Neurologic:  vertigo, TIA'S, syncope, seizures, focal weakness  Psychosocial:  insomnia, anxiety, or depression. Additional positive findings: - negative     PMH/SH/FH:    Medical Hx: reviewed and updated as appropriate  Past Medical History:   Diagnosis Date    Cancer [facility])     Chronic hyponatremia     Dr Pawan Marie, [facility]    COVID-19 2020    Hyperlipidemia     Lung cancer [facility])          Surgical Hx: reviewed and updated as appropriate   has a past surgical history that includes Breast surgery; Skin cancer excision; bronchoscopy (N/A, 2020); bronchoscopy (2020); bronchoscopy (2020); bronchoscopy (2020); and Port Surgery (N/A, 2020). Social Hx: reviewed and updated as appropriate  Social History     Tobacco Use    Smoking status: Former Smoker     Last attempt to quit: 2020     Years since quittin.4    Smokeless tobacco: Never Used   Substance Use Topics    Alcohol use: Never     Frequency: Never        Family hx: reviewed and updated as appropriate  family history is not on file.     Medications:   polyethylene glycol, 17 g, BID  urea, 15 g, Daily  torsemide, 10 mg, Daily  sodium chloride, 2 g, TID WC  atorvastatin, 40 mg, Daily  senna, 1 tablet, BID  sodium chloride flush, 10 mL, 2 times per day  enoxaparin, 40 mg, Daily       Patient has no known allergies. Allergies:   No Known Allergies      Physical Exam/Objective:   Vitals:    10/18/20 0837   BP: 138/72   Pulse: 82   Resp: 16   Temp: 98.2 °F (36.8 °C)   SpO2: 93%       Intake/Output Summary (Last 24 hours) at 10/18/2020 1330  Last data filed at 10/18/2020 1101  Gross per 24 hour   Intake 1150 ml   Output 1300 ml   Net -150 ml         General appearance: pleasant lady  in no acute distress, comfortable, communicative and fully alert and oriented x 3. HEENT: Lips and teeth normal, no icterus, EOM intact, trachea midline. Neck : no masses, appears symmetrical and no JVD appreciated. Respiratory: Respiratory effort normal, bilateral equal chest rise. No wheeze, no crackles   Cardiovascular: Ausculation shows rrr. no edema   Abdomen: abdomen is soft, non distended, no masses, no pain with palpation. Musculoskeletal:  no joint swelling, no deformity, strength grossly normal.   Skin: no rashes, no induration, no tightening, no jaundice   Neuro/psychiatric:  Judgement and insight is normal. Follows commands, moves all extremities spontaneously     On oxygen    Data:   CBC:   Recent Labs     10/16/20  0611 10/17/20  0532   WBC 23.0* 20.3*   HGB 10.1* 9.9*   HCT 29.7* 30.7*    259     BMP:    Recent Labs     10/16/20  0611  10/16/20  2131 10/17/20  0532 10/18/20  0518   *   < > 126* 128* 128*   K 3.8  --   --  3.4* 3.7   CL 86*  --   --  86* 84*   CO2 28  --   --  32 33*   BUN 6*  --   --  6* 18   CREATININE 0.6  --   --  0.7 0.7   GLUCOSE 104*  --   --  123* 96   PHOS 2.9  --   --  3.1  --     < > = values in this interval not displayed.

## 2020-10-18 NOTE — FLOWSHEET NOTE
10/17/20 1959   Assessment   Charting Type Shift assessment   Neurological   Level of Consciousness 0   Orientation Level Oriented X4   Cognition Appropriate judgement; Appropriate safety awareness; Appropriate attention/concentration; Appropriate for developmental age   Language Clear; Appropriate for developmental age   Size R Pupil (mm) 2   R Pupil Shape Round   R Pupil Reaction Brisk   Size L Pupil (mm) 2   L Pupil Shape Round   L Pupil Reaction Brisk   R Hand  Strong   L Hand  Strong   R Foot Dorsiflexion Strong   L Foot Dorsiflexion Strong   R Foot Plantar Flexion Strong   L Foot Plantar Flexion Strong   RUE Motor Response Responds to command;Normal extension;Normal flexion   Sensation RUE Full sensation   LUE Motor Response Responds to command;Normal extension;Normal flexion   Sensation LUE Full sensation   RLE Motor Response Responds to command;Normal extension;Normal flexion   Sensation RLE Full sensation   LLE Motor Response Responds to command;Normal extension;Normal flexion   Sensation LLE Full sensation   Gag Present   Tongue Deviation None   Faye Coma Scale   Eye Opening 4   Best Verbal Response 5   Best Motor Response 6   [place] Coma Scale Score 15   HEENT   Right Eye Impaired vision   Left Eye Impaired vision   Nose Intact   Throat Intact   Neck No tracheal deviation   Tongue Pink & moist   Voice Normal   Mucous Membrane Moist;Pink; Intact   Teeth Dentures upper   Respiratory   Respiratory Pattern Regular   Respiratory Depth Normal   Respiratory Quality/Effort Unlabored   Chest Assessment Chest expansion symmetrical   Breath Sounds   Right Upper Lobe Clear   Right Middle Lobe Diminished   Right Lower Lobe Diminished   Left Upper Lobe Clear   Left Lower Lobe Diminished   Cardiac   Cardiac Regularity Regular   Heart Sounds S1, S2   Cardiac Rhythm NSR   Rhythm Interpretation   Pulse 87   Cardiac Monitor   Telemetry Monitor On Yes   Telemetry Audible Yes   Telemetry Alarms Set Yes   Telemetry Box Number 30   Gastrointestinal   RUQ Bowel Sounds Active   LUQ Bowel Sounds Active   RLQ Bowel Sounds Active   LLQ Bowel Sounds Active   Abdomen Inspection Rounded   Tenderness Soft;Nontender   Peripheral Vascular   Peripheral Vascular (WDL) WDL   Skin Color/Condition   Skin Color Pale   Skin Integrity   Skin Integrity Bruising   Location scattered   Musculoskeletal   Musculoskeletal (WDL) WDL   Genitourinary   Genitourinary (WDL) WDL   Flank Tenderness No   Suprapubic Tenderness No   Dysuria No   Anus/Rectum   Anus/Rectum (WDL) WDL   Psychosocial   Psychosocial (WDL) WDL   Pt with gait steady ambulates @ room, c/o \" Lower backache, sore\" rated 4-5/10. Pt emphasized safety/fall prevention and maintained. Updated with POC verbalized understanding. PRN med. Given for pain, no further concerns voiced. Personal belongings within reach. Will continue to monitor.  MKRN

## 2020-10-19 VITALS
SYSTOLIC BLOOD PRESSURE: 125 MMHG | OXYGEN SATURATION: 92 % | BODY MASS INDEX: 22.94 KG/M2 | HEIGHT: 64 IN | RESPIRATION RATE: 18 BRPM | DIASTOLIC BLOOD PRESSURE: 77 MMHG | WEIGHT: 134.4 LBS | TEMPERATURE: 97.8 F | HEART RATE: 92 BPM

## 2020-10-19 LAB
ANION GAP SERPL CALCULATED.3IONS-SCNC: 10 MMOL/L (ref 3–16)
BUN BLDV-MCNC: 21 MG/DL (ref 7–20)
CALCIUM SERPL-MCNC: 9.5 MG/DL (ref 8.3–10.6)
CHLORIDE BLD-SCNC: 86 MMOL/L (ref 99–110)
CO2: 32 MMOL/L (ref 21–32)
CREAT SERPL-MCNC: 0.7 MG/DL (ref 0.6–1.2)
GFR AFRICAN AMERICAN: >60
GFR NON-AFRICAN AMERICAN: >60
GLUCOSE BLD-MCNC: 98 MG/DL (ref 70–99)
POTASSIUM REFLEX MAGNESIUM: 3.6 MMOL/L (ref 3.5–5.1)
SODIUM BLD-SCNC: 128 MMOL/L (ref 136–145)

## 2020-10-19 PROCEDURE — 80048 BASIC METABOLIC PNL TOTAL CA: CPT

## 2020-10-19 PROCEDURE — 6370000000 HC RX 637 (ALT 250 FOR IP): Performed by: INTERNAL MEDICINE

## 2020-10-19 PROCEDURE — 2580000003 HC RX 258: Performed by: INTERNAL MEDICINE

## 2020-10-19 PROCEDURE — 6370000000 HC RX 637 (ALT 250 FOR IP): Performed by: NURSE PRACTITIONER

## 2020-10-19 PROCEDURE — 6360000002 HC RX W HCPCS: Performed by: INTERNAL MEDICINE

## 2020-10-19 RX ORDER — TORSEMIDE 10 MG/1
10 TABLET ORAL DAILY
Qty: 30 TABLET | Refills: 0 | Status: ON HOLD | OUTPATIENT
Start: 2020-10-20 | End: 2021-02-26 | Stop reason: HOSPADM

## 2020-10-19 RX ORDER — POLYETHYLENE GLYCOL 3350 17 G/17G
17 POWDER, FOR SOLUTION ORAL 2 TIMES DAILY
Qty: 527 G | Refills: 0 | Status: SHIPPED | OUTPATIENT
Start: 2020-10-19 | End: 2020-11-18

## 2020-10-19 RX ADMIN — Medication 2 G: at 10:31

## 2020-10-19 RX ADMIN — OXYCODONE HYDROCHLORIDE AND ACETAMINOPHEN 1 TABLET: 10; 325 TABLET ORAL at 04:00

## 2020-10-19 RX ADMIN — ATORVASTATIN CALCIUM 40 MG: 40 TABLET, FILM COATED ORAL at 10:30

## 2020-10-19 RX ADMIN — Medication 15 G: at 10:31

## 2020-10-19 RX ADMIN — TORSEMIDE 10 MG: 20 TABLET ORAL at 10:31

## 2020-10-19 RX ADMIN — SODIUM CHLORIDE, PRESERVATIVE FREE 10 ML: 5 INJECTION INTRAVENOUS at 10:38

## 2020-10-19 RX ADMIN — SENNOSIDES 8.6 MG: 8.6 TABLET, FILM COATED ORAL at 10:31

## 2020-10-19 RX ADMIN — OXYCODONE HYDROCHLORIDE AND ACETAMINOPHEN 1 TABLET: 10; 325 TABLET ORAL at 10:35

## 2020-10-19 RX ADMIN — ENOXAPARIN SODIUM 40 MG: 40 INJECTION SUBCUTANEOUS at 10:30

## 2020-10-19 RX ADMIN — Medication 2 G: at 12:30

## 2020-10-19 ASSESSMENT — PAIN SCALES - GENERAL
PAINLEVEL_OUTOF10: 4
PAINLEVEL_OUTOF10: 4
PAINLEVEL_OUTOF10: 2
PAINLEVEL_OUTOF10: 2

## 2020-10-19 NOTE — DISCHARGE SUMMARY
Hospital Medicine Discharge Summary    Patient ID: Pino Bartholomew      Patient's PCP: Nuria Sweeney    Admit Date: 10/10/2020     Discharge Date: 10/19/2020 10/19/20       Admitting Physician: Meet Malik MD     Discharge Physician: Meet Malik MD     Discharge Diagnoses: Active Hospital Problems    Diagnosis    Hyponatremia [E87.1]    Hyperlipidemia [E78.5]       The patient was seen and examined on day of discharge and this discharge summary is in conjunction with any daily progress note from day of discharge. Hospital Course:    1 Back pain imaging negative on as needed Percocet. 2.  Small cell lung cancer with progression of metastatic disease oncology consulted and following on Atezolizumab. Outpatient follow-up oncology input appreciated  3.  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 4.  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 Outpatient follow-up nephrology input appreciated  5.  Anemia stable. 6.  Hyperlipidemia continue with a statin. 7.  Constipation  resolved by MiraLAX Senokot   enema            Physical Exam Performed:     /77   Pulse 92   Temp 97.8 °F (36.6 °C) (Oral)   Resp 18   Ht 5' 3.5\" (1.613 m)   Wt 134 lb 6.4 oz (61 kg)   SpO2 92%   BMI 23.43 kg/m²       General appearance: No apparent distress, appears stated age and cooperative. HEENT: Pupils equal, round, and reactive to light. Conjunctivae/corneas clear. Neck: Supple, with full range of motion. No jugular venous distention. Trachea midline. Respiratory:  Normal respiratory effort. Clear to auscultation, bilaterally without Rales/Wheezes/Rhonchi. Cardiovascular: Regular rate and rhythm with normal S1/S2 without murmurs, rubs or gallops. Abdomen: Soft, non-tender, non-distended with normal bowel sounds. Musculoskeletal: No clubbing, cyanosis or edema bilaterally.   Full range of motion without deformity. Skin: Skin color, texture, turgor normal.  No rashes or lesions. Neurologic:  Neurovascularly intact without any focal sensory/motor deficits. Cranial nerves: II-XII intact, grossly non-focal.  Psychiatric: Alert and oriented, thought content appropriate, normal insight  Capillary Refill: Brisk,< 3 seconds   Peripheral Pulses: +2 palpable, equal bilaterally     Labs: For convenience and continuity at follow-up the following most recent labs are provided:      CBC:    Lab Results   Component Value Date    WBC 20.3 10/17/2020    HGB 9.9 10/17/2020    HCT 30.7 10/17/2020     10/17/2020       Renal:    Lab Results   Component Value Date     10/19/2020    K 3.6 10/19/2020    CL 86 10/19/2020    CO2 32 10/19/2020    BUN 21 10/19/2020    CREATININE 0.7 10/19/2020    CALCIUM 9.5 10/19/2020    PHOS 3.1 10/17/2020         Significant Diagnostic Studies    Radiology:   XR ABDOMEN (KUB) (SINGLE AP VIEW)   Final Result   No significant finding in the abdomen. CT ABDOMEN PELVIS WO CONTRAST Additional Contrast? None   Final Result   1. Progression of metastatic disease within the lower chest and abdomen. 2. New small left pleural effusion. Increased lingular opacity could   represent atelectasis, focal pneumonia, or be related to metastatic disease. CT LUMBAR SPINE WO CONTRAST   Final Result   No acute fractures identified. L1-2 mild retrolisthesis, considered degenerative. Multilevel spondylosis, with disproportionate involvement of L4-5 and L5-S1.                 Consults:     IP CONSULT TO ONCOLOGY  IP CONSULT TO HOSPITALIST  IP CONSULT TO NEPHROLOGY    Disposition: Home with home care    Condition at Discharge: Stable    Discharge Instructions/Follow-up: Follow-up with nephrology, discussed with nephrology, follow-up with oncology    Code Status:  Prior     Activity: activity as tolerated    Diet: regular diet      Discharge Medications:     Discharge Medication List as of 10/19/2020  1:11 PM           Details   torsemide (DEMADEX) 10 MG tablet Take 1 tablet by mouth daily, Disp-30 tablet,R-0Normal      polyethylene glycol (GLYCOLAX) 17 g packet Take 17 g by mouth 2 times daily, Disp-527 g,R-0Normal      urea (URE-NA) 15 g PACK packet Take 15 g by mouth daily, Disp-1 Package,R-0Normal              Details   sodium chloride 1 g tablet Take 2 tablets by mouth 3 times daily (with meals), Disp-90 tablet,R-3Normal      oxyCODONE-acetaminophen (ROXICET) 5-325 MG/5ML solution Take by mouth 2 times daily as needed for Pain. Historical Med      senna (SENOKOT) 8.6 MG tablet Take 1 tablet by mouth 2 times dailyHistorical Med      potassium chloride (KLOR-CON M) 20 MEQ extended release tablet Take 1 tablet by mouth 2 times daily, Disp-60 tablet, R-0Normal      albuterol sulfate HFA (PROAIR HFA) 108 (90 Base) MCG/ACT inhaler Inhale 2 puffs into the lungs every 4 hours as needed for Wheezing or Shortness of Breath, Disp-1 Inhaler, R-3Normal      ondansetron (ZOFRAN ODT) 4 MG disintegrating tablet Take 2 tablets by mouth every 8 hours as needed for Nausea or Vomiting, Disp-20 tablet, R-5Normal      promethazine (PHENERGAN) 12.5 MG tablet Take 1 tablet by mouth every 6 hours as needed for Nausea, Disp-25 tablet, R-5Normal      atorvastatin (LIPITOR) 40 MG tablet Take 1 tablet by mouth daily, Disp-30 tablet, R-3Normal             Time Spent on discharge is more than 30 minutes in the examination, evaluation, counseling and review of medications and discharge plan. Signed:    Cayden Palacios MD   11/16/2020      Thank you Artie Johnson for the opportunity to be involved in this patient's care. If you have any questions or concerns please feel free to contact me at [phone].

## 2020-10-19 NOTE — PROGRESS NOTES
ONCOLOGY HEMATOLOGY CARE PROGRESS NOTE      SUBJECTIVE:     Had a BM last night after an enema was given. She is walking around room. Sodium is 128. ROS:   The remaining 10 point review of symptoms is unremarkable. OBJECTIVE        Physical    VITALS:  BP (!) 152/84   Pulse 76   Temp 98 °F (36.7 °C) (Oral)   Resp 16   Ht 5' 3.5\" (1.613 m)   Wt 134 lb 6.4 oz (61 kg)   SpO2 92%   BMI 23.43 kg/m²   TEMPERATURE:  Current - Temp: 98 °F (36.7 °C); Max - Temp  Av.9 °F (36.6 °C)  Min: 97.6 °F (36.4 °C)  Max: 98 °F (36.7 °C)  PULSE OXIMETRY RANGE: SpO2  Av.3 %  Min: 90 %  Max: 93 %  24HR INTAKE/OUTPUT:      Intake/Output Summary (Last 24 hours) at 10/19/2020 1056  Last data filed at 10/19/2020 8962  Gross per 24 hour   Intake 2280 ml   Output 600 ml   Net 1680 ml       CONSTITUTIONAL:  awake, alert, cooperative, no apparent distress, HEENT oral pharynx , no scleral icterus  HEMATOLOGIC/LYMPHATICS:  no cervical lymphadenopathy, no supraclavicular lymphadenopathy, no axillary lymphadenopathy and no inguinal lymphadenopathy  LUNGS:  No increased work of breathing, good air exchange, clear to auscultation bilaterally, no crackles or wheezing  CARDIOVASCULAR:  , regular rate and rhythm, normal S1 and S2, no S3 or S4, and no murmur noted  ABDOMEN:  No scars, normal bowel sounds, soft, non-distended, non-tender, no masses palpated, no hepatosplenomegally  MUSCULOSKELETAL:  There is no redness, warmth, or swelling of the joints. EXTREMETIES: No clubbing cynosis or edema  NEUROLOGIC:  Awake, alert, oriented to name, place and time. Cranial nerves II-XII are grossly intact. Motor is 5 out of 5 bilaterally.    SKIN:  no bruising or bleeding      Data      Recent Labs     10/17/20  0532   WBC 20.3*   HGB 9.9*   HCT 30.7*      MCV 93.2        Recent Labs     10/17/20  0532 10/18/20  0518 10/19/20  0516   * 128* 128*   K 3.4* 3.7 3.6   CL 86* 84* 86*   CO2 32 33* 32 PHOS 3.1  --   --    BUN 6* 18 21*   CREATININE 0.7 0.7 0.7     No results for input(s): AST, ALT, ALB, BILIDIR, BILITOT, ALKPHOS in the last 72 hours. Magnesium:    Lab Results   Component Value Date    MG 1.60 10/15/2020    MG 1.50 08/08/2020    MG 1.10 08/07/2020         Problem List  Patient Active Problem List   Diagnosis    Acute hyponatremia    N&V (nausea and vomiting)    Leukocytosis    Lung mass    Multiple lung nodules    Hilar adenopathy    Centrilobular emphysema (HCC)    Smoker    Anemia    Sepsis ([address]    [facility]    Hyperlipidemia    2019 novel coronavirus disease (COVID-19)    Hypokalemia    Pulmonary infiltrates    [facility] cell lung cancer ([facility]    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 - Lurbinectedin is A very good option and is designed for people who have failed a platinum-based regimen for small cell lung carcinoma  -We may need to start weekly Taxol if she cannot be discharged due to her hyponatremia       2.) Hpyonatremia  - stable at 128 on samsca and sodium tablets      3.) Pain   - Takes Oxycodone/APAP 5/325 mg tabs at home. - Increase Percocet to 10 mg   - Add Morphine 2 mg IVP every 3 hours   - Her hyponatremia can be related to uncontrolled pain and release of ADH (discussed with renal)   - Cont Perc 10 every 4 hours- if she needs this more frequently will need to drop the APAP and move to Oxy 10 mg every 3 hours PRN. No changes today.  -Her pain appears stable     4. 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  Narcotic

## 2020-10-19 NOTE — PROGRESS NOTES
Pt d/c'd home. De accessed port and stopped bleeding. Catheter intact. Pt tolerated well. No redness noted at site. Notified [facility] and removed tele box. Reviewed d/c instructions, home meds, and  f/u information utilizing teach-back method. Scripts for patient filled at [facility]. Patient verbalized understanding.  Electronically signed by Ish Zazueta RN on 10/19/2020 at 3:08 PM

## 2020-10-19 NOTE — PROGRESS NOTES
Hospitalist Progress Note      PCP: Librado Jimenez    Date of Admission: 10/10/2020    Chief Complaint: Back pain    Hospital Course: 78-year-old female with a history of lung cancer kidney cancer metastasis to the liver and lymph node currently on chemotherapy, CLL admitted with a worsening back pain, hyponatremia nephrology consulted and following    Subjective: Patient denies any chest pain no shortness of breath complains of still no BM      Medications:  Reviewed    Infusion Medications   Scheduled Medications    polyethylene glycol  17 g Oral BID    urea  15 g Oral Daily    torsemide  10 mg Oral Daily    sodium chloride  2 g Oral TID WC    atorvastatin  40 mg Oral Daily    senna  1 tablet Oral BID    sodium chloride flush  10 mL Intravenous 2 times per day    enoxaparin  40 mg Subcutaneous Daily     PRN Meds: prochlorperazine, morphine, oxyCODONE-acetaminophen, albuterol sulfate HFA, sodium chloride flush, acetaminophen **OR** acetaminophen, polyethylene glycol, promethazine **OR** ondansetron      Intake/Output Summary (Last 24 hours) at 10/19/2020 0854  Last data filed at 10/19/2020 0819  Gross per 24 hour   Intake 2160 ml   Output 600 ml   Net 1560 ml       Physical Exam Performed:    BP (!) 152/84   Pulse 76   Temp 98 °F (36.7 °C) (Oral)   Resp 16   Ht 5' 3.5\" (1.613 m)   Wt 134 lb 6.4 oz (61 kg)   SpO2 92%   BMI 23.43 kg/m²     General appearance: No apparent distress, appears stated age and cooperative. HEENT: Pupils equal, round, and reactive to light. Conjunctivae/corneas clear. Neck: Supple, with full range of motion. No jugular venous distention. Trachea midline. Respiratory:  Normal respiratory effort. Clear to auscultation, bilaterally without Rales/Wheezes/Rhonchi. Cardiovascular: Regular rate and rhythm with normal S1/S2 without murmurs, rubs or gallops. Abdomen: Soft, non-tender, non-distended with normal bowel sounds.   Musculoskeletal: No clubbing, cyanosis or edema bilaterally. Full range of motion without deformity. Skin: Skin color, texture, turgor normal.  No rashes or lesions. Neurologic:  Neurovascularly intact without any focal sensory/motor deficits. Cranial nerves: II-XII intact, grossly non-focal.  Psychiatric: Alert and oriented, thought content appropriate, normal insight  Capillary Refill: Brisk,< 3 seconds   Peripheral Pulses: +2 palpable, equal bilaterally       Labs:   Recent Labs     10/17/20  0532   WBC 20.3*   HGB 9.9*   HCT 30.7*        Recent Labs     10/17/20  0532 10/18/20  0518 10/19/20  0516   * 128* 128*   K 3.4* 3.7 3.6   CL 86* 84* 86*   CO2 32 33* 32   BUN 6* 18 21*   CREATININE 0.7 0.7 0.7   CALCIUM 9.4 9.3 9.5   PHOS 3.1  --   --      No results for input(s): AST, ALT, BILIDIR, BILITOT, ALKPHOS in the last 72 hours. No results for input(s): INR in the last 72 hours. No results for input(s): Baljinder Seed in the last 72 hours. Urinalysis:      Lab Results   Component Value Date    NITRU Negative 10/10/2020    WBCUA None seen 10/10/2020    BACTERIA Rare 10/10/2020    RBCUA 3-4 10/10/2020    BLOODU SMALL 10/10/2020    SPECGRAV 1.020 10/10/2020    GLUCOSEU Negative 10/10/2020       Radiology:  XR ABDOMEN (KUB) (SINGLE AP VIEW)   Final Result   No significant finding in the abdomen. CT ABDOMEN PELVIS WO CONTRAST Additional Contrast? None   Final Result   1. Progression of metastatic disease within the lower chest and abdomen. 2. New small left pleural effusion. Increased lingular opacity could   represent atelectasis, focal pneumonia, or be related to metastatic disease. CT LUMBAR SPINE WO CONTRAST   Final Result   No acute fractures identified. L1-2 mild retrolisthesis, considered degenerative. Multilevel spondylosis, with disproportionate involvement of L4-5 and L5-S1.                  Assessment/Plan:    Active Hospital Problems    Diagnosis    Hyponatremia [E87.1]    Hyperlipidemia [E78.5] 1.  Back pain imaging negative on as needed Percocet. 2.  Small cell lung cancer with progression of metastatic disease oncology consulted and following on Atezolizumab. 3.  Leukocytosis secondary to CLL oncology following. 4.  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  5.  Anemia stable. 6.  Hyperlipidemia continue with a statin.   7.  Constipation not resolved by MiraLAX Senokot will give enema today    DVT Prophylaxis: Lovenox subcu  Diet: DIET GENERAL;  Dietary Nutrition Supplements: Standard High Calorie Oral Supplement  Code Status: Full Code    PT/OT Eval Status:     Dian Marsh MD

## 2020-10-19 NOTE — PROGRESS NOTES
[facility]   [phone]  [facility] [place]  Inpatient Progress note      Assessment/Plan:   Checo Lock is being seen by nephrology for hyponatremia. Seen and examined at bedside with RN bedside    Sodium is 128  Constipation better with enema       Plan:   -Now off Samsca  Continue with salt and Urena  Recommend salt, torsemide, urina at home  Okay to discharge today  Pharmacy will be providing free supply for the time being-also spoke to daughter and explained that she may have to bear the cost of urea out-of-pocket in the future    - ? 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 On salt tabs 2 g TID at home and lasix. Had uncontrolled pain and nausea which probably precipitated this acute drop. Repeat hospitalization with hyponatremia      [facility] would like to thank you for the opportunity to serve this patient. Please don't hesitate to call with any questions or concerns. Arpan Irene MD  [facility]  [address] [place] [address], [address] [place] [address]  Fax: [phone]  24 hrs Answering service [phone]   7 am-5 pm at Perfect serve or cell phone      CC/Reason for consult:   Reason for consult: hyponatremia. Chief Complaint   Patient presents with    Back Pain     started 1 week ago. hx of kidney and lung cancer         HPI/Hospital Course:   From consult note  Checo Lock is a 77 y.o. female presented to the hospital on   10/10/2020 with uncontrolled pain in her back . Has a h/o lung cancer with liver mets. She had been having uncontrolled back pain the past week at home. She developed nausea and vomiting and was unable to take much PO. On presentation to ED sodium found to be low at 117. Given IVFs to 122. These were stopped.  IVFs resumed when dropped to 120. Now looking euvolemci and eating and drinking. Home lasix and salt tabs resumed. Review of Systems:   [address] All other remaining systems are negative. Constitutional:  fever, chills, weakness, weight change, fatigue,      Skin:  rash, pruritus, hair loss, bruising, dry skin, petechiae. Head, Face, Neck   headaches, swelling,  cervical adenopathy. Respiratory: shortness of breath, cough, or wheezing  Cardiovascular: chest pain, palpitations, dizzy, edema  Gastrointestinal: nausea, vomiting, diarrhea, constipation,belly pain    Yellow skin, blood in stool  Musculoskeletal:  back pain, muscle weakness, gait problems,       joint pain or swelling. Genitourinary:  dysuria, poor urine flow, flank pain, blood in urine  Neurologic:  vertigo, TIA'S, syncope, seizures, focal weakness  Psychosocial:  insomnia, anxiety, or depression. Additional positive findings: - negative     PMH/SH/FH:    Medical Hx: reviewed and updated as appropriate  Past Medical History:   Diagnosis Date    Cancer [facility])     Chronic hyponatremia     Dr Alejandrina Haq, [facility]    COVID-19 2020    Hyperlipidemia     Lung cancer [facility])          Surgical Hx: reviewed and updated as appropriate   has a past surgical history that includes Breast surgery; Skin cancer excision; bronchoscopy (N/A, 2020); bronchoscopy (2020); bronchoscopy (2020); bronchoscopy (2020); and Port Surgery (N/A, 2020). Social Hx: reviewed and updated as appropriate  Social History     Tobacco Use    Smoking status: Former Smoker     Last attempt to quit: 2020     Years since quittin.4    Smokeless tobacco: Never Used   Substance Use Topics    Alcohol use: Never     Frequency: Never        Family hx: reviewed and updated as appropriate  family history is not on file.     Medications:   polyethylene glycol, 17 g, BID  urea, 15 g, Daily  torsemide, 10 mg, Daily  sodium chloride, 2 g, TID WC  atorvastatin, 40 mg, Daily  senna, 1 tablet, BID  sodium chloride flush, 10 mL, 2 times per day  enoxaparin, 40 mg, Daily       Patient has no known allergies. Allergies:   No Known Allergies      Physical Exam/Objective:   Vitals:    10/19/20 0817   BP: (!) 152/84   Pulse: 76   Resp: 16   Temp: 98 °F (36.7 °C)   SpO2: 92%       Intake/Output Summary (Last 24 hours) at 10/19/2020 1116  Last data filed at 10/19/2020 7377  Gross per 24 hour   Intake 1920 ml   Output 600 ml   Net 1320 ml         General appearance: pleasant lady  in no acute distress, comfortable, communicative and fully alert and oriented x 3. HEENT: Lips and teeth normal, no icterus, EOM intact, trachea midline. Neck : no masses, appears symmetrical and no JVD appreciated. Respiratory: Respiratory effort normal, bilateral equal chest rise. No wheeze, no crackles   Cardiovascular: Ausculation shows rrr. no edema   Abdomen: abdomen is soft, non distended, no masses, no pain with palpation.   Musculoskeletal:  no joint swelling, no deformity, strength grossly normal.   Skin: no rashes, no induration, no tightening, no jaundice   Neuro/psychiatric:  Judgement and insight is normal. Follows commands, moves all extremities spontaneously     On oxygen    Data:   CBC:   Recent Labs     10/17/20  0532   WBC 20.3*   HGB 9.9*   HCT 30.7*        BMP:    Recent Labs     10/17/20  0532 10/18/20  0518 10/19/20  0516   * 128* 128*   K 3.4* 3.7 3.6   CL 86* 84* 86*   CO2 32 33* 32   BUN 6* 18 21*   CREATININE 0.7 0.7 0.7   GLUCOSE 123* 96 98   PHOS 3.1  --   --

## 2020-11-11 NOTE — ED PROVIDER NOTES
Emergency Department Attending Provider Note  Location: [address] [place] [address]  10/10/2020     Patient Identification  Checo Lock is a 77 y.o. female      Checo Lock was evaluated in the Emergency Department for back pain. CT imaging showing progressive metastatic disease. Labs showing hyponatremia. Case was discussed with hematology oncology recommends admission. Otherwise has been hemodynamically stable in the emergency department. We will plan for admission at this time per their recommendation. .  Initial history and physical exam information was obtained by ROHIT/NPP (who also dictated a record of this visit). I was available for consultation and discussion of diagnostic, treatment, and disposition decisions. However, I did not independently examine and evaluate this patient. This chart was generated in part by using Dragon Dictation system and may contain errors related to that system including errors in grammar, punctuation, and spelling, as well as words and phrases that may be inappropriate. If there are any questions or concerns please feel free to contact the dictating provider for clarification.      Aguilar Guillen MD  [address] MD  11/11/20 3911

## 2020-11-12 ENCOUNTER — TELEPHONE (OUTPATIENT)
Dept: NEPHROLOGY | Age: 75
End: 2020-11-12

## 2020-11-30 ENCOUNTER — HOSPITAL ENCOUNTER (EMERGENCY)
Age: 75
Discharge: HOME | End: 2020-11-30
Attending: EMERGENCY MEDICINE
Payer: MEDICARE

## 2020-11-30 VITALS
OXYGEN SATURATION: 95 % | SYSTOLIC BLOOD PRESSURE: 99 MMHG | DIASTOLIC BLOOD PRESSURE: 65 MMHG | TEMPERATURE: 98.5 F | HEART RATE: 89 BPM | RESPIRATION RATE: 13 BRPM

## 2020-11-30 LAB
A/G RATIO: 1.5 (ref 1.1–2.2)
ACANTHOCYTES: ABNORMAL
ALBUMIN SERPL-MCNC: 3.5 G/DL (ref 3.4–5)
ALP BLD-CCNC: 187 U/L (ref 40–129)
ALT SERPL-CCNC: 52 U/L (ref 10–40)
ANION GAP SERPL CALCULATED.3IONS-SCNC: 9 MMOL/L (ref 3–16)
ANISOCYTOSIS: ABNORMAL
AST SERPL-CCNC: 48 U/L (ref 15–37)
ATYPICAL LYMPHOCYTE RELATIVE PERCENT: 8 % (ref 0–6)
BANDED NEUTROPHILS RELATIVE PERCENT: 4 % (ref 0–7)
BASOPHILS ABSOLUTE: 0 K/UL (ref 0–0.2)
BASOPHILS RELATIVE PERCENT: 0 %
BILIRUB SERPL-MCNC: 0.5 MG/DL (ref 0–1)
BUN BLDV-MCNC: 15 MG/DL (ref 7–20)
CALCIUM SERPL-MCNC: 8 MG/DL (ref 8.3–10.6)
CHLORIDE BLD-SCNC: 98 MMOL/L (ref 99–110)
CO2: 26 MMOL/L (ref 21–32)
CREAT SERPL-MCNC: 0.9 MG/DL (ref 0.6–1.2)
EKG ATRIAL RATE: 80 BPM
EKG DIAGNOSIS: NORMAL
EKG P AXIS: 66 DEGREES
EKG P-R INTERVAL: 164 MS
EKG Q-T INTERVAL: 394 MS
EKG QRS DURATION: 90 MS
EKG QTC CALCULATION (BAZETT): 454 MS
EKG R AXIS: 27 DEGREES
EKG T AXIS: 57 DEGREES
EKG VENTRICULAR RATE: 80 BPM
EOSINOPHILS ABSOLUTE: 0 K/UL (ref 0–0.6)
EOSINOPHILS RELATIVE PERCENT: 0 %
GFR AFRICAN AMERICAN: >60
GFR NON-AFRICAN AMERICAN: >60
GLOBULIN: 2.3 G/DL
GLUCOSE BLD-MCNC: 109 MG/DL (ref 70–99)
HCT VFR BLD CALC: 31.8 % (ref 36–48)
HEMATOLOGY PATH CONSULT: NO
HEMOGLOBIN: 10.4 G/DL (ref 12–16)
LYMPHOCYTES ABSOLUTE: 15.4 K/UL (ref 1–5.1)
LYMPHOCYTES RELATIVE PERCENT: 87 %
MACROCYTES: ABNORMAL
MAGNESIUM: 1 MG/DL (ref 1.8–2.4)
MCH RBC QN AUTO: 30.7 PG (ref 26–34)
MCHC RBC AUTO-ENTMCNC: 32.7 G/DL (ref 31–36)
MCV RBC AUTO: 93.9 FL (ref 80–100)
MICROCYTES: ABNORMAL
MONOCYTES ABSOLUTE: 0 K/UL (ref 0–1.3)
MONOCYTES RELATIVE PERCENT: 0 %
NEUTROPHILS ABSOLUTE: 0.8 K/UL (ref 1.7–7.7)
NEUTROPHILS RELATIVE PERCENT: 1 %
OVALOCYTES: ABNORMAL
PDW BLD-RTO: 15.1 % (ref 12.4–15.4)
PLATELET # BLD: 202 K/UL (ref 135–450)
PMV BLD AUTO: 8.7 FL (ref 5–10.5)
POTASSIUM SERPL-SCNC: 4 MMOL/L (ref 3.5–5.1)
RBC # BLD: 3.39 M/UL (ref 4–5.2)
SMUDGE CELLS: PRESENT
SODIUM BLD-SCNC: 133 MMOL/L (ref 136–145)
TOTAL PROTEIN: 5.8 G/DL (ref 6.4–8.2)
TROPONIN: <0.01 NG/ML
WBC # BLD: 16.2 K/UL (ref 4–11)

## 2020-11-30 PROCEDURE — 6370000000 HC RX 637 (ALT 250 FOR IP): Performed by: PHYSICIAN ASSISTANT

## 2020-11-30 PROCEDURE — 80053 COMPREHEN METABOLIC PANEL: CPT

## 2020-11-30 PROCEDURE — 84484 ASSAY OF TROPONIN QUANT: CPT

## 2020-11-30 PROCEDURE — 83735 ASSAY OF MAGNESIUM: CPT

## 2020-11-30 PROCEDURE — 6360000002 HC RX W HCPCS: Performed by: PHYSICIAN ASSISTANT

## 2020-11-30 PROCEDURE — 96375 TX/PRO/DX INJ NEW DRUG ADDON: CPT

## 2020-11-30 PROCEDURE — 93010 ELECTROCARDIOGRAM REPORT: CPT | Performed by: INTERNAL MEDICINE

## 2020-11-30 PROCEDURE — 96365 THER/PROPH/DIAG IV INF INIT: CPT

## 2020-11-30 PROCEDURE — 99285 EMERGENCY DEPT VISIT HI MDM: CPT

## 2020-11-30 PROCEDURE — 2580000003 HC RX 258: Performed by: PHYSICIAN ASSISTANT

## 2020-11-30 PROCEDURE — 85025 COMPLETE CBC W/AUTO DIFF WBC: CPT

## 2020-11-30 PROCEDURE — 93005 ELECTROCARDIOGRAM TRACING: CPT | Performed by: EMERGENCY MEDICINE

## 2020-11-30 RX ORDER — ONDANSETRON 2 MG/ML
4 INJECTION INTRAMUSCULAR; INTRAVENOUS ONCE
Status: COMPLETED | OUTPATIENT
Start: 2020-11-30 | End: 2020-11-30

## 2020-11-30 RX ORDER — LANOLIN ALCOHOL/MO/W.PET/CERES
400 CREAM (GRAM) TOPICAL DAILY
Qty: 30 TABLET | Refills: 0 | Status: ON HOLD | OUTPATIENT
Start: 2020-11-30 | End: 2021-02-26 | Stop reason: HOSPADM

## 2020-11-30 RX ORDER — OXYCODONE HYDROCHLORIDE 5 MG/1
5 TABLET ORAL ONCE
Status: COMPLETED | OUTPATIENT
Start: 2020-11-30 | End: 2020-11-30

## 2020-11-30 RX ORDER — MAGNESIUM SULFATE IN WATER 40 MG/ML
4 INJECTION, SOLUTION INTRAVENOUS ONCE
Status: COMPLETED | OUTPATIENT
Start: 2020-11-30 | End: 2020-11-30

## 2020-11-30 RX ORDER — ONDANSETRON 4 MG/1
4 TABLET, ORALLY DISINTEGRATING ORAL EVERY 8 HOURS PRN
Qty: 21 TABLET | Refills: 0 | Status: ON HOLD | OUTPATIENT
Start: 2020-11-30 | End: 2020-12-16 | Stop reason: HOSPADM

## 2020-11-30 RX ORDER — 0.9 % SODIUM CHLORIDE 0.9 %
1000 INTRAVENOUS SOLUTION INTRAVENOUS ONCE
Status: COMPLETED | OUTPATIENT
Start: 2020-11-30 | End: 2020-11-30

## 2020-11-30 RX ADMIN — MAGNESIUM SULFATE HEPTAHYDRATE 4 G: 40 INJECTION, SOLUTION INTRAVENOUS at 10:01

## 2020-11-30 RX ADMIN — ONDANSETRON 4 MG: 2 INJECTION INTRAMUSCULAR; INTRAVENOUS at 09:01

## 2020-11-30 RX ADMIN — SODIUM CHLORIDE 1000 ML: 9 INJECTION, SOLUTION INTRAVENOUS at 09:00

## 2020-11-30 RX ADMIN — OXYCODONE HYDROCHLORIDE 5 MG: 5 TABLET ORAL at 11:57

## 2020-11-30 ASSESSMENT — ENCOUNTER SYMPTOMS
SHORTNESS OF BREATH: 1
COUGH: 0
VOMITING: 1
EYES NEGATIVE: 1
ABDOMINAL PAIN: 0
BACK PAIN: 0
NAUSEA: 1
COLOR CHANGE: 0

## 2020-11-30 ASSESSMENT — PAIN SCALES - GENERAL: PAINLEVEL_OUTOF10: 5

## 2020-11-30 NOTE — ED NOTES
Verbal and written discharge instructions given. Port de-accessed and telemetry monitor removed. Prescriptions given to patient. Patient accompanied to car via wheelchair. Patient in stable condition, discharged home with daughter.      Josafat Cazares RN  11/30/20 1050

## 2020-11-30 NOTE — ED NOTES

## 2020-11-30 NOTE — ED NOTES
Ps Hem/Onc @ 1019 Second page @ [phone]  Re: hypomagnesemia  Per: SUSHANT Chaney  [facility] returned call @ [address]  11/30/20 9860

## 2020-11-30 NOTE — ED NOTES
Pt has a note from her doctor stating she has a power port.         Christian Guadarrama RN  11/30/20 3718

## 2020-11-30 NOTE — ED NOTES
Ps Nephrology  Per: SUSHANT Vázquez  Re: hypomagnesemia  Osiel Stewart returned call @ [address]  11/30/20 1024

## 2020-11-30 NOTE — ED PROVIDER NOTES
[facility]  ED  EMERGENCY DEPARTMENT ENCOUNTER        Pt Name: John Greene  MRN: [id number]  [place] 1945  Date of evaluation: 11/30/2020  Provider: Ashley Rubio PA-C  PCP: Ileana Coy  ED Attending: Dorothy Andersen MD      This patient was seen by the attending provider    History provided by the patient    CHIEF COMPLAINT:     Chief Complaint   Patient presents with    Emesis     pt is chemo patient has been vomitting and dehydrated this week. . worse today after chemo treatment     Shortness of Breath     pt has small cell lung cancer       HISTORY OF PRESENT ILLNESS:      John Greene is a 77 y.o. female who arrives to the ED by private vehicle from home. Patient has small cell lung cancer and follows with Dr. Steffanie Lr  Her last chemo treatment was on Thursday, 11/26. For the last 1 week the patient states she is felt ill primarily with nausea and vomiting. She reports feeling dehydrated. She had some diarrhea last week but has not moved her bowels in the last 2 days. She denies any fevers, headaches, chest pain, coughing. She has some chronic shortness of breath that she attributes to her cancer. She states that is no worse than usual today. She denies any abdominal pain. Patient reports that she has a history of recurrent hyponatremia requiring hospitalization. She takes sodium tablets at home and states whenever she tries to take these she seems to aggravate her nausea and vomiting. That was the case this morning. Nursing Notes were reviewed     REVIEW OF SYSTEMS:     Review of Systems   Constitutional: Negative for chills and fever. HENT: Negative. Eyes: Negative. Respiratory: Positive for shortness of breath. Negative for cough. Cardiovascular: Negative for chest pain. Gastrointestinal: Positive for nausea and vomiting. Negative for abdominal pain. Genitourinary: Negative for dysuria. Musculoskeletal: Negative for back pain and neck pain. Skin: Negative for color change. Neurological: Negative for headaches. All other systems reviewed and are negative. Except as noted above in the ROS, all other systems were reviewed and negative. PAST MEDICAL HISTORY:     Past Medical History:   Diagnosis Date    [facility]     Chronic hyponatremia     Dr Yuniel Hanks, [facility] Nephrology    [phone] 08/13/2020    Hyperlipidemia     Lung cancer ([address]          SURGICAL HISTORY:      Past Surgical History:   Procedure Laterality Date    BREAST SURGERY      fibroid tumors removed bilateral breast    BRONCHOSCOPY N/A 5/11/2020    BRONCHOSCOPY ENDOBRONCHIAL ULTRASOUND with ANESTHESIA performed by Albert Flor MD at [address] [place] [address]  5/11/2020    BRONCHOSCOPY/TRANSBRONCHIAL NEEDLE BIOPSY performed by Albert Flor MD at [address] [place] [address]  5/11/2020    BRONCHOSCOPY/TRANSBRONCHIAL NEEDLE BIOPSY ADDL LOBE performed by Albert Flro MD at [address] [place] [address]  5/11/2020    BRONCHOSCOPY DIAGNOSTIC OR CELL [address] ONLY performed by Albert Flor MD at [address] [place] [address] N/A 5/13/2020    RIGHT SUBCLAVIAN VEIN PORT A CATH INSERTION performed by Jania Jesus MD at [address]         CURRENT MEDICATIONS:       Previous Medications    ALBUTEROL SULFATE HFA (PROAIR HFA) 108 (90 BASE) MCG/ACT INHALER    Inhale 2 puffs into the lungs every 4 hours as needed for Wheezing or Shortness of Breath    ATORVASTATIN (LIPITOR) 40 MG TABLET    Take 1 tablet by mouth daily    ONDANSETRON (ZOFRAN ODT) 4 MG DISINTEGRATING TABLET    Take 2 tablets by mouth every 8 hours as needed for Nausea or Vomiting    OXYCODONE-ACETAMINOPHEN (ROXICET) 5-325 MG/5ML SOLUTION    Take by mouth 2 times daily as needed for Pain.     POTASSIUM CHLORIDE (KLOR-CON M) 20 MEQ EXTENDED RELEASE TABLET    Take 1 tablet by mouth 2 times daily    PROMETHAZINE (PHENERGAN) 12.5 MG TABLET Take 1 tablet by mouth every 6 hours as needed for Nausea    SENNA (SENOKOT) 8.6 MG TABLET    Take 1 tablet by mouth 2 times daily    SODIUM CHLORIDE 1 G TABLET    Take 2 tablets by mouth 3 times daily (with meals)    TORSEMIDE (DEMADEX) 10 MG TABLET    Take 1 tablet by mouth daily    UREA (URE-NA) 15 G PACK PACKET    Take 15 g by mouth daily         ALLERGIES:    Patient has no known allergies. FAMILY HISTORY:     History reviewed. No pertinent family history. SOCIAL HISTORY:       Social History     Socioeconomic History    Marital status:       Spouse name: None    Number of children: 1    Years of education: None    Highest education level: None   Occupational History    None   Social Needs    Financial resource strain: None    Food insecurity     Worry: None     Inability: None    Transportation needs     Medical: None     Non-medical: None   Tobacco Use    Smoking status: Former Smoker     Last attempt to quit: 2020     Years since quittin.5    Smokeless tobacco: Never Used   Substance and Sexual Activity    Alcohol use: Never     Frequency: Never    Drug use: Never    Sexual activity: Not Currently   Lifestyle    Physical activity     Days per week: None     Minutes per session: None    Stress: None   Relationships    Social connections     Talks on phone: None     Gets together: None     Attends Congregational service: None     Active member of club or organization: None     Attends meetings of clubs or organizations: None     Relationship status: None    Intimate partner violence     Fear of current or ex partner: None     Emotionally abused: None     Physically abused: None     Forced sexual activity: None   Other Topics Concern    None   Social History Narrative    None       SCREENINGS:             PHYSICAL EXAM:       ED Triage Vitals   BP Temp Temp Source Pulse Resp SpO2 Height Weight   20 0808 20 0808 20 0808 20 0801 20 0808 20 0801 -- --   123/75 98.5 °F (36.9 °C) Oral 106 18 95 %         Physical Exam    CONSTITUTIONAL: Awake and alert. Cooperative. Well-developed. Well-nourished. Non-toxic. No acute distress. HENT: Normocephalic. Atraumatic. External ears normal, without discharge. TMs clear bilaterally. No nasal discharge. Oropharynx clear. Mucous membranes moist.  EYES: Conjunctiva non-injected. No scleral icterus. PERRL. EOM's grossly intact. NECK: Supple. Normal ROM. CARDIOVASCULAR: RRR. No Murmer. Intact distal pulses. PULMONARY/CHEST WALL: Effort normal. No tachypnea. Lungs clear to ausculation. ABDOMEN: Normal BS. Soft. Nondistended. No tenderness to palpate. No guarding. /ANORECTAL: Not assessed  MUSKULOSKELETAL: Normal ROM. No acute deformities. No edema. No tenderness to palpate. SKIN: Warm and dry. No rash. NEUROLOGICAL: Alert and oriented x 3. GCS 15. CN II-XII grossly intact. Strength is 5/5 in all extremities and sensation is intact. Normal gait. Patella DTRs normal and symmetric.   PSYCHIATRIC: Normal affect        DIAGNOSTICRESULTS:     LABS:    Results for orders placed or performed during the hospital encounter of 11/30/20   CBC Auto Differential   Result Value Ref Range    WBC 16.2 (H) 4.0 - 11.0 K/uL    RBC 3.39 (L) 4.00 - 5.20 M/uL    Hemoglobin 10.4 (L) 12.0 - 16.0 g/dL    Hematocrit 31.8 (L) 36.0 - 48.0 %    MCV 93.9 80.0 - 100.0 fL    MCH 30.7 26.0 - 34.0 pg    MCHC 32.7 31.0 - 36.0 g/dL    RDW 15.1 12.4 - 15.4 %    Platelets 438 557 - 521 K/uL    MPV 8.7 5.0 - 10.5 fL    Path Consult No     Neutrophils % 1.0 %    Lymphocytes % 87.0 %    Monocytes % 0.0 %    Eosinophils % 0.0 %    Basophils % 0.0 %    Neutrophils Absolute 0.8 (LL) 1.7 - 7.7 K/uL    Lymphocytes Absolute 15.4 (H) 1.0 - 5.1 K/uL    Monocytes Absolute 0.0 0.0 - 1.3 K/uL    Eosinophils Absolute 0.0 0.0 - 0.6 K/uL    Basophils Absolute 0.0 0.0 - 0.2 K/uL    Bands Relative 4 0 - 7 %    Atypical Lymphocytes Relative 8 (H) 0 - 6 %    Smudge Cells Present (A)     Anisocytosis Occasional (A)     Macrocytes Occasional (A)     Microcytes Occasional (A)     Acanthocytes Occasional (A)     Ovalocytes Occasional (A)    Comprehensive metabolic panel   Result Value Ref Range    Sodium 133 (L) 136 - 145 mmol/L    Potassium 4.0 3.5 - 5.1 mmol/L    Chloride 98 (L) 99 - 110 mmol/L    CO2 26 21 - 32 mmol/L    Anion Gap 9 3 - 16    Glucose 109 (H) 70 - 99 mg/dL    BUN 15 7 - 20 mg/dL    CREATININE 0.9 0.6 - 1.2 mg/dL    GFR Non-African American >60 >60    GFR African American >60 >60    Calcium 8.0 (L) 8.3 - 10.6 mg/dL    Total Protein 5.8 (L) 6.4 - 8.2 g/dL    Alb 3.5 3.4 - 5.0 g/dL    Albumin/Globulin Ratio 1.5 1.1 - 2.2    Total Bilirubin 0.5 0.0 - 1.0 mg/dL    Alkaline Phosphatase 187 (H) 40 - 129 U/L    ALT 52 (H) 10 - 40 U/L    AST 48 (H) 15 - 37 U/L    Globulin 2.3 g/dL   Magnesium   Result Value Ref Range    Magnesium 1.00 (L) 1.80 - 2.40 mg/dL           PROCEDURES:   N/A    CRITICAL CARE TIME:       Due to the immediate potential for life-threatening deterioration due to significant hypomagnesemia requiring IV replacement, I spent 31 minutes providing critical care. This time is excluding time spent performing procedures.       CONSULTS:  IP CONSULT TO HEM/ONC  IP CONSULT TO NEPHROLOGY      EMERGENCY DEPARTMENT COURSE and DIFFERENTIAL DIAGNOSIS/MDM:   Vitals:    Vitals:    11/30/20 0908 11/30/20 1023 11/30/20 1123 11/30/20 1140   BP: 98/70 109/66 83/72 97/67   Pulse: 89 85 89 87   Resp: 15 15 20    Temp:       TempSrc:       SpO2: 95% 95% 96%        Patient was given the following medications:  Medications   magnesium sulfate 4 g in 100 mL IVPB premix (4 g Intravenous New Bag 11/30/20 1001)   0.9 % sodium chloride bolus (0 mLs Intravenous Stopped 11/30/20 1156)   ondansetron (ZOFRAN) injection 4 mg (4 mg Intravenous Given 11/30/20 0901)   oxyCODONE (ROXICODONE) immediate release tablet 5 mg (5 mg Oral Given 11/30/20 1157)         Patient was evaluated by both myself and Kay Teixeira MD.   Old records were reviewed. Patient is here with known lung cancer and ongoing nausea and vomiting, more so since undergoing chemo treatment last week. She denies being in any pain. She has chronic, unchanged shortness of breath that was not short of breath, tachypneic or hypoxic while at rest here in the ED. I ordered a 1 L bolus of normal saline IV on arrival along with Zofran 4 mg IV  CBC reveals white count elevated at 16.2. Her absolute neutrophils are low at 0.8. H&H stable at 10.4 and 31.8 with platelet count 696  CMP reveals just mild hyponatremia 133 and chloride 98. She has a normal potassium. Glucose is 109. BUN 15 creatinine 0.9. Magnesium is low at 1.0. Patient is feeling better after IV fluids and antiemetics. She is tolerating p.o. I ordered 4 g of magnesium over 4 hours to help replace her deficiency. I consulted her nephrologist, Dr. Dustin Owens. From his standpoint he felt comfortable with the patient going home following the IV replacement but recommended discharging patient on magnesium oxide 400 mg daily. I additionally contacted the patient's oncologist, Dr. Shirley Augustine. I spoke with Dr. Shivam Wang, on-call. At this point he to feels comfortable with the patient going home following her 4 g dose of magnesium IV in the ED, fluids and Zofran. I have reassessed the patient twice now. She continues to feel better and is requesting to eat. She asks if we give vegetable soup which is what sounds good to her right now. She is due for dose of oxycodone 5 mg and therefore this is ordered. Patient will be discharged from here upon completion of IV magnesium. I will give her prescriptions for Zofran and magnesium oxide 4 mg daily. She can follow with oncology, primary care and nephrology.     I estimate there is LOW risk for SEPSIS, ACUTE APPENDICITIS, PYELONEPHRITIS, BOWEL OBSTRUCTION, CHOLECYSTITIS, DIVERTICULITIS, INCARCERATED HERNIA, PANCREATITIS, PERFORATED BOWEL or ULCER, thus I consider the discharge disposition reasonable. Also, there is no evidence or peritonitis, sepsis, or toxicity. [facility] and I have discussed the diagnosis and risks, and we agree with discharging home to follow-up with their primary doctor. We also discussed returning to the Emergency Department immediately if new or worsening symptoms occur. We have discussed the symptoms which are most concerning (e.g., bloody stool, fever, changing or worsening pain, vomiting) that necessitate immediate return. FINAL IMPRESSION:      1. Hypomagnesemia    2. Non-intractable vomiting with nausea, unspecified vomiting type    3. Chemotherapy-induced neutropenia (HCC)    4.  Small cell lung cancer ([facility] [address]          DISPOSITION/PLAN:   DISPOSITION     DISCHARGE    PATIENT REFERRED TO:  Sarahi Spring MD  [address] Jun[place] [address] [address] [phone]            DISCHARGE MEDICATIONS:  New Prescriptions    MAGNESIUM OXIDE (MAG-OX) 400 (240 MG) MG TABLET    Take 1 tablet by mouth daily    ONDANSETRON (ZOFRAN ODT) 4 MG DISINTEGRATING TABLET    Take 1 tablet by mouth every 8 hours as needed for Nausea                  (Please note thatportions of this note were completed with a voice recognition program.  Efforts were made to edit the dictations, but occasionally words are mis-transcribed.)    Estrellita Jimenez PA-C (electronicallysigned)              [place], [place]  11/30/20 6824

## 2020-11-30 NOTE — ED PROVIDER NOTES
Emergency Department Attending Provider Note  Location: [facility]  ED  11/30/2020     Patient Identification  Raymon Vann is a 77 y.o. female      Raymon Vnan was evaluated in the Emergency Department for nausea and vomiting. History of lung cancer managed by Dr. Jose E Merrill on chemotherapy last received last week. Physical exam revealed:  Vital signs reviewed  Gen: Alert and oriented, no acute distress  Card: RRR, no murmurs, equal radial pulses  Resp: CBSBL, no wheezes rales or rhonchi  Abd: Soft nontender, nondistended abdomen, no guarding or rebound, no CVA tenderness  Ext: No deformities noted  Neuro: Grossly normal moving extremities equally      EKG Interpretation  Normal sinus rhythm rate of 80 normal axis normal intervals no evidence of conduction abnormalities or diagnostic ischemic changes . Patient seen and evaluated. Relevant records reviewed. 71-year-old female here with nausea vomiting and fatigue. On exam she is overall well-appearing no acute distress, her vitals are overall reassuring systolics over 328. Tolerating p.o. in the ED. Labs significant for hypomagnesemia which was repleted in the ED. Plan for ROHIT discussed case with nephrology, heme-onc and eventually discharge if they are agreeable to outpatient follow-up. I completed a structured, evidence-based clinical evaluation to screen for acute emergencies for the above complaints. Available evidence indicate that the patient is low risk and this is consistent with my clinical intuition. The risk of further workup or hospitalization is likely higher than the likelihood of the patient having a dangerous emergent condition. It is, therefore, in the patients best interest not to do additional emergent testing. At this point I do not feel the patient requires further work up and it is reasonable to discharge the patient.  I had a discussion with the patient and/or their surrogate regarding

## 2020-12-08 ENCOUNTER — HOSPITAL ENCOUNTER (OUTPATIENT)
Dept: CT IMAGING | Age: 75
Discharge: HOME | End: 2020-12-08
Payer: MEDICARE

## 2020-12-08 PROCEDURE — 74177 CT ABD & PELVIS W/CONTRAST: CPT

## 2020-12-08 PROCEDURE — 71260 CT THORAX DX C+: CPT

## 2020-12-08 PROCEDURE — 6360000004 HC RX CONTRAST MEDICATION: Performed by: INTERNAL MEDICINE

## 2020-12-08 RX ADMIN — IOPAMIDOL 100 ML: 755 INJECTION, SOLUTION INTRAVENOUS at 16:53

## 2020-12-08 RX ADMIN — IOHEXOL 50 ML: 240 INJECTION, SOLUTION INTRATHECAL; INTRAVASCULAR; INTRAVENOUS; ORAL at 16:52

## 2020-12-10 ENCOUNTER — HOSPITAL ENCOUNTER (OUTPATIENT)
Dept: MRI IMAGING | Age: 75
Discharge: HOME | End: 2020-12-10
Payer: MEDICARE

## 2020-12-10 PROCEDURE — A9579 GAD-BASE MR CONTRAST NOS,1ML: HCPCS | Performed by: NURSE PRACTITIONER

## 2020-12-10 PROCEDURE — 70553 MRI BRAIN STEM W/O & W/DYE: CPT

## 2020-12-10 PROCEDURE — 6360000004 HC RX CONTRAST MEDICATION: Performed by: NURSE PRACTITIONER

## 2020-12-10 RX ADMIN — GADOTERIDOL 10 ML: 279.3 INJECTION, SOLUTION INTRAVENOUS at 13:36

## 2020-12-13 ENCOUNTER — HOSPITAL ENCOUNTER (INPATIENT)
Age: 75
LOS: 2 days | Discharge: HOME | DRG: 189 | End: 2020-12-16
Attending: EMERGENCY MEDICINE | Admitting: INTERNAL MEDICINE
Payer: MEDICARE

## 2020-12-13 PROCEDURE — 99284 EMERGENCY DEPT VISIT MOD MDM: CPT

## 2020-12-13 NOTE — Clinical Note
Patient Class: Inpatient [101]   REQUIRED: Diagnosis: Acute respiratory failure with hypoxia ([facility] [address] [[id number]]   Estimated Length of Stay: Estimated stay of more than 2 midnights   Admitting Provider:  Jennifer Greer [[id number]]   Telemetry Bed Required?: Yes

## 2020-12-14 ENCOUNTER — APPOINTMENT (OUTPATIENT)
Dept: GENERAL RADIOLOGY | Age: 75
DRG: 189 | End: 2020-12-14
Payer: MEDICARE

## 2020-12-14 ENCOUNTER — APPOINTMENT (OUTPATIENT)
Dept: CT IMAGING | Age: 75
DRG: 189 | End: 2020-12-14
Payer: MEDICARE

## 2020-12-14 PROBLEM — C91.10 CLL (CHRONIC LYMPHOCYTIC LEUKEMIA) (HCC): Chronic | Status: ACTIVE | Noted: 2020-12-14

## 2020-12-14 LAB
A/G RATIO: 1 (ref 1.1–2.2)
ALBUMIN SERPL-MCNC: 3.1 G/DL (ref 3.4–5)
ALP BLD-CCNC: 498 U/L (ref 40–129)
ALT SERPL-CCNC: 48 U/L (ref 10–40)
ANION GAP SERPL CALCULATED.3IONS-SCNC: 9 MMOL/L (ref 3–16)
ANISOCYTOSIS: ABNORMAL
AST SERPL-CCNC: 51 U/L (ref 15–37)
ATYPICAL LYMPHOCYTE RELATIVE PERCENT: 3 % (ref 0–6)
BANDED NEUTROPHILS RELATIVE PERCENT: 13 % (ref 0–7)
BASE EXCESS ARTERIAL: 0.6 MMOL/L (ref -3–3)
BASE EXCESS VENOUS: 1.8 MMOL/L (ref -3–3)
BASOPHILS ABSOLUTE: 0 K/UL (ref 0–0.2)
BASOPHILS RELATIVE PERCENT: 0 %
BILIRUB SERPL-MCNC: 0.8 MG/DL (ref 0–1)
BILIRUBIN URINE: NEGATIVE
BLOOD, URINE: NEGATIVE
BUN BLDV-MCNC: 24 MG/DL (ref 7–20)
CALCIUM SERPL-MCNC: 8.4 MG/DL (ref 8.3–10.6)
CARBOXYHEMOGLOBIN ARTERIAL: 0.3 % (ref 0–1.5)
CARBOXYHEMOGLOBIN: 2 % (ref 0–1.5)
CHLORIDE BLD-SCNC: 106 MMOL/L (ref 99–110)
CLARITY: CLEAR
CO2: 28 MMOL/L (ref 21–32)
COLOR: YELLOW
CREAT SERPL-MCNC: 1.1 MG/DL (ref 0.6–1.2)
EKG ATRIAL RATE: 94 BPM
EKG DIAGNOSIS: NORMAL
EKG P AXIS: 69 DEGREES
EKG P-R INTERVAL: 134 MS
EKG Q-T INTERVAL: 358 MS
EKG QRS DURATION: 84 MS
EKG QTC CALCULATION (BAZETT): 447 MS
EKG R AXIS: 48 DEGREES
EKG T AXIS: 75 DEGREES
EKG VENTRICULAR RATE: 94 BPM
EOSINOPHILS ABSOLUTE: 0 K/UL (ref 0–0.6)
EOSINOPHILS RELATIVE PERCENT: 0 %
GFR AFRICAN AMERICAN: 59
GFR NON-AFRICAN AMERICAN: 48
GLOBULIN: 3 G/DL
GLUCOSE BLD-MCNC: 119 MG/DL (ref 70–99)
GLUCOSE URINE: NEGATIVE MG/DL
HCO3 ARTERIAL: 24.8 MMOL/L (ref 21–29)
HCO3 VENOUS: 28.1 MMOL/L (ref 23–29)
HCT VFR BLD CALC: 34.7 % (ref 36–48)
HEMATOLOGY PATH CONSULT: NO
HEMOGLOBIN, ART, EXTENDED: 10.7 G/DL (ref 12–16)
HEMOGLOBIN: 10.7 G/DL (ref 12–16)
KETONES, URINE: NEGATIVE MG/DL
L. PNEUMOPHILA SEROGP 1 UR AG: NORMAL
LACTIC ACID: 1.1 MMOL/L (ref 0.4–2)
LACTIC ACID: 1.5 MMOL/L (ref 0.4–2)
LEUKOCYTE ESTERASE, URINE: NEGATIVE
LYMPHOCYTES ABSOLUTE: 42.5 K/UL (ref 1–5.1)
LYMPHOCYTES RELATIVE PERCENT: 64 %
MAGNESIUM: 1.9 MG/DL (ref 1.8–2.4)
MCH RBC QN AUTO: 29.2 PG (ref 26–34)
MCHC RBC AUTO-ENTMCNC: 30.9 G/DL (ref 31–36)
MCV RBC AUTO: 94.7 FL (ref 80–100)
METAMYELOCYTES RELATIVE PERCENT: 1 %
METHEMOGLOBIN ARTERIAL: 0.8 %
METHEMOGLOBIN VENOUS: 0.4 %
MICROSCOPIC EXAMINATION: NORMAL
MONOCYTES ABSOLUTE: 1.3 K/UL (ref 0–1.3)
MONOCYTES RELATIVE PERCENT: 2 %
MONONUCLEAR UNIDENTIFIED CELLS: 4 %
NEUTROPHILS ABSOLUTE: 17.1 K/UL (ref 1.7–7.7)
NEUTROPHILS RELATIVE PERCENT: 13 %
NITRITE, URINE: NEGATIVE
O2 CONTENT ARTERIAL: 14 ML/DL
O2 CONTENT, VEN: 13 VOL %
O2 SAT, ARTERIAL: 93.3 %
O2 SAT, VEN: 81 %
O2 THERAPY: ABNORMAL
O2 THERAPY: ABNORMAL
PCO2 ARTERIAL: 38 MMHG (ref 35–45)
PCO2, VEN: 51.5 MMHG (ref 40–50)
PDW BLD-RTO: 16.4 % (ref 12.4–15.4)
PH ARTERIAL: 7.43 (ref 7.35–7.45)
PH UA: 5.5 (ref 5–8)
PH VENOUS: 7.35 (ref 7.35–7.45)
PLATELET # BLD: 444 K/UL (ref 135–450)
PLATELET SLIDE REVIEW: ABNORMAL
PMV BLD AUTO: 8.3 FL (ref 5–10.5)
PO2 ARTERIAL: 69.5 MMHG (ref 75–108)
PO2, VEN: 48.7 MMHG (ref 25–40)
POTASSIUM SERPL-SCNC: 3.4 MMOL/L (ref 3.5–5.1)
PRO-BNP: 627 PG/ML (ref 0–449)
PROTEIN UA: NEGATIVE MG/DL
RAPID INFLUENZA  B AGN: NEGATIVE
RAPID INFLUENZA A AGN: NEGATIVE
RBC # BLD: 3.67 M/UL (ref 4–5.2)
SARS-COV-2, NAAT: NOT DETECTED
SLIDE REVIEW: ABNORMAL
SMUDGE CELLS: PRESENT
SODIUM BLD-SCNC: 143 MMOL/L (ref 136–145)
SPECIFIC GRAVITY UA: 1.01 (ref 1–1.03)
SPECIMEN STATUS: NORMAL
STREP PNEUMONIAE ANTIGEN, URINE: NORMAL
TCO2 ARTERIAL: 25.9 MMOL/L
TCO2 CALC VENOUS: 30 MMOL/L
TOTAL PROTEIN: 6.1 G/DL (ref 6.4–8.2)
TROPONIN: <0.01 NG/ML
URINE REFLEX TO CULTURE: NORMAL
URINE TYPE: NORMAL
UROBILINOGEN, URINE: 0.2 E.U./DL
WBC # BLD: 63.5 K/UL (ref 4–11)

## 2020-12-14 PROCEDURE — 6360000002 HC RX W HCPCS: Performed by: INTERNAL MEDICINE

## 2020-12-14 PROCEDURE — 83605 ASSAY OF LACTIC ACID: CPT

## 2020-12-14 PROCEDURE — 71260 CT THORAX DX C+: CPT

## 2020-12-14 PROCEDURE — 6370000000 HC RX 637 (ALT 250 FOR IP): Performed by: INTERNAL MEDICINE

## 2020-12-14 PROCEDURE — 83735 ASSAY OF MAGNESIUM: CPT

## 2020-12-14 PROCEDURE — U0002 COVID-19 LAB TEST NON-CDC: HCPCS

## 2020-12-14 PROCEDURE — 87804 INFLUENZA ASSAY W/OPTIC: CPT

## 2020-12-14 PROCEDURE — 96361 HYDRATE IV INFUSION ADD-ON: CPT

## 2020-12-14 PROCEDURE — 2580000003 HC RX 258: Performed by: EMERGENCY MEDICINE

## 2020-12-14 PROCEDURE — 2700000000 HC OXYGEN THERAPY PER DAY

## 2020-12-14 PROCEDURE — 82803 BLOOD GASES ANY COMBINATION: CPT

## 2020-12-14 PROCEDURE — 83880 ASSAY OF NATRIURETIC PEPTIDE: CPT

## 2020-12-14 PROCEDURE — 2580000003 HC RX 258: Performed by: INTERNAL MEDICINE

## 2020-12-14 PROCEDURE — 85025 COMPLETE CBC W/AUTO DIFF WBC: CPT

## 2020-12-14 PROCEDURE — 94640 AIRWAY INHALATION TREATMENT: CPT

## 2020-12-14 PROCEDURE — 1200000000 HC SEMI PRIVATE

## 2020-12-14 PROCEDURE — 71045 X-RAY EXAM CHEST 1 VIEW: CPT

## 2020-12-14 PROCEDURE — 6360000002 HC RX W HCPCS: Performed by: EMERGENCY MEDICINE

## 2020-12-14 PROCEDURE — 80053 COMPREHEN METABOLIC PANEL: CPT

## 2020-12-14 PROCEDURE — 96374 THER/PROPH/DIAG INJ IV PUSH: CPT

## 2020-12-14 PROCEDURE — 6360000004 HC RX CONTRAST MEDICATION: Performed by: EMERGENCY MEDICINE

## 2020-12-14 PROCEDURE — 6370000000 HC RX 637 (ALT 250 FOR IP): Performed by: EMERGENCY MEDICINE

## 2020-12-14 PROCEDURE — 84484 ASSAY OF TROPONIN QUANT: CPT

## 2020-12-14 PROCEDURE — 87449 NOS EACH ORGANISM AG IA: CPT

## 2020-12-14 PROCEDURE — 94761 N-INVAS EAR/PLS OXIMETRY MLT: CPT

## 2020-12-14 PROCEDURE — 93005 ELECTROCARDIOGRAM TRACING: CPT | Performed by: EMERGENCY MEDICINE

## 2020-12-14 PROCEDURE — 81003 URINALYSIS AUTO W/O SCOPE: CPT

## 2020-12-14 PROCEDURE — 36415 COLL VENOUS BLD VENIPUNCTURE: CPT

## 2020-12-14 PROCEDURE — 93010 ELECTROCARDIOGRAM REPORT: CPT | Performed by: INTERNAL MEDICINE

## 2020-12-14 PROCEDURE — 87040 BLOOD CULTURE FOR BACTERIA: CPT

## 2020-12-14 RX ORDER — ACETAMINOPHEN 650 MG/1
650 SUPPOSITORY RECTAL EVERY 6 HOURS PRN
Status: DISCONTINUED | OUTPATIENT
Start: 2020-12-14 | End: 2020-12-16 | Stop reason: HOSPADM

## 2020-12-14 RX ORDER — PROMETHAZINE HYDROCHLORIDE 25 MG/1
12.5 TABLET ORAL EVERY 4 HOURS PRN
Status: DISCONTINUED | OUTPATIENT
Start: 2020-12-14 | End: 2020-12-16 | Stop reason: HOSPADM

## 2020-12-14 RX ORDER — ONDANSETRON 2 MG/ML
4 INJECTION INTRAMUSCULAR; INTRAVENOUS EVERY 4 HOURS PRN
Status: DISCONTINUED | OUTPATIENT
Start: 2020-12-14 | End: 2020-12-16 | Stop reason: HOSPADM

## 2020-12-14 RX ORDER — ALBUTEROL SULFATE 2.5 MG/3ML
2.5 SOLUTION RESPIRATORY (INHALATION)
Status: DISCONTINUED | OUTPATIENT
Start: 2020-12-14 | End: 2020-12-16 | Stop reason: HOSPADM

## 2020-12-14 RX ORDER — ATORVASTATIN CALCIUM 10 MG/1
20 TABLET, FILM COATED ORAL NIGHTLY
Status: DISCONTINUED | OUTPATIENT
Start: 2020-12-14 | End: 2020-12-16 | Stop reason: HOSPADM

## 2020-12-14 RX ORDER — SODIUM CHLORIDE, SODIUM LACTATE, POTASSIUM CHLORIDE, CALCIUM CHLORIDE 600; 310; 30; 20 MG/100ML; MG/100ML; MG/100ML; MG/100ML
INJECTION, SOLUTION INTRAVENOUS CONTINUOUS
Status: DISCONTINUED | OUTPATIENT
Start: 2020-12-14 | End: 2020-12-15

## 2020-12-14 RX ORDER — OXYCODONE HYDROCHLORIDE AND ACETAMINOPHEN 5; 325 MG/1; MG/1
1 TABLET ORAL EVERY 4 HOURS PRN
Status: DISCONTINUED | OUTPATIENT
Start: 2020-12-14 | End: 2020-12-16 | Stop reason: HOSPADM

## 2020-12-14 RX ORDER — IPRATROPIUM BROMIDE AND ALBUTEROL SULFATE 2.5; .5 MG/3ML; MG/3ML
1 SOLUTION RESPIRATORY (INHALATION) ONCE
Status: COMPLETED | OUTPATIENT
Start: 2020-12-14 | End: 2020-12-14

## 2020-12-14 RX ORDER — SODIUM CHLORIDE 0.9 % (FLUSH) 0.9 %
10 SYRINGE (ML) INJECTION PRN
Status: DISCONTINUED | OUTPATIENT
Start: 2020-12-14 | End: 2020-12-16 | Stop reason: HOSPADM

## 2020-12-14 RX ORDER — POTASSIUM CHLORIDE 20 MEQ/1
20 TABLET, EXTENDED RELEASE ORAL 2 TIMES DAILY
Status: DISCONTINUED | OUTPATIENT
Start: 2020-12-14 | End: 2020-12-16 | Stop reason: HOSPADM

## 2020-12-14 RX ORDER — POTASSIUM CHLORIDE 20 MEQ/1
40 TABLET, EXTENDED RELEASE ORAL PRN
Status: DISCONTINUED | OUTPATIENT
Start: 2020-12-14 | End: 2020-12-16

## 2020-12-14 RX ORDER — ONDANSETRON 2 MG/ML
4 INJECTION INTRAMUSCULAR; INTRAVENOUS ONCE
Status: COMPLETED | OUTPATIENT
Start: 2020-12-14 | End: 2020-12-14

## 2020-12-14 RX ORDER — IPRATROPIUM BROMIDE AND ALBUTEROL SULFATE 2.5; .5 MG/3ML; MG/3ML
1 SOLUTION RESPIRATORY (INHALATION) 4 TIMES DAILY
Status: DISCONTINUED | OUTPATIENT
Start: 2020-12-14 | End: 2020-12-15

## 2020-12-14 RX ORDER — METHYLPREDNISOLONE SODIUM SUCCINATE 40 MG/ML
40 INJECTION, POWDER, LYOPHILIZED, FOR SOLUTION INTRAMUSCULAR; INTRAVENOUS EVERY 12 HOURS
Status: DISCONTINUED | OUTPATIENT
Start: 2020-12-14 | End: 2020-12-15

## 2020-12-14 RX ORDER — ACETAMINOPHEN 325 MG/1
650 TABLET ORAL EVERY 6 HOURS PRN
Status: DISCONTINUED | OUTPATIENT
Start: 2020-12-14 | End: 2020-12-16 | Stop reason: HOSPADM

## 2020-12-14 RX ORDER — DEXAMETHASONE SODIUM PHOSPHATE 10 MG/ML
8 INJECTION INTRAMUSCULAR; INTRAVENOUS ONCE
Status: COMPLETED | OUTPATIENT
Start: 2020-12-14 | End: 2020-12-14

## 2020-12-14 RX ORDER — IPRATROPIUM BROMIDE AND ALBUTEROL SULFATE 2.5; .5 MG/3ML; MG/3ML
2 SOLUTION RESPIRATORY (INHALATION) ONCE
Status: COMPLETED | OUTPATIENT
Start: 2020-12-14 | End: 2020-12-14

## 2020-12-14 RX ORDER — SODIUM CHLORIDE 1000 MG
1 TABLET, SOLUBLE MISCELLANEOUS
Status: DISCONTINUED | OUTPATIENT
Start: 2020-12-14 | End: 2020-12-16 | Stop reason: HOSPADM

## 2020-12-14 RX ORDER — ONDANSETRON 4 MG/1
4 TABLET, ORALLY DISINTEGRATING ORAL EVERY 4 HOURS PRN
Status: DISCONTINUED | OUTPATIENT
Start: 2020-12-14 | End: 2020-12-16 | Stop reason: HOSPADM

## 2020-12-14 RX ORDER — PROMETHAZINE HYDROCHLORIDE 25 MG/ML
12.5 INJECTION, SOLUTION INTRAMUSCULAR; INTRAVENOUS EVERY 4 HOURS PRN
Status: DISCONTINUED | OUTPATIENT
Start: 2020-12-14 | End: 2020-12-16 | Stop reason: HOSPADM

## 2020-12-14 RX ORDER — SENNA PLUS 8.6 MG/1
1 TABLET ORAL 2 TIMES DAILY
Status: DISCONTINUED | OUTPATIENT
Start: 2020-12-14 | End: 2020-12-16 | Stop reason: HOSPADM

## 2020-12-14 RX ORDER — TORSEMIDE 20 MG/1
10 TABLET ORAL DAILY
Status: DISCONTINUED | OUTPATIENT
Start: 2020-12-14 | End: 2020-12-16 | Stop reason: HOSPADM

## 2020-12-14 RX ORDER — MAGNESIUM SULFATE 1 G/100ML
1 INJECTION INTRAVENOUS PRN
Status: DISCONTINUED | OUTPATIENT
Start: 2020-12-14 | End: 2020-12-16

## 2020-12-14 RX ORDER — PROMETHAZINE HYDROCHLORIDE 6.25 MG/5ML
12.5 SYRUP ORAL EVERY 4 HOURS PRN
Status: DISCONTINUED | OUTPATIENT
Start: 2020-12-14 | End: 2020-12-16 | Stop reason: HOSPADM

## 2020-12-14 RX ORDER — OXYCODONE HYDROCHLORIDE 5 MG/1
10 TABLET ORAL 3 TIMES DAILY
Status: DISCONTINUED | OUTPATIENT
Start: 2020-12-14 | End: 2020-12-16 | Stop reason: HOSPADM

## 2020-12-14 RX ORDER — 0.9 % SODIUM CHLORIDE 0.9 %
500 INTRAVENOUS SOLUTION INTRAVENOUS ONCE
Status: COMPLETED | OUTPATIENT
Start: 2020-12-14 | End: 2020-12-14

## 2020-12-14 RX ORDER — SODIUM CHLORIDE 1000 MG
2 TABLET, SOLUBLE MISCELLANEOUS
Status: DISCONTINUED | OUTPATIENT
Start: 2020-12-14 | End: 2020-12-14 | Stop reason: DRUGHIGH

## 2020-12-14 RX ORDER — LORAZEPAM 2 MG/ML
1 INJECTION INTRAMUSCULAR ONCE
Status: COMPLETED | OUTPATIENT
Start: 2020-12-14 | End: 2020-12-14

## 2020-12-14 RX ORDER — OXYCODONE HYDROCHLORIDE 5 MG/1
10 TABLET ORAL EVERY 4 HOURS PRN
Status: DISCONTINUED | OUTPATIENT
Start: 2020-12-14 | End: 2020-12-16 | Stop reason: HOSPADM

## 2020-12-14 RX ORDER — POTASSIUM CHLORIDE 7.45 MG/ML
10 INJECTION INTRAVENOUS PRN
Status: DISCONTINUED | OUTPATIENT
Start: 2020-12-14 | End: 2020-12-16

## 2020-12-14 RX ADMIN — POTASSIUM CHLORIDE 20 MEQ: 20 TABLET, EXTENDED RELEASE ORAL at 22:46

## 2020-12-14 RX ADMIN — CEFEPIME 2 G: 2 INJECTION, POWDER, FOR SOLUTION INTRAVENOUS at 18:14

## 2020-12-14 RX ADMIN — METHYLPREDNISOLONE SODIUM SUCCINATE 40 MG: 40 INJECTION, POWDER, FOR SOLUTION INTRAMUSCULAR; INTRAVENOUS at 15:40

## 2020-12-14 RX ADMIN — DEXAMETHASONE SODIUM PHOSPHATE 8 MG: 10 INJECTION INTRAMUSCULAR; INTRAVENOUS at 04:12

## 2020-12-14 RX ADMIN — ENOXAPARIN SODIUM 40 MG: 40 INJECTION SUBCUTANEOUS at 10:28

## 2020-12-14 RX ADMIN — SODIUM CHLORIDE, POTASSIUM CHLORIDE, SODIUM LACTATE AND CALCIUM CHLORIDE 1000 ML: 600; 310; 30; 20 INJECTION, SOLUTION INTRAVENOUS at 06:56

## 2020-12-14 RX ADMIN — SODIUM CHLORIDE 500 ML: 9 INJECTION, SOLUTION INTRAVENOUS at 01:03

## 2020-12-14 RX ADMIN — TORSEMIDE 10 MG: 20 TABLET ORAL at 17:59

## 2020-12-14 RX ADMIN — Medication 2 G: at 10:28

## 2020-12-14 RX ADMIN — CEFEPIME 2 G: 2 INJECTION, POWDER, FOR SOLUTION INTRAVENOUS at 07:26

## 2020-12-14 RX ADMIN — SENNOSIDES 8.6 MG: 8.6 TABLET, FILM COATED ORAL at 22:42

## 2020-12-14 RX ADMIN — ATORVASTATIN CALCIUM 20 MG: 10 TABLET, FILM COATED ORAL at 22:42

## 2020-12-14 RX ADMIN — IPRATROPIUM BROMIDE AND ALBUTEROL SULFATE 1 AMPULE: .5; 3 SOLUTION RESPIRATORY (INHALATION) at 19:21

## 2020-12-14 RX ADMIN — Medication 10 ML: at 06:57

## 2020-12-14 RX ADMIN — LORAZEPAM 1 MG: 2 INJECTION, SOLUTION INTRAMUSCULAR; INTRAVENOUS at 04:12

## 2020-12-14 RX ADMIN — MAGNESIUM GLUCONATE 500 MG ORAL TABLET 400 MG: 500 TABLET ORAL at 15:41

## 2020-12-14 RX ADMIN — SODIUM CHLORIDE, POTASSIUM CHLORIDE, SODIUM LACTATE AND CALCIUM CHLORIDE: 600; 310; 30; 20 INJECTION, SOLUTION INTRAVENOUS at 19:38

## 2020-12-14 RX ADMIN — IPRATROPIUM BROMIDE AND ALBUTEROL SULFATE 1 AMPULE: .5; 3 SOLUTION RESPIRATORY (INHALATION) at 02:15

## 2020-12-14 RX ADMIN — IPRATROPIUM BROMIDE AND ALBUTEROL SULFATE 1 AMPULE: .5; 3 SOLUTION RESPIRATORY (INHALATION) at 08:21

## 2020-12-14 RX ADMIN — VANCOMYCIN HYDROCHLORIDE 750 MG: 750 INJECTION, POWDER, LYOPHILIZED, FOR SOLUTION INTRAVENOUS at 08:03

## 2020-12-14 RX ADMIN — IOPAMIDOL 75 ML: 755 INJECTION, SOLUTION INTRAVENOUS at 03:07

## 2020-12-14 RX ADMIN — IPRATROPIUM BROMIDE AND ALBUTEROL SULFATE 1 AMPULE: .5; 3 SOLUTION RESPIRATORY (INHALATION) at 15:31

## 2020-12-14 RX ADMIN — OXYCODONE 10 MG: 5 TABLET ORAL at 15:40

## 2020-12-14 RX ADMIN — ONDANSETRON 4 MG: 2 INJECTION INTRAMUSCULAR; INTRAVENOUS at 01:03

## 2020-12-14 RX ADMIN — Medication 1 G: at 17:59

## 2020-12-14 RX ADMIN — IPRATROPIUM BROMIDE AND ALBUTEROL SULFATE 2 AMPULE: .5; 3 SOLUTION RESPIRATORY (INHALATION) at 04:33

## 2020-12-14 RX ADMIN — IPRATROPIUM BROMIDE AND ALBUTEROL SULFATE 1 AMPULE: .5; 3 SOLUTION RESPIRATORY (INHALATION) at 12:20

## 2020-12-14 ASSESSMENT — ENCOUNTER SYMPTOMS
ABDOMINAL PAIN: 0
COUGH: 1
NAUSEA: 1
BACK PAIN: 0
DIARRHEA: 0
SORE THROAT: 1
TROUBLE SWALLOWING: 1
VOMITING: 1
SHORTNESS OF BREATH: 1

## 2020-12-14 ASSESSMENT — PAIN SCALES - GENERAL
PAINLEVEL_OUTOF10: 5
PAINLEVEL_OUTOF10: 4

## 2020-12-14 NOTE — CONSULTS
Pharmacy to Dose Vancomycin    Dx: HAP  Goal trough = 15-20  Pt wt = 61 kg  Recent Labs     12/14/20  0028   CREATININE 1.1     Recent Labs     12/14/20  0028   WBC 63.5*     Start Vancomycin 750 mg IV q24h.   Vancomycin trough: 12/17 0600  Marcos Matthews 12/14/2020 5:42 AM

## 2020-12-14 NOTE — ED NOTES
Patient identified as a positive fall risk on the ED triage fall screening. Patient placed in fall precautions which includes:  yellow fall risk bracelet on wrist, yellow socks on feet, \"Be Safe\" sign placed on patient's door, and bed alarm placed under patient/alarm turned on. Patient instructed on importance of not getting out of bed or ambulating without assistance for safety.           Mariam Almanza, [address] [place] [address]  12/14/20 7265

## 2020-12-14 NOTE — H&P
Hospital Medicine History & Physical      Patient:  Shubham Betts  :   1945  MRN:   [id number]  Date of Service: 20    CHIEF COMPLAINT:  Dyspnea, vomiting    HISTORY OF PRESENT ILLNESS:    Shubham Betts is a 77 y.o. female. Patient has active CLL and small cell carcinoma of the lung for which she is receiving chemotherapy. She presented for dyspnea w/ an O2 saturation of 74% on room air. At that time she reported she had been vomiting for the past two days. She does not usually require supplemental O2. At the time of my interview patient was very somnolent. She was transiently arousable but too drowsy to answer questions. History was obtained via chart review. Review of Systems:  All pertinent positives and negatives are as noted in the HPI section. All other systems were reviewed and are negative.     Past Medical History:   Diagnosis Date    Cancer [facility])     Chronic hyponatremia     Dr Tiffanie Mehta, [facility]    [phone]    Hyperlipidemia     Lung cancer [facility])        Past Surgical History:   Procedure Laterality Date    BREAST SURGERY      fibroid tumors removed bilateral breast    BRONCHOSCOPY N/A 2020    BRONCHOSCOPY ENDOBRONCHIAL ULTRASOUND with ANESTHESIA performed by Ellie Elena MD at [address] [place] [address]  2020    BRONCHOSCOPY/TRANSBRONCHIAL NEEDLE BIOPSY performed by Ellie Elena MD at [address] [place] [address]  2020    BRONCHOSCOPY/TRANSBRONCHIAL NEEDLE BIOPSY ADDL LOBE performed by Ellie Elena MD at [address] [place] [address]  2020    BRONCHOSCOPY DIAGNOSTIC OR CELL [address] ONLY performed by Ellie Elena MD at [facility] E N/A 2020    RIGHT SUBCLAVIAN VEIN PORT A CATH INSERTION performed by Warren Madison MD at [address]      mid chest         Prior to Admission medications Medication Sig Start Date End Date Taking? Authorizing Provider   ondansetron (ZOFRAN ODT) 4 MG disintegrating tablet Take 1 tablet by mouth every 8 hours as needed for Nausea 11/30/20   SUSHANT Pat   magnesium oxide (MAG-OX) 400 (240 Mg) MG tablet Take 1 tablet by mouth daily 11/30/20   SUSHANT Pat   torsemide (DEMADEX) 10 MG tablet Take 1 tablet by mouth daily 10/20/20   Adrienne Antunez MD   urea (URE-NA) 15 g PACK packet Take 15 g by mouth daily 10/20/20 1/18/21  Adrienne Antunez MD   sodium chloride 1 g tablet Take 2 tablets by mouth 3 times daily (with meals) 9/29/20   DEVORAH Murillo CNP   oxyCODONE-acetaminophen (ROXICET) 5-325 MG/5ML solution Take by mouth 2 times daily as needed for Pain. Historical Provider, MD   senna (SENOKOT) 8.6 MG tablet Take 1 tablet by mouth 2 times daily    Historical Provider, MD   potassium chloride (KLOR-CON M) 20 MEQ extended release tablet Take 1 tablet by mouth 2 times daily 5/18/20   Ghulam Gillespie MD   albuterol sulfate HFA (PROAIR HFA) 108 (90 Base) MCG/ACT inhaler Inhale 2 puffs into the lungs every 4 hours as needed for Wheezing or Shortness of Breath 5/18/20   [place] Vivian, APRN - CNP   ondansetron (ZOFRAN ODT) 4 MG disintegrating tablet Take 2 tablets by mouth every 8 hours as needed for Nausea or Vomiting 5/18/20   [place] Vivian, APRN - CNP   promethazine (PHENERGAN) 12.5 MG tablet Take 1 tablet by mouth every 6 hours as needed for Nausea 5/18/20   [place] Vivian, APRN - CNP   atorvastatin (LIPITOR) 40 MG tablet Take 1 tablet by mouth daily 5/18/20   [place] Vivian, APRN - CNP       Allergies:   Patient has no known allergies. Social:   reports that she quit smoking about 7 months ago. She has never used smokeless tobacco.   reports no history of alcohol use. Social History     Substance and Sexual Activity   Drug Use Never       History reviewed. No pertinent family history.     PHYSICAL EXAM: I performed this physical examination. Vitals:  Patient Vitals for the past 24 hrs:   BP Temp Temp src Pulse Resp SpO2 Height Weight   12/14/20 0310 114/64   102 16 92 %     12/14/20 0215 102/71   87 14 93 %     12/14/20 0145 103/73   85 14 96 %     12/14/20 0115 122/79   89 15 94 %     12/14/20 0002 137/85 98.8 °F (37.1 °C) Oral 117 18 (!) 74 % 5' 3\" (1.6 m) 134 lb 6.4 oz (61 kg)   12/13/20 2358    89  (!) 72 %         Intake/Output Summary (Last 24 hours) at 12/14/2020 0406  Last data filed at 12/14/2020 0147  Gross per 24 hour   Intake 500 ml   Output    Net 500 ml       8L/min HFNC    GEN:  Appearance:  Chronically ill appearing female . Level of Consciousness:  Somnolent. Transiently arousable . Orientation:  Does not answer    HEENT: Sclera anicteric.  no conjunctival chemosis. tacky mucus membranes. no specific or diagnostic oral lesions. NECK:  no signs of meningismus. Jugular veins not distended. Carotid pulses  2+.  no cervical lymphadenopathy. no thyromegaly. CV:  regular rhythm. normal S1 & S2.    no murmur. no rub.  no gallop. CHEST: Right upper chest port    PULM:  Chest excursion is symmetric. Breath sounds are generally vesicular. Adventitious sounds: Inspiratory crackles and wheezes  over both bases left > right    AB:  Abdominal shape is normal.  Bowel sounds are active. Generally soft to palpation. no tenderness is present. no involuntary guarding. no rebound guarding. EXTR:  Skin is warm. Capillary refill brisk. no specific or pathognomic rash. no clubbing. no pitting edema. no active wound or ulcer.     LABS:  Lab Results   Component Value Date    WBC 63.5 (HH) 12/14/2020    HGB 10.7 (L) 12/14/2020    HCT 34.7 (L) 12/14/2020    MCV 94.7 12/14/2020     12/14/2020     Lab Results   Component Value Date    CREATININE 1.1 12/14/2020    BUN 24 (H) 12/14/2020     12/14/2020    K 3.4 (L) 12/14/2020  12/14/2020    CO2 28 12/14/2020     Lab Results   Component Value Date    ALT 48 (H) 12/14/2020    AST 51 (H) 12/14/2020    ALKPHOS 498 (H) 12/14/2020    BILITOT 0.8 12/14/2020     Lab Results   Component Value Date    TROPONINI <0.01 12/14/2020     No results for input(s): PHART, HPL2UXB, PO2ART in the last 72 hours.     IMAGING:  Ct Chest W Contrast    Result Date: 12/9/2020 EXAMINATION: CT OF THE ABDOMEN AND PELVIS WITH CONTRAST; CT OF THE CHEST WITH CONTRAST 12/8/2020 4:42 pm TECHNIQUE: CT of the abdomen and pelvis was performed with the administration of intravenous contrast. Multiplanar reformatted images are provided for review. Dose modulation, iterative reconstruction, and/or weight based adjustment of the mA/kV was utilized to reduce the radiation dose to as low as reasonably achievable.; CT of the chest was performed with the administration of intravenous contrast. Multiplanar reformatted images are provided for review. Dose modulation, iterative reconstruction, and/or weight based adjustment of the mA/kV was utilized to reduce the radiation dose to as low as reasonably achievable. COMPARISON: None. HISTORY: ORDERING SYSTEM PROVIDED HISTORY: Small cell lung cancer ([facility] TECHNOLOGIST PROVIDED HISTORY: Additional Contrast?->Oral Reason for exam:->cancer fu Reason for Exam: f/u small cell lung ca Acuity: Unknown Type of Exam: Subsequent/Follow-up CHEST: Lungs/pleura: The central airways are patent. 7 mm left lower lobe nodule is again identified. Few additional scattered tiny nodules are also unchanged. Scattered areas of tree-in-bud type opacity have overall improved. These are consistent with an infectious or inflammatory process. There are no new or enlarging CT the chest 08/28/2020, CT of the abdomen 10/10/2020 pulmonary nodules there is a small left-sided pleural effusion. Mediastinum: Mildly prominent subcarinal lymph nodes are unchanged. Soft Tissues/Bones: No suspicious osteolytic or osteoblastic lesions Abdomen/Pelvis: Organs: There is significant increase in number of tiny scattered hepatic lesions. Left adrenal adenoma is unchanged. The spleen, right adrenal, and kidneys are unremarkable. The pancreas is unremarkable. GI/Bowel: The visualized portions of the bowel are unremarkable.  Peritoneum/Retroperitoneum: There is been interval enlargement of a left para-aortic EXAMINATION: CT OF THE ABDOMEN AND PELVIS WITH CONTRAST; CT OF THE CHEST WITH CONTRAST 12/8/2020 4:42 pm TECHNIQUE: CT of the abdomen and pelvis was performed with the administration of intravenous contrast. Multiplanar reformatted images are provided for review. Dose modulation, iterative reconstruction, and/or weight based adjustment of the mA/kV was utilized to reduce the radiation dose to as low as reasonably achievable.; CT of the chest was performed with the administration of intravenous contrast. Multiplanar reformatted images are provided for review. Dose modulation, iterative reconstruction, and/or weight based adjustment of the mA/kV was utilized to reduce the radiation dose to as low as reasonably achievable. COMPARISON: None. HISTORY: ORDERING SYSTEM PROVIDED HISTORY: Small cell lung cancer ([facility] [address] TECHNOLOGIST PROVIDED HISTORY: Additional Contrast?->Oral Reason for exam:->cancer fu Reason for Exam: f/u small cell lung ca Acuity: Unknown Type of Exam: Subsequent/Follow-up CHEST: Lungs/pleura: The central airways are patent. 7 mm left lower lobe nodule is again identified. Few additional scattered tiny nodules are also unchanged. Scattered areas of tree-in-bud type opacity have overall improved. These are consistent with an infectious or inflammatory process. There are no new or enlarging CT the chest 08/28/2020, CT of the abdomen 10/10/2020 pulmonary nodules there is a small left-sided pleural effusion. Mediastinum: Mildly prominent subcarinal lymph nodes are unchanged. Soft Tissues/Bones: No suspicious osteolytic or osteoblastic lesions Abdomen/Pelvis: Organs: There is significant increase in number of tiny scattered hepatic lesions. Left adrenal adenoma is unchanged. The spleen, right adrenal, and kidneys are unremarkable. The pancreas is unremarkable. GI/Bowel: The visualized portions of the bowel are unremarkable.  Peritoneum/Retroperitoneum: There is been interval enlargement of a left para-aortic lymph node currently measuring approximately 15 mm, previously approximately 5 mm Bones: No suspicious osseous lesions are identified     Worsening hepatic metastatic disease. No significant interval change. Xr Chest Portable    Result Date: 12/14/2020  EXAMINATION: ONE XRAY VIEW OF THE CHEST 12/14/2020 12:22 am COMPARISON: Prior studies including 08/11/2020. HISTORY: ORDERING SYSTEM PROVIDED HISTORY: cough TECHNOLOGIST PROVIDED HISTORY: Reason for exam:->cough Reason for Exam: Shortness of breath, productive cough, nausea and vomiting, lung cancer patient FINDINGS: Multifocal airspace disease evident on 08/11/2020 has improved in the interval.  There is mild opacity at the left greater than right lung bases that may represent residual or recurrent disease. There is no pneumothorax. The heart size is normal.  Right IJ MediPort catheter is unchanged with the tip in satisfactory position in the region of the superior vena cava. Overall, there has been improvement from 08/11/2020. Mild left-greater-than-right basal opacity may represent residual or recurrent disease. Ct Chest Pulmonary Embolism W Contrast    1. No pulmonary embolus. 2. Small left pleural effusion with adjacent atelectasis. Right base atelectasis. 3. Left paramediastinal primary malignancy is seen on study dated May 2020 has largely resolved. 4. Stable left lower lobe nodule measuring 7 mm. 5. COPD. 6. Stable left adrenal nodule.      Mri Brain W Wo Contrast    Result Date: 12/10/2020 EXAMINATION: MRI OF THE BRAIN WITHOUT AND WITH CONTRAST  12/10/2020 1:06 pm TECHNIQUE: Multiplanar multisequence MRI of the head/brain was performed without and with the administration of intravenous contrast. COMPARISON: MRI brain 05/12/2020 HISTORY: ORDERING SYSTEM PROVIDED HISTORY: Small cell lung cancer [facility]) TECHNOLOGIST PROVIDED HISTORY: Reason for exam:->new small cell lung cancer FINDINGS: INTRACRANIAL STRUCTURES/VENTRICLES:  Motion degraded examination. No acute infarction. No mass effect or midline shift. No acute intracranial hemorrhage. The ventricles and sulci are normal in size and configuration. Mild periventricular and subcortical white matter T2/FLAIR hyperintense signal. The sellar/suprasellar regions appear unremarkable. The normal signal voids within the major intracranial vessels appear maintained. No abnormal focus of enhancement is seen within the brain. ORBITS: The visualized portion of the orbits demonstrate no acute abnormality. SINUSES: The visualized paranasal sinuses and mastoid air cells are well aerated. BONES/SOFT TISSUES: The bone marrow signal intensity appears normal. The soft tissues demonstrate no acute abnormality. 1. Motion degraded examination. 2. No acute intracranial abnormality. No intracranial metastatic disease. 3. Sequela of mild chronic microvascular ischemic changes. I directly reviewed all recent imaging studies as well as pertinent prior studies.      Active Hospital Problems    Diagnosis Date Noted    CLL (chronic lymphocytic leukemia) ([facility] [C91.10] 12/14/2020    Acute respiratory failure with hypoxia (HCC) [J96.01]     Pneumonia of both lower lobes due to infectious organism [J18.9]     [facility] ([facility] [C34.90] 08/08/2020    Sepsis ([facility] [A41.9] 08/07/2020    Centrilobular emphysema (HCC) [J43.2]        ASSESSMENT & PLAN  Respiratory failure, COPD exacerbation, Bibasilar pneumonia -  Suspicion that the patient aspirated during a vomiting episode. -  Titrate respiratory support according to patient's needs and advanced care plan. Currently patient is requiring 8L/min O2 support. -  Cultures:  Blood, sputum, urine pneumococcal and legionella Ag's, influenza A/B Ag swab. Already negative for COVID-19.  -  Empiric abx:-  Cefepime, vancomycin  -  Adjuncts: Solumedrol 40mg IV q12h, scheduled duoenbs, prn albuterol nebs    CLL  -  Stable mild anemia. Absolute neutrophil and platelet counts ok. -  No active treatment plan. SCLC  -  Extensive stage. Follows with Dr. Steffanie Lr. DVT prophylaxis: SCDs, lovenox  Code Status:  Full code. Disposition:  Inpatient. Eventual d/c back to home.     Mackenzie Lemus MD

## 2020-12-14 NOTE — ED NOTES
Patient and family updated on plan of care. Patient resting in bed with daughter at bedside. Fall precautions in place. Call light in stretcher.       Eli Nichols RN  12/14/20 0156

## 2020-12-14 NOTE — PROGRESS NOTES
RESPIRATORY THERAPY ASSESSMENT    Name:  Raymon Vann  Medical Record Number:  [id number]  Age: 77 y.o. Gender: female  : 1945  Today's Date:  2020  Room:      Assessment     Is the patient being admitted for a COPD or Asthma exacerbation? No   (If yes the patient will be seen every 4 hours for the first 24 hours and then reassessed)    Patient Admission Diagnosis      Allergies  No Known Allergies    Minimum Predicted Vital Capacity:     0          Actual Vital Capacity:      0              Pulmonary History:Lung Ca  Home Oxygen Therapy:  6 liters/min via HFC  Home Respiratory Therapy:Unknown   Current Respiratory Therapy:  HHN qid  Treatment Type: [facility] [address]  Medications: Albuterol/Ipratropium    Respiratory Severity Index(RSI)   Patients with orders for inhalation medications, oxygen, or any therapeutic treatment modality will be placed on Respiratory Protocol. They will be assessed with the first treatment and at least every 72 hours thereafter. The following severity scale will be used to determine frequency of treatment intervention.     Smoking History: Mild Exacerbation = 3    Social History  Social History     Tobacco Use    Smoking status: Former Smoker     Quit date: 2020     Years since quittin.6    Smokeless tobacco: Never Used   Substance Use Topics    Alcohol use: Never     Frequency: Never    Drug use: Never       Recent Surgical History: None = 0  Past Surgical History  Past Surgical History:   Procedure Laterality Date    BREAST SURGERY      fibroid tumors removed bilateral breast    BRONCHOSCOPY N/A 2020    BRONCHOSCOPY ENDOBRONCHIAL ULTRASOUND with ANESTHESIA performed by Amaya Young MD at [address] [place] [address]  2020    BRONCHOSCOPY/TRANSBRONCHIAL NEEDLE BIOPSY performed by Amaya Young MD at [address] [place] [address]  2020 5. Bronchodilator(s) will be discontinued if patient has a RSI less than 9 and has received no scheduled or as needed treatment for 72  Hrs. Patients Ordered on a Mucolytic Agent:    1. Must always be administered with a bronchodilator. 2. Discontinue if patient experiences worsened bronchospasm, or secretions have lessened to the point that the patient is able to clear them with a cough. Anti-inflammatory and Combination Medications:    1. If the patient lacks prior history of lung disease, is not using inhaled anti-inflammatory medication at home, and lacks wheezing by examination or by history for at least 24 hours, contact physician for possible discontinuation.

## 2020-12-14 NOTE — ED NOTES

## 2020-12-14 NOTE — ED PROVIDER NOTES
[address] [facility]  ED  EMERGENCY DEPARTMENT ENCOUNTER        Pt Name: Xavier Jordan  MRN: [id number]  Armsmagdygfstanislaw 1945  Date of evaluation: 12/13/2020  Provider: Chrystie Boeck, MD  PCP: [id number]       Chief Complaint   Patient presents with    Shortness of Breath     Patient to ED O2 sat 74% upon arrival at ED, patient coughing thick greenish sputum, patient has hx of lung cancer, reports started vomiting upon ED visit felt ill for 2 days     Dysphagia     patient reports painful to swallow, reports that last chemo treatment was 11 days ago. HISTORY OFPRESENT ILLNESS   (Location/Symptom, Timing/Onset, Context/Setting, Quality, Duration, Modifying Factors,Severity)  Note limiting factors. Xavier Jordan is a 77 y.o. female presenting today due to concern for increasing shortness of breath with hypoxia today along with vomiting over the last few days. She did have chemotherapy recently. No radiation. She has a history of lung cancer. She does not normally wear oxygen on a regular basis but due to being hypoxic at 82% on room air, her daughter placed her on 2 L and her oxygen went up to 91%. She was 74% on room air on arrival to the ED. She denies any diarrhea. No chest pain or abdominal pain. No headache. She does complain of a sore throat with trouble swallowing over the last few days. She also is coughing up green mucus. She had Covid in August of this year. Due to concern for worsening shortness of breath, she came to the ED for further evaluation. She denies any falls or trauma. No history of blood clots. REVIEW OF SYSTEMS    (2-9 systems for level 4, 10 or more for level 5)     Review of Systems   Constitutional: Positive for appetite change and fatigue. Negative for chills, diaphoresis and fever. HENT: Positive for sore throat and trouble swallowing. Respiratory: Positive for cough and shortness of breath. Cardiovascular: Negative for chest pain. Gastrointestinal: Positive for nausea and vomiting. Negative for abdominal pain and diarrhea. Genitourinary: Negative for difficulty urinating and flank pain. Musculoskeletal: Negative for back pain. Skin: Negative for wound. Allergic/Immunologic: Positive for immunocompromised state. Neurological: Positive for weakness (generalized) and light-headedness. Negative for syncope and headaches. Psychiatric/Behavioral: Positive for confusion. Positives and Pertinent negatives as per HPI.       PASTMEDICAL HISTORY     Past Medical History:   Diagnosis Date    Cancer [facility])     Chronic hyponatremia     Dr Belem Gramajo, [facility]    COVID-23 08/13/2020    Hyperlipidemia     Lung cancer ([facility] [address]          SURGICAL HISTORY       Past Surgical History:   Procedure Laterality Date    BREAST SURGERY      fibroid tumors removed bilateral breast    BRONCHOSCOPY N/A 5/11/2020    BRONCHOSCOPY ENDOBRONCHIAL ULTRASOUND with ANESTHESIA performed by Sai Hernandez MD at [address] [place] [address]  5/11/2020    BRONCHOSCOPY/TRANSBRONCHIAL NEEDLE BIOPSY performed by Sai Hernandez MD at [address] [place] [address]  5/11/2020    BRONCHOSCOPY/TRANSBRONCHIAL NEEDLE BIOPSY ADDL LOBE performed by Sai Hernandez MD at [address] [place] [address]  5/11/2020    BRONCHOSCOPY DIAGNOSTIC OR CELL [address] ONLY [facility] N/A 5/13/2020    RIGHT SUBCLAVIAN VEIN PORT A CATH INSERTION [facility]      mid chest         CURRENT MEDICATIONS       Previous Medications    ALBUTEROL SULFATE HFA (PROAIR HFA) 108 (90 BASE) MCG/ACT INHALER    Inhale 2 puffs into the lungs every 4 hours as needed for Wheezing or Shortness of Breath    ATORVASTATIN (LIPITOR) 40 MG TABLET    Take 1 tablet by mouth daily MAGNESIUM OXIDE (MAG-OX) 400 (240 MG) MG TABLET    Take 1 tablet by mouth daily    ONDANSETRON (ZOFRAN ODT) 4 MG DISINTEGRATING TABLET    Take 2 tablets by mouth every 8 hours as needed for Nausea or Vomiting    ONDANSETRON (ZOFRAN ODT) 4 MG DISINTEGRATING TABLET    Take 1 tablet by mouth every 8 hours as needed for Nausea    OXYCODONE-ACETAMINOPHEN (ROXICET) 5-325 MG/5ML SOLUTION    Take by mouth 2 times daily as needed for Pain. POTASSIUM CHLORIDE (KLOR-CON M) 20 MEQ EXTENDED RELEASE TABLET    Take 1 tablet by mouth 2 times daily    PROMETHAZINE (PHENERGAN) 12.5 MG TABLET    Take 1 tablet by mouth every 6 hours as needed for Nausea    SENNA (SENOKOT) 8.6 MG TABLET    Take 1 tablet by mouth 2 times daily    SODIUM CHLORIDE 1 G TABLET    Take 2 tablets by mouth 3 times daily (with meals)    TORSEMIDE (DEMADEX) 10 MG TABLET    Take 1 tablet by mouth daily    UREA (URE-NA) 15 G PACK PACKET    Take 15 g by mouth daily       ALLERGIES     Patient has no known allergies. FAMILY HISTORY     History reviewed. No pertinent family history. SOCIAL HISTORY       Social History     Socioeconomic History    Marital status:       Spouse name: None    Number of children: 1    Years of education: None    Highest education level: None   Occupational History    None   Social Needs    Financial resource strain: None    Food insecurity     Worry: None     Inability: None    Transportation needs     Medical: None     Non-medical: None   Tobacco Use    Smoking status: Former Smoker     Quit date: 2020     Years since quittin.6    Smokeless tobacco: Never Used   Substance and Sexual Activity    Alcohol use: Never     Frequency: Never    Drug use: Never    Sexual activity: Not Currently   Lifestyle    Physical activity     Days per week: None     Minutes per session: None    Stress: None   Relationships    Social connections     Talks on phone: None     Gets together: None Attends [employer] service: None     Active member of club or organization: None     Attends meetings of clubs or organizations: None     Relationship status: None    Intimate partner violence     Fear of current or ex partner: None     Emotionally abused: None     Physically abused: None     Forced sexual activity: None   Other Topics Concern    None   Social History Narrative    None       SCREENINGS                PHYSICAL EXAM    (up to 7 for level 4, 8 or more for level 5)     ED Triage Vitals   BP Temp Temp Source Pulse Resp SpO2 Height Weight   12/14/20 0002 12/14/20 0002 12/14/20 0002 12/13/20 2358 12/14/20 0002 12/13/20 2358 12/14/20 0002 12/14/20 0002   137/85 98.8 °F (37.1 °C) Oral 89 18 (!) 72 % 5' 3\" (1.6 m) 134 lb 6.4 oz (61 kg)       Physical Exam  Vitals signs and nursing note reviewed. Constitutional:       General: She is awake. She is not in acute distress. Appearance: She is well-developed, well-groomed and normal weight. She is ill-appearing. She is not toxic-appearing or diaphoretic. Interventions: She is not intubated. HENT:      Head: Normocephalic and atraumatic. Right Ear: External ear normal.      Left Ear: External ear normal.      Nose: Nose normal.      Mouth/Throat:      Mouth: Mucous membranes are dry. Eyes:      General:         Right eye: No discharge. Left eye: No discharge. Pupils: Pupils are equal, round, and reactive to light. Neck:      Musculoskeletal: Full passive range of motion without pain, normal range of motion and neck supple. Normal range of motion. No edema, erythema or neck rigidity. Trachea: No tracheal deviation. Cardiovascular:      Rate and Rhythm: Regular rhythm. Tachycardia present. Pulses:           Radial pulses are 2+ on the right side and 2+ on the left side. Heart sounds: Normal heart sounds.    Pulmonary: Effort: Pulmonary effort is normal. No tachypnea, bradypnea, accessory muscle usage, prolonged expiration, respiratory distress or retractions. She is not intubated. Breath sounds: Normal air entry. No stridor, decreased air movement or transmitted upper airway sounds. Wheezing and rhonchi present. No decreased breath sounds or rales. Chest:      Chest wall: No tenderness. Abdominal:      General: Bowel sounds are normal. There is no distension. Palpations: Abdomen is soft. Abdomen is not rigid. Tenderness: There is no abdominal tenderness. There is no guarding or rebound. Negative signs include Guerrero's sign and McBurney's sign. Musculoskeletal: Normal range of motion. General: No tenderness, deformity or signs of injury. Right lower leg: She exhibits no tenderness. No edema. Left lower leg: She exhibits no tenderness. No edema. Skin:     General: Skin is warm and dry. Coloration: Skin is not jaundiced or pale. Findings: No bruising, erythema, lesion or rash. Neurological:      General: No focal deficit present. Mental Status: She is alert and oriented to person, place, and time. Mental status is at baseline. GCS: GCS eye subscore is 4. GCS verbal subscore is 5. GCS motor subscore is 6. Cranial Nerves: No dysarthria. Sensory: Sensation is intact. No sensory deficit. Motor: Motor function is intact. No weakness, tremor, atrophy, abnormal muscle tone or seizure activity. Comments: Normal  strength and leg extension bilaterally     Psychiatric:         Attention and Perception: Attention normal.         Mood and Affect: Mood is anxious and depressed. Speech: Speech normal.         Behavior: Behavior normal. Behavior is not agitated. Behavior is cooperative.              DIAGNOSTIC RESULTS   :    Labs Reviewed   BRAIN NATRIURETIC PEPTIDE - Abnormal; Notable for the following components:       Result Value Pro- (*)     All other components within normal limits    Narrative:     Performed at:  [address], [facility] [place] [employer]   Phone [phone]   COMPREHENSIVE METABOLIC PANEL - Abnormal; Notable for the following components:    Potassium 3.4 (*)     Glucose 119 (*)     BUN 24 (*)     GFR Non- 48 (*)     GFR  59 (*)     Total Protein 6.1 (*)     Alb 3.1 (*)     Albumin/Globulin Ratio 1.0 (*)     Alkaline Phosphatase 498 (*)     ALT 48 (*)     AST 51 (*)     All other components within normal limits    Narrative:     Performed at:  [address], [facility] [place] [employer]   Phone [phone]   CBC WITH AUTO DIFFERENTIAL - Abnormal; Notable for the following components:    WBC 63.5 (*)     RBC 3.67 (*)     Hemoglobin 10.7 (*)     Hematocrit 34.7 (*)     MCHC 30.9 (*)     RDW 16.4 (*)     Neutrophils Absolute 17.1 (*)     Lymphocytes Absolute 42.5 (*)     Bands Relative 13 (*)     Metamyelocytes Relative 1 (*)     Unid. Mononu 4 (*)     Smudge Cells Present (*)     Anisocytosis Occasional (*)     All other components within normal limits    Narrative:     Judy Kamara tel. [phone],  Hematology results called to and read back by Safia Gipson RN, 12/14/2020  01:49, by [facility]  Hematology results called to and read back by Safia Gipson RN, 12/14/2020  00:52, by [facility]  Performed at:  [address], [facility] [place] [place] [employer]   Phone [phone]   BLOOD GAS, VENOUS - Abnormal; Notable for the following components:    pCO2, Lobo 51.5 (*)     pO2, Lobo 48.7 (*)     Carboxyhemoglobin 2.0 (*)     All other components within normal limits    Narrative:     Performed at:  [address], [facility] [employer]   Phone [phone]   CULTURE, BLOOD 1   CULTURE, BLOOD 2   CULTURE, RESPIRATORY LEGIONELLA ANTIGEN, URINE   STREP PNEUMONIAE ANTIGEN   RAPID INFLUENZA A/B ANTIGENS   LACTIC ACID, PLASMA    Narrative:     Performed at:  [facility]  [address],  [address], [address] [employer] [place]   Phone [phone]   TROPONIN    Narrative:     Performed at:  [address],  [address], [address] [employer] [place]   Phone [phone]   SAMPLE POSSIBLE BLOOD BANK TESTING    Narrative:     Performed at:  [facility]) [address],  [address], [address] [place]   Phone [phone]   MAGNESIUM    Narrative:     Performed at:  [address],  [address] [address], [address] [place]   Phone [phone]    Narrative:     Performed at:  [address],  [address] [facility], [address] [place]   Phone [phone]   CBC WITH AUTO DIFFERENTIAL   URINE RT REFLEX TO CULTURE   LACTIC ACID, PLASMA   BLOOD GAS, ARTERIAL       All other labs were within normal range or not returned asof this dictation. EKG: All EKG's are interpreted by the Emergency Department Physician who either signs or Co-signs this chart in the absence of a cardiologist.    The Ekg interpreted by me shows  normal sinus rhythm with a rate of 94  Axis is   Normal  QTc is  normal  Intervals and Durations are unremarkable. ST Segments: no acute change and nonspecific changes  No significant change from prior EKG dated - 11/30/20  No STEMI           RADIOLOGY:   Non-plain film images such as CT, Ultrasound and MRI are read by the radiologist. [employer] images are visualized and preliminarily interpreted by the  ED Provider with the belowfindings:        Interpretation per the Radiologist below, if available at the time of this note:    CT CHEST PULMONARY EMBOLISM W CONTRAST   Preliminary Result   1. No pulmonary embolus. 2. Small left pleural effusion with adjacent atelectasis. Right base   atelectasis. 3. 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 4. Stable left lower lobe nodule measuring 7 mm. 5. COPD. 6. Stable left adrenal nodule. XR CHEST PORTABLE   Final Result   Overall, there has been improvement from 08/11/2020. Mild   left-greater-than-right basal opacity may represent residual or recurrent   disease. PROCEDURES   Unless otherwise noted below, none     Procedures    CRITICAL CARE TIME   Time: 35 minutes   Includes repeat examinations, speaking with consultants, lab interpretation, charting, treating for acute respiratory failure requiring nasal cannula along with multiple breathing treatments   Excludes separate billable procedures. Patient at risk for serious decompensation if not treated for this life-threatening condition. CONSULTS: Spoke with Dr. Pearl Barrett at 0231 for admission.   IP CONSULT TO HOSPITALIST    EMERGENCY DEPARTMENT COURSE and DIFFERENTIAL DIAGNOSIS/MDM:   Vitals:    Vitals:    12/14/20 0115 12/14/20 0145 12/14/20 0215 12/14/20 0310   BP: 122/79 103/73 102/71 114/64   Pulse: 89 85 87 102   Resp: 15 14 14 16   Temp:       TempSrc:       SpO2: 94% 96% 93% 92%   Weight:       Height:           Patient was given the following medications:  Medications   LORazepam (ATIVAN) injection 1 mg (has no administration in time range)   ipratropium-albuterol (DUONEB) nebulizer solution 2 ampule (has no administration in time range)   dexamethasone (DECADRON) injection 8 mg (has no administration in time range)   ipratropium-albuterol (DUONEB) nebulizer solution 1 ampule (1 ampule Inhalation Given 12/14/20 0215)   ondansetron (ZOFRAN) injection 4 mg (4 mg Intravenous Given 12/14/20 0103)   0.9 % sodium chloride bolus (0 mLs Intravenous Stopped 12/14/20 0147) iopamidol (ISOVUE-370) 76 % injection 75 mL (75 mLs Intravenous Given 12/14/20 0302)     Patient was evaluated for worsening shortness of breath over the last few days associated with new onset hypoxia today when she does not normally wear oxygen at baseline. She was 74% on room air on arrival.  She is currently on oxygen in the room and saturations did improve. She was also tachycardic and does appear severely dehydrated. She had no abdominal tenderness on exam.  I am considering pneumonia, pulmonary embolism, acute coronary syndrome, dehydration due to chemotherapy with adverse side effects, amongst other pathology. Even though she had Covid before, she is immunocompromised and therefore we will test for this again. Covid was negative. She did have improvement of oxygenation in the ED. She had new onset lymphocytosis and therefore oncology would also need to be consulted while admitted. I am concerned with her history of smoking, that she has a COPD exacerbation. She will need further evaluation in the hospital with pulmonology consultation. She is stable for the floor with telemetry. The patient tolerated their visit well. The patient and / or the family were informed of the results of any tests, a time was given to answer questions. FINAL IMPRESSION      1. Acute respiratory failure with hypoxia ([address]    2. COPD exacerbation ([address]    3. Acute kidney injury ([address]    4. Dehydration    5. Lymphocytosis    6. Non-intractable vomiting with nausea, unspecified vomiting type          DISPOSITION/PLAN   DISPOSITION  -decision to admit      PATIENT REFERRED TO:  No follow-up provider specified.     DISCHARGEMEDICATIONS:  New Prescriptions    No medications on file       DISCONTINUED MEDICATIONS:  Discontinued Medications    No medications on file (Please note that portions of this note were completed with a voicerecognition program.  Efforts were made to edit the dictations but occasionally words are mis-transcribed.)    Latesha Contreras MD (electronically signed)           Latesha Contreras MD  12/14/20 4296

## 2020-12-15 PROBLEM — T17.910A ASPIRATION OF GASTRIC CONTENTS: Status: ACTIVE | Noted: 2020-12-15

## 2020-12-15 LAB
A/G RATIO: 1.1 (ref 1.1–2.2)
ACANTHOCYTES: ABNORMAL
ALBUMIN SERPL-MCNC: 2.9 G/DL (ref 3.4–5)
ALP BLD-CCNC: 413 U/L (ref 40–129)
ALT SERPL-CCNC: 39 U/L (ref 10–40)
ANION GAP SERPL CALCULATED.3IONS-SCNC: 10 MMOL/L (ref 3–16)
ANISOCYTOSIS: ABNORMAL
AST SERPL-CCNC: 35 U/L (ref 15–37)
BANDED NEUTROPHILS RELATIVE PERCENT: 20 % (ref 0–7)
BASOPHILS ABSOLUTE: 0 K/UL (ref 0–0.2)
BASOPHILS RELATIVE PERCENT: 0 %
BILIRUB SERPL-MCNC: 0.8 MG/DL (ref 0–1)
BUN BLDV-MCNC: 18 MG/DL (ref 7–20)
CALCIUM SERPL-MCNC: 8.4 MG/DL (ref 8.3–10.6)
CHLORIDE BLD-SCNC: 104 MMOL/L (ref 99–110)
CO2: 30 MMOL/L (ref 21–32)
CREAT SERPL-MCNC: 1 MG/DL (ref 0.6–1.2)
EOSINOPHILS ABSOLUTE: 0 K/UL (ref 0–0.6)
EOSINOPHILS RELATIVE PERCENT: 0 %
GFR AFRICAN AMERICAN: >60
GFR NON-AFRICAN AMERICAN: 54
GLOBULIN: 2.6 G/DL
GLUCOSE BLD-MCNC: 107 MG/DL (ref 70–99)
HCT VFR BLD CALC: 30.7 % (ref 36–48)
HEMATOLOGY PATH CONSULT: NO
HEMOGLOBIN: 9.4 G/DL (ref 12–16)
IGA: 155 MG/DL (ref 70–400)
IGG: 612 MG/DL (ref 700–1600)
IGM: 66 MG/DL (ref 40–230)
INR BLD: 1.13 (ref 0.86–1.14)
LYMPHOCYTES ABSOLUTE: 51.8 K/UL (ref 1–5.1)
LYMPHOCYTES RELATIVE PERCENT: 62 %
MACROCYTES: ABNORMAL
MAGNESIUM: 1.8 MG/DL (ref 1.8–2.4)
MCH RBC QN AUTO: 29 PG (ref 26–34)
MCHC RBC AUTO-ENTMCNC: 30.6 G/DL (ref 31–36)
MCV RBC AUTO: 94.8 FL (ref 80–100)
METAMYELOCYTES RELATIVE PERCENT: 3 %
MICROCYTES: ABNORMAL
MONOCYTES ABSOLUTE: 0 K/UL (ref 0–1.3)
MONOCYTES RELATIVE PERCENT: 0 %
NEUTROPHILS ABSOLUTE: 31.8 K/UL (ref 1.7–7.7)
NEUTROPHILS RELATIVE PERCENT: 15 %
OVALOCYTES: ABNORMAL
PDW BLD-RTO: 16.7 % (ref 12.4–15.4)
PLATELET # BLD: 449 K/UL (ref 135–450)
PLATELET SLIDE REVIEW: ABNORMAL
PMV BLD AUTO: 8.4 FL (ref 5–10.5)
POTASSIUM SERPL-SCNC: 3.5 MMOL/L (ref 3.5–5.1)
PROCALCITONIN: 0.53 NG/ML (ref 0–0.15)
PROTHROMBIN TIME: 13.1 SEC (ref 10–13.2)
RBC # BLD: 3.23 M/UL (ref 4–5.2)
SLIDE REVIEW: ABNORMAL
SMUDGE CELLS: PRESENT
SODIUM BLD-SCNC: 144 MMOL/L (ref 136–145)
TOTAL PROTEIN: 5.5 G/DL (ref 6.4–8.2)
WBC # BLD: 83.6 K/UL (ref 4–11)

## 2020-12-15 PROCEDURE — 6370000000 HC RX 637 (ALT 250 FOR IP): Performed by: INTERNAL MEDICINE

## 2020-12-15 PROCEDURE — 85610 PROTHROMBIN TIME: CPT

## 2020-12-15 PROCEDURE — 82784 ASSAY IGA/IGD/IGG/IGM EACH: CPT

## 2020-12-15 PROCEDURE — 80053 COMPREHEN METABOLIC PANEL: CPT

## 2020-12-15 PROCEDURE — 85025 COMPLETE CBC W/AUTO DIFF WBC: CPT

## 2020-12-15 PROCEDURE — 83735 ASSAY OF MAGNESIUM: CPT

## 2020-12-15 PROCEDURE — 84145 PROCALCITONIN (PCT): CPT

## 2020-12-15 PROCEDURE — 6370000000 HC RX 637 (ALT 250 FOR IP): Performed by: NURSE PRACTITIONER

## 2020-12-15 PROCEDURE — 6360000002 HC RX W HCPCS: Performed by: INTERNAL MEDICINE

## 2020-12-15 PROCEDURE — 1200000000 HC SEMI PRIVATE

## 2020-12-15 PROCEDURE — 94640 AIRWAY INHALATION TREATMENT: CPT

## 2020-12-15 PROCEDURE — 2580000003 HC RX 258: Performed by: INTERNAL MEDICINE

## 2020-12-15 RX ORDER — IPRATROPIUM BROMIDE AND ALBUTEROL SULFATE 2.5; .5 MG/3ML; MG/3ML
1 SOLUTION RESPIRATORY (INHALATION) 2 TIMES DAILY
Status: DISCONTINUED | OUTPATIENT
Start: 2020-12-16 | End: 2020-12-16 | Stop reason: HOSPADM

## 2020-12-15 RX ADMIN — OXYCODONE 10 MG: 5 TABLET ORAL at 12:55

## 2020-12-15 RX ADMIN — IPRATROPIUM BROMIDE AND ALBUTEROL SULFATE 1 AMPULE: .5; 3 SOLUTION RESPIRATORY (INHALATION) at 07:39

## 2020-12-15 RX ADMIN — METHYLPREDNISOLONE SODIUM SUCCINATE 40 MG: 40 INJECTION, POWDER, FOR SOLUTION INTRAMUSCULAR; INTRAVENOUS at 05:19

## 2020-12-15 RX ADMIN — IPRATROPIUM BROMIDE AND ALBUTEROL SULFATE 1 AMPULE: .5; 3 SOLUTION RESPIRATORY (INHALATION) at 12:39

## 2020-12-15 RX ADMIN — NYSTATIN 5 ML: 100000 SUSPENSION ORAL at 18:57

## 2020-12-15 RX ADMIN — POTASSIUM CHLORIDE 20 MEQ: 20 TABLET, EXTENDED RELEASE ORAL at 08:50

## 2020-12-15 RX ADMIN — TORSEMIDE 10 MG: 20 TABLET ORAL at 08:50

## 2020-12-15 RX ADMIN — Medication 1 G: at 18:58

## 2020-12-15 RX ADMIN — CEFEPIME 2 G: 2 INJECTION, POWDER, FOR SOLUTION INTRAVENOUS at 05:19

## 2020-12-15 RX ADMIN — SENNOSIDES 8.6 MG: 8.6 TABLET, FILM COATED ORAL at 08:50

## 2020-12-15 RX ADMIN — Medication 10 ML: at 21:02

## 2020-12-15 RX ADMIN — MAGNESIUM GLUCONATE 500 MG ORAL TABLET 400 MG: 500 TABLET ORAL at 08:50

## 2020-12-15 RX ADMIN — OXYCODONE 10 MG: 5 TABLET ORAL at 08:50

## 2020-12-15 RX ADMIN — IPRATROPIUM BROMIDE AND ALBUTEROL SULFATE 1 AMPULE: .5; 3 SOLUTION RESPIRATORY (INHALATION) at 19:30

## 2020-12-15 RX ADMIN — OXYCODONE 10 MG: 5 TABLET ORAL at 21:01

## 2020-12-15 RX ADMIN — Medication 1 G: at 08:50

## 2020-12-15 RX ADMIN — IPRATROPIUM BROMIDE AND ALBUTEROL SULFATE 1 AMPULE: .5; 3 SOLUTION RESPIRATORY (INHALATION) at 15:39

## 2020-12-15 RX ADMIN — POTASSIUM CHLORIDE 20 MEQ: 20 TABLET, EXTENDED RELEASE ORAL at 21:03

## 2020-12-15 RX ADMIN — ENOXAPARIN SODIUM 40 MG: 40 INJECTION SUBCUTANEOUS at 08:50

## 2020-12-15 RX ADMIN — SENNOSIDES 8.6 MG: 8.6 TABLET, FILM COATED ORAL at 21:01

## 2020-12-15 RX ADMIN — NYSTATIN 5 ML: 100000 SUSPENSION ORAL at 12:56

## 2020-12-15 RX ADMIN — VANCOMYCIN HYDROCHLORIDE 750 MG: 750 INJECTION, POWDER, LYOPHILIZED, FOR SOLUTION INTRAVENOUS at 07:45

## 2020-12-15 RX ADMIN — SODIUM CHLORIDE, POTASSIUM CHLORIDE, SODIUM LACTATE AND CALCIUM CHLORIDE: 600; 310; 30; 20 INJECTION, SOLUTION INTRAVENOUS at 05:18

## 2020-12-15 RX ADMIN — ATORVASTATIN CALCIUM 20 MG: 10 TABLET, FILM COATED ORAL at 21:01

## 2020-12-15 RX ADMIN — Medication 1 G: at 12:55

## 2020-12-15 ASSESSMENT — PAIN SCALES - GENERAL
PAINLEVEL_OUTOF10: 4
PAINLEVEL_OUTOF10: 4
PAINLEVEL_OUTOF10: 0
PAINLEVEL_OUTOF10: 0
PAINLEVEL_OUTOF10: 5

## 2020-12-15 NOTE — CONSULTS
Hematology/Oncology Consultation         Patient:   Bell Gonzalez       Reason for Consult:  Small cell lung cancer on treatment, admitted with PNA   Requesting Physician: No ref. provider found    Chief Complaint:     Chief Complaint   Patient presents with    Shortness of Breath     Patient to ED O2 sat 74% upon arrival at ED, patient coughing thick greenish sputum, patient has hx of lung cancer, reports started vomiting upon ED visit felt ill for 2 days     Dysphagia     patient reports painful to swallow, reports that last chemo treatment was 11 days ago. History Obtained from:     patient, electronic medical record    History of Present Illness:     Bell Gonzalez is a 77 y.o. female  with significant past medical history of extensive stage small cell lung carcinoma who presents with SOB and dysphagia. She is now being treated for pneumonia. She was last seen in the office last week with progression of disease. She was to start new therapy lurbinectedin pending insurance approval. MRI brain this week was negative for disease. She has CLL as well and WBC is now 83. WBC is 30 at baseline. She is coughing up yellow/green phlegm. She is not coughing up blood.      Past Medical History:         Diagnosis Date    Cancer [facility])     Chronic hyponatremia     Dr Nedra Gilmore, [facility]    COVID-23 08/13/2020    Hyperlipidemia     Lung cancer [facility])        Past Surgical History:         Procedure Laterality Date    BREAST SURGERY      fibroid tumors removed bilateral breast    BRONCHOSCOPY N/A 5/11/2020    BRONCHOSCOPY ENDOBRONCHIAL ULTRASOUND with ANESTHESIA performed by Arleen Ceja MD at [place] [address]  5/11/2020    BRONCHOSCOPY/TRANSBRONCHIAL NEEDLE BIOPSY performed by Arleen Ceja MD at [place] [address]  5/11/2020    BRONCHOSCOPY/TRANSBRONCHIAL NEEDLE BIOPSY ADDL LOBE performed by Arleen Ceja MD at [address] [place] [address]  BRONCHOSCOPY  5/11/2020    BRONCHOSCOPY DIAGNOSTIC OR CELL [address] ONLY performed by Sourav Villafuerte MD at [address] N/A 5/13/2020    RIGHT SUBCLAVIAN VEIN PORT A CATH INSERTION performed by Jamaal Juarez MD at [address] [facility].      mid chest       Current Medications:     Current Facility-Administered Medications   Medication Dose Route Frequency Provider Last Rate Last Admin    oxyCODONE-acetaminophen (PERCOCET) 5-325 MG per tablet 1 tablet  1 tablet Oral Q4H PRN Bishop Marmolejo MD        senna (SENOKOT) tablet 8.6 mg  1 tablet Oral BID Bishop Marmolejo MD   8.6 mg at 12/15/20 0850    sodium chloride flush 0.9 % injection 10 mL  10 mL Intravenous PRN Bishop Marmolejo MD   10 mL at 12/14/20 0657    potassium chloride (KLOR-CON M) extended release tablet 40 mEq  40 mEq Oral PRN Bishop Marmolejo MD        Or    potassium bicarb-citric acid (EFFER-K) effervescent tablet 40 mEq  40 mEq Oral PRN Bishop Marmolejo MD        Or    potassium chloride 10 mEq/100 mL IVPB (Peripheral Line)  10 mEq Intravenous PRN Bishop Marmolejo MD        magnesium sulfate 1 g in dextrose 5% 100 mL IVPB  1 g Intravenous PRN Bishop Marmolejo MD        acetaminophen (TYLENOL) tablet 650 mg  650 mg Oral Q6H PRN Bishop Marmolejo MD        Or    acetaminophen (TYLENOL) suppository 650 mg  650 mg Rectal Q6H PRN Bishop Marmolejo MD        enoxaparin (LOVENOX) injection 40 mg  40 mg Subcutaneous Daily Bishop Marmolejo MD   40 mg at 12/15/20 0850    ondansetron (ZOFRAN) injection 4 mg  4 mg Intravenous Q4H PRN Bishop Marmolejo MD        ondansetron (ZOFRAN-ODT) disintegrating tablet 4 mg  4 mg Oral Q4H PRN Bishop Marmolejo MD        promethazine (PHENERGAN) injection 12.5 mg  12.5 mg Intravenous Q4H PRN Bishop Marmolejo MD        promethazine (PHENERGAN) 6.25 MG/5ML syrup 12.5 mg  12.5 mg Oral Q4H PRN Bishop Marmolejo MD  promethazine (PHENERGAN) tablet 12.5 mg  12.5 mg Oral Q4H PRN Nile Wolf MD        ipratropium-albuterol (DUONEB) nebulizer solution 1 ampule  1 ampule Inhalation 4x daily Nile Wolf MD   1 ampule at 12/15/20 0739    albuterol (PROVENTIL) nebulizer solution 2.5 mg  2.5 mg Nebulization Q2H PRN Nile Wolf MD        atorvastatin (LIPITOR) tablet 20 mg  20 mg Oral Nightly Nixon Simpson MD   20 mg at 20 2242    torsemide (DEMADEX) tablet 10 mg  10 mg Oral Daily Nixon Simpson MD   10 mg at 12/15/20 0850    potassium chloride (KLOR-CON M) extended release tablet 20 mEq  20 mEq Oral BID Nixon Simpson MD   20 mEq at 12/15/20 0850    sodium chloride tablet 1 g  1 g Oral TID WC Nixon Simpson MD   1 g at 12/15/20 5260    oxyCODONE (ROXICODONE) immediate release tablet 10 mg  10 mg Oral TID Nixon Simpson MD   10 mg at 12/15/20 0850    oxyCODONE (ROXICODONE) immediate release tablet 10 mg  10 mg Oral Q4H PRN Nixon Simpson MD        magnesium oxide (MAG-OX) tablet 400 mg  400 mg Oral Daily Nixon Simpson MD   400 mg at 12/15/20 1669       Allergies:     No Known Allergies    Social History:     Social History     Socioeconomic History    Marital status:       Spouse name: Not on file    Number of children: 1    Years of education: Not on file    Highest education level: Not on file   Occupational History    Not on file   Social Needs    Financial resource strain: Not on file    Food insecurity     Worry: Not on file     Inability: Not on file    Transportation needs     Medical: Not on file     Non-medical: Not on file   Tobacco Use    Smoking status: Former Smoker     Quit date: 2020     Years since quittin.6    Smokeless tobacco: Never Used   Substance and Sexual Activity    Alcohol use: Never     Frequency: Never    Drug use: Never    Sexual activity: Not Currently   Lifestyle    Physical activity Days per week: Not on file     Minutes per session: Not on file    Stress: Not on file   Relationships    Social connections     Talks on phone: Not on file     Gets together: Not on file     Attends Restoration service: Not on file     Active member of club or organization: Not on file     Attends meetings of clubs or organizations: Not on file     Relationship status: Not on file    Intimate partner violence     Fear of current or ex partner: Not on file     Emotionally abused: Not on file     Physically abused: Not on file     Forced sexual activity: Not on file   Other Topics Concern    Not on file   Social History Narrative    Not on file     Social History     Substance and Sexual Activity   Drug Use Never     Social History     Substance and Sexual Activity   Alcohol Use Never    Frequency: Never     Social History     Substance and Sexual Activity   Sexual Activity Not Currently     Social History     Tobacco Use   Smoking Status Former Smoker    Quit date: 2020    Years since quittin.6   Smokeless Tobacco Never Used       Family History:     History reviewed. No pertinent family history. Review of Systems:     Constitutional: Denies fever, sweats, weight loss. .     Eyes: No visual changes or diplopia. No scleral icterus. ENT: No Headaches, no hearing loss, no  vertigo. No mouth sores or sore throat. Cardiovascular: No chest pain, no dyspnea on exertion, no palpitations, no  loss of consciousness. Respiratory: see HPI   Gastrointestinal: No abdominal pain, no appetite loss, no blood in stools. No change in bowel habits. Genitourinary: No dysuria, trouble voiding, or hematuria. Musculoskeletal:  Generalized weakness. No joint complaints. Integumentary: No rash or pruritis. Neurological: No headache, diplopia. No change in gait, balance, or coordination. No paresthesias. Endocrine: No temperature intolerance. No excessive thirst, fluid intake, or urination. Hematologic/Lymphatic: No abnormal bruising or ecchymoses, no blood clots or swollen lymph nodes. Allergic/Immunologic: No nasal congestion or hives. Physical Exam:     /73   Pulse 112   Temp 97.8 °F (36.6 °C) (Oral)   Resp 16   Ht 5' 3\" (1.6 m)   Wt 134 lb 6.4 oz (61 kg)   SpO2 92%   BMI 23.81 kg/m²     CONSTITUTIONAL: awake, alert, cooperative, no apparent distress. EYES:  Pupils equal, round and reactive to light, sclera non-icteric, conjunctiva normal  ENT:  tongue is red with ulcers and atrophic glossitis    NECK:  Supple, symmetrical, trachea midline, no adenopathy, thyroid symmetric, not enlarged and no tenderness, skin normal  HEMATOLOGIC/LYMPHATICS:  no cervical lymphadenopathy, no supraclavicular lymphadenopathy, no axillary lymphadenopathy and no inguinal lymphadenopathy  BACK:  Symmetric, no curvature, spinous processes are non-tender on palpation, paraspinous muscles are non-tender on palpation, no costal vertebral tenderness  LUNGS:  Coarse BS and rhonchi to all lobes posterior fields   CARDIOVASCULAR:  Regular rate and rhythm, normal S1 and S2, no S3 or S4, and no murmur noted  ABDOMEN:  Normal bowel sounds, soft, non-distended, non-tender, no masses palpated, no hepatosplenomegally  MUSCULOSKELETAL:  There is no redness, warmth, or swelling of the joints  NEUROLOGIC:   No focal findings. SKIN:  Scattered bruising and ecchymoses. EXT: without clubbing, cyanosis or edema.       Data:     CBC:  Recent Labs     12/15/20  0508 12/14/20  0028 11/30/20  0853   WBC 83.6* 63.5* 16.2*   HGB 9.4* 10.7* 10.4*   HCT 30.7* 34.7* 31.8*   MCV 94.8 94.7 93.9    444 202       BMP:  Recent Labs     12/15/20  0508 12/14/20  0028 11/30/20  0853    143 133*   K 3.5 3.4* 4.0   CO2 30 28 26   BUN 18 24* 15   CREATININE 1.0 1.1 0.9   MG 1.80 1.90 1.00*       HEPATIC:  Recent Labs     12/15/20  0508 12/14/20  0028 11/30/20  0853   AST 35 51* 48*   ALT 39 48* 52*   ALKPHOS 413* 498* 187* PROT 5.5* 6.1* 5.8*   BILITOT 0.8 0.8 0.5       TUMOR MARKERS:  No results for input(s): PSA, CEA, , IV4527,  in the last 720 hours. MAGNESIUM:    Lab Results   Component Value Date    MG 1.80 12/15/2020       PT/INR:    No results found for: PTINR    Lab Results   Component Value Date    INR 1.13 12/15/2020       PTT:    Lab Results   Component Value Date    APTT 37.3 08/08/2020       U/A:    Lab Results   Component Value Date    COLORU Yellow 12/14/2020    PHUR 5.5 12/14/2020    WBCUA None seen 10/10/2020    RBCUA 3-4 10/10/2020    BACTERIA Rare 10/10/2020    CLARITYU Clear 12/14/2020    SPECGRAV 1.010 12/14/2020    LEUKOCYTESUR Negative 12/14/2020    UROBILINOGEN 0.2 12/14/2020    BILIRUBINUR Negative 12/14/2020    BLOODU Negative 12/14/2020    GLUCOSEU Negative 12/14/2020       Imaging:     EXAMINATION:   MRI OF THE BRAIN WITHOUT AND WITH CONTRAST  12/10/2020 1:06 pm       TECHNIQUE:   Multiplanar multisequence MRI of the head/brain was performed without and   with the administration of intravenous contrast.       COMPARISON:   MRI brain 05/12/2020       HISTORY:   ORDERING SYSTEM PROVIDED HISTORY: Small cell lung cancer ([facility] [address]   TECHNOLOGIST PROVIDED HISTORY:   Reason for exam:->new small cell lung cancer       FINDINGS:   INTRACRANIAL STRUCTURES/VENTRICLES:  Motion degraded examination.       No acute infarction.       No mass effect or midline shift.  No acute intracranial hemorrhage.  The   ventricles and sulci are normal in size and configuration.  Mild   periventricular and subcortical white matter T2/FLAIR hyperintense signal.   The sellar/suprasellar regions appear unremarkable.  The normal signal voids   within the major intracranial vessels appear maintained.  No abnormal focus   of enhancement is seen within the brain.       ORBITS: The visualized portion of the orbits demonstrate no acute abnormality.       SINUSES: The visualized paranasal sinuses and mastoid air cells are well aerated.       BONES/SOFT TISSUES: The bone marrow signal intensity appears normal. The soft   tissues demonstrate no acute abnormality.           Impression   1. Motion degraded examination. 2. No acute intracranial abnormality.  No intracranial metastatic disease. 3. Sequela of mild chronic microvascular ischemic changes. Impression:     See below     Plan:     Small cell lung CA  - hold lurbinectedin. 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 Aidan Monsivais to see in the AM.       Thank you very much for allowing me to participate in the care of this patient.     Jesika Sellers, SLOANE   Medical Oncology/Hematology    [facility] - [place] [address] [address]   Phone: [phone]  Fax: [phone]

## 2020-12-15 NOTE — PLAN OF CARE
LAST [facility] OFFICE NOTE FROM 12-10-20       Patient Name: Eloina Bobby   Patient : 1945   Patient MRN: [id number]   Primary Oncologist: Kaiden Raines   Referring Physician: Joseph Back MD (Medical Oncology)   Date of Service: 12/10/2020   Chief Complaint  Small cell carcinoma of lung (disorder) ( Stage Date: Unknown, Stage IVB )  Problem List  Small cell carcinoma of lung (disorder) ( Stage Date: Unknown, Stage IVB )   Anemia   CLL   Effects of immunotherapy   Hyponatremia   Liver metastasis   Nausea and vomiting (disorder)   Neoplasm related pain   SIADH    HPI  Admitted hyponatremia and found to have a lung mass with liver mets. Bx confirmed small cell neuroendocrine malignancy. She was treated with cycle 1 Carbo and  16, 5/14, 15, and 16. She tolerated chemo well. Lizeth Dean was given outpatient. Nephrology was consulted for hyponatremia as well. She is maintained on salt tablets. Na to improve further with chemo treatment as her low sodium levels are related to a paraneoplastic process from her CA. She had a port placed. MRI brain negative. She is maintained on allopurinol. Previous Therapies  Current Therapy   [facility] Hydration Cycle Length: 1 Number Cycles: 1 Start: C1D1 on 2020 Assoc Dx: Small cell carcinoma of lung (disorder) LOT: Toxicity Management 2020 C1 D1  Sodium Chloride IV 0.9 %, .9 % 1000 mL, Dose modified  OH Hydration Cycle Length: 1 Number Cycles: 5 Start: C1D1 on 2020 Assoc Dx: Small cell carcinoma of lung (disorder) LOT: Toxicity Management 2020 C5 D1  Sodium Chloride IV 0.9 %, 0.9 % solution 1000 mL  Lurbinectedin Q21D Cycle Length: 21 Number Cycles: 8 Start: Monica Griggs on 2020 Assoc Dx:  Small cell carcinoma of lung (disorder) LOT: 3rd Line Metastatic Stage: IVB Treatment Intent: [id number]/17/2020 C2 D1  Lurbinectedin IV, 5 mg (3.2 mg/m2)  Interval History Aundrea Camargo returns for a follow-up visit and to review scan results. Her sister is present for the visit and her daughter is on speaker phone. The patients family members state she has been very fatigued lately, occasionally confused and changes her mind regularly on whether or not she would like to continue treatment. The patient states today that she would like to continue treatment at this time. I recommended she have a brain MRI due to confusion and occasional nausea. The patients family states that her appetite has improved with medical marijuana. They would like to have a caregiver card so that they can go to the dispensary for the patient. Review of Systems  Per interval history; otherwise 10 point ROS is negative   Vital Signs  Blood pressure: 100/68, R arm, Regular, Pulse: 86, Temperature: 98 F, Respirations: 16, O2 sat: , Pain Scale: 0, Height: 63 in, Weight: 119 lb, BSA: 1.55, BMI: 21.08 kg/m2   Physical Exam   CONSTITUTIONAL: awake, alert, cooperative, no apparent distress   EYES: sclera clear and conjunctiva normal  NECK: supple, symmetrical  HEMATOLOGIC/LYMPHATIC: no cervical, supraclavicular or axillary lymphadenopathy   LUNGS: no increased work of breathing and clear to auscultation   CARDIOVASCULAR: regular rate and rhythm, normal S1 and S2, no murmur noted  ABDOMEN: normal bowel sounds x 4, soft, non-distended, non-tender, no masses palpated, no hepatosplenomgaly   MUSCULOSKELETAL: full range of motion noted  NEUROLOGIC: awake, alert, oriented to name, place and time. Motor skills grossly intact. SKIN: Normal skin color, texture, turgor and no jaundice.  appears intact, port in right chest with no infectious symptoms  EXTREMITIES: no LE edema   Labs  CBC   Lab Results 12/10/2020 12/03/2020 11/30/2020 11/25/2020 11/23/2020 11/19/2020   CBC                     WBC x 10^3/uL 23.6 (H) 30.6 (HH)    20.4 (H) 18.5 (H) 15.9 (H)   RBC x 10^6/uL 3.30 (L) 3.35 (L)    3.08 (L) 3.64 (L) 3.24 (L) HGB g/dL 10.0 (L) 10.6 (L)    9.6 (L) 11.3 10.1 (L)   HCT % 31.2 (L) 31.9 (L)    30.0 (L) 34.5 31.4 (L)   MCV fL 94.5 95.2 (H)    97.4 (H) 94.8 96.9 (H)   MCH pg 30.3 31.6    31.2 31.0 31.2   MCHC g/dL 32.1 (L) 33.2    32.0 (L) 32.8 32.2   RDW-CV, % 15.6 (H) 15.7 (H)    14.6 (H) 14.6 (H) 14.5 (H)   PLT x 10^3/uL 179.0 (L) 333.0    270.0 284.0 243.0   Janine %    10.5 (L)          22.8 (L)   LY %    86.2 (H)          70.7 (H)   MO %    3.1 (L)          5.8   EO % 0.0 (L) 0.1 (L)    0.1 (L) 0.4 (L) 0.7   BA % 0.0 (L) 0.1    0.0 (L) 0.1 0.0 (L)   Janine # (ANC) x 10^3/uL    3.22          3.63   LY # x 10^3/uL    26.41 (H)          11.27 (H)   MO # x 10^3/uL    0.94 (H)          0.93 (H)   EO # x 10^3/uL 0.01 (L) 0.04    0.02 (L) 0.08 0.11   BA # x 10^3/uL 0.01 0.03    0.01 0.01 0.00 (L)   CMP   Lab Results 12/10/2020 12/03/2020 11/30/2020 11/25/2020 11/23/2020 11/19/2020   Chemistries                     Glucose mg/dL    119 (H)    82 88 98   BUN mg/dL    14    19 17 15   Creatinine mg/dL    0.96 (H)    1.00 (H) 0.93 (H) 1.14 (H)   BUN/Creatinine ratio    14.6    19.0 18.3 13.2   Sodium mmol/L    140    141 140 140   Potassium mmol/L    3.8    3.6 4.4 4.2   Chloride mmol/L    104    108 (H) 105 104   CO2 mmol/L    28.9    25.8 27.3 28.4   Anion gap, mmol/L    7.1    7.2 7.7 7.6   Calcium mg/dL    9.0    8.8 9.0 8.9   Albumin g/dL    2.8 (L)    2.9 (L) 3.0 (L) 3.0 (L)   Total protein g/dL    5.2 (L)    5.2 (L) 5.8 5.6 (L)   A/G ratio    1.2    1.3 1.1 1.2   Bilirubin, total mg/dL    0.6    0.3 0.7 0.4   Alkaline phosphatase U/L    251 (H)    191 (H) 200 (H) 173 (H)   AST/SGOT U/L    57 (H)    49 (H) 52 (H) 48 (H)   ALT/SGPT U/L    66 (H)    49 48 44   GFR non-African American, estimated mL/min/1.73m2    56.8 (L)    54.2 (L) 58.9 (L) 46.6 (L)   GFR , estimated mL/min/1.73m2    >60.0    >60.0 >60.0 56.4 (L)   Magnesium, mg/dL    1.71 Lab Results 12/10/2020 12/03/2020 11/30/2020 11/25/2020 11/23/2020 11/19/2020   Immunohematology                       Lab Results 12/10/2020 12/03/2020 11/30/2020 11/25/2020 11/23/2020 11/19/2020   Anemia Labs                     Imaging  Bone Scan 5/13/2020    IMPRESSION:  Multifocal degenerative change, without a generalized scintigraphic pattern of osseous metastatic disease    MRI Brain 5/12/2020    IMPRESSION:  No brain metastases visualized. CT Abdomen and Pelvis 5/12/2020    IMPRESSION:  1. Numerous (approximately 9) liver masses compatible with metastatic disease. 2. Abnormal lymph nodes in the upper abdomen and left cardiophrenic region most suspicious for metastatic disease. PET/CT can be considered. 3. Left adrenal nodule, suspicious for metastatic disease as no remote studies are available for comparison. PET/CT can be considered. 4. Partial visualization of a loculated left pleural effusion    CT Chest 5/6/2020    IMPRESSION:  1. Left paramediastinal mass highly suspicious for malignancy. 2. Left hilar adenopathy, most compatible with metastatic disease. 3. Scattered pulmonary nodules bilaterally as described. Both benign and metastatic disease remain differential considerations. No remote studies are available for comparison. 4. Consolidative opacities in the bilateral lung apices, right greater than left. Findings are favored to represent pleuroparenchymal scarring over malignancy. Further evaluation with PET/CT is recommended. 5. Left adrenal nodularity concerning for metastatic disease given the patient's chest findings. However, prior CT report dated November 2017 indicates the presence of a left adrenal nodule favored to represent an adenoma. PET/CT can be obtained.   6. Left para-aortic lymph nodes versus additional left adrenal nodules, reactive lymphadenopathy    OCM - Patient Care Management   Pain, if applicable: ECOG Status 2 In bed <50% of the time. Ambulatory and capable of all self-care, but unable to carry out any work activities. Up and about more than 50% of waking hours. (Date: 12/10/2020)   Depression Status Was screened; Outcome positive: No; Screening Date: 09/10/2020; Screening Tool: Patient Health Questionnaire (PHQ9)   Psycho-social PHQ-9 Follow-up Plan (if applicable):      Research    Would you like this patient screened for clinical trial eligibility? If yes, please enter the order for \"Research: Screen for Eligibility\"    Assessment & Plan    Metastatic small cell lung cancer  -Chemotherapy including carboplatin, etoposide initiated in the hospital on May 14, 2020.  -CT abd/pelvis (10/10/20) showed some progression of metastatic disease in the lower chest and abdomen  -Repeat CT scans on 12/8/20 show progression in the liver. These results were reviewed with the patient today. -Taxol discontinued today  -Discussed new small cell carcinoma treatment lurbinectedin. She would like to proceed with this   -Brain MRI is ordered today    SIADH/hyponatremia:  - This is managed by nephrology. - 2/2 to small cell lung cancer paraneoplastic syndrome  -This appears to have cleared with chemotherapy previously  -Was hospitalized in 9/27-9/29 for recurrence of hyponatremia, which is of concern for a relapse  -Per hospital AVS, want to check BMP Qweekly through Dec 2020.   -Continue taking Demeclocycline 300mg BID.      Pain:  -She has a pain contract on file (6/22/20)  -Continue oxycodone    Anemia:  -Stable   - Continue to monitor    Chronic lymphocytic leukemia:  -No indication for treatment at this time  -White count is 30,000 today  -No evidence of infection      Poor appetite:  -Continue medical marijuana, b  -Daughter working on caregiver card     Dehydration:  -Will have IVF today  -This can be done as needed    GERD:  -Prescribed omeprazole 40mg p.o. daily   -if no improvement, refer to GI for endoscopy Patient is agreeable with this assessment and plan. RTC in one week for MD and to start lurbinectedin. We will call her with Brain MRI results   . Recent imaging and labs were reviewed and discussed with the patient. I have recommended that the patient follow CDC guidelines for prevention of COVID-19 infection. Milton Blackman CNP    Note Recipient:        Electronically signed by [employer].  Mckinley COLEMAN 12/10/2020 19:10 EST

## 2020-12-15 NOTE — CARE COORDINATION
CASE MANAGEMENT INITIAL ASSESSMENT      Reviewed chart and completed assessment with: pt at bedside  Explained Case Management role/services. Primary contact information:daughter [address] :   Primary Decision Maker: Vista Double - Child - [phone]    Secondary Decision Maker: Vincent Alfaro - Brother/Sister - [phone]  Can this person be reached and be able to respond quickly, such as within a few minutes or hours? Yes  Who would be your back-up decision maker? Vincent Alfaro - Brother/Sister - [phone]        Admit date/status:12/13 Inpatient  Diagnosis:Acute Respiratory Failure   Is this a Readmission?:  No      Insurance:[employer]   Precert required for SNF: No, waived during pandemic       3 night stay required: No    Living arrangements, Adls, care needs, prior to admission: Pt lives at home with daughter, she has a family friend/sitter during the day when daughter is at work, uses walker or cane as needed, has home O2 PRN. Transportation:Family     Durable Medical Equipment at home:  Walker_X_Cane_X_RTS__ BSC__Shower Chair__  02_X (with concentrator and portable tanks x2)_ HHN__ CPAP__  BiPap__  Hospital Bed__ W/C___ Other__________    Services in the home and/or outpatient, prior to admission:no skilled home care    PT/OT recs:Not ordered    Barriers to discharge:None noted    Plan/comments:Per Dr Pia Saucedo, pt can go once cleared by Oncology. Writer spoke with pt's daughter Tramaine Cronin, she agrees with pt returning without new needs at this point. Will continue to follow and coordinate discharge arrangements as needed.   HUGO Mims-RN

## 2020-12-15 NOTE — PROGRESS NOTES
Hospitalist Progress Note      PCP: Tyler Botello    Date of Admission: 12/13/2020    Chief Complaint on Admission: Dyspnea, vomiting    HPI  Patient has active CLL and small cell carcinoma of the lung for which she is receiving chemotherapy. She presented for dyspnea w/ an O2 saturation of 74% on room air. At that time she reported she had been vomiting for the past two days. She does not usually require supplemental O2. Pt Seen/Examined and Chart Reviewed. Admitting dx: Acute hypoxic respiratory failure    SUBJECTIVE:   Patient remains afebrile. Titrating off oxygen to RA and doing well. Has received Abx: Cefepime and Vanco for 24 hours. CT of chest reviewed with no evidence of airspace disease. Patient had received 48 hours of high-dose steroids, and notable elevation to white blood cell count. Plan: Awaiting hematology oncology evaluation regarding elevated white blood cell count. No new medical complaints  Patient is eager to go home  Bedside rounding with nursing completed  OBJECTIVE:     Allergies  Patient has no known allergies.     Medications      Scheduled Meds:   magic (miracle) mouthwash with nystatin  5 mL Swish & Swallow TID WC    senna  1 tablet Oral BID    enoxaparin  40 mg Subcutaneous Daily    ipratropium-albuterol  1 ampule Inhalation 4x daily    atorvastatin  20 mg Oral Nightly    torsemide  10 mg Oral Daily    potassium chloride  20 mEq Oral BID    sodium chloride  1 g Oral TID WC    oxyCODONE  10 mg Oral TID    magnesium oxide  400 mg Oral Daily       Infusions:      PRN Meds:  oxyCODONE-acetaminophen, sodium chloride flush, potassium chloride **OR** potassium alternative oral replacement **OR** potassium chloride, magnesium sulfate, acetaminophen **OR** acetaminophen, ondansetron, ondansetron, promethazine, promethazine, promethazine, albuterol, oxyCODONE    Intake and Output     Intake/Output Summary (Last 24 hours) at 12/15/2020 3159 Last data filed at 12/15/2020 0529  Gross per 24 hour   Intake 3262 ml   Output 400 ml   Net 2862 ml       Vitals    BP (!) 94/57   Pulse 93   Temp 97.5 °F (36.4 °C) (Oral)   Resp 18   Ht 5' 3\" (1.6 m)   Wt 134 lb 6.4 oz (61 kg)   SpO2 90%   BMI 23.81 kg/m²     Exam:    Gen: Well, NAD, Alert, Oriented x 3. Intermittently confused. Easily reoriented by verbal cueing  Lungs: Easy respiration, unlabored, good respiratory effort. Coarse rhonchi scattered throughout posterior lung fields. Equal chest wall excursion. Cor: RRR, S1S2, w/out M/R/G, non-displaced PMI  Vascular:Pulses normal and equal bilaterally, no lower extremity peripheral edema or venous stasis, no calf tenderness, negative Linda's sign, no calf cords  Abdomen: Soft NT/ND, w/out R/G, w/ +BSx4  Ext: No C/C/E Bilaterally. Full ROM w/ out deformity   Neuro: Neurovascularly intact w/ Sensory/Motor intact UE/LE Bilaterally. Skin: Skin color, texture, turgor normal. Without Rashes/Lesions. Musculoskeletal: No evidence of joint instability, strength normal, no evidence of muscle atrophy, no deformities present    Data    Recent Labs     12/14/20  0028 12/15/20  0508   WBC 63.5* 83.6*   HGB 10.7* 9.4*   HCT 34.7* 30.7*    449      Recent Labs     12/14/20 0028 12/15/20  0508    144   K 3.4* 3.5    104   CO2 28 30   BUN 24* 18   CREATININE 1.1 1.0     Recent Labs     12/14/20  0028 12/15/20  0508   AST 51* 35   ALT 48* 39   BILITOT 0.8 0.8   ALKPHOS 498* 413*     Recent Labs     12/15/20  0508   INR 1.13     Recent Labs     12/14/20  0028   TROPONINI <0.01       CXR 12/14/2020  Impression   Overall, there has been improvement from 08/11/2020.  Mild   left-greater-than-right basal opacity may represent residual or recurrent   disease. CT Chest 12/14/2020  Impression   1. No pulmonary embolus. 2. Small left pleural effusion with adjacent atelectasis.  Right base   atelectasis. -Per oncology, patient had been on Demeclocycline. Patient currently has not been taking. Will discuss the need to restart with Heme/Onc      DVT Prophylaxis:Lovenox  Diet: DIET GENERAL;  Code Status: Full Code    PT/OT Eval Status: No need identified    Dispo - Expect 1-2 days pending clinical response to medical therapy. Pt to go home with her daughter and has 24 hour care.      Val Tanner MD

## 2020-12-16 VITALS
OXYGEN SATURATION: 91 % | DIASTOLIC BLOOD PRESSURE: 66 MMHG | HEIGHT: 63 IN | WEIGHT: 118.2 LBS | SYSTOLIC BLOOD PRESSURE: 107 MMHG | RESPIRATION RATE: 18 BRPM | HEART RATE: 84 BPM | BODY MASS INDEX: 20.94 KG/M2 | TEMPERATURE: 98.2 F

## 2020-12-16 LAB
A/G RATIO: 1.2 (ref 1.1–2.2)
ALBUMIN SERPL-MCNC: 2.9 G/DL (ref 3.4–5)
ALP BLD-CCNC: 438 U/L (ref 40–129)
ALT SERPL-CCNC: 47 U/L (ref 10–40)
ANION GAP SERPL CALCULATED.3IONS-SCNC: 9 MMOL/L (ref 3–16)
AST SERPL-CCNC: 46 U/L (ref 15–37)
BILIRUB SERPL-MCNC: 0.8 MG/DL (ref 0–1)
BUN BLDV-MCNC: 20 MG/DL (ref 7–20)
CALCIUM SERPL-MCNC: 8.4 MG/DL (ref 8.3–10.6)
CHLORIDE BLD-SCNC: 100 MMOL/L (ref 99–110)
CO2: 32 MMOL/L (ref 21–32)
CREAT SERPL-MCNC: 1 MG/DL (ref 0.6–1.2)
GFR AFRICAN AMERICAN: >60
GFR NON-AFRICAN AMERICAN: 54
GLOBULIN: 2.5 G/DL
GLUCOSE BLD-MCNC: 80 MG/DL (ref 70–99)
INR BLD: 1.2 (ref 0.86–1.14)
MAGNESIUM: 1.7 MG/DL (ref 1.8–2.4)
POTASSIUM SERPL-SCNC: 3.3 MMOL/L (ref 3.5–5.1)
PROTHROMBIN TIME: 13.9 SEC (ref 10–13.2)
SODIUM BLD-SCNC: 141 MMOL/L (ref 136–145)
TOTAL PROTEIN: 5.4 G/DL (ref 6.4–8.2)

## 2020-12-16 PROCEDURE — 6370000000 HC RX 637 (ALT 250 FOR IP): Performed by: INTERNAL MEDICINE

## 2020-12-16 PROCEDURE — 85025 COMPLETE CBC W/AUTO DIFF WBC: CPT

## 2020-12-16 PROCEDURE — 94640 AIRWAY INHALATION TREATMENT: CPT

## 2020-12-16 PROCEDURE — 36591 DRAW BLOOD OFF VENOUS DEVICE: CPT

## 2020-12-16 PROCEDURE — 2700000000 HC OXYGEN THERAPY PER DAY

## 2020-12-16 PROCEDURE — 80053 COMPREHEN METABOLIC PANEL: CPT

## 2020-12-16 PROCEDURE — 94761 N-INVAS EAR/PLS OXIMETRY MLT: CPT

## 2020-12-16 PROCEDURE — 83735 ASSAY OF MAGNESIUM: CPT

## 2020-12-16 PROCEDURE — 6360000002 HC RX W HCPCS: Performed by: INTERNAL MEDICINE

## 2020-12-16 PROCEDURE — 85610 PROTHROMBIN TIME: CPT

## 2020-12-16 RX ORDER — MAGNESIUM SULFATE IN WATER 40 MG/ML
4 INJECTION, SOLUTION INTRAVENOUS ONCE
Status: COMPLETED | OUTPATIENT
Start: 2020-12-16 | End: 2020-12-16

## 2020-12-16 RX ORDER — OXYCODONE HYDROCHLORIDE 10 MG/1
10 TABLET ORAL 3 TIMES DAILY
Qty: 1 TABLET | Refills: 0
Start: 2020-12-16 | End: 2020-12-19

## 2020-12-16 RX ADMIN — NYSTATIN 5 ML: 100000 SUSPENSION ORAL at 16:58

## 2020-12-16 RX ADMIN — MAGNESIUM GLUCONATE 500 MG ORAL TABLET 400 MG: 500 TABLET ORAL at 09:53

## 2020-12-16 RX ADMIN — Medication 1 G: at 09:52

## 2020-12-16 RX ADMIN — ONDANSETRON 4 MG: 2 INJECTION INTRAMUSCULAR; INTRAVENOUS at 10:19

## 2020-12-16 RX ADMIN — SENNOSIDES 8.6 MG: 8.6 TABLET, FILM COATED ORAL at 09:53

## 2020-12-16 RX ADMIN — IPRATROPIUM BROMIDE AND ALBUTEROL SULFATE 1 AMPULE: .5; 3 SOLUTION RESPIRATORY (INHALATION) at 08:05

## 2020-12-16 RX ADMIN — Medication 1 G: at 14:05

## 2020-12-16 RX ADMIN — NYSTATIN 5 ML: 100000 SUSPENSION ORAL at 10:22

## 2020-12-16 RX ADMIN — MAGNESIUM SULFATE HEPTAHYDRATE 4 G: 40 INJECTION, SOLUTION INTRAVENOUS at 09:53

## 2020-12-16 RX ADMIN — Medication 1 G: at 16:58

## 2020-12-16 RX ADMIN — POTASSIUM BICARBONATE 40 MEQ: 782 TABLET, EFFERVESCENT ORAL at 09:54

## 2020-12-16 RX ADMIN — TORSEMIDE 10 MG: 20 TABLET ORAL at 09:54

## 2020-12-16 RX ADMIN — ENOXAPARIN SODIUM 40 MG: 40 INJECTION SUBCUTANEOUS at 09:52

## 2020-12-16 RX ADMIN — POTASSIUM CHLORIDE 20 MEQ: 20 TABLET, EXTENDED RELEASE ORAL at 09:53

## 2020-12-16 RX ADMIN — OXYCODONE 10 MG: 5 TABLET ORAL at 09:53

## 2020-12-16 RX ADMIN — OXYCODONE 10 MG: 5 TABLET ORAL at 14:05

## 2020-12-16 RX ADMIN — NYSTATIN 5 ML: 100000 SUSPENSION ORAL at 14:04

## 2020-12-16 ASSESSMENT — PAIN SCALES - GENERAL
PAINLEVEL_OUTOF10: 4
PAINLEVEL_OUTOF10: 4

## 2020-12-16 NOTE — PROGRESS NOTES
Shift assessment completed and charted. Pt placed back on 2L NC, stat only 87% on RA. Bed alarm on. A&OX4, can be forgetful, calls out when assistance is needed. Bed locked and in lowest position. Call light within reach. Pt denies any other needs at this time. Will continue to monitor.

## 2020-12-16 NOTE — PROGRESS NOTES
Per [facility] Pt HR increased in the 130's while pt was up to bathroom. Pt was asymptomatic. Reassessed HR pt is currently at 89. Pt is resting in bed comfortably with no complaints.

## 2020-12-16 NOTE — PROGRESS NOTES
Pt up to bathroom and returned to bed. Bed pad changed and pt readjusted in bed. Pt verbalizes no needs at this time.

## 2020-12-16 NOTE — PROGRESS NOTES
Hospitalist Progress Note      PCP: India Couch    Date of Admission: 12/13/2020    Chief Complaint on Admission: Dyspnea, vomiting    HPI  Patient has active CLL and small cell carcinoma of the lung for which she is receiving chemotherapy. She presented for dyspnea w/ an O2 saturation of 74% on room air. At that time she reported she had been vomiting for the past two days. She does not usually require supplemental O2. Pt Seen/Examined and Chart Reviewed. Admitting dx: Acute hypoxic respiratory failure    SUBJECTIVE:   Patient remains afebrile. Titrating off oxygen to RA and doing well. Has received Abx: Cefepime and Vanco for 24 hours. CT of chest reviewed with no evidence of airspace disease. Patient had received 48 hours of high-dose steroids, and notable elevation to white blood cell count. Plan: Awaiting hematology oncology evaluation regarding elevated white blood cell count. No new medical complaints  Patient is eager to go home  Bedside rounding with nursing completed  OBJECTIVE:     Allergies  Patient has no known allergies.     Medications      Scheduled Meds:   magnesium sulfate  4 g Intravenous Once    potassium bicarb-citric acid  40 mEq Oral Once    magic (miracle) mouthwash with nystatin  5 mL Swish & Swallow TID WC    ipratropium-albuterol  1 ampule Inhalation BID    senna  1 tablet Oral BID    enoxaparin  40 mg Subcutaneous Daily    atorvastatin  20 mg Oral Nightly    torsemide  10 mg Oral Daily    potassium chloride  20 mEq Oral BID    sodium chloride  1 g Oral TID WC    oxyCODONE  10 mg Oral TID    magnesium oxide  400 mg Oral Daily       Infusions:      PRN Meds:  oxyCODONE-acetaminophen, sodium chloride flush, acetaminophen **OR** acetaminophen, ondansetron, ondansetron, promethazine, promethazine, promethazine, albuterol, oxyCODONE    Intake and Output     Intake/Output Summary (Last 24 hours) at 12/16/2020 0945  Last data filed at 12/16/2020 2193 Gross per 24 hour   Intake 480 ml   Output 300 ml   Net 180 ml       Vitals    /67   Pulse 97   Temp 98 °F (36.7 °C) (Oral)   Resp 16   Ht 5' 3\" (1.6 m)   Wt 118 lb 3.2 oz (53.6 kg)   SpO2 91%   BMI 20.94 kg/m²     Exam:    Gen: Well, NAD, Alert, Oriented x 3. Intermittently confused. Easily reoriented by verbal cueing  Lungs: Easy respiration, unlabored, good respiratory effort. Coarse rhonchi scattered throughout posterior lung fields. Equal chest wall excursion. Cor: RRR, S1S2, w/out M/R/G, non-displaced PMI  Vascular:Pulses normal and equal bilaterally, no lower extremity peripheral edema or venous stasis, no calf tenderness, negative Linda's sign, no calf cords  Abdomen: Soft NT/ND, w/out R/G, w/ +BSx4  Ext: No C/C/E Bilaterally. Full ROM w/ out deformity   Neuro: Neurovascularly intact w/ Sensory/Motor intact UE/LE Bilaterally. Skin: Skin color, texture, turgor normal. Without Rashes/Lesions. Musculoskeletal: No evidence of joint instability, strength normal, no evidence of muscle atrophy, no deformities present    Data    Recent Labs     12/14/20  0028 12/15/20  0508 12/16/20  0530   WBC 63.5* 83.6* 81.4*   HGB 10.7* 9.4* 9.6*   HCT 34.7* 30.7* 31.1*    449 454*      Recent Labs     12/14/20  0028 12/15/20  0508 12/16/20  0530    144 141   K 3.4* 3.5 3.3*    104 100   CO2 28 30 32   BUN 24* 18 20   CREATININE 1.1 1.0 1.0     Recent Labs     12/14/20  0028 12/15/20  0508 12/16/20  0530   AST 51* 35 46*   ALT 48* 39 47*   BILITOT 0.8 0.8 0.8   ALKPHOS 498* 413* 438*     Recent Labs     12/15/20  0508 12/16/20  0530   INR 1.13 1.20*     Recent Labs     12/14/20  0028   TROPONINI <0.01       CXR 12/14/2020  Impression   Overall, there has been improvement from 08/11/2020.  Mild   left-greater-than-right basal opacity may represent residual or recurrent   disease. CT Chest 12/14/2020  Impression   1. No pulmonary embolus. 2. Small left pleural effusion with adjacent atelectasis.  Right base   atelectasis. 3. 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 4. Stable left lower lobe nodule measuring 7 mm. 5. COPD. 6. Stable left adrenal nodule. Consults:     IP CONSULT TO HOSPITALIST  IP CONSULT TO PHARMACY  IP CONSULT TO ONCOLOGY    ASSESSMENT AND PLAN      Active Hospital Problems    Diagnosis Date Noted    Aspiration of gastric contents [T17.910A] 12/15/2020    CLL (chronic lymphocytic leukemia) ([facility] [address] [C91.10] 12/14/2020    Acute respiratory failure with hypoxia (HCC) [J96.01]     Small cell lung cancer ([facility] [address] [C34.90] 08/08/2020    Centrilobular emphysema (HCC) [J43.2]        Acute hypoxic respiratory failure in the setting of nausea and vomiting likely aspiration event. Chest x-ray and CT chest do not demonstrate any infectious process on imaging. I do not favor active clinical pneumonia. Patient has remained afebrile during hospitalization stay. Elevated white blood cell count most likely reflective of underlying oncological process and high-dose steroids.  -Monitor fever curves  -Hold antibiotics: Patient received 48 hours of vancomycin, cefepime  -Labs: Legionella and streptococcal antigen negative  -Procalcitonin ordered 12/15  -Imaging: Reviewed chest x-ray, CT chest  -Patient has not required any further supplemental oxygen  -Consult hematology oncology: Dr. Sidney Gilbert to see in a.m. Small cell lung carcinoma and CLL: Patient actively receiving chemotherapy  -Consultation hematology oncology: Pablo Melendez to see in a.m.     Chronic emphysema:  -Persistent underlying   -Medications: DuoNeb  -Monitor off steroids, as this is exacerbating underlying leukocytosis.   -Educate smoking cessation    HLD:   -Stable cholesterol control  - Meds: Lipitor  -Cont to optimize cholesterol control as outpt;    SIADH

## 2020-12-16 NOTE — PROGRESS NOTES
ONCOLOGY HEMATOLOGY CARE PROGRESS NOTE      SUBJECTIVE:     The patient states that she feels better today would like to go home. ROS:   The remaining 10 point review of symptoms is unremarkable. OBJECTIVE        Physical    VITALS:  /67   Pulse 97   Temp 98 °F (36.7 °C) (Oral)   Resp 16   Ht 5' 3\" (1.6 m)   Wt 118 lb 3.2 oz (53.6 kg)   SpO2 91%   BMI 20.94 kg/m²   TEMPERATURE:  Current - Temp: 98 °F (36.7 °C); Max - Temp  Av.1 °F (36.7 °C)  Min: 97.5 °F (36.4 °C)  Max: 99.1 °F (37.3 °C)  PULSE OXIMETRY RANGE: SpO2  Av.2 %  Min: 89 %  Max: 97 %  24HR INTAKE/OUTPUT:      Intake/Output Summary (Last 24 hours) at 2020 0813  Last data filed at 2020 0404  Gross per 24 hour   Intake 480 ml   Output 300 ml   Net 180 ml       CONSTITUTIONAL:  awake, alert, cooperative, no apparent distress, HEENT oral pharynx , no scleral icterus  HEMATOLOGIC/LYMPHATICS:  no cervical lymphadenopathy, no supraclavicular lymphadenopathy, no axillary lymphadenopathy and no inguinal lymphadenopathy  LUNGS:  She has diminished breath sounds and rhonchi in both lungs. CARDIOVASCULAR:  , regular rate and rhythm, normal S1 and S2, no S3 or S4, and no murmur noted  ABDOMEN:  No scars, normal bowel sounds, soft, non-distended, non-tender, no masses palpated, no hepatosplenomegally  MUSCULOSKELETAL:  There is no redness, warmth, or swelling of the joints. EXTREMETIES: No clubbing cynosis or edema  NEUROLOGIC:  Awake, alert, oriented to name, place and time. Cranial nerves II-XII are grossly intact. Motor is 5 out of 5 bilaterally.    SKIN:  no bruising or bleeding      Data      Recent Labs     20  0028 12/15/20  0508 20  0530   WBC 63.5* 83.6* 81.4*   HGB 10.7* 9.4* 9.6*   HCT 34.7* 30.7* 31.1*    449 454*   MCV 94.7 94.8 93.9        Recent Labs     20  0028 12/15/20  0508 20  0530    144 141   K 3.4* 3.5 3.3*    104 100 CO2 28 30 32   BUN 24* 18 20   CREATININE 1.1 1.0 1.0     Recent Labs     12/14/20  0028 12/15/20  0508 12/16/20  0530   AST 51* 35 46*   ALT 48* 39 47*   BILITOT 0.8 0.8 0.8   ALKPHOS 498* 413* 438*       Magnesium:    Lab Results   Component Value Date    MG 1.70 12/16/2020    MG 1.80 12/15/2020    MG 1.90 12/14/2020         Problem List  Patient Active Problem List   Diagnosis    Acute hyponatremia    N&V (nausea and vomiting)    Leukocytosis    Lung mass    Multiple lung nodules    Hilar adenopathy    Centrilobular emphysema (HCC)    Smoker    Anemia    [facility]eps[facility] [address]    Lung cancer ([facility] [address]    Hyperlipidemia    2019 novel coronavirus disease (COVID-19)    Hypokalemia    Pulmonary infiltrates    [facility] cell lung cancer ([facility] [address]    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)    Acute blood loss anemia    Elevated procalcitonin    Hyponatremia    Former smoker    Acute respiratory failure with hypoxia (HCC)    Pneumonia of both lower lobes due to infectious organism    CLL (chronic lymphocytic leukemia) (HCC)    Aspiration of gastric contents       ASSESSMENT AND PLAN:    Probable aspiration pneumonia:  -Antibiotics have been stopped  -The patient is feeling much better and would like to go home    Leukocytosis:  -The patient has CLL  -Anytime she has an acute event, her white blood count increases and this is not of great concern    Small cell lung carcinoma:  -The patient is failed induction therapy with carboplatinum/-16  -She failed Taxol on first relapse  -She is to start lubrinectiden this Thursday. ONCOLOGIC DISPOSITION:    -No further oncology intervention while she is in the hospital.  -Hopefully she will be discharged and can receive her initial third line therapy on Thursday in our office.     Karly Gomez MD  Please contact through [facility]

## 2020-12-17 LAB
ANISOCYTOSIS: ABNORMAL
BANDED NEUTROPHILS RELATIVE PERCENT: 6 % (ref 0–7)
BASOPHILS ABSOLUTE: 0 K/UL (ref 0–0.2)
BASOPHILS RELATIVE PERCENT: 0 %
EOSINOPHILS ABSOLUTE: 0 K/UL (ref 0–0.6)
EOSINOPHILS RELATIVE PERCENT: 0 %
HCT VFR BLD CALC: 31.1 % (ref 36–48)
HEMATOLOGY PATH CONSULT: NO
HEMOGLOBIN: 9.6 G/DL (ref 12–16)
LYMPHOCYTES ABSOLUTE: 53.7 K/UL (ref 1–5.1)
LYMPHOCYTES RELATIVE PERCENT: 66 %
MACROCYTES: ABNORMAL
MCH RBC QN AUTO: 29.1 PG (ref 26–34)
MCHC RBC AUTO-ENTMCNC: 31 G/DL (ref 31–36)
MCV RBC AUTO: 93.9 FL (ref 80–100)
MONOCYTES ABSOLUTE: 0.8 K/UL (ref 0–1.3)
MONOCYTES RELATIVE PERCENT: 1 %
NEUTROPHILS ABSOLUTE: 26.9 K/UL (ref 1.7–7.7)
NEUTROPHILS RELATIVE PERCENT: 27 %
OVALOCYTES: ABNORMAL
PDW BLD-RTO: 16.7 % (ref 12.4–15.4)
PLATELET # BLD: 454 K/UL (ref 135–450)
PLATELET SLIDE REVIEW: ABNORMAL
PMV BLD AUTO: 8.2 FL (ref 5–10.5)
POLYCHROMASIA: ABNORMAL
RBC # BLD: 3.31 M/UL (ref 4–5.2)
SLIDE REVIEW: ABNORMAL
SMUDGE CELLS: PRESENT
TOXIC GRANULATION: PRESENT
WBC # BLD: 81.4 K/UL (ref 4–11)

## 2020-12-17 NOTE — DISCHARGE SUMMARY
Hospital Medicine Discharge Summary    Patient ID: Bola Pinto      Patient's PCP: Bob Roth    Admit Date: 12/13/2020     Discharge Date:   12/16/2020    Admitting Physician: Juan Samuel MD     Discharge Physician: Rojas Moreno MD     Discharge Diagnoses: Active Hospital Problems    Diagnosis    Aspiration of gastric contents [T17.910A]    CLL (chronic lymphocytic leukemia) (HCC) [C91.10]    Acute respiratory failure with hypoxia (HCC) [J96.01]    Small cell lung cancer ([facility] [address] [C34.90]    [facility]rilobular emph[facility]sema ([facility] [J43.2]       The patient was seen and examined on day of discharge and this discharge summary is in conjunction with any daily progress note from day of discharge. Hospital Course:   Patient has active CLL and small cell carcinoma of the lung for which she is receiving chemotherapy.  She presented for dyspnea w/ an O2 saturation of 74% on room air.  At that time she reported she had been vomiting for the past two days. Ky Walls does not usually require supplemental O2. Patient was admitted to the Hospitalist service for further medical care and evaluation.      Physical Exam Performed:     /66   Pulse 84   Temp 98.2 °F (36.8 °C) (Oral)   Resp 18   Ht 5' 3\" (1.6 m)   Wt 118 lb 3.2 oz (53.6 kg)   SpO2 91%   BMI 20.94 kg/m²   Gen: Well, NAD, Alert, Oriented x 3. Intermittently confused. Easily reoriented by verbal cueing  Lungs: Easy respiration, unlabored, good respiratory effort. Coarse rhonchi scattered throughout posterior lung fields. Equal chest wall excursion. Cor: RRR, S1S2, w/out M/R/G, non-displaced PMI  Vascular:Pulses normal and equal bilaterally, no lower extremity peripheral edema or venous stasis, no calf tenderness, negative Linda's sign, no calf cords  Abdomen: Soft NT/ND, w/out R/G, w/ +BSx4  Ext: No C/C/E Bilaterally. Full ROM w/ out deformity   Neuro: Neurovascularly intact w/ Sensory/Motor intact UE/LE Bilaterally. Skin: Skin color, texture, turgor normal. Without Rashes/Lesions. Musculoskeletal: No evidence of joint instability, strength normal, no evidence of muscle atrophy, no deformities present         Labs: For convenience and continuity at follow-up the following most recent labs are provided:      CBC:    Lab Results   Component Value Date    WBC 81.4 12/16/2020    HGB 9.6 12/16/2020    HCT 31.1 12/16/2020     12/16/2020       Renal:    Lab Results   Component Value Date     12/16/2020    K 3.3 12/16/2020    K 3.6 10/19/2020     12/16/2020    CO2 32 12/16/2020    BUN 20 12/16/2020    CREATININE 1.0 12/16/2020    CALCIUM 8.4 12/16/2020    PHOS 3.1 10/17/2020         Significant Diagnostic Studies    Radiology:   CT CHEST PULMONARY EMBOLISM W CONTRAST   Final Result   1. No pulmonary embolus. 2. Small left pleural effusion with adjacent atelectasis. Right base   atelectasis. 3. 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 4. Stable left lower lobe nodule measuring 7 mm. 5. COPD. 6. Stable left adrenal nodule. XR CHEST PORTABLE   Final Result   Overall, there has been improvement from 08/11/2020. Mild   left-greater-than-right basal opacity may represent residual or recurrent   disease.             ASSESSMENT AND PLAN            Active Hospital Problems     Diagnosis Date Noted    Aspiration of gastric contents [T17.910A] 12/15/2020    CLL (chronic lymphocytic leukemia) (HCC) [C91.10] 12/14/2020    Acute respiratory failure with hypoxia (HCC) [J96.01]      Small cell lung cancer ([facility] [address] [C34.90] 08/08/2020    Centrilobular emphysema (HCC) [J43.2]          Acute hypoxic respiratory failure in the setting of nausea and vomiting likely aspiration event. Chest x-ray and CT chest do not demonstrate any infectious process on imaging. I do not favor active clinical pneumonia. Patient has remained afebrile during hospitalization stay. Elevated white blood cell count most likely reflective of underlying oncological process and high-dose steroids.  -Monitor fever curves  -Hold antibiotics: Patient received 48 hours of vancomycin, cefepime  -Labs: Legionella and streptococcal antigen negative  -Imaging: Reviewed chest x-ray, CT chest  -Patient has not required any further supplemental oxygen  -Consult hematology oncology: Dr. Francisco Diaz noting elevated WBC in setting of acute illness events.      Small cell lung carcinoma and CLL: Patient actively receiving chemotherapy  -Consultation hematology oncology: Hold lurbinectedin in hopsital  -FU at FL     Chronic emphysema:  -Persistent underlying   -Medications: DuoNeb  -Monitor off steroids, as this is exacerbating underlying leukocytosis.   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 Patient currently has not been taking. Recommend to discuss the need to restart with Heme/Onc at outpt follow up. FEN: Hypokalemia, Hypomagnesmia  -Replete  -Encourage nutrition  -Monitor labs as clinically warranted as outpt.        Consults:     IP CONSULT TO HOSPITALIST  IP CONSULT TO PHARMACY  IP CONSULT TO ONCOLOGY    Disposition: Home     Condition at Discharge: Stable    Discharge Instructions/Follow-up: Heme-onc 1 week, PCP 1 to 2 weeks on discharge    Code Status:  Full Code     Activity: activity as tolerated    Diet: regular diet      Discharge Medications:     Current Discharge Medication List Thank you Antoine Coronel for the opportunity to be involved in this patient's care. If you have any questions or concerns please feel free to contact me at [phone].

## 2020-12-18 LAB
BLOOD CULTURE, ROUTINE: NORMAL
CULTURE, BLOOD 2: NORMAL

## 2021-02-19 ENCOUNTER — HOSPITAL ENCOUNTER (OUTPATIENT)
Dept: MRI IMAGING | Age: 76
Discharge: HOME | End: 2021-02-19
Payer: MEDICARE

## 2021-02-19 DIAGNOSIS — C34.11 SMALL CELL LUNG CANCER, RIGHT UPPER LOBE (HCC): ICD-10-CM

## 2021-02-19 DIAGNOSIS — C34.11 SMALL CELL CARCINOMA OF UPPER LOBE OF RIGHT LUNG (HCC): ICD-10-CM

## 2021-02-19 PROCEDURE — 6360000004 HC RX CONTRAST MEDICATION: Performed by: INTERNAL MEDICINE

## 2021-02-19 PROCEDURE — A9579 GAD-BASE MR CONTRAST NOS,1ML: HCPCS | Performed by: INTERNAL MEDICINE

## 2021-02-19 PROCEDURE — 72158 MRI LUMBAR SPINE W/O & W/DYE: CPT

## 2021-02-19 PROCEDURE — 72157 MRI CHEST SPINE W/O & W/DYE: CPT

## 2021-02-19 RX ADMIN — GADOTERIDOL 10 ML: 279.3 INJECTION, SOLUTION INTRAVENOUS at 16:22

## 2021-02-22 ENCOUNTER — APPOINTMENT (OUTPATIENT)
Dept: GENERAL RADIOLOGY | Age: 76
End: 2021-02-22
Payer: MEDICARE

## 2021-02-22 ENCOUNTER — HOSPITAL ENCOUNTER (INPATIENT)
Age: 76
LOS: 4 days | Discharge: HOSPICE | DRG: 055 | End: 2021-02-26
Attending: INTERNAL MEDICINE
Payer: MEDICARE

## 2021-02-22 ENCOUNTER — HOSPITAL ENCOUNTER (EMERGENCY)
Age: 76
Discharge: ANOTHER HOSPITAL | End: 2021-02-22
Attending: EMERGENCY MEDICINE
Payer: MEDICARE

## 2021-02-22 VITALS
DIASTOLIC BLOOD PRESSURE: 61 MMHG | HEIGHT: 62 IN | HEART RATE: 78 BPM | SYSTOLIC BLOOD PRESSURE: 101 MMHG | TEMPERATURE: 98.7 F | RESPIRATION RATE: 16 BRPM | WEIGHT: 130 LBS | OXYGEN SATURATION: 95 % | BODY MASS INDEX: 23.92 KG/M2

## 2021-02-22 DIAGNOSIS — M25.551 PAIN IN JOINT INVOLVING RIGHT PELVIC REGION AND THIGH: ICD-10-CM

## 2021-02-22 DIAGNOSIS — R29.898 WEAKNESS OF RIGHT LOWER EXTREMITY: Primary | ICD-10-CM

## 2021-02-22 DIAGNOSIS — C79.49 METASTASIS OF NEOPLASM TO SPINAL CANAL (HCC): Primary | ICD-10-CM

## 2021-02-22 DIAGNOSIS — C34.02 MALIGNANT NEOPLASM OF HILUS OF LEFT LUNG (HCC): ICD-10-CM

## 2021-02-22 DIAGNOSIS — R20.0 NUMBNESS: ICD-10-CM

## 2021-02-22 LAB
A/G RATIO: 1.3 (ref 1.1–2.2)
ALBUMIN SERPL-MCNC: 3.6 G/DL (ref 3.4–5)
ALP BLD-CCNC: 88 U/L (ref 40–129)
ALT SERPL-CCNC: 15 U/L (ref 10–40)
ANION GAP SERPL CALCULATED.3IONS-SCNC: 9 MMOL/L (ref 3–16)
AST SERPL-CCNC: 25 U/L (ref 15–37)
ATYPICAL LYMPHOCYTE RELATIVE PERCENT: 2 % (ref 0–6)
BANDED NEUTROPHILS RELATIVE PERCENT: 2 % (ref 0–7)
BASOPHILS ABSOLUTE: 0 K/UL (ref 0–0.2)
BASOPHILS RELATIVE PERCENT: 0 %
BILIRUB SERPL-MCNC: 0.5 MG/DL (ref 0–1)
BILIRUBIN URINE: NEGATIVE
BLOOD, URINE: NEGATIVE
BUN BLDV-MCNC: 21 MG/DL (ref 7–20)
CALCIUM SERPL-MCNC: 9.1 MG/DL (ref 8.3–10.6)
CHLORIDE BLD-SCNC: 101 MMOL/L (ref 99–110)
CLARITY: CLEAR
CO2: 29 MMOL/L (ref 21–32)
COLOR: YELLOW
CREAT SERPL-MCNC: 1.4 MG/DL (ref 0.6–1.2)
EOSINOPHILS ABSOLUTE: 0 K/UL (ref 0–0.6)
EOSINOPHILS RELATIVE PERCENT: 0 %
GFR AFRICAN AMERICAN: 44
GFR NON-AFRICAN AMERICAN: 37
GLOBULIN: 2.7 G/DL
GLUCOSE BLD-MCNC: 86 MG/DL (ref 70–99)
GLUCOSE URINE: NEGATIVE MG/DL
HCT VFR BLD CALC: 32.1 % (ref 36–48)
HEMATOLOGY PATH CONSULT: NORMAL
HEMATOLOGY PATH CONSULT: YES
HEMOGLOBIN: 10.4 G/DL (ref 12–16)
HYPOCHROMIA: ABNORMAL
KETONES, URINE: NEGATIVE MG/DL
LEUKOCYTE ESTERASE, URINE: NEGATIVE
LYMPHOCYTES ABSOLUTE: 11.4 K/UL (ref 1–5.1)
LYMPHOCYTES RELATIVE PERCENT: 49 %
MCH RBC QN AUTO: 32.2 PG (ref 26–34)
MCHC RBC AUTO-ENTMCNC: 32.3 G/DL (ref 31–36)
MCV RBC AUTO: 99.8 FL (ref 80–100)
MICROSCOPIC EXAMINATION: NORMAL
MONOCYTES ABSOLUTE: 0.4 K/UL (ref 0–1.3)
MONOCYTES RELATIVE PERCENT: 2 %
NEUTROPHILS ABSOLUTE: 10.5 K/UL (ref 1.7–7.7)
NEUTROPHILS RELATIVE PERCENT: 45 %
NITRITE, URINE: NEGATIVE
OVALOCYTES: ABNORMAL
PDW BLD-RTO: 18.7 % (ref 12.4–15.4)
PH UA: 7.5 (ref 5–8)
PLATELET # BLD: 256 K/UL (ref 135–450)
PLATELET SLIDE REVIEW: ADEQUATE
PMV BLD AUTO: 7.4 FL (ref 5–10.5)
POTASSIUM SERPL-SCNC: 4.8 MMOL/L (ref 3.5–5.1)
PROTEIN UA: NEGATIVE MG/DL
RBC # BLD: 3.21 M/UL (ref 4–5.2)
SLIDE REVIEW: ABNORMAL
SMUDGE CELLS: PRESENT
SODIUM BLD-SCNC: 139 MMOL/L (ref 136–145)
SPECIFIC GRAVITY UA: 1.01 (ref 1–1.03)
TOTAL PROTEIN: 6.3 G/DL (ref 6.4–8.2)
URINE REFLEX TO CULTURE: NORMAL
URINE TYPE: NORMAL
UROBILINOGEN, URINE: 0.2 E.U./DL
WBC # BLD: 22.4 K/UL (ref 4–11)

## 2021-02-22 PROCEDURE — 36415 COLL VENOUS BLD VENIPUNCTURE: CPT

## 2021-02-22 PROCEDURE — 6370000000 HC RX 637 (ALT 250 FOR IP): Performed by: INTERNAL MEDICINE

## 2021-02-22 PROCEDURE — 2060000000 HC ICU INTERMEDIATE R&B

## 2021-02-22 PROCEDURE — 72100 X-RAY EXAM L-S SPINE 2/3 VWS: CPT

## 2021-02-22 PROCEDURE — 73521 X-RAY EXAM HIPS BI 2 VIEWS: CPT

## 2021-02-22 PROCEDURE — 80053 COMPREHEN METABOLIC PANEL: CPT

## 2021-02-22 PROCEDURE — 85025 COMPLETE CBC W/AUTO DIFF WBC: CPT

## 2021-02-22 PROCEDURE — 99285 EMERGENCY DEPT VISIT HI MDM: CPT

## 2021-02-22 PROCEDURE — 2580000003 HC RX 258: Performed by: INTERNAL MEDICINE

## 2021-02-22 PROCEDURE — 1200000000 HC SEMI PRIVATE

## 2021-02-22 PROCEDURE — 6360000002 HC RX W HCPCS: Performed by: INTERNAL MEDICINE

## 2021-02-22 PROCEDURE — 6360000002 HC RX W HCPCS: Performed by: PHYSICIAN ASSISTANT

## 2021-02-22 PROCEDURE — 96375 TX/PRO/DX INJ NEW DRUG ADDON: CPT

## 2021-02-22 PROCEDURE — 96374 THER/PROPH/DIAG INJ IV PUSH: CPT

## 2021-02-22 PROCEDURE — 81003 URINALYSIS AUTO W/O SCOPE: CPT

## 2021-02-22 RX ORDER — ACETAMINOPHEN 325 MG/1
650 TABLET ORAL EVERY 6 HOURS PRN
Status: DISCONTINUED | OUTPATIENT
Start: 2021-02-22 | End: 2021-02-24

## 2021-02-22 RX ORDER — MAGNESIUM SULFATE IN WATER 40 MG/ML
2000 INJECTION, SOLUTION INTRAVENOUS PRN
Status: DISCONTINUED | OUTPATIENT
Start: 2021-02-22 | End: 2021-02-24

## 2021-02-22 RX ORDER — POTASSIUM CHLORIDE 7.45 MG/ML
10 INJECTION INTRAVENOUS PRN
Status: DISCONTINUED | OUTPATIENT
Start: 2021-02-22 | End: 2021-02-24

## 2021-02-22 RX ORDER — OXYCODONE HYDROCHLORIDE 5 MG/1
10 TABLET ORAL EVERY MORNING
Status: ON HOLD | COMMUNITY
End: 2021-02-23 | Stop reason: CLARIF

## 2021-02-22 RX ORDER — ACETAMINOPHEN 650 MG/1
650 SUPPOSITORY RECTAL EVERY 6 HOURS PRN
Status: DISCONTINUED | OUTPATIENT
Start: 2021-02-22 | End: 2021-02-26 | Stop reason: HOSPADM

## 2021-02-22 RX ORDER — LANOLIN ALCOHOL/MO/W.PET/CERES
400 CREAM (GRAM) TOPICAL DAILY
Status: DISCONTINUED | OUTPATIENT
Start: 2021-02-23 | End: 2021-02-24

## 2021-02-22 RX ORDER — ATORVASTATIN CALCIUM 40 MG/1
40 TABLET, FILM COATED ORAL DAILY
Status: DISCONTINUED | OUTPATIENT
Start: 2021-02-23 | End: 2021-02-24

## 2021-02-22 RX ORDER — ALBUTEROL SULFATE 2.5 MG/3ML
2.5 SOLUTION RESPIRATORY (INHALATION) EVERY 4 HOURS PRN
Status: DISCONTINUED | OUTPATIENT
Start: 2021-02-22 | End: 2021-02-26 | Stop reason: HOSPADM

## 2021-02-22 RX ORDER — MIDODRINE HYDROCHLORIDE 5 MG/1
5 TABLET ORAL 2 TIMES DAILY WITH MEALS
Status: DISCONTINUED | OUTPATIENT
Start: 2021-02-23 | End: 2021-02-24

## 2021-02-22 RX ORDER — MIDODRINE HYDROCHLORIDE 5 MG/1
TABLET ORAL
Status: ON HOLD | COMMUNITY
Start: 2021-01-07 | End: 2021-02-26 | Stop reason: HOSPADM

## 2021-02-22 RX ORDER — OXYCODONE HYDROCHLORIDE 5 MG/1
10 TABLET ORAL EVERY EVENING
Status: DISCONTINUED | OUTPATIENT
Start: 2021-02-22 | End: 2021-02-23

## 2021-02-22 RX ORDER — MORPHINE SULFATE 2 MG/ML
1 INJECTION, SOLUTION INTRAMUSCULAR; INTRAVENOUS EVERY 4 HOURS PRN
Status: DISCONTINUED | OUTPATIENT
Start: 2021-02-22 | End: 2021-02-24

## 2021-02-22 RX ORDER — OXYCODONE HYDROCHLORIDE 5 MG/1
10 TABLET ORAL EVERY 4 HOURS PRN
Status: DISCONTINUED | OUTPATIENT
Start: 2021-02-22 | End: 2021-02-24

## 2021-02-22 RX ORDER — ONDANSETRON 2 MG/ML
4 INJECTION INTRAMUSCULAR; INTRAVENOUS EVERY 6 HOURS PRN
Status: DISCONTINUED | OUTPATIENT
Start: 2021-02-22 | End: 2021-02-26 | Stop reason: HOSPADM

## 2021-02-22 RX ORDER — SODIUM CHLORIDE 0.9 % (FLUSH) 0.9 %
10 SYRINGE (ML) INJECTION EVERY 12 HOURS SCHEDULED
Status: DISCONTINUED | OUTPATIENT
Start: 2021-02-22 | End: 2021-02-24

## 2021-02-22 RX ORDER — DEXAMETHASONE SODIUM PHOSPHATE 4 MG/ML
6 INJECTION, SOLUTION INTRA-ARTICULAR; INTRALESIONAL; INTRAMUSCULAR; INTRAVENOUS; SOFT TISSUE EVERY 6 HOURS
Status: DISCONTINUED | OUTPATIENT
Start: 2021-02-22 | End: 2021-02-26 | Stop reason: HOSPADM

## 2021-02-22 RX ORDER — PROMETHAZINE HYDROCHLORIDE 12.5 MG/1
12.5 TABLET ORAL EVERY 6 HOURS PRN
Status: DISCONTINUED | OUTPATIENT
Start: 2021-02-22 | End: 2021-02-24

## 2021-02-22 RX ORDER — DEXAMETHASONE SODIUM PHOSPHATE 10 MG/ML
10 INJECTION INTRAMUSCULAR; INTRAVENOUS ONCE
Status: COMPLETED | OUTPATIENT
Start: 2021-02-22 | End: 2021-02-22

## 2021-02-22 RX ORDER — OXYCODONE HYDROCHLORIDE 5 MG/1
10 TABLET ORAL EVERY EVENING
Status: ON HOLD | COMMUNITY
End: 2021-02-23 | Stop reason: CLARIF

## 2021-02-22 RX ORDER — OXYCODONE HYDROCHLORIDE 5 MG/1
10 TABLET ORAL EVERY MORNING
Status: DISCONTINUED | OUTPATIENT
Start: 2021-02-23 | End: 2021-02-23

## 2021-02-22 RX ORDER — SODIUM CHLORIDE 0.9 % (FLUSH) 0.9 %
10 SYRINGE (ML) INJECTION PRN
Status: DISCONTINUED | OUTPATIENT
Start: 2021-02-22 | End: 2021-02-24

## 2021-02-22 RX ORDER — OXYCODONE HYDROCHLORIDE 5 MG/1
10 TABLET ORAL EVERY 4 HOURS PRN
Status: ON HOLD | COMMUNITY
End: 2021-02-26 | Stop reason: HOSPADM

## 2021-02-22 RX ORDER — OXYCODONE HYDROCHLORIDE AND ACETAMINOPHEN 325; 5 MG/5ML; MG/5ML
5 SOLUTION ORAL 2 TIMES DAILY PRN
Status: DISCONTINUED | OUTPATIENT
Start: 2021-02-22 | End: 2021-02-23

## 2021-02-22 RX ADMIN — ENOXAPARIN SODIUM 30 MG: 30 INJECTION SUBCUTANEOUS at 18:08

## 2021-02-22 RX ADMIN — HYDROMORPHONE HYDROCHLORIDE 0.5 MG: 1 INJECTION, SOLUTION INTRAMUSCULAR; INTRAVENOUS; SUBCUTANEOUS at 13:05

## 2021-02-22 RX ADMIN — DEXAMETHASONE SODIUM PHOSPHATE 10 MG: 10 INJECTION INTRAMUSCULAR; INTRAVENOUS at 13:05

## 2021-02-22 RX ADMIN — DEXAMETHASONE SODIUM PHOSPHATE 6 MG: 4 INJECTION, SOLUTION INTRA-ARTICULAR; INTRALESIONAL; INTRAMUSCULAR; INTRAVENOUS; SOFT TISSUE at 18:18

## 2021-02-22 RX ADMIN — OXYCODONE 10 MG: 5 TABLET ORAL at 23:22

## 2021-02-22 RX ADMIN — Medication 10 ML: at 20:58

## 2021-02-22 ASSESSMENT — PAIN SCALES - GENERAL
PAINLEVEL_OUTOF10: 3
PAINLEVEL_OUTOF10: 0

## 2021-02-22 NOTE — ED NOTES
1307 - called [facility] center to reach Admitting MD at [facility]  Re: Right leg numb/amatory dysfunction/new found bone mets  1503 -Bed number and Strategic  transport  [address]  02/22/21 3079

## 2021-02-22 NOTE — ED PROVIDER NOTES
Emergency Department Provider Note     Location: [address] [place] [address]  ED  2/22/2021    I independently performed a history and physical on 142 [facility]. All diagnostic, treatment, and disposition decisions were made by myself in conjunction with the mid-level provider. Briefly, this is a 77 y.o. female here for right hip numbness and right leg weakness. Patient has been having ongoing low back pain and right hip pain for some time. She had MRIs yesterday which showed significant metastases. Yesterday patient was able to stand but this morning she was not able to move her right leg at all or put any weight on it. Patient states that she continues to have pain in the right hip and woke up multiple times in tears overnight due to the pain despite taking increasing doses of her pain medication. She is unable to move her right leg at all and states that this is because she physically cannot do so not because it is painful. .      ED Triage Vitals [02/22/21 1040]   BP Temp Temp Source Pulse Resp SpO2 Height Weight   112/81 98.7 °F (37.1 °C) Oral 81 20 92 % 5' 2\" (1.575 m) 130 lb (59 kg)        Patient resting comfortably in no acute distress. Heart is regular rate and rhythm. Lungs clear to auscultation bilaterally. Abdomen is soft, nondistended, and nontender. No peripheral edema noted. Patient has point tenderness in her low back and sciatic nerve outlet. She has no effort against gravity from the hip flexor. 1 out of 5 strength for both ankle flexion and extension. Decreased sensation of the right lower extremity near the ankle but above this is equal bilaterally. Patient seen and examined. Vital signs stable and within normal limits. Physical exam as documented above. X-rays are negative for acute bony abnormality. Oncology consulted and ultimately they recommend transfer to [facility] for radiation treatment. Spoke with hospitalist at [facility] Dr. Vicente Arellano who accepted patient for transfer. Patient will be transferred. EKG  The Ekg interpreted by me in the absence of a cardiologist shows. Xr Lumbar Spine (2-3 Views)    Result Date: 2/22/2021  EXAMINATION: THREE XRAY VIEWS OF THE LUMBAR SPINE 2/22/2021 11:28 am COMPARISON: MRI 02/19/2021. HISTORY: ORDERING SYSTEM PROVIDED HISTORY: HX of mets; right hip numbness r/o new collapse/compression fracture TECHNOLOGIST PROVIDED HISTORY: Reason for exam:->HX of mets; right hip numbness r/o new collapse/compression fracture FINDINGS: Vertebral body height is maintained with no definite fracture. The osseous metastatic disease noted on the previous MRI is not apparent on plain film. Mild osteopenia. Degenerative disc disease most prevalent at L1-2 and L5-S1. 3 mm retrolisthesis of L1 on L2. Mild facet arthropathy. Aortic vascular calcification. No acute osseous abnormality of the lumbar spine. Degenerative disc disease at L1-2 and L5-S1. Osteopenia with no plain film evidence of metastatic disease. Xr Hip Bilateral W Ap Pelvis (2 Views)    Result Date: 2/22/2021  EXAMINATION: ONE XRAY VIEW OF THE PELVIS AND TWO XRAY VIEWS OF EACH OF THE BILATERAL HIPS 2/22/2021 10:57 am COMPARISON: None. HISTORY: ORDERING SYSTEM PROVIDED HISTORY: Right hip pain r/o fracture vs dislocation TECHNOLOGIST PROVIDED HISTORY: Reason for exam:->Right hip pain r/o fracture vs dislocation FINDINGS: No acute finding of the bony structures and joints. No fracture demonstrated, and no dislocation. Mild degenerative changes. Negative, no evidence of fracture or dislocation of the pelvis or either hip. Results for orders placed or performed during the hospital encounter of 02/22/21   CBC auto differential   Result Value Ref Range    WBC 22.4 (H) 4.0 - 11.0 K/uL    RBC 3.21 (L) 4.00 - 5.20 M/uL    Hemoglobin 10.4 (L) 12.0 - 16.0 g/dL    Hematocrit 32.1 (L) 36.0 - 48.0 %    MCV 99.8 80.0 - 100.0 fL    MCH 32.2 26.0 - 34.0 pg    MCHC 32.3 31.0 - 36.0 g/dL    RDW 18.7 (H) 12.4 - 15.4 %    Platelets 132 563 - 935 K/uL    MPV 7.4 5.0 - 10.5 fL   Comprehensive metabolic panel   Result Value Ref Range    Sodium 139 136 - 145 mmol/L    Potassium 4.8 3.5 - 5.1 mmol/L    Chloride 101 99 - 110 mmol/L    CO2 29 21 - 32 mmol/L    Anion Gap 9 3 - 16    Glucose 86 70 - 99 mg/dL    BUN 21 (H) 7 - 20 mg/dL    CREATININE 1.4 (H) 0.6 - 1.2 mg/dL    GFR Non- 37 (A) >60    GFR  44 (A) >60    Calcium 9.1 8.3 - 10.6 mg/dL    Total Protein 6.3 (L) 6.4 - 8.2 g/dL    Albumin 3.6 3.4 - 5.0 g/dL    Albumin/Globulin Ratio 1.3 1.1 - 2.2    Total Bilirubin 0.5 0.0 - 1.0 mg/dL    Alkaline Phosphatase 88 40 - 129 U/L    ALT 15 10 - 40 U/L    AST 25 15 - 37 U/L    Globulin 2.7 g/dL   Urine, reflex to culture    Specimen: Urine, clean catch   Result Value Ref Range    Color, UA Yellow Straw/Yellow    Clarity, UA Clear Clear    Glucose, Ur Negative Negative mg/dL    Bilirubin Urine Negative Negative    Ketones, Urine Negative Negative mg/dL    Specific Gravity, UA 1.015 1.005 - 1.030    Blood, Urine Negative Negative    pH, UA 7.5 5.0 - 8.0    Protein, UA Negative Negative mg/dL    Urobilinogen, Urine 0.2 <2.0 E.U./dL    Nitrite, Urine Negative Negative    Leukocyte Esterase, Urine Negative Negative    Microscopic Examination Not Indicated     Urine Type NotGiven     Urine Reflex to Culture Not Indicated        For further details of Judith Swenson's emergency department encounter, please see Lillette Decent documentation. Total critical care time is 32 minutes, which excludes separately billable procedures and updating family. Time spent is specifically for management of the presenting complaint and symptoms initially, direct bedside care, reevaluation, review of records, and consultation. There was a high probability of clinically significant life-threatening deterioration in the patient's condition, which required my urgent intervention. This chart was generated in part by using Dragon Dictation system and may contain errors related to that system including errors in grammar, punctuation, and spelling, as well as words and phrases that may be inappropriate. If there are any questions or concerns please feel free to contact the dictating provider for clarification.           Jonnie Linton MD  02/22/21 4407

## 2021-02-22 NOTE — CONSULTS
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 provider found    Chief Complaint:     Chief Complaint   Patient presents with    Numbness     R hip numbness. Terminal cancer pt. Pt was attempting to get out of bed this am with her daughters assistance. Daugher had to lower the pt to the floor. History Obtained from:     patient, electronic medical record    History of Present Illness:     Facundo Massey is a 77 y.o. female  with significant past medical history of small cell lung cancer with liver mets who presents with new bone mets and spinal cord compression at T8-T9. She saw Dr. Steve Grissom in the office Friday with mild RLE complaints. MRI T and L spine was ordered and found to have cord mets. Her RLE weakness has drastically increased and her R leg is now largely immobile. MRI images reviewed with Dr. Delicia Woody. Patient to be transferred to [facility] for urgent XRT. Family and patient not interested in hospice care at this time. Would like to pursue XRT. She is s/p Cycle 4 lurbindetedin 2/19.      Past Medical History:         Diagnosis Date    Cancer [facility])     Chronic hyponatremia     Dr Ofelia Babin, [facility] Nephrology    COVID-23 08/13/2020    Hyperlipidemia     Lung cancer [facility])        Past Surgical History:         Procedure Laterality Date    BREAST SURGERY      fibroid tumors removed bilateral breast    BRONCHOSCOPY N/A 5/11/2020    BRONCHOSCOPY ENDOBRONCHIAL ULTRASOUND with ANESTHESIA performed by Malina Vallecillo MD at [address] [place] [address]  5/11/2020    BRONCHOSCOPY/TRANSBRONCHIAL NEEDLE BIOPSY performed by Malina Vallecillo MD at [address] [place] [address]  5/11/2020    BRONCHOSCOPY/TRANSBRONCHIAL NEEDLE BIOPSY ADDL LOBE performed by Malina Vallecillo MD at [address] [place] [address] Socioeconomic History    Marital status:      Spouse name: Not on file    Number of children: 1    Years of education: Not on file    Highest education level: Not on file   Occupational History    Not on file   Social Needs    Financial resource strain: Not on file    Food insecurity     Worry: Not on file     Inability: Not on file    Transportation needs     Medical: Not on file     Non-medical: Not on file   Tobacco Use    Smoking status: Former Smoker     Quit date: 2020     Years since quittin.8    Smokeless tobacco: Never Used   Substance and Sexual Activity    Alcohol use: Never     Frequency: Never    Drug use: Never    Sexual activity: Not Currently   Lifestyle    Physical activity     Days per week: Not on file     Minutes per session: Not on file    Stress: Not on file   Relationships    Social connections     Talks on phone: Not on file     Gets together: Not on file     Attends Mandaen service: Not on file     Active member of club or organization: Not on file     Attends meetings of clubs or organizations: Not on file     Relationship status: Not on file    Intimate partner violence     Fear of current or ex partner: Not on file     Emotionally abused: Not on file     Physically abused: Not on file     Forced sexual activity: Not on file   Other Topics Concern    Not on file   Social History Narrative    Not on file     Social History     Substance and Sexual Activity   Drug Use Never     Social History     Substance and Sexual Activity   Alcohol Use Never    Frequency: Never     Social History     Substance and Sexual Activity   Sexual Activity Not Currently     Social History     Tobacco Use   Smoking Status Former Smoker    Quit date: 2020    Years since quittin.8   Smokeless Tobacco Never Used       Family History:     History reviewed. No pertinent family history. Review of Systems:     Constitutional: Denies fever, sweats, weight loss. Rakesh Lynn Eyes: No visual changes or diplopia. No scleral icterus. ENT: No Headaches, no hearing loss, no  vertigo. No mouth sores or sore throat. Cardiovascular: No chest pain, no dyspnea on exertion, no palpitations, no  loss of consciousness. Respiratory: No cough, no  wheezing, no dyspnea, no sputum production. No hemoptysis. .    Gastrointestinal: No abdominal pain, no appetite loss, no blood in stools. No change in bowel habits. Genitourinary: No dysuria, trouble voiding, or hematuria. Musculoskeletal:  See HPI   Integumentary: No rash or pruritis. Neurological: No headache, diplopia. No change in gait, balance, or coordination. No paresthesias. Endocrine: No temperature intolerance. No excessive thirst, fluid intake, or urination. Hematologic/Lymphatic: No abnormal bruising or ecchymoses, no blood clots or swollen lymph nodes. Allergic/Immunologic: No nasal congestion or hives. Physical Exam:     /78   Pulse 72   Temp 98.7 °F (37.1 °C) (Oral)   Resp 18   Ht 5' 2\" (1.575 m)   Wt 130 lb (59 kg)   SpO2 97%   BMI 23.78 kg/m²     CONSTITUTIONAL: awake, alert, cooperative, no apparent distress. EYES:  Pupils equal, round and reactive to light, sclera non-icteric, conjunctiva normal  ENT:  Normocephalic, without obvious abnormality, atraumatic, sinuses nontender on palpation, external ears without lesions, oral pharynx with moist mucus membranes, no mucositis.   NECK:  Supple, symmetrical, trachea midline, no adenopathy, thyroid symmetric, not enlarged and no tenderness, skin normal  HEMATOLOGIC/LYMPHATICS:  no cervical lymphadenopathy, no supraclavicular lymphadenopathy, no axillary lymphadenopathy and no inguinal lymphadenopathy  BACK:  Symmetric, no curvature, spinous processes are non-tender on palpation, paraspinous muscles are non-tender on palpation, no costal vertebral tenderness  LUNGS:  Clear to auscultation bilaterally, no crackles or wheezing Note:  For AAA enlargement of >0.5 cm in 6 months or >1 cm in 1 year,   recommend vascular consultation.       References: J Am Alfredito Radiol 2013; 10(10):789-794; J Vasc Surg. 2018; 67:2-77         Impression:     Small cell lung cancer with liver mets   New bone mets  T8-T9 Spinal cord compression, cord mets   R leg weakness/numbess/inability to move RLE   Urinary incontinence     Plan:     MRI images reviewed with Dr. Delicia Woody. She will need transferred for urgent XRT at [facility]. I did discuss hospice role with the patient and daughter today. They are inclined to move forward with XRT to the T8-T9 region for [facility]. Dex 10 mg IV X 1 now     Will need Dex 6 mg every 6 hours at [facility]. Discussed with SUSHANT Malin- plans to transfer to [facility]. Patient and daughter in agreement with the plan. Chemo on hold until XRT completed. CLL      Thank you very much for allowing me to participate in the care of this patient.     Perry Loo CNP   Medical Oncology/Hematology    [facility] - [address],  [address]ennifer [address]  Phone: [phone]  Fax: [phone]

## 2021-02-22 NOTE — ED NOTES
Ps Hem/Onv @ 1215  Re: New found bone mets; right leg numbness  Per: SUSHANT Lynch NP returned call @ [address]  02/22/21 2358

## 2021-02-22 NOTE — PROGRESS NOTES
Lovenox 40 mg daily ordered for patient. This medication is renally eliminated. Will change to 30 mg daily per renal dose adjustment policy. Estimated Creatinine Clearance: 28 mL/min (A) (based on SCr of 1.4 mg/dL (H)). Pharmacy will continue to monitor renal function and adjust dose as necessary. Please call with any questions.     Thanks  Kyle Spring RPh 2/22/2021, 5:00 PM

## 2021-02-22 NOTE — PLAN OF CARE
Received ED page for admission.   Sent to [facility] admitting hospitalist NP.

## 2021-02-22 NOTE — ED PROVIDER NOTES
[facility] Emergency Department    CHIEF COMPLAINT  Numbness (R hip numbness. Terminal cancer pt. Pt was attempting to get out of bed this am with her daughters assistance. Delvisugher had to lower the pt to the floor.  )      SHARED SERVICE VISIT  I have seen and evaluated this patient with my supervising physician, Dr. Cayla Massey. HISTORY OF PRESENT ILLNESS  Gema Seen is a 77 y.o. female who presents to the ED complaining of worsening right hip and leg numbness and weakness ongoing for a week. The patient was attempting to get out of bed with her daughter's assistance when she had to be lowered to the ground due to inability to ambulate. Yesterday patient was able to ambulate and today she is unable to. This numbness has been ongoing for a week and received an MRI of her thoracic and lumbar spine yesterday. At that time, there was new found metastatic lesions in her spine and pelvis. Patient has been receiving chemotherapy up to this point. Pain is also been worsening; reporting waking up multiple times throughout the night not being able to tolerate the pain given her current pain regimen. Patient denies any known trauma to that hip or extremity. Denies any head trauma or loss of consciousness. No worsening shortness of breath or chest pain. No other complaints, modifying factors or associated symptoms. Nursing notes reviewed.    Past Medical History:   Diagnosis Date    Cancer [facility])     Chronic hyponatremia     Dr Sabine Gordon, [facility] Nephrology    COVID-19 08/13/2020    Hyperlipidemia     Lung cancer [facility])      Past Surgical History:   Procedure Laterality Date    BREAST SURGERY      fibroid tumors removed bilateral breast    BRONCHOSCOPY N/A 5/11/2020    BRONCHOSCOPY ENDOBRONCHIAL ULTRASOUND with ANESTHESIA performed by Sydni Benitez MD at [address] [place] [address]  5/11/2020 BRONCHOSCOPY/TRANSBRONCHIAL NEEDLE BIOPSY performed by Jessica Bethea MD at  [address] [facility]  2020    BRONCHOSCOPY/TRANSBRONCHIAL NEEDLE BIOPSY ADDL LOBE performed by Jessica Bethea MD at  [facility]  2020    BRONCHOSCOPY DIAGNOSTIC OR CELL [address] ONLY performed by Jessica Bethea MD at [address] N/A 2020    RIGHT SUBCLAVIAN VEIN PORT A CATH INSERTION performed by Pan Grimes MD at [facility]      mid chest     History reviewed. No pertinent family history. Social History     Socioeconomic History    Marital status:       Spouse name: Not on file    Number of children: 1    Years of education: Not on file    Highest education level: Not on file   Occupational History    Not on file   Social Needs    Financial resource strain: Not on file    Food insecurity     Worry: Not on file     Inability: Not on file    Transportation needs     Medical: Not on file     Non-medical: Not on file   Tobacco Use    Smoking status: Former Smoker     Quit date: 2020     Years since quittin.8    Smokeless tobacco: Never Used   Substance and Sexual Activity    Alcohol use: Never     Frequency: Never    Drug use: Never    Sexual activity: Not Currently   Lifestyle    Physical activity     Days per week: Not on file     Minutes per session: Not on file    Stress: Not on file   Relationships    Social connections     Talks on phone: Not on file     Gets together: Not on file     Attends Jain service: Not on file     Active member of club or organization: Not on file     Attends meetings of clubs or organizations: Not on file     Relationship status: Not on file    Intimate partner violence     Fear of current or ex partner: Not on file     Emotionally abused: Not on file     Physically abused: Not on file     Forced sexual activity: Not on file   Other Topics Concern    Not on file Social History Narrative    Not on file     No current facility-administered medications for this encounter. Current Outpatient Medications   Medication Sig Dispense Refill    Magic Mouthwash (MIRACLE MOUTHWASH) Swish and spit 5 mLs 4 times daily as needed for Irritation 1 Bottle 0    magnesium oxide (MAG-OX) 400 (240 Mg) MG tablet Take 1 tablet by mouth daily 30 tablet 0    torsemide (DEMADEX) 10 MG tablet Take 1 tablet by mouth daily 30 tablet 0    sodium chloride 1 g tablet Take 2 tablets by mouth 3 times daily (with meals) 90 tablet 3    oxyCODONE-acetaminophen (ROXICET) 5-325 MG/5ML solution Take by mouth 2 times daily as needed for Pain.  senna (SENOKOT) 8.6 MG tablet Take 1 tablet by mouth 2 times daily      potassium chloride (KLOR-CON M) 20 MEQ extended release tablet Take 1 tablet by mouth 2 times daily 60 tablet 0    albuterol sulfate HFA (PROAIR HFA) 108 (90 Base) MCG/ACT inhaler Inhale 2 puffs into the lungs every 4 hours as needed for Wheezing or Shortness of Breath 1 Inhaler 3    ondansetron (ZOFRAN ODT) 4 MG disintegrating tablet Take 2 tablets by mouth every 8 hours as needed for Nausea or Vomiting 20 tablet 5    promethazine (PHENERGAN) 12.5 MG tablet Take 1 tablet by mouth every 6 hours as needed for Nausea 25 tablet 5    atorvastatin (LIPITOR) 40 MG tablet Take 1 tablet by mouth daily 30 tablet 3     No Known Allergies    REVIEW OF SYSTEMS  10 systems reviewed, pertinent positives per HPI otherwise noted to be negative    PHYSICAL EXAM  /69   Pulse 76   Temp 98.7 °F (37.1 °C) (Oral)   Resp 15   Ht 5' 2\" (1.575 m)   Wt 130 lb (59 kg)   SpO2 96%   BMI 23.78 kg/m²   GENERAL APPEARANCE: Awake and alert. Cooperative. Not in distress. HEAD: Normocephalic. Atraumatic. EYES: PERRL. EOM's grossly intact. ENT: Mucous membranes are moist.   NECK: Supple. HEART: RRR. No murmurs. LUNGS: Respirations unlabored. CTAB. Good air exchange. Speaking comfortably in full sentences. ABDOMEN: Soft. Non-distended. Non-tender. No guarding or rebound. No masses. No organomegaly. EXTREMITIES: Unable to flex or extend her right hip and right knee. +1 strength with flexion of the right foot. 0 strength flexion extension of the right knee and right hip. Full strength with flexion and extension of the left lower extremity. Sensation is diminished in the distal right lower extremity but equal level of the knee. DP and TP pulses equal strong and intact. Right lower extremity is equal in length but externally rotated. No obvious bony deformities. Tender to palpation in the sacral region. No edema  SKIN: Warm and dry. No acute rashes. NEUROLOGICAL: Alert and oriented. CN's 2-12 intact. No gross facial drooping. Strength 5/5, sensation intact. PSYCHIATRIC: Normal mood and affect. RADIOLOGY  Radiology  XR LUMBAR SPINE (2-3 VIEWS)   Final Result   No acute osseous abnormality of the lumbar spine. Degenerative disc disease   at L1-2 and L5-S1. Osteopenia with no plain film evidence of metastatic   disease. XR HIP BILATERAL W AP PELVIS (2 VIEWS)   Final Result   Negative, no evidence of fracture or dislocation of the pelvis or either hip.            LABS  Labs Reviewed   CBC WITH AUTO DIFFERENTIAL - Abnormal; Notable for the following components:       Result Value    WBC 22.4 (*)     RBC 3.21 (*)     Hemoglobin 10.4 (*)     Hematocrit 32.1 (*)     RDW 18.7 (*)     All other components within normal limits    Narrative:     Performed at:  [place] [address] [address]   Phone [phone]   URINE RT REFLEX TO CULTURE    Narrative:     Performed at:  [facility]) [place] [address] [address]   Phone [phone]   COMPREHENSIVE METABOLIC PANEL       PROCEDURES 71-year-old female history of also lung carcinoma, and is currently receiving chemotherapyn Presents emergency department for progressive numbness and weakness of the right hip and right leg. Patient received an MRI yesterday which showed metastatic lesions throughout the lumbar spine and pelvic bones. Ridging also demonstrated cord metastases noted at T8-T9 measuring 2.4 cm in length. She presented to the emergency department today due to this numbness and weakness progressing to the point where she is no longer able to ambulate. Prior to this date patient was able to ambulate. Her imaging was obtained here to rule out logical fracture. However there was no evidence of pathological fracture or dislocation at this time. I will reach out to the oncology service to discuss further management of this patient. Yessi patient's options including candidate for radiation or steroids with physical therapy/outpatient therapy evaluation. Spoke with the on-call oncology service who recommended that we start the patient on dexamethasone 10 mg at this time continue 6 mg every 4 hours. Discussed with the provider the need for transport to a facility with radiation versus admission to this hospital.  Following evaluation by the oncology service they recommended transfer to [facility], [facility] facility with radiation capabilities. Reached out to the hospital service at [facility], [facility]. to discuss transfer this patient to a facility with more appropriate levels of care. DISPOSITION  Patient was transferred to [facility], [facility] facility with aviation capabilities to treat patient's metastatic cancer. CLINICAL IMPRESSION  1. Weakness of right lower extremity    2. Numbness    3.  Pain in joint involving right pelvic region and thigh           Claudia Boeck, PA-C  02/24/21 0800

## 2021-02-22 NOTE — PROGRESS NOTES
Patient arrived to room 5511 from [facility] ED. Patient A&Ox3, disoriented to time. VSS, O2 100% on 2 L NC. Neuro checks at baseline with RLE numbness and weakness. Patient currently denies pain. Vega in place with adequate output. Patient oriented to room and instructed to call out for needs. Fall precautions in place.

## 2021-02-22 NOTE — ED NOTES
Report to [facility]. Patient belongings sent with daughter. Phone and  sent with patient. Transported from department at this time.      Malcolm Cason RN  02/22/21 4744

## 2021-02-22 NOTE — PLAN OF CARE
LAST [facility] OFFICE NOTE FROM 2021       Patient Name: Jane Hicks   Patient : 1945   Patient MRN: [id number]   Primary Oncologist: Bobby Jackson [address] [place] [address]   Referring Physician: Corbin Strickland MD (Medical Oncology)   Date of Service: 2021   Chief Complaint  Small cell carcinoma of lung (disorder) ( Stage Date: Unknown, Stage IVB )  Problem List  Small cell carcinoma of lung (disorder) ( Stage Date: Unknown, Stage IVB )   Anemia   CLL   Dehydration   Effects of immunotherapy   Hyponatremia   Liver metastasis   Nausea and vomiting (disorder)   Neoplasm related pain   SIADH    HPI  Admitted hyponatremia and found to have a lung mass with liver mets. Bx confirmed small cell neuroendocrine malignancy. She was treated with cycle 1 Carbo and  16, 5/14, 15, and 16. She tolerated chemo well. Catie Mew was given outpatient. Nephrology was consulted for hyponatremia as well. She is maintained on salt tablets. Na to improve further with chemo treatment as her low sodium levels are related to a paraneoplastic process from her CA. She had a port placed. MRI brain negative. She is maintained on allopurinol. Previous Therapies  Current Therapy   [facility] Hydration Cycle Length: 1 Number Cycles: 1 Start: C1D1 on 2020 Assoc Dx: Small cell carcinoma of lung (disorder) LOT: Toxicity Management 2020 C1 D1  Sodium Chloride IV 0.9 %, .9 % 1000 mL, Dose modified  [facility]C Hydration Cycle Length: 1 Number Cycles: 18 Start: Solange Grew on 2020 Assoc Dx: Small cell carcinoma of lung (disorder) LOT: Toxicity Management 2020 C5 D1  Sodium Chloride IV 0.9 %, .9 % 1000 mL, Dose modified  Lurbinectedin Q21D Cycle Length: 21 Number Cycles: 8 Start: Solange Grew on 2020 Assoc Dx:  Small cell carcinoma of lung (disorder) LOT: 3rd Line Metastatic Stage: IVB Treatment Intent: [id number]/17/2020 C2 D1  Lurbinectedin IV, 5 mg (3.2 mg/m2) [facility] Hydration Cycle Length: 1 Number Cycles: 4 Start: C1D1 on 2021 Assoc Dx: Small cell carcinoma of lung (disorder) LOT: Other 2021 C3 D1  Sodium Chloride IV 0.9 %,  mL, Dose modified  Interval History   The patient returns for follow-up. She states her appetite and sense of wellbeing is much better. She denies fever, chills, nausea or vomiting. She does complain of right leg weakness which is new. There is no loss of bowel or bladder habit. She can still walk. She feels that it is numb up in the thigh area. Review of Systems  Constitutional: No weight loss, No fever, No chills, No night sweats. Energy level good.   Eyes: No impairment or change in vision  ENT / Mouth: No pain, abnormal ulceration, bleeding, nasal drip or change in voice or hearing  Cardiovascular: No chest pain, palpitations, new edema, or calf discomfort  Respiratory: No pain, hemoptysis, change to breathing  Breast: No pain, discharge, change in appearance or texture  Gastrointestinal: No pain, cramping, jaundice, change to eating and bowel habits  Urinary: No pain, bleeding or change in continence  Genitalia: No pain, bleeding or discharge  Musculoskeletal: No redness, pain, edema or weakness  Skin: No pruritus, rash, change to nodules or lesions  Neurologic: No discomfort, change in mental status, speech, sensory or motor activity  Psychiatric: No change in concentration or change to affect or mood  Endocrine: No hot flashes, increased thirst, or change to urine production  Hematologic: No petechiae, ecchymosis or bleeding  Lymphatic: No lymphadenopathy or lymphedema  Allergy / Immunologic: No eczema, hives, frequent or recurrent infections      Vital Signs  Blood pressure: 128/78, L arm, Regular, Pulse: 82, Temperature: 97.8 F, Respirations: 12, O2 sat: , Pain Scale: 0, Height: 63 in, Weight: 122 lb, BSA: 1.57, BMI: 21.61 kg/m2   Physical Exam   CONSTITUTIONAL: awake, alert, cooperative, EYES: sclera clear and conjunctiva normal  NECK: supple, symmetrical  HEMATOLOGIC/LYMPHATIC: no cervical, supraclavicular or axillary lymphadenopathy   LUNGS: no increased work of breathing and clear to auscultation   CARDIOVASCULAR: regular rate and rhythm, normal S1 and S2, no murmur noted  ABDOMEN: normal bowel sounds x 4, soft, non-distended, non-tender, no masses palpated, no hepatosplenomegaly   MUSCULOSKELETAL: full range of motion noted  NEUROLOGIC: The right leg is minimally weaker with respect to elevating the thigh to the left. The remaining neurologic exam is normal.  SKIN: Normal skin color, texture, turgor and no jaundice.  appears intact, port in right chest with no infectious symptoms  EXTREMITIES: no LE edema  Labs  CBC   Lab Results 02/19/2021 01/28/2021 01/07/2021 12/31/2020 12/28/2020 12/24/2020   CBC                     WBC x 10^3/uL 20.5 (H) 31.9 (HH) 38.1 (HH) 44.2 (HH)    51.5 (HH)   RBC x 10^6/uL 3.28 (L) 3.21 (L) 3.31 (L) 3.68 (L)    3.48 (L)   HGB g/dL 10.6 (L) 10.1 (L) 10.2 (L) 11.2    10.5 (L)   HCT % 32.8 (L) 31.8 (L) 32.4 (L) 35.2    34.6   MCV fL 100.0 (H) 99.1 (H) 97.9 (H) 95.7 (H)    99.4 (H)   MCH pg 32.3 (H) 31.5 30.8 30.4    30.2   MCHC g/dL 32.3 31.8 (L) 31.5 (L) 31.8 (L)    30.3 (L)   RDW-CV, % 17.7 (H) 19.0 (H) 18.7 (H) 18.5 (H)    17.3 (H)   PLT x 10^3/uL 350.0 480.0 (H) 307.0 195.0    269.0   Janine %                16.6 (L)   LY %                82.5 (H)   MO %                0.9 (L)   EO % 0.3 (L) 0.2 (L) 0.2 (L) 0.2 (L)    0.0 (L)   BA % 0.1 0.2 0.0 (L) 0.1    0.0 (L)   Janine # (ANC) x 10^3/uL                8.53 (H)   LY # x 10^3/uL                42.51 (H)   MO # x 10^3/uL                0.44   EO # x 10^3/uL 0.06 0.05 0.06 0.08    0.02 (L)   BA # x 10^3/uL 0.03 0.05 0.01 0.04    0.01   CMP   Lab Results 02/19/2021 01/28/2021 01/07/2021 12/31/2020 12/28/2020 12/24/2020   Chemistries                     Glucose mg/dL 101 79 106 89 95 132 (H)   BUN mg/dL 13 22 19 15 22 27 (H) Creatinine mg/dL 1.01 (H) 1.42 (H) 1.23 (H) 1.18 (H) 1.38 (H) 2.0 (H)   BUN/Creatinine ratio 12.9 15.5 15.4 12.7 15.9      Sodium mmol/L 138 138 137 138 143 145   Potassium mmol/L 4.2 4.3 4.2 4.1 4.1 4.7   Chloride mmol/L 103 103 103 105 110 (H) 114 (H)   CO2 mmol/L 28.6 29.8 26.3 26.1 25.5 24   Anion gap, mmol/L 6.4 5.2 7.7 6.9 7.5      Calcium mg/dL 9.4 8.6 (L) 8.9 8.5 (L) 8.8 7.9 (L)   Albumin g/dL 3.0 (L) 2.7 (L) 2.9 (L) 2.8 (L) 2.9 (L) 3.0 (L)   Total protein g/dL 5.7 5.7 5.6 (L) 5.8 5.7 6.2 (L)   A/G ratio 1.1 0.9 (L) 1.1 0.9 (L) 1.0      Bilirubin, total mg/dL 0.4 0.4 0.6 0.7 0.9 0.7   Alkaline phosphatase U/L 76 113 132 (H) 154 (H) 166 (H) 189 (H)   AST/SGOT U/L 19 15 22 20 19 41 (H)   ALT/SGPT U/L 11 10 18 23 24 35   GFR non-African American, estimated mL/min/1.73m2 53.6 (L) 36.2 (L) 42.7 (L) 44.8 (L) 37.4 (L) 24.4 (L)   GFR , estimated mL/min/1.73m2 >60.0 43.8 (L) 51.6 (L) 54.2 (L) 45.2 (L) 29.5 (L)     Lab Results 02/19/2021 01/28/2021 01/07/2021 12/31/2020 12/28/2020 12/24/2020   Immunohematology                       Lab Results 02/19/2021 01/28/2021 01/07/2021 12/31/2020 12/28/2020 12/24/2020   Anemia Labs                     Imaging  Bone Scan 5/13/2020    IMPRESSION:  Multifocal degenerative change, without a generalized scintigraphic pattern of osseous metastatic disease    MRI Brain 5/12/2020    IMPRESSION:  No brain metastases visualized. CT Abdomen and Pelvis 5/12/2020    IMPRESSION:  1. Numerous (approximately 9) liver masses compatible with metastatic disease. 2. Abnormal lymph nodes in the upper abdomen and left cardiophrenic region most suspicious for metastatic disease. PET/CT can be considered. 3. Left adrenal nodule, suspicious for metastatic disease as no remote studies are available for comparison. PET/CT can be considered.   4. Partial visualization of a loculated left pleural effusion    CT Chest 5/6/2020    IMPRESSION: 1. Left paramediastinal mass highly suspicious for malignancy. 2. Left hilar adenopathy, most compatible with metastatic disease. 3. Scattered pulmonary nodules bilaterally as described. Both benign and metastatic disease remain differential considerations. No remote studies are available for comparison. 4. Consolidative opacities in the bilateral lung apices, right greater than left. Findings are favored to represent pleuroparenchymal scarring over malignancy. Further evaluation with PET/CT is recommended. 5. Left adrenal nodularity concerning for metastatic disease given the patient's chest findings. However, prior CT report dated November 2017 indicates the presence of a left adrenal nodule favored to represent an adenoma. PET/CT can be obtained. 6. Left para-aortic lymph nodes versus additional left adrenal nodules, reactive lymphadenopathy    OCM - Patient Care Management    Pain, if applicable:     Performance Status: ECOG 2 In bed <50% of the time. Ambulatory and capable of all self-care, but unable to carry out any work activities. Up and about more than 50% of waking hours. (Date: 02/19/2021)     Depression Status: Was screened; Outcome positive: Yes; Screening Date: 02/19/2021; Screening Tool: Patient Health Questionnaire [facility]); Plan: Other interventions or follow-up for the diagnosis or treatment of depression; Total depression score: 14  Psycho-social PHQ-9 Follow-up Plan (if applicable): The patient did not have a good PHQ-9 score, but despite this she states she is actually feeling better. She does not feel that she needs any intervention, counseling or change in medications. She denies suicidal or homicidal ideation. Research    Would you like this patient screened for clinical trial eligibility?  If yes, please enter the order for \"Research: Screen for Eligibility\"    Assessment & Plan  Metastatic small cell lung cancer ( x ) Preparing to see the patient and reviewing records  ( x ) Individual interpretation of results   ( ) Discussion or coordination of care with other health care professionals  ( x ) Ordering of unique tests, medications, or procedures  ( x ) Documentation within the EHR   . Recent imaging and labs were reviewed and discussed with the patient. I have recommended that the patient follow CDC guidelines for prevention of COVID-19 infection. Mike Pineda MD  [facility]  Phone: [phone]  Fax: [phone]  Online: [email or web address]     Note Recipients:

## 2021-02-22 NOTE — PLAN OF CARE
Pt being transferred for leg weakaness sec to spine metastasis. Hx of metastatic small cell lung cancer. On chemo. Oncology recommending steroids and radiation therapy. Will need oncology and rad onc consulted on admission.      Fiorella Wilson  Attending Physician  Hospitalist

## 2021-02-22 NOTE — ED NOTES
Placed straight cath. Drained 1,000mL of urine from pt. Sample taken. Sterile technique used. RN at bedside for procedure. Placed amaral cath afterward per RN. Drained ~800mL of urine in amaral.       Carolin Schaefer  02/22/21 Rei Herrera  02/22/21 130

## 2021-02-23 LAB
ANION GAP SERPL CALCULATED.3IONS-SCNC: 9 MMOL/L (ref 3–16)
BUN BLDV-MCNC: 24 MG/DL (ref 7–20)
CALCIUM SERPL-MCNC: 9.1 MG/DL (ref 8.3–10.6)
CHLORIDE BLD-SCNC: 97 MMOL/L (ref 99–110)
CO2: 28 MMOL/L (ref 21–32)
CREAT SERPL-MCNC: 1.2 MG/DL (ref 0.6–1.2)
GFR AFRICAN AMERICAN: 53
GFR NON-AFRICAN AMERICAN: 44
GLUCOSE BLD-MCNC: 183 MG/DL (ref 70–99)
HCT VFR BLD CALC: 33.1 % (ref 36–48)
HEMOGLOBIN: 10.8 G/DL (ref 12–16)
MCH RBC QN AUTO: 32.6 PG (ref 26–34)
MCHC RBC AUTO-ENTMCNC: 32.5 G/DL (ref 31–36)
MCV RBC AUTO: 100.2 FL (ref 80–100)
PDW BLD-RTO: 18.4 % (ref 12.4–15.4)
PLATELET # BLD: 235 K/UL (ref 135–450)
PMV BLD AUTO: 8 FL (ref 5–10.5)
POTASSIUM REFLEX MAGNESIUM: 4 MMOL/L (ref 3.5–5.1)
RBC # BLD: 3.3 M/UL (ref 4–5.2)
SODIUM BLD-SCNC: 134 MMOL/L (ref 136–145)
WBC # BLD: 19.5 K/UL (ref 4–11)

## 2021-02-23 PROCEDURE — 97162 PT EVAL MOD COMPLEX 30 MIN: CPT

## 2021-02-23 PROCEDURE — 2580000003 HC RX 258: Performed by: NURSE PRACTITIONER

## 2021-02-23 PROCEDURE — 51798 US URINE CAPACITY MEASURE: CPT

## 2021-02-23 PROCEDURE — 77014 HC CT TREATMENT PLAN: CPT

## 2021-02-23 PROCEDURE — 77300 RADIATION THERAPY DOSE PLAN: CPT

## 2021-02-23 PROCEDURE — 77334 RADIATION TREATMENT AID(S): CPT

## 2021-02-23 PROCEDURE — 85027 COMPLETE CBC AUTOMATED: CPT

## 2021-02-23 PROCEDURE — 80048 BASIC METABOLIC PNL TOTAL CA: CPT

## 2021-02-23 PROCEDURE — 77290 THER RAD SIMULAJ FIELD CPLX: CPT

## 2021-02-23 PROCEDURE — DPYC7ZZ CONTACT RADIATION OF OTHER BONE: ICD-10-PCS | Performed by: RADIOLOGY

## 2021-02-23 PROCEDURE — 6360000002 HC RX W HCPCS: Performed by: NURSE PRACTITIONER

## 2021-02-23 PROCEDURE — 97166 OT EVAL MOD COMPLEX 45 MIN: CPT

## 2021-02-23 PROCEDURE — 77412 RADIATION TX DELIVERY LVL 3: CPT

## 2021-02-23 PROCEDURE — 1200000000 HC SEMI PRIVATE

## 2021-02-23 PROCEDURE — 99221 1ST HOSP IP/OBS SF/LOW 40: CPT | Performed by: NURSE PRACTITIONER

## 2021-02-23 PROCEDURE — 6370000000 HC RX 637 (ALT 250 FOR IP): Performed by: NURSE PRACTITIONER

## 2021-02-23 PROCEDURE — 2580000003 HC RX 258: Performed by: INTERNAL MEDICINE

## 2021-02-23 PROCEDURE — 97530 THERAPEUTIC ACTIVITIES: CPT

## 2021-02-23 PROCEDURE — 6360000002 HC RX W HCPCS: Performed by: INTERNAL MEDICINE

## 2021-02-23 PROCEDURE — 6370000000 HC RX 637 (ALT 250 FOR IP): Performed by: INTERNAL MEDICINE

## 2021-02-23 PROCEDURE — 77295 3-D RADIOTHERAPY PLAN: CPT

## 2021-02-23 PROCEDURE — 97535 SELF CARE MNGMENT TRAINING: CPT

## 2021-02-23 RX ORDER — POLYETHYLENE GLYCOL 3350 17 G/17G
17 POWDER, FOR SOLUTION ORAL DAILY
Status: DISCONTINUED | OUTPATIENT
Start: 2021-02-23 | End: 2021-02-24

## 2021-02-23 RX ORDER — DIAZEPAM 5 MG/1
5 TABLET ORAL EVERY 6 HOURS PRN
Status: DISCONTINUED | OUTPATIENT
Start: 2021-02-23 | End: 2021-02-24

## 2021-02-23 RX ORDER — DOCUSATE SODIUM 100 MG/1
100 CAPSULE, LIQUID FILLED ORAL 2 TIMES DAILY
Status: DISCONTINUED | OUTPATIENT
Start: 2021-02-23 | End: 2021-02-24

## 2021-02-23 RX ORDER — PANTOPRAZOLE SODIUM 40 MG/1
40 TABLET, DELAYED RELEASE ORAL
Status: DISCONTINUED | OUTPATIENT
Start: 2021-02-24 | End: 2021-02-24

## 2021-02-23 RX ORDER — METHOCARBAMOL 750 MG/1
750 TABLET, FILM COATED ORAL EVERY 6 HOURS SCHEDULED
Status: DISCONTINUED | OUTPATIENT
Start: 2021-02-24 | End: 2021-02-24

## 2021-02-23 RX ORDER — SENNA PLUS 8.6 MG/1
1 TABLET ORAL 2 TIMES DAILY
Status: DISCONTINUED | OUTPATIENT
Start: 2021-02-23 | End: 2021-02-24

## 2021-02-23 RX ADMIN — Medication 10 ML: at 19:56

## 2021-02-23 RX ADMIN — DEXAMETHASONE SODIUM PHOSPHATE 6 MG: 4 INJECTION, SOLUTION INTRA-ARTICULAR; INTRALESIONAL; INTRAMUSCULAR; INTRAVENOUS; SOFT TISSUE at 06:13

## 2021-02-23 RX ADMIN — DEXAMETHASONE SODIUM PHOSPHATE 6 MG: 4 INJECTION, SOLUTION INTRA-ARTICULAR; INTRALESIONAL; INTRAMUSCULAR; INTRAVENOUS; SOFT TISSUE at 00:46

## 2021-02-23 RX ADMIN — METHOCARBAMOL 1000 MG: 100 INJECTION, SOLUTION INTRAMUSCULAR; INTRAVENOUS at 11:55

## 2021-02-23 RX ADMIN — MIDODRINE HYDROCHLORIDE 5 MG: 5 TABLET ORAL at 17:18

## 2021-02-23 RX ADMIN — DEXAMETHASONE SODIUM PHOSPHATE 6 MG: 4 INJECTION, SOLUTION INTRA-ARTICULAR; INTRALESIONAL; INTRAMUSCULAR; INTRAVENOUS; SOFT TISSUE at 18:29

## 2021-02-23 RX ADMIN — METHOCARBAMOL 1000 MG: 100 INJECTION, SOLUTION INTRAMUSCULAR; INTRAVENOUS at 17:30

## 2021-02-23 RX ADMIN — Medication 400 MG: at 08:13

## 2021-02-23 RX ADMIN — Medication 10 ML: at 08:13

## 2021-02-23 RX ADMIN — ATORVASTATIN CALCIUM 40 MG: 40 TABLET, FILM COATED ORAL at 08:13

## 2021-02-23 RX ADMIN — DOCUSATE SODIUM 100 MG: 100 CAPSULE, LIQUID FILLED ORAL at 16:46

## 2021-02-23 RX ADMIN — STANDARDIZED SENNA CONCENTRATE 8.6 MG: 8.6 TABLET ORAL at 19:56

## 2021-02-23 RX ADMIN — ENOXAPARIN SODIUM 30 MG: 30 INJECTION SUBCUTANEOUS at 08:13

## 2021-02-23 RX ADMIN — DOCUSATE SODIUM 100 MG: 100 CAPSULE, LIQUID FILLED ORAL at 19:55

## 2021-02-23 RX ADMIN — DEXAMETHASONE SODIUM PHOSPHATE 6 MG: 4 INJECTION, SOLUTION INTRA-ARTICULAR; INTRALESIONAL; INTRAMUSCULAR; INTRAVENOUS; SOFT TISSUE at 13:11

## 2021-02-23 RX ADMIN — POLYETHYLENE GLYCOL 3350 17 G: 17 POWDER, FOR SOLUTION ORAL at 16:46

## 2021-02-23 RX ADMIN — STANDARDIZED SENNA CONCENTRATE 8.6 MG: 8.6 TABLET ORAL at 16:46

## 2021-02-23 ASSESSMENT — ENCOUNTER SYMPTOMS
COUGH: 0
BACK PAIN: 1
ABDOMINAL PAIN: 0
SHORTNESS OF BREATH: 0

## 2021-02-23 NOTE — H&P
Hospital Medicine History & Physical      PCP: Odell Lutz    Date of Admission: 2/22/2021    Chief Complaint: Right hip pain, numbness    History Of Present Illness:  Patient is a 78-year-old female with past medical history of hyperlipidemia, lung cancer, chronic hyponatremia who presents to the hospital as a transfer from outside facility where she presented for right hip pain numbness. At outside ED hematology was consulted which recommended patient to get radiation therapy at [facility] and symptom relief. Patient denies nausea vomiting chest pain shortness of breath fevers chills however mentions her pain is 10/10 intensity, more localized in spine, she also has associated right leg weakness numbness and inability to move along with urinary incontinence. Past Medical History:          Diagnosis Date    Cancer [facility])     Chronic hyponatremia     Dr Vern Hooks, [facility] Nephrology    COVID-23 08/13/2020    Hyperlipidemia     Lung cancer [facility])        Past Surgical History:          Procedure Laterality Date    BREAST SURGERY      fibroid tumors removed bilateral breast    BRONCHOSCOPY N/A 5/11/2020    BRONCHOSCOPY ENDOBRONCHIAL ULTRASOUND with ANESTHESIA performed by Holly Nicole MD at [address] [place] [address]  5/11/2020    BRONCHOSCOPY/TRANSBRONCHIAL NEEDLE BIOPSY performed by Holly Nicole MD at [address] [place] [address]  5/11/2020    BRONCHOSCOPY/TRANSBRONCHIAL NEEDLE BIOPSY ADDL LOBE performed by Holly Nicole MD at [address] [place] [address]  5/11/2020    BRONCHOSCOPY DIAGNOSTIC OR CELL [address] ONLY performed by Holly Nicole MD at [facility] N/A 5/13/2020    RIGHT SUBCLAVIAN VEIN PORT A CATH INSERTION performed by Yamile Chan MD at [address]      mid chest       Medications Prior to Admission:      Prior to Admission medications    Medication Sig Start Date End Date Taking?  Authorizing Provider   oxyCODONE (ROXICODONE) 5 MG immediate release tablet Take 10 mg by mouth every 4 hours as needed for Pain. Yes Historical Provider, MD   Magic Mouthwash (MIRACLE MOUTHWASH) Swish and spit 5 mLs 4 times daily as needed for Irritation 12/16/20  Yes Kerrie Garcia MD   magnesium oxide (MAG-OX) 400 (240 Mg) MG tablet Take 1 tablet by mouth daily 11/30/20  Yes SUSHANT Bolivar   torsemide (DEMADEX) 10 MG tablet Take 1 tablet by mouth daily 10/20/20  Yes Karen Clayton MD   sodium chloride 1 g tablet Take 2 tablets by mouth 3 times daily (with meals) 9/29/20  Yes DEVORAH Mann CNP   senna (SENOKOT) 8.6 MG tablet Take 1 tablet by mouth 2 times daily   Yes Historical Provider, MD   potassium chloride (KLOR-CON M) 20 MEQ extended release tablet Take 1 tablet by mouth 2 times daily 5/18/20  Yes Carla Moreno MD   ondansetron (ZOFRAN ODT) 4 MG disintegrating tablet Take 2 tablets by mouth every 8 hours as needed for Nausea or Vomiting 5/18/20  Yes DEVORAH Garcia CNP   promethazine (PHENERGAN) 12.5 MG tablet Take 1 tablet by mouth every 6 hours as needed for Nausea 5/18/20  Yes DEVORAH Garcia CNP   atorvastatin (LIPITOR) 40 MG tablet Take 1 tablet by mouth daily 5/18/20  Yes DEVORAH Garcia CNP   oxyCODONE (ROXICODONE) 5 MG immediate release tablet Take 10 mg by mouth every morning. Historical Provider, MD   oxyCODONE (ROXICODONE) 5 MG immediate release tablet Take 10 mg by mouth every evening. Historical Provider, MD   midodrine (PROAMATINE) 5 MG tablet TAKE 1 TABLET BY MOUTH THREE TIMES A DAY. DO NOT GIVE LAST DOSE OF THE DAY AFTER 6PM OR WITHIN 4 HOURS OF BED TIME 1/7/21   Historical Provider, MD   oxyCODONE-acetaminophen (ROXICET) 5-325 MG/5ML solution Take by mouth 2 times daily as needed for Pain.     Historical Provider, MD   albuterol sulfate HFA (PROAIR HFA) 108 (90 Base) MCG/ACT inhaler Inhale 2 puffs into the lungs every 4 hours as needed for Wheezing or Shortness of Breath 5/18/20   Britta Louis APRN - CNP       Allergies:  Patient has no known allergies. Social History:      TOBACCO:   reports that she quit smoking about 9 months ago. She has a 82.50 pack-year smoking history. She has never used smokeless tobacco.  ETOH:   reports no history of alcohol use. Family History:       Reviewed in detail and non contributory      No family history on file. REVIEW OF SYSTEMS:   Pertinent positives as noted in the HPI. All other systems reviewed and negative. PHYSICAL EXAM PERFORMED:    /69   Pulse 76   Temp 97.6 °F (36.4 °C) (Oral)   Resp 16   Ht 5' 2\" (1.575 m)   Wt 130 lb 1.1 oz (59 kg)   SpO2 94%   BMI 23.79 kg/m²     General appearance:  No apparent distress, cooperative. HEENT:  Normal cephalic, atraumatic without obvious deformity. Conjunctivae/corneas clear. Neck: Supple, with full range of motion. No cervical lymphadenopathy  Respiratory:  Normal respiratory effort. Clear to auscultation, bilaterally without Rales/Wheezes/Rhonchi. Cardiovascular:  Regular rate and rhythm with normal S1/S2 without murmurs, rubs or gallops. Abdomen: Soft, non-tender, non-distended, normal bowel sounds. Musculoskeletal:  No edema noted bilaterally. No tenderness on palpation   Skin: no rash visible  Neurologic:  Neurologically intact without any focal sensory/motor deficits. grossly non-focal.  Psychiatric:  Alert and oriented, normal mood  Peripheral Pulses: +2 palpable, equal bilaterally       Labs:     Recent Labs     02/22/21  1255   WBC 22.4*   HGB 10.4*   HCT 32.1*        Recent Labs     02/22/21  1255      K 4.8      CO2 29   BUN 21*   CREATININE 1.4*   CALCIUM 9.1     Recent Labs     02/22/21  1255   AST 25   ALT 15   BILITOT 0.5   ALKPHOS 88     No results for input(s): INR in the last 72 hours. No results for input(s): Lyndee Vick in the last 72 hours.     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) [C79.49] 02/22/2021       Patient is a 80-year-old female with past medical history of hyperlipidemia, lung cancer, chronic hyponatremia who presents to the hospital as a transfer from outside facility where she presented for right hip pain numbness. At outside ED hematology was consulted which recommended patient to get radiation therapy at [facility]. and symptom relief. Patient denies nausea vomiting chest pain shortness of breath fevers chills however mentions her pain is 10/10 intensity, more localized in spine, she also has associated right leg weakness numbness and inability to move along with urinary incontinence. Assessment  Right leg weakness secondary to spine metastasis  Elevated creatinine  Urinary incontinence  Leukocytosis  History of small cell lung cancer  CLL  Nausea vomiting  History of SIADH    Plan   start dexamethasone 6 mg every 6, pain control  Consult radiation oncology, oncology, neurosurgery  Monitor BMP  DVT prophylaxis-Lovenox  Resume home medications   Diet: DIET GENERAL;  Code Status: Full Code    PT/OT Eval Status: ordered    Dispo - pending clinical improvement       Vicente Chappell MD    The note was completed using EMR and Dragon dictation system. Every effort was made to ensure accuracy; however, inadvertent computerized transcription errors may be present. Thank you Maurice Brady for the opportunity to be involved in this patient's care. If you have any questions or concerns please feel free to contact me at [phone].     Vicente Chappell MD

## 2021-02-23 NOTE — CONSULTS
NEUROSURGERY CONSULT NOTE    Liyah Cornell  [id number]   1945 2/23/2021    Requesting physician: No admitting provider for patient encounter. 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 female  with significant past medical history of small cell lung cancer with liver mets who presents with new bone mets and spinal cord compression at T8-T9. She saw Dr. Maddie Venegas in the office Friday with mild RLE complaints. MRI T and L spine was ordered and found to have cord mets. Her RLE weakness has drastically increased and she can not left her R leg off the bed now. She also has urinary incontinence. She was transferred to [facility] for urgent radiation. ROS:   GENERAL:  Denies fever or recent illness.  Denies weight changes   EYES:  Denies vision change or diplopia  EARS:  Denies hearing loss  CARDIAC:  Denies chest pain  RESPIRATORY:  Denies shortness of breath  SKIN:  Denies rash or lesions   HEM:  Denies excessive bruising  PSYCH:  Denies anxiety or depression  NEURO:  Denies headache, + numbness or tingling or lateralizing weakness on RLE  :  + urinary incontinence  GI: Denies nausea, vomiting, diarrhea or constipation  MUSCULOSKELETAL:  No arthralgias    No Known Allergies    Past Medical History:   Diagnosis Date    [facility] [address]     Chronic hyponatremia     Dr George Zapata, [facility]    COVID-19 08/13/2020    Hyperlipidemia     Lung cancer ([facility] [address]         Past Surgical History:   Procedure Laterality Date    BREAST SURGERY      fibroid tumors removed bilateral breast    BRONCHOSCOPY N/A 5/11/2020    BRONCHOSCOPY ENDOBRONCHIAL ULTRASOUND with ANESTHESIA performed by Jessica Bethea MD at [address] [place] [address]  5/11/2020    BRONCHOSCOPY/TRANSBRONCHIAL NEEDLE BIOPSY performed by Jessica Bethea MD at [address] [place] [address] 2020    BRONCHOSCOPY/TRANSBRONCHIAL NEEDLE BIOPSY ADDL LOBE performed by Aylin Rodriguez MD at [address] [facility]  2020    BRONCHOSCOPY DIAGNOSTIC OR CELL [address] performed by Aylin Rodriguez MD at [address] N/A 2020    RIGHT SUBCLAVIAN VEIN PORT A CATH INSERTION performed by Philly Oquendo MD at [address]      mid chest       Social History     Occupational History    Not on file   Tobacco Use    Smoking status: Former Smoker     Packs/day: 1.50     Years: 55.00     Pack years: 82.50     Quit date: 2020     Years since quittin.8    Smokeless tobacco: Never Used   Substance and Sexual Activity    Alcohol use: Never     Frequency: Never    Drug use: Never    Sexual activity: Not Currently        No family history on file. Outpatient Medications Marked as Taking for the 21 encounter [facility] Encounter)   Medication Sig Dispense Refill    oxyCODONE (ROXICODONE) 5 MG immediate release tablet Take 10 mg by mouth every 4 hours as needed for Pain.       Magic Mouthwash (MIRACLE MOUTHWASH) Swish and spit 5 mLs 4 times daily as needed for Irritation 1 Bottle 0    magnesium oxide (MAG-OX) 400 (240 Mg) MG tablet Take 1 tablet by mouth daily 30 tablet 0    torsemide (DEMADEX) 10 MG tablet Take 1 tablet by mouth daily 30 tablet 0    sodium chloride 1 g tablet Take 2 tablets by mouth 3 times daily (with meals) 90 tablet 3    senna (SENOKOT) 8.6 MG tablet Take 1 tablet by mouth 2 times daily      potassium chloride (KLOR-CON M) 20 MEQ extended release tablet Take 1 tablet by mouth 2 times daily 60 tablet 0    ondansetron (ZOFRAN ODT) 4 MG disintegrating tablet Take 2 tablets by mouth every 8 hours as needed for Nausea or Vomiting 20 tablet 5    promethazine (PHENERGAN) 12.5 MG tablet Take 1 tablet by mouth every 6 hours as needed for Nausea 25 tablet 5    atorvastatin (LIPITOR) 40 MG tablet Take 1 tablet by mouth daily 30 tablet 3        Current Facility-Administered Medications   Medication Dose Route Frequency Provider Last Rate Last Admin    magic (miracle) mouthwash  5 mL Swish & Spit 4x Daily PRN Aman Rodríguez MD        morphine (PF) injection 1 mg  1 mg Intravenous Q4H PRN Aman Rodríguez MD        sodium chloride flush 0.9 % injection 10 mL  10 mL Intravenous 2 times per day Aman Rodríguez MD   10 mL at 02/23/21 0813    sodium chloride flush 0.9 % injection 10 mL  10 mL Intravenous PRN Aman Rodríguez MD        potassium chloride 10 mEq/100 mL IVPB (Peripheral Line)  10 mEq Intravenous PRN Aman Rodríguez MD        magnesium sulfate 2000 mg in 50 mL IVPB premix  2,000 mg Intravenous PRN Aman Rodríguez MD        enoxaparin (LOVENOX) injection 30 mg  30 mg Subcutaneous Daily Aman Rodríguez MD   30 mg at 02/23/21 0813    promethazine (PHENERGAN) tablet 12.5 mg  12.5 mg Oral Q6H PRN Aman Rodríguez MD        Or    ondansetron (ZOFRAN) injection 4 mg  4 mg Intravenous Q6H PRN Aman Rodríguez MD        magnesium hydroxide (MILK OF MAGNESIA) 400 MG/5ML suspension 30 mL  30 mL Oral Daily PRN Aman Rodríguez MD        acetaminophen (TYLENOL) tablet 650 mg  650 mg Oral Q6H PRN Aman Rodríguez MD        Or    acetaminophen (TYLENOL) suppository 650 mg  650 mg Rectal Q6H PRN Aman Rodríguez MD        dexamethasone (DECADRON) injection 6 mg  6 mg Intravenous Q6H Hugh Herrera MD   6 mg at 02/23/21 0613    albuterol (PROVENTIL) nebulizer solution 2.5 mg  2.5 mg Nebulization Q4H PRN Aman Rodríguez MD        atorvastatin (LIPITOR) tablet 40 mg  40 mg Oral Daily Hugh Herrera MD   40 mg at 02/23/21 0813    magnesium oxide (MAG-OX) tablet 400 mg  400 mg Oral Daily Aman Rodríguez MD   400 mg at 02/23/21 0813    midodrine (PROAMATINE) tablet 5 mg  5 mg Oral BID WC Aman Rodríguez MD        oxyCODONE (ROXICODONE) immediate release tablet 10 mg  10 mg Oral QAM Aman Rodríguez MD        oxyCODONE (ROXICODONE) immediate release tablet 10 mg 10 mg Oral QPM Yvette Hernandes MD   10 mg at 02/22/21 2322    oxyCODONE (ROXICODONE) immediate release tablet 10 mg  10 mg Oral Q4H PRN Yvette Hernandes MD        oxyCODONE-acetaminophen (ROXICET) 5-325 MG/5ML solution 5 mL  5 mL Oral BID PRN Yvette Hernandes MD            Objective:  /78   Pulse 85   Temp 97.2 °F (36.2 °C) (Oral)   Resp 16   Ht 5' 2\" (1.575 m)   Wt 130 lb 1.1 oz (59 kg)   SpO2 93%   BMI 23.79 kg/m²     Physical Exam:   Patient seen and examined  General: Well developed. Alert and cooperative in no acute distress. HENT: atraumatic, neck supple  Eyes: Optic discs: Not tested  Pulmonary: unlabored respiratory effort  Cardiovascular:  Warm well perfused. No peripheral edema  Gastrointestinal: abdomen soft, NT, ND    Neurological:  Mental Status: Awake, alert, oriented x 4, speech clear and appropriate  Attention: Intact  Language: No aphasia or dysarthria noted  Sensation: Intact to all extremities to light touch,numbness to right leg  Coordination: Intact      Cranial Nerves:  II: Visual acuity not tested, denies new visual changes / diplopia  III, IV, VI: PERRL, 3 mm bilaterally, EOMI, no nystagmus noted  V: Facial sensation intact bilaterally to touch  VII: Face symmetric  VIII: Hearing intact bilaterally to spoken voice  IX: Palate movement equal bilaterally  XI: Shoulder shrug equal bilaterally  XII: Tongue midline    Musculoskeletal:   Gait: Not tested   Assist devices: None   Tone: normal  Motor strength:    Right  Left    Right  Left    Deltoid  5 5  Hip Flex  0 5   Biceps  5 5  Knee Extensors  0 5   Triceps  5 5  Knee Flexors  0 5   Wrist Ext  5 5  Ankle Dorsiflex. 0 5   Wrist Flex  5 5  Ankle Plantarflex.   0 5   Handgrip  5 5  Ext Jh Longus  0 5   Thumb Ext  5 5         Radiological Findings:    mri thoracic 2/19/2021  FINDINGS:   BONES/ALIGNMENT: There is diffuse marrow replacement with enhancing lesions   demonstrated L1-T12 T11 T10 T7-T6 T5 T3 T2 and T1.       SPINAL CORD: Enhancing lesion in the cord at mid T8 that extends down the mid   T9 approximately 2.4 cm in length 6.7 mm transverse 5.9 mm AP.  Motion   degradation of the axial images limits evaluation of the upper thoracic cord.       There is T2 signal abnormality in the cord beginning at T4 and extending down   to T11.       SOFT TISSUES:  No abnormal enhancement of the thoracic spine. No paraspinal   mass identified.       DEGENERATIVE CHANGES: No significant spinal canal stenosis or neural   foraminal narrowing of the thoracic spine.           Impression   Extensive marrow replacement with multiple areas enhancement consistent with   diffuse skeletal metastases.       There is cord metastases noted at T8-T9 measuring 2.4 cm length.  Elevated   signal on T2 a demonstrated from T4 down to T11.       Much of the thoracic spine is obscured on the postcontrast axial images due   to excessive motion. Libby iBanchi other areas of enhancement noted on the   postcontrast sagittal images         Labs:  Recent Labs     02/23/21  0642   WBC 19.5*   HGB 10.8*   HCT 33.1*          Recent Labs     02/23/21  0642   *   K 4.0   CL 97*   CO2 28   BUN 24*   CREATININE 1.2   GLUCOSE 183*   CALCIUM 9.1       No results for input(s): PROTIME, INR, APTT in the last 72 hours.     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 ([address] 08/08/2020    Acute blood loss anemia 08/08/2020    Elevated procalcitonin 08/08/2020    Hyponatremia 08/08/2020    Former smoker 08/08/2020    Anemia 08/07/2020    Sepsis ([address] 08/07/2020    Lung cancer ([facility] [address] 08/07/2020    Hyperlipidemia 08/07/2020    2019 novel coronavirus disease (COVID-19) 08/07/2020    Hypokalemia 08/07/2020    Lung mass     Multiple lung nodules     Hilar adenopathy     Centrilobular emphysema (HCC)     Smoker     Acute hyponatremia 05/04/2020    N&V (nausea and vomiting) 05/04/2020    Leukocytosis 05/04/2020       Assessment:  Patient is a 77 y.o. female w/ hx of metastatic small cell lung cancer who presented with back pain and R LE weakness who now has mets to spine and spinal cord. Plan:  1. No emergent neurosurgical intervention indicated  2. Neurologic exams frequency: Floor: Q4H  3. Muscle spasms: PRN Robaxin and Valium  4. Pain control: Managed by medical team  5. Spasms: added robaxin and valium prn  6. Radiation oncology to consult  7. Advance diet / activity per primary team  8. Thank you for consult. Will sign off. Please call with any questions or decline in neurological status    DISPO: Dispo timing to be determined by primary team once patient is medically stable for discharge. Patient was seen and examined with Dr. Chana Fairchild who agrees with above assessment and plan. Electronically signed by:  KIM Tucker, 2/23/2021 9:33 AM  [phone]

## 2021-02-23 NOTE — CONSULTS
[facility]  Palliative Medicine Consultation Note      Date Of Admission:2/22/2021  Date of consult: 02/23/21  Seen by [facility] in the past:  No    Recommendations:        Met with the pt and her daughter Sumanth Ramirez at the bedside, introduced palliative care. Discussed their understanding of the pt's medical condition. They are hopeful the pt will improve with urgent radiation and that she will be able to continue with chemo [id number] once radiation is completed. They are interested in an outpatient palliative care referral at discharge. If the pt does not show improvement with radiation, or deteriorates for any reason, they would be interested in pursuing hospice. Had a voluntary discussion about advance care planning. Assisted the pt in completing a HCPOA today. Her primary agent is her daughter Sumanth Ramirez. Her secondary agent is her sister Lenny Rangel. Discussed code status in depth, she has elected to change her code status to [facility]. 1. Goals of Care/Advanced Care planning/Code status: [facility], continue with current management. The pt is hopeful for improvement with radiation. 2. Pain: Pt reports her pain is well controlled on current regimen, robaxin 750mg four times daily, scheduled oxycodone 10mg q4h and morphine 1mg q4h prn.   3. SOB: Pt denies  4. Mets to spine: Pt was transported her to [facility] for urgent radiation for new bone mets and SCC at T8-T9. D/w pt and family today as above. 5. Constipation: Pt reports no BM since Sunday, on Dulcolax BID, senna BID and miralax once daily at home. Ordered her home regimen during inpatient stay. 6. Disposition: Likely home when medically ready for discharge with outpatient palliative care.      Reason for Consult:         [x]  Goals of Care  [x]  Code Status Discussion/Advanced Care Planning   [x]  Psychosocial/Family Support  []  Symptom Management  []  Other (Specify)    Requesting Physician: Dr. Hever Monsivais:  Right Hip chloride 1 g tablet, Take 2 tablets by mouth 3 times daily (with meals)  senna (SENOKOT) 8.6 MG tablet, Take 1 tablet by mouth 2 times daily  potassium chloride (KLOR-CON M) 20 MEQ extended release tablet, Take 1 tablet by mouth 2 times daily  ondansetron (ZOFRAN ODT) 4 MG disintegrating tablet, Take 2 tablets by mouth every 8 hours as needed for Nausea or Vomiting  promethazine (PHENERGAN) 12.5 MG tablet, Take 1 tablet by mouth every 6 hours as needed for Nausea  atorvastatin (LIPITOR) 40 MG tablet, Take 1 tablet by mouth daily  midodrine (PROAMATINE) 5 MG tablet, TAKE 1 TABLET BY MOUTH THREE TIMES A DAY. DO NOT GIVE LAST DOSE OF THE DAY AFTER 6PM OR WITHIN 4 HOURS OF BED TIME  albuterol sulfate HFA (PROAIR HFA) 108 (90 Base) MCG/ACT inhaler, Inhale 2 puffs into the lungs every 4 hours as needed for Wheezing or Shortness of Breath    Allergies:  Patient has no known allergies. Social History:    · TOBACCO: reports that she quit smoking about 9 months ago. She has a 82.50 pack-year smoking history. She has never used smokeless tobacco.  · ETOH:   reports no history of alcohol use. · Patient currently lives with family daughter    Review of Systems -   Review of Systems: A 10 point review of systems was conducted, significant findings as notedin HPI. Objective:          Physical Exam  Constitutional:       General: She is not in acute distress. Cardiovascular:      Rate and Rhythm: Normal rate and regular rhythm. Heart sounds: Normal heart sounds. Pulmonary:      Effort: Pulmonary effort is normal.      Breath sounds: Normal breath sounds. Abdominal:      General: Bowel sounds are normal.      Palpations: Abdomen is soft. Musculoskeletal:      Right lower leg: No edema. Left lower leg: No edema. Skin:     General: Skin is warm and dry. Neurological:      Mental Status: She is alert and oriented to person, place, and time.           Palliative Performance Scale:  [x] 60% Ambulation reduced; Significant disease; Can't do hobbies/housework; intake normal or reduced; occasional assist; LOC full/confusion  [] 50% Mainly sit/lie; Extensive disease; Can't do any work; Considerable assist; intake normal  Or reduced; LOC full/confusion  [] 40% Mainly in bed; Extensive disease; Mainly assist; intake normal or reduced; occasional assist; LOC full/confusion  [] 30% Bed Bound; Extensive disease; Total care; intake reduced; LOC full/confusion  [] 20% Bed Bound; Extensive disease; Total care; intake minimal; Drowsy/coma  [] 10% Bed Bound; Extensive disease; Total care; Mouth care only; Drowsy/coma  [] 0% Death    PPS: 60    Vitals:    /76   Pulse 90   Temp 98.4 °F (36.9 °C) (Oral)   Resp 16   Ht 5' 2\" (1.575 m)   Wt 130 lb 1.1 oz (59 kg)   SpO2 95%   BMI 23.79 kg/m²     Labs:    BMP:   Recent Labs     02/22/21  1255 02/23/21  0642    134*   K 4.8 4.0    97*   CO2 29 28   BUN 21* 24*   CREATININE 1.4* 1.2   GLUCOSE 86 183*     CBC:   Recent Labs     02/22/21  1255 02/23/21  0642   WBC 22.4* 19.5*   HGB 10.4* 10.8*   HCT 32.1* 33.1*    235       LFT's:   Recent Labs     02/22/21  1255   AST 25   ALT 15   BILITOT 0.5   ALKPHOS 88     Troponin: No results for input(s): TROPONINI in the last 72 hours. BNP: No results for input(s): BNP in the last 72 hours. ABGs: No results for input(s): PHART, VSB4WFL, PO2ART in the last 72 hours. INR: No results for input(s): INR in the last 72 hours.     U/A:  Recent Labs     02/22/21  1236   COLORU Yellow   PHUR 7.5   CLARITYU Clear   SPECGRAV 1.015   LEUKOCYTESUR Negative   UROBILINOGEN 0.2   BILIRUBINUR Negative   BLOODU Negative   GLUCOSEU Negative       No orders to display         Conclusion/Time spent:         Recommendations see above    Time spent with patient and/or family: 30  Time reviewing records: 10 min   Time communicating with staff: 5 min     A total of 45 minutes spent with the patient and family on unit greater than 50% in counseling regarding palliative care and in goals of care for the patient. Thank you to Dr. Rolf Martinez for this consultation. We will continue to follow Ms. Swenson's care as needed.     Carolyn Mejia [facility]  Inpatient Palliative Care  [phone]

## 2021-02-23 NOTE — PLAN OF CARE
Problem: Falls - Risk of:  Goal: Will remain free from falls  Description: Will remain free from falls  2/23/2021 0932 by Kush Cole RN  Outcome: Ongoing  Note: Pt is in bed with alarm on. Non-skid socks are on. Up x2 with walker and gait belt. Fall precautions in place. Call light and bedside table in reach. Problem: Pain:  Goal: Pain level will decrease  Description: Pain level will decrease  2/23/2021 0932 by Kush Cole RN  Outcome: Ongoing  Note: Patient states she has no pain. Pt self repositions. Non-pharmaceutical pain management offered. Pt educated on pain scale. Will continue to monitor.

## 2021-02-23 NOTE — CONSULTS
Oncology Consult Note      PCP: Zenobia Longo    Date of Admission: 2/22/2021    Reason for Consult:  Metastatic Small Cell Carcinoma of Lung     History Of Present Illness:      Gema Seen is a 77 y.o. female w/ PMHX of Metastatic Small Cell carcinoma of lung, SIADH, CLL who presented to [facility] with LE weakness. Pt reported that 2 weeks ago she started having Back pain which progressively became worse and LE weakness. Her LE weakness has impaired her ability to walk. She did have associated numbness/tingling in her legs. Endorsed urinary incontinence, but denied bowel incontinence. Pt was seen by Dr. Dereck Shah in the office Friday with mild RLE complaints. She had MRI T and L spine was ordered and found to have cord mets. Pt was transferred to [facility] for urgent Radiation therapy. Today, pt reported that her back pain is controlled, is feeling anxious. She has not had a BM in 3 days. Denied CP, ab pain, n/v, f/c, HA.     Past Medical History:        Diagnosis Date    Cancer [facility])     Chronic hyponatremia     Dr Sabine Gordon, [facility]    COVID-23 08/13/2020    Hyperlipidemia     Lung cancer [facility])        Past Surgical History:          Procedure Laterality Date    BREAST SURGERY      fibroid tumors removed bilateral breast    BRONCHOSCOPY N/A 5/11/2020    BRONCHOSCOPY ENDOBRONCHIAL ULTRASOUND with ANESTHESIA performed by Renetta Serrano MD at [address] [place] [address]  5/11/2020    BRONCHOSCOPY/TRANSBRONCHIAL NEEDLE BIOPSY performed by Renetta Serrano MD at [address] [place] [address]  5/11/2020    BRONCHOSCOPY/TRANSBRONCHIAL NEEDLE BIOPSY ADDL LOBE performed by Renetta Serrano MD at [address] [place] [address]  5/11/2020    BRONCHOSCOPY DIAGNOSTIC OR CELL [address] ONLY performed by Renetta Serrano MD at [address] [place] [address] N/A 5/13/2020    RIGHT SUBCLAVIAN VEIN PORT A CATH INSERTION performed by Kristina Vargas MD at [facility] SKIN CANCER EXCISION      mid chest       Medications Prior to Admission:      Prior to Admission medications    Medication Sig Start Date End Date Taking? Authorizing Provider   oxyCODONE (ROXICODONE) 5 MG immediate release tablet Take 10 mg by mouth every 4 hours as needed for Pain. Yes Historical Provider, MD   Magic Mouthwash (MIRACLE MOUTHWASH) Swish and spit 5 mLs 4 times daily as needed for Irritation 12/16/20  Yes Saloni Rice MD   magnesium oxide (MAG-OX) 400 (240 Mg) MG tablet Take 1 tablet by mouth daily 11/30/20  Yes SUSHANT Moss   torsemide (DEMADEX) 10 MG tablet Take 1 tablet by mouth daily 10/20/20  Yes Anna Gant MD   sodium chloride 1 g tablet Take 2 tablets by mouth 3 times daily (with meals) 9/29/20  Yes DEVORAH Lira - CNP   senna (SENOKOT) 8.6 MG tablet Take 1 tablet by mouth 2 times daily   Yes Historical Provider, MD   potassium chloride (KLOR-CON M) 20 MEQ extended release tablet Take 1 tablet by mouth 2 times daily 5/18/20  Yes Pina Camejo MD   ondansetron (ZOFRAN ODT) 4 MG disintegrating tablet Take 2 tablets by mouth every 8 hours as needed for Nausea or Vomiting 5/18/20  Yes Caryzenrosina Loo APRN - CNP   promethazine (PHENERGAN) 12.5 MG tablet Take 1 tablet by mouth every 6 hours as needed for Nausea 5/18/20  Yes Rosezena Eze, APRN - CNP   atorvastatin (LIPITOR) 40 MG tablet Take 1 tablet by mouth daily 5/18/20  Yes Caryzenrosina Loo APRN - CNP   oxyCODONE (ROXICODONE) 5 MG immediate release tablet Take 10 mg by mouth every morning. Historical Provider, MD   oxyCODONE (ROXICODONE) 5 MG immediate release tablet Take 10 mg by mouth every evening. Historical Provider, MD   midodrine (PROAMATINE) 5 MG tablet TAKE 1 TABLET BY MOUTH THREE TIMES A DAY.  DO NOT GIVE LAST DOSE OF THE DAY AFTER 6PM OR WITHIN 4 HOURS OF BED TIME 1/7/21   Historical Provider, MD   oxyCODONE-acetaminophen (ROXICET) 5-325 MG/5ML solution Take by mouth 2 times daily as needed for Pain. Historical Provider, MD   albuterol sulfate HFA (PROAIR HFA) 108 (90 Base) MCG/ACT inhaler Inhale 2 puffs into the lungs every 4 hours as needed for Wheezing or Shortness of Breath 5/18/20   DEVORAH Trujillo - CNP       Allergies:  Patient has no known allergies. Social History:      TOBACCO:   reports that she quit smoking about 9 months ago. She has a 82.50 pack-year smoking history. She has never used smokeless tobacco.  ETOH:   reports no history of alcohol use. History:      No family history on file. REVIEW OF SYSTEMS:   Pertinentpositives as noted in the HPI. All other systems reviewed and negative. ROS: Review of Systems   Constitutional: Negative for chills and diaphoresis. Respiratory: Negative for cough and shortness of breath. Cardiovascular: Negative for chest pain, palpitations and leg swelling. Gastrointestinal: Negative for abdominal pain. Musculoskeletal: Positive for back pain and gait problem. Neurological: Positive for numbness. Negative for dizziness and headaches. Psychiatric/Behavioral: Negative for confusion. PHYSICALEXAM PERFORMED:    /78   Pulse 85   Temp 97.2 °F (36.2 °C) (Oral)   Resp 16   Ht 5' 2\" (1.575 m)   Wt 130 lb 1.1 oz (59 kg)   SpO2 93%   BMI 23.79 kg/m²     General appearance:  No apparent distress, appears stated age and cooperative. HEENT:  Normal cephalic, atraumaticwithout obvious deformity. Pupils equal, round, and reactive to light. Extra ocular muscles intact. Respiratory:  Normal respiratory effort. Clear to auscultation, bilaterally without Rales/Wheezes/Rhonchi. Cardiovascular:  Regular rate and rhythm with normal S1/S2 without murmurs, rubs or gallops. Abdomen: Soft,non-tender, non-distended with normal bowel sounds. Musculoskeletal:  No clubbing, cyanosis or edema bilaterally. Full range of motion without deformity. Neurologic: Strength 4/5 in LLE, 1/5 RLE, Sensation is intact.  Cranial nerves: II-XII intact, grossly non-focal.  Psychiatric:  Alert and oriented,thought content appropriate, normal insight  Peripheral Pulses: +2 palpable, equal bilaterally     Labs:     Recent Labs     02/22/21  1255 02/23/21  0642   WBC 22.4* 19.5*   HGB 10.4* 10.8*   HCT 32.1* 33.1*    235     Recent Labs     02/22/21  1255 02/23/21  0642    134*   K 4.8 4.0    97*   CO2 29 28   BUN 21* 24*   CREATININE 1.4* 1.2   CALCIUM 9.1 9.1     Recent Labs     02/22/21  1255   AST 25   ALT 15   BILITOT 0.5   ALKPHOS 88     No results for input(s): INR in the last 72 hours. No results for input(s): Myrna Gaster in the last 72 hours. Urinalysis:   Lab Results   Component Value Date    NITRU Negative 02/22/2021    WBCUA None seen 10/10/2020    BACTERIA Rare 10/10/2020    RBCUA 3-4 10/10/2020    BLOODU Negative 02/22/2021    SPECGRAV 1.015 02/22/2021    GLUCOSEU Negative 02/22/2021       Radiology:     MRI THORACIC SPINE W WO CONTRAST  Extensive marrow replacement with multiple areas enhancement consistent with   diffuse skeletal metastases.       There is cord metastases noted at T8-T9 measuring 2.4 cm length.  Elevated   signal on T2 a demonstrated from T4 down to T11.       Much of the thoracic spine is obscured on the postcontrast axial images due   to excessive motion. Elen Everts other areas of enhancement noted on the   postcontrast sagittal images         MRI LUMBAR SPINE W WO CONTRAST  Diffuse skeletal metastases and degenerative changes as detailed above.       Incidentally noted infrarenal abdominal aortic aneurysm.           ASSESSMENT & PLAN:  Airam Álvarez is a 77 y.o. female who pw LE weakness.     LE Weakness: 2/2 Metastatic Small Cell Lung cancer to Thoracic, Lumbar spine  - Rad Onc on board, received first treatment today  - NSGY consulted, recommended no neurosurgical intervention   - Decadron 6mg q6hr  - Pain management  - PT/OT  - Recommend Palliative Care consult, since she has advanced small cell lung cancer that has progressed on treatment  - Most recent palliative systemic therapy has been lurbinectidin. She has been receiving this therapy since mid-Dec 2020. Unfortunately, she has developed intramedullary cord mets despite this therapy. With her poor performance status and progressive disease, I fear that she is a poor candidate for any further systemic treatment. After she completes palliative radiation therapy, hopefully she will regain some functional status. DVT Prophylaxis: Lovenox 30mg    I will discuss the patient with MD Kathy Balderrama MD  Internal Medicine Resident, PGY-3  Contact via [facility]    Attending:    Patient seen and examined. Data reviewed. Case discussed with Dr. Landen Vale and agree with his consult note with my modification.     Ruby Babcock MD

## 2021-02-23 NOTE — PROGRESS NOTES
Progress Note  Admit Date: 2/22/2021         CC: F/U for right leg numbness and weakness    Subjective:  Pt reports ongoing numbness and weakness of right leg. Reports a sharp \"twinge\"/ pain in lower back. + urinary incontinence and difficulty w/ bowels. No cp or sob. No fever/ chills. No abd pain/n/v. No cough/ congestion. No headache or dizziness. Review of Systems: - Negative except HPI. Diet: DIET GENERAL;    PHYSICAL EXAM:  /78   Pulse 85   Temp 97.2 °F (36.2 °C) (Oral)   Resp 16   Ht 5' 2\" (1.575 m)   Wt 130 lb 1.1 oz (59 kg)   SpO2 93%   BMI 23.79 kg/m²   No results for input(s): POCGLU in the last 72 hours.     Intake/Output Summary (Last 24 hours) at 2/23/2021 0831  Last data filed at 2/23/2021 0630  Gross per 24 hour   Intake --   Output 1200 ml   Net -1200 ml     General appearance: alert, appears stated age and cooperative  Head: Normocephalic, without obvious abnormality, atraumatic  Neck: marked anterior cervical adenopathy and supple, symmetrical, trachea midline  Lungs: clear to auscultation bilaterally  Heart: regular rate and rhythm  Abdomen: normal findings: soft, non-tender  Extremities: no edema b/l le  Skin: no rash or lesions  Neurologic: Mental status: Alert, oriented, thought content appropriate  Cranial nerves: normal  Motor:grossly normal    LABS:  Recent Labs     02/22/21  1255 02/23/21  0642   WBC 22.4* 19.5*   HGB 10.4* 10.8*   HCT 32.1* 33.1*    235                                                                  Recent Labs     02/22/21  1255 02/23/21  0642    134*   K 4.8 4.0    97*   CO2 29 28   BUN 21* 24*   CREATININE 1.4* 1.2   GLUCOSE 86 183*     MRI Lumbar Spine 2/19/2021:  FINDINGS:   BONES/ALIGNMENT: There is a normal lumbar lordosis.  Assuming that the last   well-formed disc represents L5-S1, the conus medullaris ends at L1 and is   within normal limits.  There is moderate to severe degenerative disc disease   at L5-S1. April Armas consulted. Cont iv decadron in the meantime. Pain control. Urinary incontinence:  Likely 2/2 cord compression- amaral. Constipation:  Place on a bowel regimen. DVT Proph:  Subcut lovenox. Dispo:  Family wants palliative care eval.  Consult placed. Cont inpatient for now.     DNR-CCA       Ratna Gerber MD

## 2021-02-23 NOTE — CARE COORDINATION
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      Current PCP: Cinda Pearson Patient: No    Chart Reviewed: Yes  Patient/ Family Interviewed: Yes    Initial assessment completed at bedside with: Patient and daughter, Venkatesh Eng at bedside     Hospitalization in the last 30 days: No    Emergency Contacts:  Extended Emergency Contact Information  Primary Emergency Contact: [address] [facility] Phone: [phone]  Relation: Child  Secondary Emergency Contact: Gaurang Varghese, [address] [facility] Phone: [phone]  Relation: Brother/Sister    Advance Directives:   Code Status: [address]: Yes  Agent: Lidia Litten  Contact Number: [phone]    Copy present: No        Financial:  Payor: Janet Patterson / Plan: Mark Metzger HMO / Product Type: *No Product type* /     Pre-cert required for SNF: No    Pharmacy:    [address], ANASTASIA Murrell [address] [place] [address]  Phone: [phone] Fax: [phone]    [facility] [address] [place] [phone] [address] [phone]  [address] [facility] [address] [facility] [address]  Phone: [phone] Fax: [phone]      Potential assistance Purchasing Medications: Potential Assistance Purchasing Medications: No  Does Patient want to participate in local refill/ meds to beds program?: Yes    Meds To Beds General Rules:  1. Can ONLY be done Monday- Friday between 8:30am-5pm  2. Prescription(s) must be in pharmacy by 3pm to be filled same day  3. Copy of patient's insurance/ prescription drug card and patient face sheet must be sent along with the prescription(s)  4. Cost of Rx cannot be added to hospital bill. If financial assistance is needed, please contact unit  or ;  or  CANNOT provide pharmacy voucher for patients co-pays  5. Patients can then  the prescription on their way out of the hospital at discharge, or pharmacy can deliver to the bedside if staff is available. (payment due at time of pick-up or delivery - cash, check, or card accepted)     Able to afford home medications/ co-pay costs: No    ADLS:  Support Systems: Children, Family Members    PT AM-PAC:   OT AM-PAC:     Housing:  Home Environment: Home with family     Plans to RETURN to current housing: Yes  Barrier(s) to RETURNING to current housin [address]:  Currently ACTIVE with [facility] [address]: No  Home Care Agency: Not Applicable    Currently ACTIVE with [facility] on Aging: No      Durable Medical Equipment:  DME Provider: n/a   Equipment: wheelchair, 3-in-1, shower chair, 4 wheeled walker     Home Oxygen and Respiratory Equipment:  [address] [place] [address] prior to admission:Yes  [address]: Not Applicable    Informed of need to bring portable home O2 tank on day of DISCHARGE for nursing to connect prior to leaving: Yes  Verbalized agreement/Understanding: Yes  Person to bring portable tank at discharge: Daughter     Dialysis:  Active with HD/PD prior to admission: No    DISCHARGE PLAN:  Disposition: Home with [address] [place] [address] palliative care vs. Home with Hospice pending Radiation and testing results.      Transportation PLAN for discharge: pending at this time      Factors facilitating achievement of predicted outcomes: family support,     Barriers to discharge: Radiation progress, Onc    Additional Case Management Notes:   SW met with patient and daughter at bedside this AM. Daughter, Jocelyn Boas, reported the plan is for patient to come home pending Daughter is wanting to see how patient responds to radiation treatment and results of testing. Daughter is aware it is palliative in nature. At this time, family is looking to bring patient home with either [facility] palliative care vs. Hospice. CM team to follow. 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  Yes    Care Transition patient: No    MESFIN Torres  [facility]  Case Management Department  Ph: [phone]

## 2021-02-23 NOTE — PLAN OF CARE
Problem: Falls - Risk of:  Goal: Will remain free from falls  2/23/2021 0416 by Kory Mathias RN  Outcome: Ongoing  Note: Patient is a high fall risk. All fall precautions in place: bed alarm on, nonskid socks on pt, call light within reach, bed locked in lowest position. Will continue to monitor pt safety. Problem: Pain:  Description: Pain management should include both nonpharmacologic and pharmacologic interventions. Goal: Pain level will decrease  2/23/2021 0416 by Kory Mathias RN  Outcome: Ongoing  Note: Pt reports mild pain back. Positioned for comfort. Pt resting comfortably at this time.

## 2021-02-23 NOTE — PROGRESS NOTES
RESPIRATORY THERAPY ASSESSMENT    Name:  Jagdish Corado  Medical Record Number:  [id number]  Age: 77 y.o. Gender: female  : 1945  Today's Date:  2021  Room:  [facility]-    Assessment     Is the patient being admitted for a COPD or Asthma exacerbation? No   (If yes the patient will be seen every 4 hours for the first 24 hours and then reassessed)    Patient Admission Diagnosis rt hip pain/bone mets      Allergies  No Known Allergies    Pulmonary History:82 pk year smoking hx/lung ca  Home Oxygen Therapy:  [facility]  Home Respiratory Therapy:Albuterol   Current Respiratory Therapy: HHN Albuterol Q4 prn          Respiratory Severity Index(RSI)   Patients with orders for inhalation medications, oxygen, or any therapeutic treatment modality will be placed on Respiratory Protocol. They will be assessed with the first treatment and at least every 72 hours thereafter. The following severity scale will be used to determine frequency of treatment intervention.     Smoking History: Pulmonary Disease or Smoking History, Greater than 15 pack year = 2    Social History  Social History     Tobacco Use    Smoking status: Former Smoker     Packs/day: 1.50     Years: 55.00     Pack years: 82.50     Quit date: 2020     Years since quittin.8    Smokeless tobacco: Never Used   Substance Use Topics    Alcohol use: Never     Frequency: Never    Drug use: Never       Recent Surgical History: None = 0  Past Surgical History  Past Surgical History:   Procedure Laterality Date    BREAST SURGERY      fibroid tumors removed bilateral breast    BRONCHOSCOPY N/A 2020    BRONCHOSCOPY ENDOBRONCHIAL ULTRASOUND with ANESTHESIA performed by Kalina Nelson MD at [address] [place] [address]  2020    BRONCHOSCOPY/TRANSBRONCHIAL NEEDLE BIOPSY performed by Kalina Nelson MD at [address] [place] [address]  2020    BRONCHOSCOPY/TRANSBRONCHIAL NEEDLE BIOPSY ADDL LOBE performed by Kalina Nelson MD at [address] [facility]  5/11/2020    BRONCHOSCOPY DIAGNOSTIC OR CELL [address] ONLY performed [facility] [address] [facility] N/A 5/13/[facility]    RIGHT SUBCLAVIAN VEIN PORT [facility]ATH [facility]      [facility]       Level of Consciousness: Alert, Oriented, and Cooperative = 0    Level of Activity: Mostly sedentary, minimal walking = 2    Respiratory Pattern: Regular Pattern; RR 8-20 = 0    Breath Sounds: Diminshed bilaterally and/or crackles = 2    Sputum   ,  ,    Cough: Strong, spontaneous, non-productive = 0    Vital Signs   /69   Pulse 76   Temp 97.6 °F (36.4 °C) (Oral)   Resp 16   Ht 5' 2\" (1.575 m)   Wt 130 lb 1.1 oz (59 kg)   SpO2 94%   BMI 23.79 kg/m²   SPO2 (COPD values may differ): 90-91% on room air or greater than 92% on FiO2 24- 28% = 1    Peak Flow (asthma only): not applicable = 0    RSI: 0-4 = See once and convert to home regimen or discontinue        Plan       Goals: medication delivery    Level of patient/caregiver understanding able to:   ? Verbalize understanding   ? Demonstrate understanding       ? Teach back        ? Needs reinforcement       ? No available caregiver               ? Other:     Is patient being placed on Home Treatment Regimen? Yes     Does the patient have everything they need prior to discharge? NA     Comments: prn albuterol hhn    Plan of Care: Chart reviewed, home regimine    Electronically signed by Iraida Adam RCP on 2/22/2021 at 11:19 PM    Respiratory Protocol Guidelines     1. Assessment and treatment by Respiratory Therapy will be initiated for medication and therapeutic interventions upon initiation of aerosolized medication. 2. Physician will be contacted for respiratory rate (RR) greater than 35 breaths per minute.  Therapy will be held for heart rate (HR) greater than 140 beats per minute, pending direction from physician. 3. Bronchodilators will be administered via Metered Dose Inhaler (MDI) with spacer when the following criteria are met:  a. Alert and cooperative     b. HR < 140 bpm  c. RR < 30 bpm                d. Can demonstrate a 2-3 second inspiratory hold  4. Bronchodilators will be administered via Hand Held Nebulizer [facility]) to patients when ANY of the following criteria are met  a. Incognizant or uncooperative          b. Patients treated with HHN at Home        c. Unable to demonstrate proper use of MDI with spacer     d. RR > 30 bpm   5. Bronchodilators will be delivered via Metered Dose Inhaler (MDI), HHN, Aerogen to intubated patients on mechanical ventilation. 6. Inhalation medication orders will be delivered and/or substituted as outlined below. Aerosolized Medications Ordering and Administration Guidelines:    1. All Medications will be ordered by a physician, and their frequency and/or modality will be adjusted as defined by the patients Respiratory Severity Index (RSI) score. 2. If the patient does not have documented COPD, consider discontinuing anticholinergics when RSI is less than 9.  3. If the bronchospasm worsens (increased RSI), then the bronchodilator frequency can be increased to a maximum of every 4 hours. If greater than every 4 hours is required, the physician will be contacted. 4. If the bronchospasm improves, the frequency of the bronchodilator can be decreased, based on the patient's RSI, but not less than home treatment regimen frequency. 5. Bronchodilator(s) will be discontinued if patient has a RSI less than 9 and has received no scheduled or as needed treatment for 72  Hrs. Patients Ordered on a Mucolytic Agent:    1. Must always be administered with a bronchodilator. 2. Discontinue if patient experiences worsened bronchospasm, or secretions have lessened to the point that the patient is able to clear them with a cough.     Anti-inflammatory and Combination Medications:    1. If the patient lacks prior history of lung disease, is not using inhaled anti-inflammatory medication at home, and lacks wheezing by examination or by history for at least 24 hours, contact physician for possible discontinuation.

## 2021-02-23 NOTE — PROGRESS NOTES
Pt is A/O to self and place, intermittently oriented to situation and time. VSS. Pt has deccreased sensation and weakness to RLE. Pt denies pain. Fall precautions in place. Will continue to monitor.

## 2021-02-23 NOTE — PROGRESS NOTES
Occupational Therapy   Occupational Therapy Initial Assessment/Tx Note  Date: 2021   Patient Name: Facundo Massey  MRN: [id number]     : 1945    Date of Service: 2021     Assessment: Functional independence is decreased due to lower extremity weakness from thoracic SCC. Pt needs significant assist for mobility and toileting, dressing, bathing. Pt to receive radiation in hopes of improving strength. Plan is to return home with family assist and palliative care and to stay on main level with hospital bed. Recommend home OT as tolerated. Discharge Recommendations: Facundo Massey scored a 16 on the AM-PAC ADL Inpatient form. Current research shows that an AM-PAC score of 18 or greater is typically associated with a discharge to the patient's home setting. Based on the patient's AM-PAC score, and their current ADL deficits, it is recommended that the patient have 2-3 sessions per week of Occupational Therapy at d/c to increase the patient's independence. At this time, this patient demonstrates the endurance and safety to discharge home with 24 hr A, home OT and a follow up treatment frequency of 2-3x/wk. Please see assessment section for further patient specific details. OT Equipment Recommendations  Equipment Needed: No    Assessment   Performance deficits / Impairments: Decreased functional mobility ; Decreased ADL status; Decreased strength;Decreased endurance;Decreased balance  Treatment Diagnosis: Decreased activity tolerance, impaired ADLs and mobility  Decision Making: Medium Complexity  REQUIRES OT FOLLOW UP: Yes  Activity Tolerance  Activity Tolerance: Patient Tolerated treatment well  Safety Devices  Safety Devices in place: Yes  Type of devices: Call light within reach;Nurse notified; Left in chair;Chair alarm in place(assist level on board: Guillermina Wu x2 vs stand pivot)         Treatment Diagnosis: Decreased activity tolerance, impaired ADLs and mobility      Restrictions  Position Activity Restriction  Other position/activity restrictions: up as tolerated    Subjective   General  Chart Reviewed: Yes  Additional Pertinent Hx: 77 y.o. F admitted 2/22 for emergent radiation of bony mets of spine with SCC at T8-9. Pt has lung CA with liver mets. Family / Caregiver Present: Yes(daughter present initially)  Referring Practitioner: Javier  Subjective  Subjective: Pt in bed on entry. Pleasant and cooperative. Eating candy and playing games on her phone. Patient Currently in Pain: Denies  Pain Assessment  Pain Assessment: 0-10(no pain during session)    Social/Functional History  Social/Functional History  Lives With: Daughter  Type of Home: House  Home Layout: Bed/Bath upstairs, 1/2 bath on main level(plan is for her to be on 1st floor now)  Home Access: (1 LORENA)  Bathroom Toilet: Standard(BSC often placed over top)  Bathroom Equipment: 3-in-1 commode, Shower chair  Home Equipment: [employer], 4 wheeled walker(transport chair)  ADL Assistance: Independent(prior to this week; independent with exception of tub transfers)  Ambulation Assistance: Independent(using walker recently)  Transfer Assistance: [address]: watch tv, games on phone  Additional Comments: Reports normally independent with mobility but has been needing increased assist over last week PTA. Had 2 falls PTA and one time had to be lowered to the floor. Daughter works - pt normally home alone but had a sitter with her last week. Objective        Orientation  Overall Orientation Status: Impaired  Orientation Level: Oriented to person;Disoriented to time(partially oriented to place and situation)     Balance  Sitting Balance: Minimal assistance(without UE support; CGA with UE support)  Standing Balance:  Moderate assistance(x2 to mod assist x1)  Standing Balance  Time: 20 sec + 20 sec + 20 sec  Activity: static stance at walker + transfer  Comment: Mod assist x2 sit to stand, mod assist x2 with attempts to bear weight through RLE, mod assist x1 and CGA x1 with weight on LLE and walker. Tolerated 2 stands walker + stand pivot transfer HHA. Toilet Transfers  Toilet - Technique: Stand pivot  Equipment Used: Standard bedside commode(simulated to chair)  Toilet Transfer: 2 Person assistance(mod assist x2)  ADL  Feeding: Independent  Grooming: Modified independent ;Setup  LE Dressing: Dependent/Total(pants)  Toileting: Unable to assess(comment)(external catheter in use; anticipate pt could perform some pericare seated on a BSC)  Additional Comments: Daughter arranging for a temporary shower on the first floor. Bed mobility  Scooting: Contact guard assistance  Transfers  Sit to stand: 2 Person assistance(mod assist x2)  Stand to sit: 2 Person assistance(mod assist x2)     Cognition  Overall Cognitive Status: Exceptions  Cognition Comment: daughter reports pt has \"chemo brain. \" alert, partially oriented, follows simple commands, difficulty problem solving and sequencing       LUE Strength  Gross LUE Strength: WFL  RUE Strength  Gross RUE Strength: WFL           Plan  If pt discharges prior to next tx, this note will serve as d/c summary.  Continue per POC if pt does not d/c.     Plan  Times per week: 2-5x  Times per day: Daily  Current Treatment Recommendations: Strengthening, Balance Training, Functional Mobility Training, Endurance Training, Self-Care / ADL, Safety Education & Training, Patient/Caregiver Education & Training, Equipment Evaluation, Education, & procurement    AM-PAC Score        AM-PAC Inpatient Daily Activity Raw Score: 16 (02/23/21 1535)  AM-PAC Inpatient ADL T-Scale Score : 35.96 (02/23/21 1535)  ADL Inpatient CMS 0-100% Score: 53.32 (02/23/21 1535)  ADL Inpatient CMS G-Code Modifier : CK (02/23/21 1535)    Goals  Short term goals  Time Frame for Short term goals: by D/C  Short term goal 1: Transfer to chair/BSC with min assist - Not met  Short term goal 2:

## 2021-02-23 NOTE — PROGRESS NOTES
Physical Therapy    Facility/Department: [facility] 5T ORTHO/NEURO  Initial Assessment    NAME: Digna oLzano  : 1945  MRN: [id number]    Date of Service: 2021    Discharge Recommendations:    Digna Lozano scored a 9/24 on the AM-PAC short mobility form. Current research shows that an AM-PAC score of 17 or less is typically not associated with a discharge to the patient's home setting. Based on the patient's AM-PAC score and their current functional mobility deficits, it is recommended that the patient have 3-5 sessions per week of Physical Therapy at d/c to increase the patient's independence. Please see assessment section for further patient specific details. If patient discharges prior to next session this note will serve as a discharge summary. Please see below for the latest assessment towards goals. PT Equipment Recommendations  Equipment Needed: (defer)    Assessment   Body structures, Functions, Activity limitations: Decreased functional mobility ; Decreased strength;Decreased safe awareness;Decreased cognition;Decreased endurance;Decreased balance;Decreased fine motor control;Decreased coordination  Assessment: Pt with decreased independent mobiltiy from baseline. Pt with BLE weakness and decreased motor control - RLE worse than L.  CUrrently needing mod assist for bed mobility and mod assist x2 for transfers. At high risk for falls. Not safe to get up alone. Rec cont skilled PT to maximzie mobility and independence  Treatment Diagnosis: impaired functional mobility 2/2 LE weakness - R worse than L  Decision Making: Medium Complexity  PT Education: Goals;PT Role;Plan of Care;General Safety; Functional Mobility Training  Patient Education: Will need reinforcement  REQUIRES PT FOLLOW UP: Yes       Patient Diagnosis(es): There were no encounter diagnoses. has a past medical history of Cancer ([facility] [address] Chronic hyponatremia, COVID-19, Hyperlipidemia, and Lung cancer ([facility] [address]    has a past surgical history that includes Breast surgery; Skin cancer excision; bronchoscopy (N/A, 5/11/2020); bronchoscopy (5/11/2020); bronchoscopy (5/11/2020); bronchoscopy (5/11/2020); and Port Surgery (N/A, 5/13/2020). Restrictions  Position Activity Restriction  Other position/activity restrictions: up as tolerated  Vision/Hearing  Vision: Within Functional Limits(wears glasses)  Hearing: Within functional limits     Subjective  General  Chart Reviewed: Yes  Additional Pertinent Hx: Pt is a 77 y.o. female adm 2/22 with metastasis of neoplasm to spinal canal.  Pt presented to [facility] with right hip numbness and right leg weakness. Patient has been having ongoing low back pain and right hip pain for some time. She had MRIs yesterday which showed new bone mets and spinal cord compression at T8-T9. Yesterday patient was able to stand but this morning she was not able to move her right leg at all or put any weight on it. Transferred to [facility] for urgent XRT. PMH: small cell lung cancer with liver mets, hyperlipidemia  Family / Caregiver Present: Yes(daughter)  Referring Practitioner: Marva Mares MD  Referral Date : 02/22/21  Subjective  Subjective: Pt found supine. Agreeable to PT.   Pain Screening  Patient Currently in Pain: Denies  Vital Signs  Patient Currently in Pain: Denies       Orientation  Orientation  Overall Orientation Status: Within Functional Limits(some confusion with conversation noted)  Social/Functional History  Social/Functional History  Lives With: Daughter  Type of Home: House  Home Layout: Bed/Bath upstairs, 1/2 bath on main level(plan is for her to be on 1st floor now)  Home Access: (1 LORENA)  Bathroom Toilet: Standard(BSC often placed over top)  Bathroom Equipment: 3-in-1 commode, Shower chair  Home Equipment: [employer], 4 wheeled walker(transport chair)  ADL Assistance: Independent(prior to this week; independent with exception of tub transfers)  Ambulation Assistance: Independent(using walker recently)  Transfer Assistance: [address]: watch tv, games on phone  Additional Comments: Reports normally independent with mobility but has been needing increased assist over last week PTA. Had 2 falls PTA and one time had to be lowered to the floor. Daughter works - pt normally home alone but had a sitter with her last week. Cognition        Objective          AROM RLE (degrees)  RLE AROM: (decreased AROM - minimal movement in knee and with ankle PF; AA/PROM WFL)  AROM LLE (degrees)  LLE AROM : WFL  Strength RLE  Strength RLE: (hip flex 1/5, knee flex 1/5, knee ext 2+/5, DF 0/5)  Strength LLE  Strength LLE: (>3/5 grossly)  Motor Control  Gross Motor?: (decreased grossly BLE)     Bed mobility  Supine to Sit: Moderate assistance(HOB elevated, assist for RLE and trunk)  Transfers  Sit to Stand: Dependent/Total(mod assist x  2 from bed with walker)  Stand to sit: Dependent/Total(mod assist x 2)  Bed to Chair: Dependent/Total(partial stand pivot with mod assist x 2)        Balance  Sitting - Static: (Sat EOB with CGA with UE support, min assist without UE support.   Decreased trunk control)  Standing - Static: (Stood with walker with mod assist x 2, blocking R knee)    Treatment included: bed mobility, transfers, pt education      Plan   Plan  Times per week: 2-5  Current Treatment Recommendations: Strengthening, Functional Mobility Training, Transfer Training, Endurance Training, Patient/Caregiver Education & Training, Safety Education & Training  Safety Devices  Type of devices: Call light within reach, Chair alarm in place, Nurse notified, Left in chair    G-Code       OutComes Score                                                  AM-PAC Score  AM-PAC Inpatient Mobility Raw Score : 9 (02/23/21 1536)  AM-PAC Inpatient T-Scale Score : 30.55 (02/23/21 1536)  Mobility Inpatient CMS 0-100% Score: 81.38 (02/23/21 1536)  Mobility Inpatient CMS G-Code Modifier : CM (02/23/21 1536)          Goals  Short term goals  Time Frame for Short term goals: By discharge  Short term goal 1: Sup to sit SBA  Short term goal 2: Pt will transfer sit to stand with CGA  Short term goal 3: Pt will transfer bed to chair with CGA       Therapy Time   Individual Concurrent Group Co-treatment   Time In 1440         Time Out 1518         Minutes 38              Timed Code Treatment Minutes:23       Total Treatment Minutes:  [facility], PT

## 2021-02-23 NOTE — PROGRESS NOTES
Pt is alert and oriented to self and place, interdentally to situation and time. Rt leg remains weak with decreased sensation. VSS Pt resting comfortably at this time.

## 2021-02-24 ENCOUNTER — APPOINTMENT (OUTPATIENT)
Dept: MRI IMAGING | Age: 76
DRG: 055 | End: 2021-02-24
Attending: INTERNAL MEDICINE
Payer: MEDICARE

## 2021-02-24 ENCOUNTER — APPOINTMENT (OUTPATIENT)
Dept: CT IMAGING | Age: 76
DRG: 055 | End: 2021-02-24
Attending: INTERNAL MEDICINE
Payer: MEDICARE

## 2021-02-24 LAB
ANION GAP SERPL CALCULATED.3IONS-SCNC: 10 MMOL/L (ref 3–16)
ANION GAP SERPL CALCULATED.3IONS-SCNC: 12 MMOL/L (ref 3–16)
BACTERIA: ABNORMAL /HPF
BILIRUBIN URINE: NEGATIVE
BLOOD, URINE: ABNORMAL
BUN BLDV-MCNC: 22 MG/DL (ref 7–20)
BUN BLDV-MCNC: 24 MG/DL (ref 7–20)
CALCIUM SERPL-MCNC: 8.6 MG/DL (ref 8.3–10.6)
CALCIUM SERPL-MCNC: 8.9 MG/DL (ref 8.3–10.6)
CHLORIDE BLD-SCNC: 89 MMOL/L (ref 99–110)
CHLORIDE BLD-SCNC: 89 MMOL/L (ref 99–110)
CLARITY: ABNORMAL
CO2: 24 MMOL/L (ref 21–32)
CO2: 26 MMOL/L (ref 21–32)
COLOR: YELLOW
CREAT SERPL-MCNC: 0.8 MG/DL (ref 0.6–1.2)
CREAT SERPL-MCNC: 0.9 MG/DL (ref 0.6–1.2)
EPITHELIAL CELLS, UA: ABNORMAL /HPF (ref 0–5)
GFR AFRICAN AMERICAN: >60
GFR AFRICAN AMERICAN: >60
GFR NON-AFRICAN AMERICAN: >60
GFR NON-AFRICAN AMERICAN: >60
GLUCOSE BLD-MCNC: 121 MG/DL (ref 70–99)
GLUCOSE BLD-MCNC: 125 MG/DL (ref 70–99)
GLUCOSE URINE: NEGATIVE MG/DL
HCT VFR BLD CALC: 33.1 % (ref 36–48)
HEMOGLOBIN: 10.7 G/DL (ref 12–16)
HYALINE CASTS: ABNORMAL /LPF (ref 0–2)
KETONES, URINE: NEGATIVE MG/DL
LEUKOCYTE ESTERASE, URINE: NEGATIVE
MCH RBC QN AUTO: 31.9 PG (ref 26–34)
MCHC RBC AUTO-ENTMCNC: 32.4 G/DL (ref 31–36)
MCV RBC AUTO: 98.4 FL (ref 80–100)
MICROSCOPIC EXAMINATION: YES
NITRITE, URINE: NEGATIVE
PDW BLD-RTO: 17.5 % (ref 12.4–15.4)
PH UA: 6 (ref 5–8)
PLATELET # BLD: 278 K/UL (ref 135–450)
PMV BLD AUTO: 7.9 FL (ref 5–10.5)
POTASSIUM REFLEX MAGNESIUM: 4.2 MMOL/L (ref 3.5–5.1)
POTASSIUM SERPL-SCNC: 3.9 MMOL/L (ref 3.5–5.1)
PROTEIN UA: 100 MG/DL
RBC # BLD: 3.36 M/UL (ref 4–5.2)
RBC UA: ABNORMAL /HPF (ref 0–4)
SODIUM BLD-SCNC: 125 MMOL/L (ref 136–145)
SODIUM BLD-SCNC: 125 MMOL/L (ref 136–145)
SPECIFIC GRAVITY UA: 1.02 (ref 1–1.03)
URINE REFLEX TO CULTURE: ABNORMAL
URINE TYPE: ABNORMAL
UROBILINOGEN, URINE: 0.2 E.U./DL
WBC # BLD: 31.1 K/UL (ref 4–11)
WBC UA: ABNORMAL /HPF (ref 0–5)

## 2021-02-24 PROCEDURE — 6370000000 HC RX 637 (ALT 250 FOR IP): Performed by: INTERNAL MEDICINE

## 2021-02-24 PROCEDURE — 81001 URINALYSIS AUTO W/SCOPE: CPT

## 2021-02-24 PROCEDURE — 6370000000 HC RX 637 (ALT 250 FOR IP): Performed by: NURSE PRACTITIONER

## 2021-02-24 PROCEDURE — 6360000002 HC RX W HCPCS: Performed by: INTERNAL MEDICINE

## 2021-02-24 PROCEDURE — 51798 US URINE CAPACITY MEASURE: CPT

## 2021-02-24 PROCEDURE — 70470 CT HEAD/BRAIN W/O & W/DYE: CPT

## 2021-02-24 PROCEDURE — 2580000003 HC RX 258: Performed by: INTERNAL MEDICINE

## 2021-02-24 PROCEDURE — 80048 BASIC METABOLIC PNL TOTAL CA: CPT

## 2021-02-24 PROCEDURE — 6360000004 HC RX CONTRAST MEDICATION: Performed by: INTERNAL MEDICINE

## 2021-02-24 PROCEDURE — 6360000002 HC RX W HCPCS: Performed by: NURSE PRACTITIONER

## 2021-02-24 PROCEDURE — 77014 HC CT TREATMENT PLAN: CPT

## 2021-02-24 PROCEDURE — 77412 RADIATION TX DELIVERY LVL 3: CPT

## 2021-02-24 PROCEDURE — 1200000000 HC SEMI PRIVATE

## 2021-02-24 PROCEDURE — 85027 COMPLETE CBC AUTOMATED: CPT

## 2021-02-24 PROCEDURE — 6370000000 HC RX 637 (ALT 250 FOR IP): Performed by: STUDENT IN AN ORGANIZED HEALTH CARE EDUCATION/TRAINING PROGRAM

## 2021-02-24 PROCEDURE — 2580000003 HC RX 258: Performed by: NURSE PRACTITIONER

## 2021-02-24 RX ORDER — LORAZEPAM 2 MG/ML
1 INJECTION INTRAMUSCULAR EVERY 4 HOURS PRN
Status: DISCONTINUED | OUTPATIENT
Start: 2021-02-24 | End: 2021-02-26 | Stop reason: HOSPADM

## 2021-02-24 RX ORDER — SODIUM CHLORIDE 9 MG/ML
INJECTION, SOLUTION INTRAVENOUS CONTINUOUS
Status: DISCONTINUED | OUTPATIENT
Start: 2021-02-24 | End: 2021-02-26 | Stop reason: HOSPADM

## 2021-02-24 RX ORDER — LORAZEPAM 2 MG/ML
1 INJECTION INTRAMUSCULAR ONCE
Status: COMPLETED | OUTPATIENT
Start: 2021-02-24 | End: 2021-02-24

## 2021-02-24 RX ORDER — MORPHINE SULFATE 2 MG/ML
2 INJECTION, SOLUTION INTRAMUSCULAR; INTRAVENOUS
Status: DISCONTINUED | OUTPATIENT
Start: 2021-02-24 | End: 2021-02-26 | Stop reason: HOSPADM

## 2021-02-24 RX ADMIN — ATORVASTATIN CALCIUM 40 MG: 40 TABLET, FILM COATED ORAL at 09:41

## 2021-02-24 RX ADMIN — NALOXEGOL OXALATE 12.5 MG: 12.5 TABLET, FILM COATED ORAL at 09:41

## 2021-02-24 RX ADMIN — Medication 400 MG: at 09:41

## 2021-02-24 RX ADMIN — POLYETHYLENE GLYCOL 3350 17 G: 17 POWDER, FOR SOLUTION ORAL at 09:41

## 2021-02-24 RX ADMIN — SODIUM CHLORIDE: 9 INJECTION, SOLUTION INTRAVENOUS at 13:23

## 2021-02-24 RX ADMIN — ACETAMINOPHEN 650 MG: 325 TABLET ORAL at 02:26

## 2021-02-24 RX ADMIN — DEXAMETHASONE SODIUM PHOSPHATE 6 MG: 4 INJECTION, SOLUTION INTRA-ARTICULAR; INTRALESIONAL; INTRAMUSCULAR; INTRAVENOUS; SOFT TISSUE at 06:17

## 2021-02-24 RX ADMIN — DEXAMETHASONE SODIUM PHOSPHATE 6 MG: 4 INJECTION, SOLUTION INTRA-ARTICULAR; INTRALESIONAL; INTRAMUSCULAR; INTRAVENOUS; SOFT TISSUE at 13:25

## 2021-02-24 RX ADMIN — ENOXAPARIN SODIUM 30 MG: 30 INJECTION SUBCUTANEOUS at 09:40

## 2021-02-24 RX ADMIN — DOCUSATE SODIUM 100 MG: 100 CAPSULE, LIQUID FILLED ORAL at 09:41

## 2021-02-24 RX ADMIN — METHOCARBAMOL 750 MG: 750 TABLET ORAL at 11:52

## 2021-02-24 RX ADMIN — METHOCARBAMOL 1000 MG: 100 INJECTION, SOLUTION INTRAMUSCULAR; INTRAVENOUS at 02:27

## 2021-02-24 RX ADMIN — PANTOPRAZOLE SODIUM 40 MG: 40 TABLET, DELAYED RELEASE ORAL at 06:17

## 2021-02-24 RX ADMIN — OXYCODONE 10 MG: 5 TABLET ORAL at 02:26

## 2021-02-24 RX ADMIN — Medication 10 ML: at 10:23

## 2021-02-24 RX ADMIN — IOPAMIDOL 80 ML: 755 INJECTION, SOLUTION INTRAVENOUS at 17:09

## 2021-02-24 RX ADMIN — DEXAMETHASONE SODIUM PHOSPHATE 6 MG: 4 INJECTION, SOLUTION INTRA-ARTICULAR; INTRALESIONAL; INTRAMUSCULAR; INTRAVENOUS; SOFT TISSUE at 20:10

## 2021-02-24 RX ADMIN — STANDARDIZED SENNA CONCENTRATE 8.6 MG: 8.6 TABLET ORAL at 09:41

## 2021-02-24 RX ADMIN — MIDODRINE HYDROCHLORIDE 5 MG: 5 TABLET ORAL at 09:43

## 2021-02-24 RX ADMIN — LORAZEPAM 1 MG: 2 INJECTION INTRAMUSCULAR; INTRAVENOUS at 15:00

## 2021-02-24 RX ADMIN — DEXAMETHASONE SODIUM PHOSPHATE 6 MG: 4 INJECTION, SOLUTION INTRA-ARTICULAR; INTRALESIONAL; INTRAMUSCULAR; INTRAVENOUS; SOFT TISSUE at 02:26

## 2021-02-24 ASSESSMENT — ENCOUNTER SYMPTOMS
BACK PAIN: 1
GASTROINTESTINAL NEGATIVE: 1
EYES NEGATIVE: 1
RESPIRATORY NEGATIVE: 1
ALLERGIC/IMMUNOLOGIC NEGATIVE: 1

## 2021-02-24 ASSESSMENT — PAIN DESCRIPTION - ORIENTATION: ORIENTATION: RIGHT

## 2021-02-24 ASSESSMENT — PAIN DESCRIPTION - FREQUENCY: FREQUENCY: CONTINUOUS

## 2021-02-24 ASSESSMENT — PAIN DESCRIPTION - LOCATION: LOCATION: HIP

## 2021-02-24 ASSESSMENT — PAIN DESCRIPTION - PROGRESSION: CLINICAL_PROGRESSION: NOT CHANGED

## 2021-02-24 NOTE — PROGRESS NOTES
NUTRITION NOTE   Admission Date: 2/22/2021     Type and Reason for Visit: Initial, Positive Nutrition Screen    NUTRITION RECOMMENDATIONS:   1. PO Diet: Continue current diet and offer additional foods between meals to increase PO nutrition. 2. ONS: Adding QD per pt use at home. NUTRITION ASSESSMENT:  Nutrition eval triggered for risk of malnutrition per nutrition screen. Pt admitted d/t BLE weakness d/t 2/2 Metastatic Small Cell Lung cancer to Thoracic, Lumbar spine; 10 radiation tx planned per Dr. Beatris Lemus; RD notes trending weight loss over last several months; po intake through admission so far has been adequate, taking an average of greater than 75% of meals when offered so far. RD obtains all nutrition hx from daughter at bedside; pt without recent weight loss. Drinks 1 boost or ensure at home daily- RD will add to orders while admitted. Daughter denied other nutrition needs at this time. Palliative care consult/code status noted. MALNUTRITION ASSESSMENT  Context of Malnutrition: Chronic Illness   Malnutrition Status: At risk for malnutrition (Comment)    NUTRITION DIAGNOSIS   Problem: Problem #1: Increased nutrient needs  Etiology: radiation  Signs & Symptoms: Diet history of poor intake  and Weight loss     NUTRITION INTERVENTION  Food and/or Nutrient Delivery:Continue Current diet  or Start ONS   Nutrition education/counseling/coordination of care: Continue Inpatient Monitoring     NUTRITION RISK LEVEL: Risk Level: Low        The patient will still be monitored per nutrition standards of care. Consult dietitian if nutrition interventions essential to patient care is needed.      Mirtha Caraballo, [address]  [place]:  [phone]  Office:  [phone]

## 2021-02-24 NOTE — PLAN OF CARE
Problem: Falls - Risk of:  Goal: Will remain free from falls  Description: Will remain free from falls  Outcome: Ongoing  Note: Pt will remain free of falls for the duration of the shift. Pt is A/O to person and place  and uses the call light appropriately for needs. Pt is assist x2 with stefan jhaveri. Bed alarm on, wheels locked, bed in lowest position, side rails up 2/4, nonskid socks on, call light and bedside table in reach. Will continue to monitor. Problem: Pain:  Goal: Control of acute pain  Description: Control of acute pain  Outcome: Ongoing  Note: Pt c/o pain in the right hip. Pt takes PO pain medication. Bed alarm on, wheels locked, bed in lowest position, side rails up 2/4, nonskid socks on, call light and bedside table in reach. Will continue to monitor.

## 2021-02-24 NOTE — CARE COORDINATION
Patient here from home. Plans for patient to discharge to home pending patients response to radiation treatments and test results. Possible d/c with either HOC vs palliative. CM will continue to follow for any changes to discharge plans.   Mendel Peabody RN  RN Case Manager  [phone]

## 2021-02-24 NOTE — PROGRESS NOTES
Enoxaparin 30 mg subQ daily ordered for patient. This medication is renally eliminated. Will change to enoxaparin 40 mg subQ daily per renal dose adjustment policy. Estimated Creatinine Clearance: 43 mL/min (based on SCr of 0.9 mg/dL). Pharmacy will continue to monitor renal function and adjust dose as necessary. Please call with any questions.   S[facility] NavasD, [facility]  Main pharmacy: [id number]  2/24/2021 10:36 AM

## 2021-02-24 NOTE — PROGRESS NOTES
Pt reporting ABD discomfort and states she needs to urinate. Low ABD is distended. Pt bladder scanned for only 236. Pt taken to the BR and attempted to voud w/o success. Pt requesting SC. Rn straight cathed for 200 mL yellowe urine. Pt reports relief.

## 2021-02-24 NOTE — PROGRESS NOTES
Oncology Hematology Care  Progress Note    Subjective:     Still has back pain. Started radiation treatment yesterday, reports minimal improvement in LE weakness. Review of Systems:     Review of Systems   Constitutional: Negative. HENT: Negative. Eyes: Negative. Respiratory: Negative. Cardiovascular: Negative. Gastrointestinal: Negative. Endocrine: Negative. Genitourinary: Negative. Musculoskeletal: Positive for arthralgias, back pain and gait problem. Allergic/Immunologic: Negative. Neurological: Positive for numbness.        Objective:     Medications    Current Facility-Administered Medications: magic (miracle) mouthwash, 5 mL, Swish & Spit, 4x Daily PRN  [COMPLETED] methocarbamol (ROBAXIN) 1,000 mg in dextrose 5 % 100 mL IVPB, 1,000 mg, Intravenous, Q8H **FOLLOWED BY** methocarbamol (ROBAXIN) tablet 750 mg, 750 mg, Oral, 4 times per day  diazePAM (VALIUM) tablet 5 mg, 5 mg, Oral, Q6H PRN  pantoprazole (PROTONIX) tablet 40 mg, 40 mg, Oral, QAM AC  senna (SENOKOT) tablet 8.6 mg, 1 tablet, Oral, BID  docusate sodium (COLACE) capsule 100 mg, 100 mg, Oral, BID  polyethylene glycol (GLYCOLAX) packet 17 g, 17 g, Oral, Daily  naloxegol (MOVANTIK) tablet 12.5 mg, 12.5 mg, Oral, QAM  morphine (PF) injection 1 mg, 1 mg, Intravenous, Q4H PRN  sodium chloride flush 0.9 % injection 10 mL, 10 mL, Intravenous, 2 times per day  sodium chloride flush 0.9 % injection 10 mL, 10 mL, Intravenous, PRN  potassium chloride 10 mEq/100 mL IVPB (Peripheral Line), 10 mEq, Intravenous, PRN  magnesium sulfate 2000 mg in 50 mL IVPB premix, 2,000 mg, Intravenous, PRN  enoxaparin (LOVENOX) injection 30 mg, 30 mg, Subcutaneous, Daily  promethazine (PHENERGAN) tablet 12.5 mg, 12.5 mg, Oral, Q6H PRN **OR** ondansetron (ZOFRAN) injection 4 mg, 4 mg, Intravenous, Q6H PRN  magnesium hydroxide (MILK OF MAGNESIA) 400 MG/5ML suspension 30 mL, 30 mL, Oral, Daily PRN  acetaminophen (TYLENOL) tablet 650 mg, 650 mg, Oral, Q6H PRN **OR** acetaminophen (TYLENOL) suppository 650 mg, 650 mg, Rectal, Q6H PRN  dexamethasone (DECADRON) injection 6 mg, 6 mg, Intravenous, Q6H  albuterol (PROVENTIL) nebulizer solution 2.5 mg, 2.5 mg, Nebulization, Q4H PRN  atorvastatin (LIPITOR) tablet 40 mg, 40 mg, Oral, Daily  magnesium oxide (MAG-OX) tablet 400 mg, 400 mg, Oral, Daily  midodrine (PROAMATINE) tablet 5 mg, 5 mg, Oral, BID WC  oxyCODONE (ROXICODONE) immediate release tablet 10 mg, 10 mg, Oral, Q4H PRN    Allergies  No Known Allergies    Physical Exam  VITALS:  BP (!) 144/88   Pulse 79   Temp 98 °F (36.7 °C) (Oral)   Resp 16   Ht 5' 2\" (1.575 m)   Wt 130 lb 1.1 oz (59 kg)   SpO2 93%   BMI 23.79 kg/m²   TEMPERATURE:  Current - Temp: 98 °F (36.7 °C); Max - Temp  Av.1 °F (36.7 °C)  Min: 97.7 °F (36.5 °C)  Max: 98.5 °F (36.9 °C)  PULSE OXIMETRY RANGE: SpO2  Av %  Min: 93 %  Max: 97 %  24HR INTAKE/OUTPUT:      Intake/Output Summary (Last 24 hours) at 2021 0967  Last data filed at 2021 6936  Gross per 24 hour   Intake 360 ml   Output 925 ml   Net -565 ml       Physical Exam  HENT:      Mouth/Throat:      Mouth: Mucous membranes are moist.   Eyes:      General: No scleral icterus. Extraocular Movements: Extraocular movements intact. Cardiovascular:      Rate and Rhythm: Normal rate and regular rhythm. Heart sounds: No murmur. Pulmonary:      Effort: Pulmonary effort is normal.      Breath sounds: No wheezing or rales. Abdominal:      Palpations: Abdomen is soft. Tenderness: There is no abdominal tenderness. Musculoskeletal:      Comments: Able to plantar flex, wiggle toes on LLE, Unable to do so on RLE. No resistance against gravity. Neurological:      Mental Status: She is alert.          Labs  Recent Labs     21  1255 21  0642 21  0510   WBC 22.4* 19.5* 31.1*   HGB 10.4* 10.8* 10.7*   HCT 32.1* 33.1* 33.1*    235 278   MCV 99.8 100.2* 98.4       Recent Labs 02/22/21  1255 02/23/21  0642 02/24/21  0510    134* 125*   K 4.8 4.0 4.2    97* 89*   CO2 29 28 24   BUN 21* 24* 24*   CREATININE 1.4* 1.2 0.9       Recent Labs     02/22/21  1255   AST 25   ALT 15   BILITOT 0.5   ALKPHOS 88       No results for input(s): MG in the last 72 hours. Radiology  MRI THORACIC SPINE W WO CONTRAST  Extensive marrow replacement with multiple areas enhancement consistent with   diffuse skeletal metastases.       There is cord metastases noted at T8-T9 measuring 2.4 cm length.  Elevated   signal on T2 a demonstrated from T4 down to T11.       Much of the thoracic spine is obscured on the postcontrast axial images due   to excessive motion. Huyen Larve other areas of enhancement noted on the   postcontrast sagittal images          MRI LUMBAR SPINE W WO CONTRAST  Diffuse skeletal metastases and degenerative changes as detailed above.       Incidentally noted infrarenal abdominal aortic aneurysm.             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  Plan for 10 fractions as Per Dr. Murguia Abt.   -Not a candidate for NSGY intervention as Per Dr. Lorraine Sandoval.   -Decadron 5 mg q6h. -Follows with Dr. Pascale Ya for management of her relapsed, metastatic small cell lung cancer. Her disease has progressed on current regimen with lurbinectedin. On discharge she will need to see Dr. Pascale Ya in office to discuss further treatment options and /or goals of care given progression of disease.   -Had goals of care and code status discussion with  & Palliative care. Now changed to [facility]. Discussed with attending Dr. Layla Grissom MD PGY-2  2/24/2021     Attending:    Patient seen and examined. Data reviewed. Case discussed with Dr. Elin Dixon on morning rounds and agree with his progress note with my modification.     Vlad Sung MD, PhD

## 2021-02-24 NOTE — ACP (ADVANCE CARE PLANNING)
I discussed Advance Care Planning with Judith Swenson's daughter as the pt is confused and does not have capacty to make decisions today which included the patient's choices for care and treatment in the case of a health event that adversely affects decision-making abilities. She stated the Advance Care Directives on file are current and after knowledge of metastatic disease plan is to change to DNR CC. She wants Hospice consulted and goal is for home hospice. Goal of care is comfort as prognosis is poor. The daughter has no questions at this time. 1. Goals of medical treatment were reviewed . 2. Patient's daughter's (POA)stated goal/treatment preference is DNR CC.  3. Others present during the discussion-none   4. The discussion lasted 17 minutes. 5. I will notify nurse of change in care plan.       Reba Boast, MD

## 2021-02-24 NOTE — PROGRESS NOTES
Progress Note  Admit Date: 2/22/2021         CC: F/U for right leg numbness and weakness    Subjective:  Somnolent, confused. Review of Systems: - unobtainable 2/2 ams    Diet: DIET GENERAL;    PHYSICAL EXAM:  BP (!) 144/88   Pulse 79   Temp 98 °F (36.7 °C) (Oral)   Resp 16   Ht 5' 2\" (1.575 m)   Wt 130 lb 1.1 oz (59 kg)   SpO2 93%   BMI 23.79 kg/m²   No results for input(s): POCGLU in the last 72 hours. Intake/Output Summary (Last 24 hours) at 2/24/2021 6923  Last data filed at 2/24/2021 9374  Gross per 24 hour   Intake 600 ml   Output 925 ml   Net -325 ml     General appearance: difficult to arouse  Head: Normocephalic, without obvious abnormality, atraumatic  Neck: marked anterior cervical adenopathy and supple, symmetrical, trachea midline  Lungs: clear to auscultation bilaterally  Heart: regular rate and rhythm  Abdomen: normal findings: soft, non-tender  Extremities: no edema b/l le  Skin: no rash or lesions  Neurologic: Mental status: somnolent, confused  Motor:grossly normal    LABS:  Recent Labs     02/22/21  1255 02/23/21  0642 02/24/21  0510   WBC 22.4* 19.5* 31.1*   HGB 10.4* 10.8* 10.7*   HCT 32.1* 33.1* 33.1*    235 278                                                                  Recent Labs     02/23/21  0642 02/24/21  0510 02/24/21  1249   * 125* 125*   K 4.0 4.2 3.9   CL 97* 89* 89*   CO2 28 24 26   BUN 24* 24* 22*   CREATININE 1.2 0.9 0.8   GLUCOSE 183* 125* 121*       Assessment & Plan:      Acute encephalopathy:  Unclear etiology- concern for brain mets vs metabolic cause vs infection. Obtain stat head ct/ rx hyponatremia/ check urine for uti. Neuro checks/ supportive care. 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 Start ns/ follow na and I/o. Lung CA w/ sponal mets w/ cord compression:  Requiring xrt- onc and radonc consulted. Cont iv decadron in the meantime. Pain control.     Urinary and bowel retention:  Likely 2/2 cord compression- amaral. Constipation:  Cont on a bowel regimen. DVT Proph:  Subcut lovenox. Dispo:  Home once xrt starts and pt has some improvement. Palliative care following- follow on dc? Cont inpatient for now.     DNR-CCA       Ashley Ochoa MD

## 2021-02-24 NOTE — PROGRESS NOTES
Palliative Care Chart Review  and Check in Note:     NAME:  Nataliya Max Date: 2/22/2021  Hospital Day:  Hospital Day: 3   Current Code status: DNR-CCA    Palliative care is continuing to following Ms. Swenson for symptom management,  and goals of care discussion as needed. Patient's chart reviewed today 2/24/21. Saw pt at the bedside, no family at the bedside, she was asleep, did not attempt to wake. Per chart review, she had her first radiation treatment yesterday. The following are the currently established goals/code status, and Symptom management. Goals of care: Continue with current management, pt/family hopeful that she will have improvement in functional status, LE weakness and pain with palliative radiation. If not, or she is no longer a candidate for systemic therapy they would consider hospice. Code status: [facility]    Discharge plan: Home with outpatient palliative care when medically ready for discharge. Pt to follow up with Dr. Dereck Shah in office to discuss further treatment options upon discharge.        [facility]  Inpatient Palliative Care  [phone]

## 2021-02-24 NOTE — PROGRESS NOTES
Patient drowsy, responds to voice and painful stimuli with \"mhm\". Pt unable to keep eyes open. unable to assess orientation or neuro check accurately d/t lethargy, hospitalist notified, awaiting MRI. Patient moves all extremities except RLE. Patient denies pain or nausea. Patient without appetite, declined breakfast and lunch. Patient unable to void independently, amaral placed per orders. Patient and daughter instructed to call out for needs. Will continue to monitor.

## 2021-02-24 NOTE — PROGRESS NOTES
Patient returned to MRI. Unable to complete MRI d/t patient unable to tolerate it, per MRI tech. Hospitalist notified.

## 2021-02-24 NOTE — PROGRESS NOTES
Physical Therapy    Chart review completed. Attempted to see pt at [phone] on 2/24/21. Pt found supine in bed sleeping. Per pt's daughter, pt is currently very agitated when disturbed and would prefer not to be woken up. Pt's daughter politely asking if PT can try again tomorrow. Pt left supine in bed with bed alarm set and call light/needs within reach. Will re-attempt as schedule allows.     Hans Nguyen, PT

## 2021-02-24 NOTE — PLAN OF CARE
Problem: Falls - Risk of:  Goal: Will remain free from falls  Description: Will remain free from falls  2/24/2021 1241 by Hamzah Burt RN  Outcome: Ongoing     Problem: Skin Integrity:  Goal: Absence of new skin breakdown  Description: Absence of new skin breakdown  Outcome: Ongoing     Problem: Pain:  Goal: Pain level will decrease  Description: Pain level will decrease  Outcome: Ongoing     Problem: Pain:  Goal: Control of acute pain  Description: Control of acute pain  2/24/2021 1241 by Hamzah Burt RN  Outcome: Ongoing

## 2021-02-24 NOTE — CONSULTS
NEUROSURGERY CONSULT NOTE    Blade Sandhu  [id number]   1945 2/23/2021    Requesting physician: No admitting provider for patient encounter. 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 female  with significant past medical history of small cell lung cancer with liver mets who presents with new bone mets and spinal cord compression at T8-T9. She saw Dr. Eric Spicer in the office Friday with mild RLE complaints. MRI T and L spine was ordered and found to have cord mets. Her RLE weakness has drastically increased and she can not left her R leg off the bed now. She also has urinary incontinence. She was transferred to [facility] for urgent radiation. ROS:   GENERAL:  Denies fever or recent illness.  Denies weight changes   EYES:  Denies vision change or diplopia  EARS:  Denies hearing loss  CARDIAC:  Denies chest pain  RESPIRATORY:  Denies shortness of breath  SKIN:  Denies rash or lesions   HEM:  Denies excessive bruising  PSYCH:  Denies anxiety or depression  NEURO:  Denies headache, + numbness or tingling or lateralizing weakness on RLE  :  + urinary incontinence  GI: Denies nausea, vomiting, diarrhea or constipation  MUSCULOSKELETAL:  No arthralgias    No Known Allergies    Past Medical History:   Diagnosis Date    Cancer ([facility]     Chronic hyponatremia     Dr Bishop Arellano, [facility]    COVID-19 08/13/2020    Hyperlipidemia     Lung cancer ([facility]         Past Surgical History:   Procedure Laterality Date    BREAST SURGERY      fibroid tumors removed bilateral breast    BRONCHOSCOPY N/A 5/11/2020    BRONCHOSCOPY ENDOBRONCHIAL ULTRASOUND with ANESTHESIA performed by Azucena Gonzalez MD at [address] [place] [address]  5/11/2020    BRONCHOSCOPY/TRANSBRONCHIAL NEEDLE BIOPSY performed by Azucena Gonzalez MD at [address] [place] [address] 2020    BRONCHOSCOPY/TRANSBRONCHIAL NEEDLE BIOPSY ADDL LOBE performed by Malik Melgar MD at [address]  2020    BRONCHOSCOPY DIAGNOSTIC OR CELL [address] performed by Malik Melgar MD at [address] N/A 2020    RIGHT SUBCLAVIAN VEIN PORT A CATH INSERTION performed by Christina Orozco MD at [address]      mid chest       Social History     Occupational History    Not on file   Tobacco Use    Smoking status: Former Smoker     Packs/day: 1.50     Years: 55.00     Pack years: 82.50     Quit date: 2020     Years since quittin.8    Smokeless tobacco: Never Used   Substance and Sexual Activity    Alcohol use: Never     Frequency: Never    Drug use: Never    Sexual activity: Not Currently        No family history on file. Outpatient Medications Marked as Taking for the 21 encounter [facility] Encounter)   Medication Sig Dispense Refill    oxyCODONE (ROXICODONE) 5 MG immediate release tablet Take 10 mg by mouth every 4 hours as needed for Pain.       Magic Mouthwash (MIRACLE MOUTHWASH) Swish and spit 5 mLs 4 times daily as needed for Irritation 1 Bottle 0    magnesium oxide (MAG-OX) 400 (240 Mg) MG tablet Take 1 tablet by mouth daily 30 tablet 0    torsemide (DEMADEX) 10 MG tablet Take 1 tablet by mouth daily 30 tablet 0    sodium chloride 1 g tablet Take 2 tablets by mouth 3 times daily (with meals) 90 tablet 3    senna (SENOKOT) 8.6 MG tablet Take 1 tablet by mouth 2 times daily      potassium chloride (KLOR-CON M) 20 MEQ extended release tablet Take 1 tablet by mouth 2 times daily 60 tablet 0    ondansetron (ZOFRAN ODT) 4 MG disintegrating tablet Take 2 tablets by mouth every 8 hours as needed for Nausea or Vomiting 20 tablet 5    promethazine (PHENERGAN) 12.5 MG tablet Take 1 tablet by mouth every 6 hours as needed for Nausea 25 tablet 5    atorvastatin (LIPITOR) 40 MG tablet Take 1 tablet by magnesium hydroxide (MILK OF MAGNESIA) 400 MG/5ML suspension 30 mL  30 mL Oral Daily PRN Brent Carballo MD        acetaminophen (TYLENOL) tablet 650 mg  650 mg Oral Q6H PRN Brent Carballo MD        Or    acetaminophen (TYLENOL) suppository 650 mg  650 mg Rectal Q6H PRN Brent Carballo MD        dexamethasone (DECADRON) injection 6 mg  6 mg Intravenous Q6H Hugh Herrera MD   6 mg at 02/23/21 1829    albuterol (PROVENTIL) nebulizer solution 2.5 mg  2.5 mg Nebulization Q4H PRN Brent Carballo MD        atorvastatin (LIPITOR) tablet 40 mg  40 mg Oral Daily Brent Carballo MD   40 mg at 02/23/21 0813    magnesium oxide (MAG-OX) tablet 400 mg  400 mg Oral Daily Brent Carballo MD   400 mg at 02/23/21 0813    midodrine (PROAMATINE) tablet 5 mg  5 mg Oral BID WC Brent Carballo MD   5 mg at 02/23/21 1718    oxyCODONE (ROXICODONE) immediate release tablet 10 mg  10 mg Oral Q4H PRN Brent Carballo MD            Objective:  BP (!) 156/89   Pulse 72   Temp 97.7 °F (36.5 °C) (Oral)   Resp 18   Ht 5' 2\" (1.575 m)   Wt 130 lb 1.1 oz (59 kg)   SpO2 97%   BMI 23.79 kg/m²     Physical Exam:   Patient seen and examined  General: Well developed. Alert and cooperative in no acute distress. HENT: atraumatic, neck supple  Eyes: Optic discs: Not tested  Pulmonary: unlabored respiratory effort  Cardiovascular:  Warm well perfused.  No peripheral edema  Gastrointestinal: abdomen soft, NT, ND    Neurological:  Mental Status: Awake, alert, oriented x 4, speech clear and appropriate  Attention: Intact  Language: No aphasia or dysarthria noted  Sensation: Intact to all extremities to light touch,numbness to right leg  Coordination: Intact      Cranial Nerves:  II: Visual acuity not tested, denies new visual changes / diplopia  III, IV, VI: PERRL, 3 mm bilaterally, EOMI, no nystagmus noted  V: Facial sensation intact bilaterally to touch  VII: Face symmetric  VIII: Hearing intact bilaterally to spoken voice  IX: Palate movement equal bilaterally  XI: Shoulder shrug equal bilaterally  XII: Tongue midline    Musculoskeletal:   Gait: Not tested   Assist devices: None   Tone: normal  Motor strength:    Right  Left    Right  Left    Deltoid  5 5  Hip Flex  0 5   Biceps  5 5  Knee Extensors  0 5   Triceps  5 5  Knee Flexors  0 5   Wrist Ext  5 5  Ankle Dorsiflex. 0 5   Wrist Flex  5 5  Ankle Plantarflex. 0 5   Handgrip  5 5  Ext Jh Longus  0 5   Thumb Ext  5 5         Radiological Findings:    I personally reviewed the patient's imaging which consists of an MRI of the thoracic spine dated 2/19/2021. This demonstrates diffuse enhancing lesions within the majority of the visualized vertebral bodies. Notably, there is evidence of an enhancing lesion that is intradural intramedullary at the level of T8 and T9. The lesion measures 2.4 x 0.6 x 0.6 cm In size and is associated with extensive surrounding T2 signal abnormality from T4-T11. Labs:  Recent Labs     02/23/21  0642   WBC 19.5*   HGB 10.8*   HCT 33.1*          Recent Labs     02/23/21  0642   *   K 4.0   CL 97*   CO2 28   BUN 24*   CREATININE 1.2   GLUCOSE 183*   CALCIUM 9.1       No results for input(s): PROTIME, INR, APTT in the last 72 hours.     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 ([facility] [address] [facility] 08/08/2020    Acute blood loss anemia 08/08/2020    Elevated procalcitonin 08/08/2020    Hyponatremia 08/08/2020    Former smoker 08/08/2020    Anemia 08/07/2020    Sepsis ([address] 08/07/2020    Lung cancer ([facility] [address] [facility] 08/07/2020    Hyperlipidemia 08/07/2020    2019 novel coronavirus disease (COVID-19) 08/07/2020    Hypokalemia 08/07/2020    Lung mass     Multiple lung nodules     Hilar adenopathy     Centrilobular emphysema (HCC)     Smoker     Acute hyponatremia 05/04/2020    N&V (nausea and vomiting) 05/04/2020    Leukocytosis 05/04/2020       Assessment:  Patient is a 77 y.o. female w/ hx of metastatic small cell lung cancer who presented with back pain and R LE weakness related to an intradural intramedullary lesion at T8-9    Plan:  1. Neurologic exams frequency: Floor: Q4H  2. Decadron 4 mg q6 hrs  3. PPI/SSI while on Decadron  4. Muscle spasms: PRN Robaxin and Valium  5. Pain control: Managed by medical team  6. Radiation oncology to consult given SCLC hx. Notably, patient has poor KPS and currently on third line therapy for tumor. She would not be a good candidate for surgical intervention. 7. Thank you for consult. Will follow peripherally while in house.   Please call with any questions or decline in neurological status    Fernanda Bruno MD, PhD  [address], [address] [facility], [address] [phone] (c), [phone] (o)

## 2021-02-24 NOTE — PROGRESS NOTES
Brought patient to MRI for scan of brain. Patient kept saying that she wanted to do the test then would immediately say get me up over and over. Attempted one sequence and patient tried to crawl out of scanner. Unable to obtain MRI at this time. PerfectServed ordering MD but Claude Maya was not available. Message was forwarded to Dr. Rochelle Ayala.

## 2021-02-25 PROCEDURE — 77014 HC CT TREATMENT PLAN: CPT

## 2021-02-25 PROCEDURE — 77412 RADIATION TX DELIVERY LVL 3: CPT

## 2021-02-25 PROCEDURE — 6360000002 HC RX W HCPCS: Performed by: INTERNAL MEDICINE

## 2021-02-25 PROCEDURE — 1200000000 HC SEMI PRIVATE

## 2021-02-25 RX ADMIN — DEXAMETHASONE SODIUM PHOSPHATE 6 MG: 4 INJECTION, SOLUTION INTRA-ARTICULAR; INTRALESIONAL; INTRAMUSCULAR; INTRAVENOUS; SOFT TISSUE at 13:57

## 2021-02-25 RX ADMIN — DEXAMETHASONE SODIUM PHOSPHATE 6 MG: 4 INJECTION, SOLUTION INTRA-ARTICULAR; INTRALESIONAL; INTRAMUSCULAR; INTRAVENOUS; SOFT TISSUE at 03:24

## 2021-02-25 RX ADMIN — MORPHINE SULFATE 2 MG: 2 INJECTION, SOLUTION INTRAMUSCULAR; INTRAVENOUS at 13:57

## 2021-02-25 RX ADMIN — DEXAMETHASONE SODIUM PHOSPHATE 6 MG: 4 INJECTION, SOLUTION INTRA-ARTICULAR; INTRALESIONAL; INTRAMUSCULAR; INTRAVENOUS; SOFT TISSUE at 18:42

## 2021-02-25 RX ADMIN — LORAZEPAM 1 MG: 2 INJECTION INTRAMUSCULAR; INTRAVENOUS at 16:10

## 2021-02-25 RX ADMIN — DEXAMETHASONE SODIUM PHOSPHATE 6 MG: 4 INJECTION, SOLUTION INTRA-ARTICULAR; INTRALESIONAL; INTRAMUSCULAR; INTRAVENOUS; SOFT TISSUE at 06:50

## 2021-02-25 ASSESSMENT — PAIN SCALES - GENERAL
PAINLEVEL_OUTOF10: 8
PAINLEVEL_OUTOF10: 0
PAINLEVEL_OUTOF10: 0

## 2021-02-25 NOTE — PROGRESS NOTES
Now decided on hospice- pt's daughter to meet w/ hospice today. Likely dc home w/ hospice care in am.  Cont comfort measures to include prn morphine and ativa for pain and anxiety respectively.     [facility]

## 2021-02-25 NOTE — PROGRESS NOTES
I spoke with Ms. Swenson's daughter, Osmar Pitt, and discussed the finding of brain metastases. We discussed goals of care and treatment options. We reviewed that whole brain radiation therapy would be an option to treat the brain metastases. I emphasized the patient is already been on steroids for a couple days and we have not seen significant improvement in her neurological status. Generally the improvement in neurological function seen in the first 24 to 48 hours of steroids is the level of improvement you will see after undergo whole brain therapy. Therefore, I do not expect her neurological function would improve much, if at all, with whole brain radiation. Debbie aGrber had made a decision yesterday to move forward with palliative care for her mother. She is not interested in pursuing whole brain radiation therapy to treat the brain metastases. Given Ms Swenson's overall functional status I would agree with this decision, that treatment would be futile. We will hold the spinal radiation at this point and anticipate that Ms. Swenson will be discharged on hospice.     Paris Singh MD

## 2021-02-25 NOTE — CARE COORDINATION
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 [facility]) [C79.49]  Patient Admission Status: Inpatient    Date of Admission:2/22/2021  4:46 PM Length of Stay: 3 GLOS:      Current Plan of Care: Hospice  ________________________________________________________________________________________  PT AM-PAC: 9 / 24 per last evaluation on: 2/24    OT AM-PAC: 16 / 24 per last evaluation on: 2/23    DME Needs for discharge:   ________________________________________________________________________________________  Discharge Plan: To Be Determined DUE TO: HOC consult    Tentative discharge date: pending    Current barriers to discharge: [address] consult    Referrals completed: Hospice: HOC    Resources/ information provided: Not indicated at this time  ________________________________________________________________________________________  Case Management Notes:  Patient here from home. [facility] requesting consult for Hospice vs Palliative. MARISABEL placed follow up call to Carmen at [address] [place] [address]. Carmen stated she would follow up with the hospital liaisons then return call to CM. CM also placed call to daughter [facility] [phone]. [facility] stated they have now decided on hospice rather than palliative. HOC to contact daughter this afternoon    Jaidenninatrev Most and her family were provided with choice of provider; she and her family are in agreement with the discharge plan.     Care Transition Patient: Macarena Chapman RN  [facility] [employer]  Case Management Department  Ph: [phone]  Fax: [phone]

## 2021-02-25 NOTE — ONCOLOGY
[address] Mcd[address] didn't receive treatment was unable to remain still.     DAILY DOSE:300 cGy    CUMULATIVE DOSE 600 cGy    #   OUT OF TREATMENTS    NEXT PLANNED TREATMENT  DATE:     Daily Treatments are Monday through Friday       INPATIENT CODING:  Beam Radiation   Photons   Photon energy : 15 mv
[address]    [phone]    DATE:2/24/21    AREA TREATED:T-spine    DAILY DOSE: 300cGy    CUMULATIVE DOSE:600 cGy    # 2  OUT OF 10 TREATMENTS    NEXT PLANNED TREATMENT  DATE:Thursday 2/25/21    Daily Treatments are Monday through Friday      INPATIENT CODING:  Beam Radiation  Photons   Photon energy : 15 mv
[address]    [phone]    DATE:2/23/21    AREA TREATED:T-spine    DAILY DOSE:300 cGy    CUMULATIVE DOSE:300 cGy    # 1  OUT OF 10 TREATMENTS    NEXT PLANNED TREATMENT  DATE:Wednesday 2/24    Photons  15 mv
midline. 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 Both the patient and her daughter were made aware that this is an ominous finding. I did recommend radiation therapy to the majority of the thoracic spine. It is my hope That she still may have some neurological recovery but obviously it cannot be guaranteed. We talked about radiation therapy and the side effects such as skin erythema and importantly esophagitis at the end of the 10 treatments. Also recommended scanning her brain. Daughter has a good understanding that the goals of treatment are palliative in nature. If she has some improvement consideration to further systemic therapy may be given. However at this point her performance status remains poor as she is unable to to weight-bear on the right side. The patient's daughter is interested in getting palliative care involved which I think is quite reasonable. Plan to simulate and treat the patient today. 3000 cGy in 10 fractions is planned. An MRI will be ordered.     Continue  Decadron

## 2021-02-25 NOTE — PLAN OF CARE
Problem: Falls - Risk of:  Goal: Will remain free from falls  Description: Will remain free from falls  2/25/2021 0111 by Charles Delgado RN  Outcome: Ongoing  Note: Pt will remain free of falls for the duration of the shift. Pt is A/O to person. Pt is assist x2 with the CHAUNCEY SAMPSON  Problem: Pain:  Goal: Control of acute pain  Description: Control of acute pain  2/25/2021 0111 by Charles Delgado RN  Outcome: Ongoing  Note: No s/s of pain this shift.

## 2021-02-25 NOTE — PLAN OF CARE
Problem: Falls - Risk of:  Goal: Will remain free from falls  Description: Will remain free from falls  2/25/2021 6521 by Michael Alvares RN  Outcome: Ongoing  2/25/2021 0111 by Tessa Patterson RN  Outcome: Ongoing  Note: Pt will remain free of falls for the duration of the shift. Pt is A/O to person.  Pt is assist x2 with the CHAUNCEY SAMPSON     Problem: Pain:  Goal: Pain level will decrease  Description: Pain level will decrease  Outcome: Ongoing

## 2021-02-25 NOTE — PROGRESS NOTES
No acute events overnight. Alertness waxes and wanes. Oriented to person only. Pt denies pain. Pt can get agitated at times. Bed alarm on, wheels locked, bed in lowest position, side rails up 2/4, nonskid socks on, call light and bedside table in reach. Will continue to monitor.

## 2021-02-25 NOTE — PROGRESS NOTES
Bedside report done with Karen. Pt in bed. Vega in place with yellow urine in the collection bag. Pt agitated when touched. Pt lethargic at this time. VSS. Bed alarm on, wheels locked, bed in lowest position, side rails up 2/4, nonskid socks on, call light and bedside table in reach. Will continue to monitor.

## 2021-02-26 VITALS
DIASTOLIC BLOOD PRESSURE: 80 MMHG | SYSTOLIC BLOOD PRESSURE: 128 MMHG | HEIGHT: 62 IN | WEIGHT: 130.07 LBS | TEMPERATURE: 98 F | BODY MASS INDEX: 23.94 KG/M2 | RESPIRATION RATE: 16 BRPM | HEART RATE: 92 BPM | OXYGEN SATURATION: 97 %

## 2021-02-26 PROCEDURE — 6360000002 HC RX W HCPCS: Performed by: INTERNAL MEDICINE

## 2021-02-26 PROCEDURE — 94761 N-INVAS EAR/PLS OXIMETRY MLT: CPT

## 2021-02-26 RX ORDER — MORPHINE SULFATE 10 MG/5ML
2.5 SOLUTION ORAL
Qty: 30 ML | Refills: 0 | Status: SHIPPED | OUTPATIENT
Start: 2021-02-26 | End: 2021-03-01

## 2021-02-26 RX ORDER — ATROPINE SULFATE 0.01 %
DROPS, EMULSION OPHTHALMIC (EYE)
Qty: 1 BOTTLE | Refills: 0 | Status: SHIPPED | OUTPATIENT
Start: 2021-02-26

## 2021-02-26 RX ORDER — HEPARIN SODIUM (PORCINE) LOCK FLUSH IV SOLN 100 UNIT/ML 100 UNIT/ML
100 SOLUTION INTRAVENOUS PRN
Status: DISCONTINUED | OUTPATIENT
Start: 2021-02-26 | End: 2021-02-26 | Stop reason: HOSPADM

## 2021-02-26 RX ORDER — DEXAMETHASONE 4 MG/1
4 TABLET ORAL 2 TIMES DAILY WITH MEALS
Qty: 20 TABLET | Refills: 0 | Status: SHIPPED | OUTPATIENT
Start: 2021-02-26 | End: 2021-03-08

## 2021-02-26 RX ORDER — LORAZEPAM 2 MG/ML
1 CONCENTRATE ORAL EVERY 4 HOURS PRN
Qty: 30 ML | Refills: 0 | Status: SHIPPED | OUTPATIENT
Start: 2021-02-26 | End: 2021-03-12

## 2021-02-26 RX ADMIN — HEPARIN SODIUM (PORCINE) LOCK FLUSH IV SOLN 100 UNIT/ML 100 UNITS: 100 SOLUTION at 16:54

## 2021-02-26 RX ADMIN — DEXAMETHASONE SODIUM PHOSPHATE 6 MG: 4 INJECTION, SOLUTION INTRA-ARTICULAR; INTRALESIONAL; INTRAMUSCULAR; INTRAVENOUS; SOFT TISSUE at 02:17

## 2021-02-26 RX ADMIN — LORAZEPAM 1 MG: 2 INJECTION INTRAMUSCULAR; INTRAVENOUS at 14:34

## 2021-02-26 RX ADMIN — MORPHINE SULFATE 2 MG: 2 INJECTION, SOLUTION INTRAMUSCULAR; INTRAVENOUS at 16:54

## 2021-02-26 RX ADMIN — DEXAMETHASONE SODIUM PHOSPHATE 6 MG: 4 INJECTION, SOLUTION INTRA-ARTICULAR; INTRALESIONAL; INTRAMUSCULAR; INTRAVENOUS; SOFT TISSUE at 12:07

## 2021-02-26 RX ADMIN — DEXAMETHASONE SODIUM PHOSPHATE 6 MG: 4 INJECTION, SOLUTION INTRA-ARTICULAR; INTRALESIONAL; INTRAMUSCULAR; INTRAVENOUS; SOFT TISSUE at 06:47

## 2021-02-26 RX ADMIN — MORPHINE SULFATE 2 MG: 2 INJECTION, SOLUTION INTRAMUSCULAR; INTRAVENOUS at 06:47

## 2021-02-26 ASSESSMENT — PAIN SCALES - GENERAL
PAINLEVEL_OUTOF10: 4
PAINLEVEL_OUTOF10: 2

## 2021-02-26 NOTE — PROGRESS NOTES
Patient VSS, resting well in bed. Patient A&O x3 to self, situation, and time. Patient experiencing numbness in RLE but denies pain. Bed wheels locked, and in lowest position with alarm on. Call light and bedside table within reach.

## 2021-02-26 NOTE — PROGRESS NOTES
Physician Progress Note      Padmini Schultz  CSN #:                  [id number]  :                       1945  ADMIT DATE:       2021 4:46 PM  [address] DATE:  RESPONDING  PROVIDER #:        Luana Yeung MD          QUERY TEXT:    Pt admitted with lung cancer with metastasis to the bones and brain. and has   encephalopathy documented. If possible, please document in progress notes and   discharge summary further specificity regarding the type of encephalopathy:    The medical record reflects the following:    Risk Factors: lung cancer, mets to brain, bone, spine, SIADH  Clinical Indicators: Patient orientation waxes and wanes. She is noted to be   oriented to self and place, intermittently oriented to situation. Other   documentation states that patient is oriented to self, sometimes situation. Advanced care progress note on : pt is confused and does not have capacty   to make decisions today which included the patient's choices for care and   treatment in the case of a health event that adversely affects decision-making   abilities. Per progress note on : -Acute encephalopathy:  Unclear etiology- concern for brain mets vs metabolic cause vs infection. WBC 22.4, 19.5, 31.1, HR 's  Jaytent underwent radiation therapy on ,  noted to be drowsy, responds to   voice and painful stimuli with \"mhm\". Pt unable to keep eyes open. unable to   assess orientation or neuro check accurately d/t lethargy  Treatment: Obtain stat head ct/ rx hyponatremia/ check urine for uti., Neuro   checks/ supportive care. MRI, Hospice consult, Ativan, frequent nursing   rounds, fall risk precautions, call ight in reach.   Options provided:  -- Metabolic encephalopathy, multifactorial, due to possible infection,   hyponatremia, or other metabolic causes  -- Metabolic encephalopathy, multifactorial, due to possible infection, hyponatremia or other metabolic causes, could also be related to brain mets  -- Septic encephalopathy  -- Encephalopathy due to brain mets  -- Toxic encephalopathy due to radiation therapy for spinal mets  -- Other - I will add my own diagnosis  -- Disagree - Not applicable / Not valid  -- Disagree - Clinically unable to determine / Unknown  -- Refer to Clinical Documentation Reviewer    PROVIDER RESPONSE TEXT:    This patient has encephalopathy due to brain mets.     Query created by: Kat Bailey on 2/26/2021 10:31 AM      Electronically signed by:  Rosalba Kerr MD 2/26/2021 11:08 AM

## 2021-02-26 NOTE — PLAN OF CARE
Problem: Falls - Risk of:  Goal: Will remain free from falls  Description: Will remain free from falls  Outcome: Ongoing   Patient has remained free of falls. 2/4 bed rails up, bed locked and in lowest position, call light within reach. Patient instructed on use of call light and uses appropriately. Bed alarm on. Non-skid footwear and fall band on. Will continue to monitor. Problem: Skin Integrity:  Goal: Will show no infection signs and symptoms  Description: Will show no infection signs and symptoms  Outcome: Ongoing   Patient turned and repositioned every two hours. Barrier cream used on bottom. Sacral heart on buttocks. Heels elevated off of bed. Specialty mattress in use. Skin thoroughly assessed. Will continue to monitor.

## 2021-02-26 NOTE — PROGRESS NOTES
Pt dc'd home via ambulance transport with home hospice services. Dc instructions provided to pts daughter at bedside.  Port de accessed per hospice rn instructions

## 2021-02-26 NOTE — PROGRESS NOTES
Patient oriented to self and sometimes situation, VSS. Denies pain. No change in neuro status overnight. Patient resting comfortably. Adequate output from amaral. Fall precautions in place.

## 2021-02-26 NOTE — DISCHARGE INSTR - COC
Continuity of Care Form    Patient Name: Cathy Wharton   :  1945  MRN:  [id number]    Admit date:  2021  Discharge date:  ***    Code Status Order: [facility]   Advance Directives:   [facility] Documentation     Date/Time Healthcare Directive Type of Healthcare Directive Copy in [address] Agent's Name Healthcare Agent's Phone Number    [phone]  Yes, patient has an advance directive for healthcare treatment  Durable power of  for health care  No, copy requested from family  Healthcare power of   Daughter  --          Admitting Physician:  No admitting provider for patient encounter.   PCP: Ana Santillan    Discharging Nurse: [facility] Unit/Room#: 6871/5190-23  Discharging Unit Phone Number: ***    Emergency Contact:   Extended Emergency Contact Information  Primary Emergency Contact: [address] [place] [address] Phone: [phone]  Relation: Child  Secondary Emergency Contact: Alexander Anderson  [place] Phone: [phone]  Relation: Brother/Sister    Past Surgical History:  Past Surgical History:   Procedure Laterality Date    BREAST SURGERY      fibroid tumors removed bilateral breast    BRONCHOSCOPY N/A 2020    BRONCHOSCOPY ENDOBRONCHIAL ULTRASOUND with ANESTHESIA performed by Patricia Valles MD at [address] [place] [address]  2020    BRONCHOSCOPY/TRANSBRONCHIAL NEEDLE BIOPSY performed by Patricia Valles MD at [address] [place] [address]  2020    BRONCHOSCOPY/TRANSBRONCHIAL NEEDLE BIOPSY ADDL LOBE performed by Patricia Valles MD at [address] [place] [address]  2020    BRONCHOSCOPY DIAGNOSTIC OR CELL [address] performed by Patricia Valles MD at [address] N/A 2020    RIGHT SUBCLAVIAN VEIN PORT A CATH INSERTION performed by Felton Prince MD at [address] [place] [address]      mid chest       Immunization History:   Immunization History Administered Date(s) Administered    Influenza, High Dose (Fluzone 65 yrs and older) 2016, 2018    Pneumococcal Conjugate 13-valent (Bnkgdmv47) 2016    Pneumococcal Polysaccharide (Gowatxyvq57) 2019       Active Problems:  Patient Active Problem List   Diagnosis Code    Acute hyponatremia E87.1    N&V (nausea and vomiting) R11.2    Leukocytosis D72.829    Lung mass R91.8    Multiple lung nodules R91.8    Hilar adenopathy R59.0    Centrilobular emphysema (HCC) J43.2    Smoker F17.200    Anemia D64.9    Sepsis (HCC) A41.9    Lung cancer (HCC) C34.90    Hyperlipidemia E78.5    2019 novel coronavirus disease (COVID-19) U07.1    Hypokalemia E87.6    Pulmonary infiltrates R91.8    Small cell lung cancer (HCC) C34.90    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, 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    Acute blood loss anemia D62    Elevated procalcitonin R79.89    Hyponatremia E87.1    Former smoker Z87.891    Acute respiratory failure with hypoxia (HCC) J96.01    Pneumonia of both lower lobes due to infectious organism J18.9    CLL (chronic lymphocytic leukemia) (HCC) C91.10    Aspiration of gastric contents T17.910A    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2 °F (36.8 °C) (Oral)   Resp 14   Ht 5' 2\" (1.575 m)   Wt 130 lb 1.1 oz (59 kg)   SpO2 98%   BMI 23.79 kg/m²     Last documented pain score (0-10 scale): Pain Level: 2  Last Weight:   Wt Readings from Last 1 Encounters:   02/22/21 130 lb 1.1 oz (59 kg)     Mental Status:  {IP PT MENTAL STATUS:42299}    IV Access:  - Central Venous Catheter  - site  right, insertion date: 03/15/2021    Nursing Mobility/ADLs:  Walking   Assisted  Transfer  Assisted  Bathing  Assisted  Dressing  Assisted  Toileting  Assisted  Feeding  Independent  Med Admin  Assisted  Med Delivery   portacath    Wound Care Documentation and Therapy:        Elimination:  Continence:   · Bowel: No  · Bladder: No  Urinary Catheter: Indication for Use of Catheter: Hospice/comfort/palliateive care   Colostomy/Ileostomy/Ileal Conduit: No       Date of Last BM:     Intake/Output Summary (Last 24 hours) at 2/26/2021 1059  Last data filed at 2/26/2021 0247  Gross per 24 hour   Intake 170 ml   Output 825 ml   Net -655 ml     I/O last 3 completed shifts: In: 170 [P.O.:170]  Out: 825 [Urine:825]    Safety Concerns: At Risk for Falls    Impairments/Disabilities:      None    Nutrition Therapy:  Current Nutrition Therapy:   - Oral Diet:  General    Routes of Feeding: Oral  Liquids: Thin Liquids  Daily Fluid Restriction: no  Last Modified Barium Swallow with Video (Video Swallowing Test): not done    Treatments at the Time of Hospital Discharge:   Respiratory Treatments:   Oxygen Therapy:  is not on home oxygen therapy.   Ventilator:    - No ventilator support    Rehab Therapies: hospice care  Weight Bearing Status/Restrictions: No weight bearing restirctions  Other Medical Equipment (for information only, NOT a DME order):  bedside commode and hospital bed  Other Treatments:     Patient's personal belongings (please select all that are sent with patient):  {[facility] DME Belongings:727228709}    RN SIGNATURE:  Electronically signed by Saint Fickle on 2/26/21 at 1:36 PM EST    CASE MANAGEMENT/SOCIAL WORK SECTION    Inpatient Status Date: 2-    Readmission Risk Assessment Score:  Readmission Risk              Risk of Unplanned Readmission:        38           Discharging to Facility/ [address], [address] [place] [address]       Phone: [phone]       Fax: [phone]        ·     / signature: Electronically signed by Hailee Johnson RN on 2/26/21 at 11:00 AM EST    PHYSICIAN SECTION    Prognosis: {Prognosis:8194147829}    Condition at Discharge: [facility] Patient Condition:878100745}    Rehab Potential (if transferring to Rehab): {Prognosis:1428711600}    Recommended Labs or Other Treatments After Discharge: ***    Physician Certification: I certify the above information and transfer of Louann Urban  is necessary for the continuing treatment of the diagnosis listed and that she requires {Admit to Appropriate Level of Care:83045} for {GREATER/LESS:562107905} 30 days.      Update Admission H&P: {CHP DME Changes in FKEWY:568360094}    PHYSICIAN SIGNATURE:  {Esignature:241919163}

## 2021-02-26 NOTE — DISCHARGE SUMMARY
Hospital Discharge Summary    Patient's PCP: Alma Mazariegos  Admit Date: 2/22/2021   Discharge Date: 2/26/2021    Admitting Physician: Dr. Meliton Vidal admitting provider for patient encounter. Discharge Physician: Dr. Fernandes Peers: hematology/oncology, neurosurgery, and palliative care    HPI:  \"77year-old female with past medical history of hyperlipidemia, lung cancer, chronic hyponatremia who presents to the hospital as a transfer from outside facility where she presented for right hip pain numbness. At outside ED hematology was consulted which recommended patient to get radiation therapy at [facility] and symptom relief. Patient denies nausea vomiting chest pain shortness of breath fevers chills however mentions her pain is 10/10 intensity, more localized in spine, she also has associated right leg weakness numbness and inability to move along with urinary incontinence. \"     Given the concern of the patients presentation and the concern of the possible multi-factorial etiology contributing to patients fo evaluation and rx. 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 Hospice was consulted. Home w/ HOC today.     Physical Exam: /80   Pulse 92   Temp 98 °F (36.7 °C) (Axillary)   Resp 16   Ht 5' 2\" (1.575 m)   Wt 130 lb 1.1 oz (59 kg)   SpO2 97%   BMI 23.79 kg/m²     General appearance: alert, appears stated age and cooperative  Head: Normocephalic, without obvious abnormality, atraumatic  Lungs: rhonchi bilaterally  Heart: regular rate and rhythm  Extremities: edema none  Neurologic: Grossly normal    LABS:  Recent Labs     02/24/21  0510   WBC 31.1*   HGB 10.7*   HCT 33.1*                                                                     Recent Labs     02/24/21  0510 02/24/21  1249   * 125*   K 4.2 3.9   CL 89* 89*   CO2 24 26   BUN 24* 22*   CREATININE 0.9 0.8   GLUCOSE 125* 121*     Discharge Medications:   Srinivas Albarado   Home Medication Instructions KMS:[id number]    Printed on:02/26/21 1808   Medication Information                      albuterol sulfate HFA (PROAIR HFA) 108 (90 Base) MCG/ACT inhaler  Inhale 2 puffs into the lungs every 4 hours as needed for Wheezing or Shortness of Breath             Atropine Sulfate 0.01 % SOLN  1-2 drops prn for secretions             dexamethasone (DECADRON) 4 MG tablet  Take 1 tablet by mouth 2 times daily (with meals) for 10 days             LORazepam (LORAZEPAM INTENSOL) 2 MG/ML concentrated solution  Take 0.5 mLs by mouth every 4 hours as needed (agitation/ anxiety) for up to 14 days. Magic Mouthwash (MIRACLE MOUTHWASH)  Swish and spit 5 mLs 4 times daily as needed for Irritation             morphine 10 MG/5ML solution  Take 1.3 mLs by mouth every 2 hours as needed for Pain for up to 3 days. ondansetron (ZOFRAN ODT) 4 MG disintegrating tablet  Take 2 tablets by mouth every 8 hours as needed for Nausea or Vomiting             promethazine (PHENERGAN) 12.5 MG tablet  Take 1 tablet by mouth every 6 hours as needed for Nausea                Activity: activity as tolerated  Diet: regular diet  Wound Care: as directed    Disposition: home with Hospice  Discharged Condition: Stable  Follow Up: Primary Care Physician in one week    Total time spent on discharge, finalizing medications, referrals and arranging follow up was 27 minutes. Thank you Dr. Alexandria Long for the opportunity to be involved in this patients care.

## 2021-02-26 NOTE — CARE COORDINATION
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) [C79.49]  Patient Admission Status: Inpatient    Date of Admission:2/22/2021  4:46 PM Length of Stay: 4 GLOS:      Current Plan of Care: Home with [address]  ________________________________________________________________________________________  PT AM-PAC: 9 / 24 per last evaluation on: 2/24    OT AM-PAC: 16 / 24 per last evaluation on: 2/23    DME Needs for discharge: NA  ________________________________________________________________________________________  Discharge Plan: Home with Hospice: [address]    Tentative discharge date: today    Current barriers to discharge: [address] obtaining equipment    Referrals completed: Hospice: HOC    Resources/ information provided: Not indicated at this time  ________________________________________________________________________________________  Case Management Notes:  Patient here from home. Crystal/ABRAHAM present. Will be meeting with patients daughter in room between 1230-1pm today. Transport has already been arranged for 430pm.  ABRAHAM currently arranging equipment being delivered to home. Will need scripts for comfort meds and DNR. Judith and her family were provided with choice of provider; she and her family are in agreement with the discharge plan.     Care Transition Patient: Macarena Johnson RN  [facility]  Case Management Department  Ph: [phone]  Fax: [phone]

## 2021-02-26 NOTE — PLAN OF CARE
Problem: Falls - Risk of:  Goal: Will remain free from falls  Description: Will remain free from falls  2/26/2021 0901 by Raphael Carlson  Outcome: Ongoing   Fall risk precautions in place. Bed is locked and in lowest position. 2/4 side rails up. Fall risk bracelet in place. Frequent checks on patient. Call light within reach. Free from falls at this time. Will continue to monitor. Problem: Skin Integrity:  Goal: Will show no infection signs and symptoms  Description: Will show no infection signs and symptoms  2/26/2021 0901 by Raphael Carlson  Outcome: Ongoing   VSS at this time. pt skin intact w/ redness to coccyx and bilat heels. Will continue to monitor for changes.

## (undated) DEVICE — SYRINGE, LUER LOCK, 10ML: Brand: MEDLINE

## (undated) DEVICE — BOWL MED M 16OZ PLAS CAP GRAD

## (undated) DEVICE — 10FR FRAZIER SUCTION HANDLE: Brand: CARDINAL HEALTH

## (undated) DEVICE — GLOVE,SURG,SENSICARE SLT,LF,PF,7.5: Brand: MEDLINE

## (undated) DEVICE — Z DISCONTINUED USE 2276105 GOWN PROTCT UNIV CHST W28IN L49IN SL 24IN BLU SPUNBOND FLM

## (undated) DEVICE — SYRINGE MED 50ML LUERSLIP TIP

## (undated) DEVICE — SUTURE VCRL + SZ 3-0 L18IN ABSRB UD SH 1/2 CIR TAPERCUT NDL VCP864D

## (undated) DEVICE — SUTURE MCRYL + SZ 4-0 L18IN ABSRB UD L19MM PS-2 3/8 CIR MCP496G

## (undated) DEVICE — LINER,SOFT,SUCTION CANISTER,1500CC: Brand: MEDLINE

## (undated) DEVICE — SKIN AFFIX SURG ADHESIVE 72/CS 0.55ML: Brand: MEDLINE

## (undated) DEVICE — CHLORAPREP 26ML ORANGE

## (undated) DEVICE — SINGLE USE SUCTION VALVE MAJ-209: Brand: SINGLE USE SUCTION VALVE (STERILE)

## (undated) DEVICE — SINGLE USE BIOPSY VALVE MAJ-210: Brand: SINGLE USE BIOPSY VALVE (STERILE)

## (undated) DEVICE — MINOR SET UP: Brand: MEDLINE INDUSTRIES, INC.

## (undated) DEVICE — DRAPE C ARM W46XL120IN XLN

## (undated) DEVICE — TRAP,MUCUS SPECIMEN, 80CC: Brand: MEDLINE

## (undated) DEVICE — TUBING, SUCTION, 3/16" X 6', STRAIGHT: Brand: MEDLINE

## (undated) DEVICE — ADAPTER TBNG DIA15MM SWVL FBROPT BRONCHSCP TERM 2 AXIS PEEP

## (undated) DEVICE — ADHESIVE SKIN CLSR 0.7ML TOP DERMBND ADV

## (undated) DEVICE — NEEDLE ASPIR 21GA L700MM US GUID TREAT DST END FOR EFFICIENT

## (undated) DEVICE — PROVE COVER: Brand: UNBRANDED

## (undated) DEVICE — Device

## (undated) DEVICE — DECANTER: Brand: UNBRANDED

## (undated) DEVICE — ENDO CARRY-ON PROCEDURE KIT INCLUDES SUCTION TUBING, LUBRICANT, GAUZE, BIOHAZARD STICKER, TRANSPORT PAD AND INTERCEPT BEDSIDE KIT.: Brand: ENDO CARRY-ON PROCEDURE KIT

## (undated) DEVICE — GAUZE,SPONGE,4"X4",8PLY,STRL,LF,10/TRAY: Brand: MEDLINE

## (undated) DEVICE — CONMED SCOPE SAVER BITE BLOCK, 20X27 MM: Brand: SCOPE SAVER

## (undated) DEVICE — SUTURE PROL 2-0 L48IN NONABSORBABLE BLU SH L26MM 1/2 CIR 8533H

## (undated) DEVICE — FLEX ADVANTAGE 3000CC: Brand: FLEX ADVANTAGE

## (undated) DEVICE — DRAPE,T,LAPARO,TRANS,STERILE: Brand: MEDLINE

## (undated) DEVICE — 3M™ TEGADERM™ TRANSPARENT FILM DRESSING WITH BORDER, 1655, 3-1/2 IN X 4-1/2 IN (6.9 CM X 11.5 CM), 50/CT 4CT/CASE: Brand: 3M™ TEGADERM™

## (undated) DEVICE — TUBING, SUCTION, 3/16" X 12', STRAIGHT: Brand: MEDLINE

## (undated) DEVICE — SYRINGE MED 10ML SLIP TIP BLNT FILL AND LUERLOCK DISP

## (undated) DEVICE — FRAME EYEWR PROTCT ASST CLR DISP SAFEVIEW

## (undated) DEVICE — GOWN,REINF,POLY,AURORA,XLNG/XXL,STRL: Brand: MEDLINE